# Patient Record
Sex: FEMALE | Race: WHITE | NOT HISPANIC OR LATINO | Employment: OTHER | URBAN - METROPOLITAN AREA
[De-identification: names, ages, dates, MRNs, and addresses within clinical notes are randomized per-mention and may not be internally consistent; named-entity substitution may affect disease eponyms.]

---

## 2017-01-04 ENCOUNTER — HOSPITAL ENCOUNTER (OUTPATIENT)
Dept: CARDIOLOGY | Facility: HOSPITAL | Age: 72
Setting detail: RECURRING SERIES
Discharge: HOME | End: 2017-01-04

## 2017-01-04 PROCEDURE — 36416 COLLJ CAPILLARY BLOOD SPEC: CPT | Performed by: INTERNAL MEDICINE

## 2017-01-04 PROCEDURE — 85610 PROTHROMBIN TIME: CPT | Performed by: INTERNAL MEDICINE

## 2017-01-06 ENCOUNTER — TELEPHONE (OUTPATIENT)
Dept: CARDIOLOGY | Facility: CLINIC | Age: 72
End: 2017-01-06

## 2017-01-06 NOTE — TELEPHONE ENCOUNTER
Zio patch results show constant atrial fibrillation.  She has a history of paroxysmal atrial fibrillation.  She also had an echocardiogram which showed some aortic stenosis.  Dr. Mina did review this.  He shouldn't is anticoagulated on warfarin and rate controlled on sotalol.  She has a follow-up appointment with Dr. Mina at [facility] on 1/18/2017.  I left a message for the patient to call me with the results of her Zio patch.

## 2017-01-06 NOTE — TELEPHONE ENCOUNTER
----- Message from LUIS ARMANDO Pollock sent at 12/13/2016  3:43 PM EST -----    Follow up on zio patch placed 12/7  Discuss with Dr. Mick Perez

## 2017-01-06 NOTE — TELEPHONE ENCOUNTER
The Zio Patch monitor was returned and scanned in on 12/19/16. I just spoke with Marcos and he is going to obtain the results.

## 2017-01-09 ENCOUNTER — TELEPHONE (OUTPATIENT)
Dept: CARDIOLOGY | Facility: CLINIC | Age: 72
End: 2017-01-09

## 2017-01-12 ENCOUNTER — TELEPHONE (OUTPATIENT)
Dept: CARDIOLOGY | Facility: CLINIC | Age: 72
End: 2017-01-12

## 2017-01-12 NOTE — TELEPHONE ENCOUNTER
Zio patch results:     Zio patch results show constant atrial fibrillation and average heart rate 70 beats per minute. She has a history of paroxysmal atrial fibrillation. She also had an echocardiogram which showed some aortic stenosis. Dr. Mina did review this. The patient anticoagulated on warfarin and rate controlled on sotalol.     She has a follow-up appointment with Dr. Mina at [facility] on 1/18/2017.     Patient was already given the results of the echocardiogram.  I just informed her about the Zio patch results.  She will follow-up with Dr. Mina as scheduled.

## 2017-01-18 ENCOUNTER — OFFICE VISIT (OUTPATIENT)
Dept: CARDIOLOGY | Facility: CLINIC | Age: 72
End: 2017-01-18

## 2017-01-18 VITALS
SYSTOLIC BLOOD PRESSURE: 124 MMHG | BODY MASS INDEX: 33.13 KG/M2 | DIASTOLIC BLOOD PRESSURE: 80 MMHG | HEIGHT: 62 IN | WEIGHT: 180 LBS | HEART RATE: 70 BPM

## 2017-01-18 DIAGNOSIS — Z95.0 PACEMAKER: ICD-10-CM

## 2017-01-18 DIAGNOSIS — Z95.2 AORTIC VALVE REPLACED: ICD-10-CM

## 2017-01-18 DIAGNOSIS — G47.33 OSA (OBSTRUCTIVE SLEEP APNEA): ICD-10-CM

## 2017-01-18 DIAGNOSIS — Z98.890 H/O MITRAL VALVE REPAIR: ICD-10-CM

## 2017-01-18 DIAGNOSIS — I48.20 CHRONIC ATRIAL FIBRILLATION (HCC): Primary | ICD-10-CM

## 2017-01-18 PROCEDURE — 93000 ELECTROCARDIOGRAM COMPLETE: CPT | Performed by: INTERNAL MEDICINE

## 2017-01-18 PROCEDURE — 99214 OFFICE O/P EST MOD 30 MIN: CPT | Performed by: INTERNAL MEDICINE

## 2017-01-18 RX ORDER — WARFARIN SODIUM 5 MG/1
5 TABLET ORAL
Qty: 90 TABLET | Refills: 0 | Status: SHIPPED | OUTPATIENT
Start: 2017-01-18 | End: 2017-03-10 | Stop reason: SDUPTHER

## 2017-01-18 RX ORDER — DILTIAZEM HYDROCHLORIDE 120 MG/1
120 CAPSULE, EXTENDED RELEASE ORAL DAILY
Qty: 90 CAPSULE | Refills: 3 | Status: SHIPPED | OUTPATIENT
Start: 2017-01-18 | End: 2017-09-01

## 2017-01-18 RX ORDER — WARFARIN SODIUM 5 MG/1
5 TABLET ORAL
COMMUNITY
End: 2017-01-18 | Stop reason: SDUPTHER

## 2017-01-18 NOTE — LETTER
January 18, 2017     Cain Dale MD  [address] [address]    Patient: Anna Gilbert   YOB: 1945   Date of Visit: 1/18/2017       Dear Dr. Suyapa MD:    Thank you for referring Anna Gilbert to me for evaluation. Below are the relevant portions of my assessment and plan of care.    If you have questions, please do not hesitate to call me. I look forward to following Anna along with you.         Sincerely,        Adeel Mina III, MD        CC: No Recipients  Adeel Mina III, MD  1/18/2017  1:03 PM  Signed      Subjective:     Encounter Date:01/18/2017      Patient ID: Anna Gilbert is a 71 y.o. female.    Chief Complaint:  History of Present Illness    Dear Dr. Dale,    This patient comes in for routine follow-up of their cardiac status.  Recently, she was seen in our device clinic for surveillance.  It was noted that she had some irregularity on interrogation.  Dr. Perez ordering echocardiogram and an event monitor.    Event Monitor  Event monitor enrollment date was 12/7/2016. Enrollment ended on 12/13/2016. The manufacturing company for the event monitor is [employer]. [employer] monitor is reviewed. A total of 6 days and 10 hours are available for review. Atrial fibrillation is present throughout. Average heart rate is 70 bpm. Maximal heart rate is approximately 150 bpm. Occasional wide complex beats are seen which appear to be atrial fibrillation with aberrancy. Minimal heart rate is atrial fibrillation at a rate of 39 bpm. No other ectopy is seen.    Impression: Abnormal monitor with atrial fibrillation present throughout. However, rate control is appropriate.     Interpretation Summary   · Left ventricular function is normal. Calculated EF = 53.8%. Estimated EF was in agreement with the calculated EF. Estimated EF = 54%. Normal left ventricular cavity size and wall thickness noted. All left ventricular wall segments contract normally.  Left ventricular diastolic function was unable to be assessed. Elevated left atrial pressure.  · Left atrial volume is severely increased.  · Right atrial cavity size is severely dilated.  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 The aortic valve peak and mean gradients are elevated. The prosthetic valve has the following abnormalities: stenosis. The mean aortic valve gradient was 28 mmHg which is moderately elevated for this valve and consistent with moderate prosthetic valve stenosis. 2-D imaging was limited and presence or absence of pannus and leaflet excursion could not be determined.  · Fixed posterior leaflet findings are consistent with surgical mitral valve repair. No mitral valve regurgitation is present. Mild mitral valve stenosis is present. 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 Mitral valve gradient was 7 mmHg at a heart rate of 53 beats minute.  · Tricuspid valve not well visualized. The tricuspid valve is grossly normal. Mild to moderate tricuspid valve regurgitation is present. Estimated right ventricular systolic pressure from tricuspid regurgitation is mildly elevated (35-45 mmHg). The calculated RV systolic pressure is 37 mmHg.  · Mild dilation of the sinuses of Valsalva is present. The aortic root at the sinus of Valsalva measures 4.1 cm.           Although the patient is in chronic atrial fibrillation at this time, she feels completely fine with no symptoms.  This patient denies any cardiac complaints.  This patient denies any chest pain, pressure, tightness, squeezing, or heartburn.  This patient has not experienced any feeling of palpitations, tachycardia or heart racing and no presyncope or syncope.  There has not been any problems with dizziness or lightheadedness.  There has not been any orthopnea or PND, and no problems with lower extremity edema.  This patient denies any shortness of breath at rest or with activity and has not had any  wheezing.  This patient has not had any problems with unexplained nausea or vomiting. The patient has continued to perform daily activities of living without any specific problem or change in the level of activity.  This patient has not been recently hospitalized for any reason.    The following portions of the patient's history were reviewed and updated as appropriate: allergies, current medications, past family history, past medical history, past social history, past surgical history and problem list.    Past Medical History   Diagnosis Date   • Health care maintenance    • Hyperlipidemia    • Hyperthyroidism    • SHEILA (obstructive sleep apnea) 7/20/2016   • PAF (paroxysmal atrial fibrillation)    • Sick sinus syndrome        Past Surgical History   Procedure Laterality Date   • A-v cardiac pacemaker insertion     • Aortic valve repair/replacement  2010     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   • Mitral valve repair  2010   • Cardiac catheterization  01/01/2011       Social History     Social History   • Marital status: Single     Spouse name: N/A   • Number of children: N/A   • Years of education: N/A     Occupational History   • Not on file.     Social History Main Topics   • Smoking status: Former Smoker   • Smokeless tobacco: Not on file      Comment: no caffeine use   • Alcohol use No   • Drug use: No   • Sexual activity: Defer     Other Topics Concern   • Not on file     Social History Narrative       Review of Systems   Constitution: Negative for chills, decreased appetite, fever and night sweats.   HENT: Negative for ear discharge, ear pain, hearing loss, nosebleeds and sore throat.    Eyes: Negative for blurred vision, double vision and pain.   Cardiovascular: Negative for cyanosis.   Respiratory: Negative for hemoptysis and sputum production.    Endocrine: Negative for cold intolerance and heat intolerance.   Hematologic/Lymphatic: Negative for adenopathy.   Skin: Negative for dry skin, itching, nail changes, rash  "and suspicious lesions.   Musculoskeletal: Negative for arthritis, gout, muscle cramps, muscle weakness, myalgias and neck pain.   Gastrointestinal: Negative for anorexia, bowel incontinence, constipation, diarrhea, dysphagia, hematemesis and jaundice.   Genitourinary: Negative for bladder incontinence, dysuria, flank pain, frequency, hematuria and nocturia.   Neurological: Negative for focal weakness, numbness, paresthesias and seizures.   Psychiatric/Behavioral: Negative for altered mental status, hallucinations, hypervigilance, suicidal ideas and thoughts of violence.   Allergic/Immunologic: Negative for persistent infections.         ECG 12 Lead  Date/Time: 1/18/2017 12:14 PM  Performed by: BELEN ROGERS III.  Authorized by: BELEN ROGERS III   Comparison: compared with previous ECG   Similar to previous ECG  Rhythm: atrial fibrillation  Rate: normal  Conduction: conduction normal  ST Segments: ST segments normal  T Waves: T waves normal  QRS axis: normal  Other: no other findings  Clinical impression: normal ECG               Objective:     Vitals:    01/18/17 1058   BP: 124/80   Pulse: 70   Weight: 180 lb (81.6 kg)   Height: 62\" (157.5 cm)         Physical Exam   Constitutional: She is oriented to person, place, and time. She appears well-developed and well-nourished. No distress.   HENT:   Head: Normocephalic and atraumatic.   Nose: Nose normal.   Mouth/Throat: Oropharynx is clear and moist.   Eyes: Conjunctivae and EOM are normal. Pupils are equal, round, and reactive to light. Right eye exhibits no discharge. Left eye exhibits no discharge.   Neck: Normal range of motion. Neck supple. No tracheal deviation present. No thyromegaly present.   Cardiovascular: Normal rate, S1 normal, S2 normal and normal pulses.  An irregularly irregular rhythm present. Exam reveals no S3.    Murmur heard.   Harsh midsystolic murmur is present with a grade of 1/6  at the upper right sternal border radiating to the " neck  Pulmonary/Chest: Effort normal and breath sounds normal. No stridor. No respiratory distress. She exhibits no tenderness.   Abdominal: Soft. Bowel sounds are normal. She exhibits no distension and no mass. There is no tenderness. There is no rebound and no guarding.   Musculoskeletal: Normal range of motion. She exhibits no tenderness or deformity.   Lymphadenopathy:     She has no cervical adenopathy.   Neurological: She is alert and oriented to person, place, and time. She has normal reflexes.   Skin: Skin is warm and dry. No rash noted. She is not diaphoretic. No erythema.   Psychiatric: She has a normal mood and affect. Thought content normal.       Lab Review:             Performed        Assessment:          Diagnosis Plan   1. Chronic atrial fibrillation  diltiazem XR (DILACOR XR) 120 MG 24 hr capsule    ECG 12 Lead   2. Aortic valve replaced  ECG 12 Lead   3. H/O mitral valve repair  ECG 12 Lead   4. SHEILA (obstructive sleep apnea)     5. Pacemaker  ECG 12 Lead          Plan:       Patient is in chronic atrial fibrillation at this point.  She is asymptomatic.  We will accept a rate control in adequate relation strategy.  I will change her sotalol to diltiazem-she previously had generalized fatigue on a higher dose of the sotalol, apparently due to the beta-blockade component of the sotalol.    Thank you very much for allowing us to participate in the care of this pleasant patient.  Please don't hesitate to call if I can be of assistance in any way.      Current Outpatient Prescriptions:   •  Ascorbic Acid (VITAMIN C PO), Take 1,000 mg by mouth Daily., Disp: , Rfl:   •  b complex vitamins capsule, Take 1 capsule by mouth Daily., Disp: , Rfl:   •  bumetanide (BUMEX) 2 MG tablet, Take 1 tablet by mouth 2 (Two) Times a Day As Needed (edema)., Disp: , Rfl:   •  Cholecalciferol (VITAMIN D-3 PO), Take 1 tablet by mouth Daily., Disp: , Rfl:   •  fluticasone (FLONASE) 50 MCG/ACT nasal spray, 1 spray into each  nostril Daily., Disp: , Rfl:   •  levothyroxine (SYNTHROID, LEVOTHROID) 175 MCG tablet, Take 175 mcg by mouth daily., Disp: , Rfl:   •  Multiple Vitamin (MULTIVITAMIN) capsule, Take 1 capsule by mouth Daily., Disp: , Rfl:   •  Potassium 99 MG tablet, Take 1 tablet by mouth Daily., Disp: , Rfl:   •  SELENIUM PO, Take 1 tablet by mouth Daily., Disp: , Rfl:   •  warfarin (JANTOVEN) 5 MG tablet, Take 5 mg by mouth Daily., Disp: , Rfl:   •  diltiazem XR (DILACOR XR) 120 MG 24 hr capsule, Take 1 capsule by mouth Daily., Disp: 90 capsule, Rfl: 3      Atrial Fibrillation and Atrial Flutter  Assessment  • The patient has permanent atrial fibrillation  • This is valvular in etiology  • The patient's CHADS2-VASc score is 3  • A GTY1TS2-FBVz score of 2 or more is considered a high risk for a thromboembolic event  • Warfarin prescribed    Plan  • Continue in atrial fibrillation with rate control  • Continue warfarin for antithrombotic therapy, bleeding issues discussed  • Add diltiazem for rate control      EMR Dragon/Transcription disclaimer:    Much of this encounter note is an electronic transcription/translation of spoken language to printed text. The electronic translation of spoken language may permit erroneous, or at times, nonsensical words or phrases to be inadvertently transcribed; Although I have reviewed the note for such errors, some may still exist.

## 2017-01-18 NOTE — MR AVS SNAPSHOT
Anna Gilbert   1/18/2017 10:45 AM   Office Visit    Dept Phone:  [phone]   Encounter #:  [id number]    Provider:  Adeel Mina III, MD   Department:  [facility] CARDIOLOGY                Your Full Care Plan              Today's Medication Changes          These changes are accurate as of: 1/18/17 11:19 AM.  If you have any questions, ask your nurse or doctor.               New Medication(s)Ordered:     diltiazem  MG 24 hr capsule   Commonly known as:  DILACOR XR   Take 1 capsule by mouth Daily.   Started by:  Adeel Mina III, MD         Medication(s)that have changed:     JANTOVEN 5 MG tablet   Generic drug:  warfarin   Take 5 mg by mouth Daily.   What changed:  Another medication with the same name was removed. Continue taking this medication, and follow the directions you see here.   Changed by:  Adeel Mina III, MD         Stop taking medication(s)listed here:     sotalol 80 MG tablet   Commonly known as:  BETAPACE   Stopped by:  Adeel Mina III, MD                Where to Get Your Medications      You can get these medications from any pharmacy     Bring a paper prescription for each of these medications     diltiazem  MG 24 hr capsule                  Your Updated Medication List          This list is accurate as of: 1/18/17 11:19 AM.  Always use your most recent med list.                b complex vitamins capsule       bumetanide 2 MG tablet   Commonly known as:  BUMEX       diltiazem  MG 24 hr capsule   Commonly known as:  DILACOR XR   Take 1 capsule by mouth Daily.       fluticasone 50 MCG/ACT nasal spray   Commonly known as:  FLONASE       JANTOVEN 5 MG tablet   Generic drug:  warfarin       levothyroxine 175 MCG tablet   Commonly known as:  SYNTHROID, LEVOTHROID       multivitamin capsule       Potassium 99 MG tablet       SELENIUM PO       VITAMIN C PO       VITAMIN D-3 PO               You Were Diagnosed With        Codes Comments    Chronic atrial fibrillation    -  Primary ICD-10-CM: I48.2  ICD-9-CM: 427.31       Instructions    Heart Disease Prevention  Heart disease is a leading cause of death. There are many things you can do to help prevent heart disease.  BE PHYSICALLY ACTIVE  Physical activity is good for your heart. It helps control your blood pressure, cholesterol levels, and weight. Try to be physically active every day. Ask your health care provider what activities are best for you.   BE A HEALTHY WEIGHT  Extra weight can strain your heart and affect your blood pressure and cholesterol levels. Lose weight with diet and exercise if recommended by your health care provider.  EAT HEART-HEALTHY FOODS  Follow a healthy eating plan as recommended by your health care provider or dietitian. Heart-healthy foods include:   · High-fiber foods. These include oat bran, oatmeal, and whole-grain breads and cereals.  · Fruits and vegetables.  Avoid:  · Alcohol.  · Fried foods.  · Foods high in saturated fat. These include meats, butter, whole dairy products, shortening, and coconut or palm oil.  · Salty foods. These include canned food, luncheon meat, salty snacks, and fast food.  KEEP YOUR CHOLESTEROL LEVELS UNDER CONTROL  Cholesterol is a substance that is used for many important functions. When your cholesterol levels are high, cholesterol can stick to the insides of your blood vessels, making them narrow or clog. This can lead to chest pain (angina) and a heart attack.   Keep your cholesterol levels under control as recommended by your health care provider. Have your cholesterol checked at least once a year. Target cholesterol levels (in mg/dL) for most people are:   · Total cholesterol below 200.  · LDL cholesterol below 100.  · HDL cholesterol above 40 in men and above 50 in women.  · Triglycerides below 150.  KEEP YOUR BLOOD PRESSURE UNDER CONTROL  Having high blood pressure (hypertension) puts you at risk for stroke and  other forms of heart disease. Keep your blood pressure under control as recommended by your health care provider. Ask your health care provider if you need treatment to lower your blood pressure. If you are 18-39 years of age, have your blood pressure checked every 3-5 years. If you are 40 years of age or older, have your blood pressure checked every year.  DO NOT USE TOBACCO PRODUCTS  Tobacco smoke can damage your heart and blood vessels. Do not use any tobacco products including cigarettes, chewing tobacco, or electronic cigarettes. If you need help quitting, ask your health care provider.  TAKE MEDICINES AS DIRECTED  Take medicines only as directed by your health care provider. Ask your health care provider whether you should take an aspirin every day. Taking aspirin can help reduce your risk of heart disease and stroke.   FOR MORE INFORMATION   To find out more about heart disease, visit the American Heart Association's website at www.americanheart.org     This information is not intended to replace advice given to you by your health care provider. Make sure you discuss any questions you have with your health care provider.     Document Released: 2005 Document Revised: 2016 Document Reviewed: 2015  [employer] Interactive Patient Education © Elsevier Inc.       Patient Instructions History      Upcoming Appointments     Visit Type Date Time Department    FOLLOW UP 2017 10:45 AM [facility]    FOLLOW UP 2018 10:30 AM [facility]      [employer] Signup     [employer] allows you to send messages to your doctor, view your test results, renew your prescriptions, schedule appointments, and more. To sign up, go to [employer] and click on the Sign Up Now link in the New User? box. Enter your [employer] Activation Code exactly as it appears below along with the last four digits of your Social Security Number and your Date of Birth () to complete the  "sign-up process. If you do not sign up before the expiration date, you must request a new code.    [employer] Activation Code: [email or web address]  Expires: 2/1/2017 11:19 AM    If you have questions, you can email [email or web address] or call [phone] to talk to our [employer] staff. Remember, [employer] is NOT to be used for urgent needs. For medical emergencies, dial 911.               Other Info from Your Visit           Your Appointments     Jan 18, 2018 10:30 AM EST   Follow Up with Adeel Mina III, MD   [facility] CARDIOLOGY (--)    [address] [address] [phone]           Arrive 15 minutes prior to appointment.              Allergies     Gabapentin        Vital Signs     Blood Pressure Pulse Height Weight Body Mass Index Smoking Status    124/80 70 62\" (157.5 cm) 180 lb (81.6 kg) 32.92 kg/m2 Former Smoker      Problems and Diagnoses Noted     Atrial fibrillation (irregular heartbeat)    -  Primary        "

## 2017-01-18 NOTE — PATIENT INSTRUCTIONS
Heart Disease Prevention  Heart disease is a leading cause of death. There are many things you can do to help prevent heart disease.  BE PHYSICALLY ACTIVE  Physical activity is good for your heart. It helps control your blood pressure, cholesterol levels, and weight. Try to be physically active every day. Ask your health care provider what activities are best for you.   BE A HEALTHY WEIGHT  Extra weight can strain your heart and affect your blood pressure and cholesterol levels. Lose weight with diet and exercise if recommended by your health care provider.  EAT HEART-HEALTHY FOODS  Follow a healthy eating plan as recommended by your health care provider or dietitian. Heart-healthy foods include:   · High-fiber foods. These include oat bran, oatmeal, and whole-grain breads and cereals.  · Fruits and vegetables.  Avoid:  · Alcohol.  · Fried foods.  · Foods high in saturated fat. These include meats, butter, whole dairy products, shortening, and coconut or palm oil.  · Salty foods. These include canned food, luncheon meat, salty snacks, and fast food.  KEEP YOUR CHOLESTEROL LEVELS UNDER CONTROL  Cholesterol is a substance that is used for many important functions. When your cholesterol levels are high, cholesterol can stick to the insides of your blood vessels, making them narrow or clog. This can lead to chest pain (angina) and a heart attack.   Keep your cholesterol levels under control as recommended by your health care provider. Have your cholesterol checked at least once a year. Target cholesterol levels (in mg/dL) for most people are:   · Total cholesterol below 200.  · LDL cholesterol below 100.  · HDL cholesterol above 40 in men and above 50 in women.  · Triglycerides below 150.  KEEP YOUR BLOOD PRESSURE UNDER CONTROL  Having high blood pressure (hypertension) puts you at risk for stroke and other forms of heart disease. Keep your blood pressure under control as recommended by your health care provider. Ask  your health care provider if you need treatment to lower your blood pressure. If you are 18-39 years of age, have your blood pressure checked every 3-5 years. If you are 40 years of age or older, have your blood pressure checked every year.  DO NOT USE TOBACCO PRODUCTS  Tobacco smoke can damage your heart and blood vessels. Do not use any tobacco products including cigarettes, chewing tobacco, or electronic cigarettes. If you need help quitting, ask your health care provider.  TAKE MEDICINES AS DIRECTED  Take medicines only as directed by your health care provider. Ask your health care provider whether you should take an aspirin every day. Taking aspirin can help reduce your risk of heart disease and stroke.   FOR MORE INFORMATION   To find out more about heart disease, visit the American Heart Association's website at www.americanheart.org     This information is not intended to replace advice given to you by your health care provider. Make sure you discuss any questions you have with your health care provider.     Document Released: 08/01/2005 Document Revised: 01/08/2016 Document Reviewed: 02/11/2015  Elsevier Interactive Patient Education ©2016 Elsevier Inc.

## 2017-01-31 ENCOUNTER — HOSPITAL ENCOUNTER (OUTPATIENT)
Dept: CARDIOLOGY | Facility: HOSPITAL | Age: 72
Setting detail: RECURRING SERIES
Discharge: HOME | End: 2017-01-31

## 2017-01-31 PROCEDURE — 36416 COLLJ CAPILLARY BLOOD SPEC: CPT | Performed by: INTERNAL MEDICINE

## 2017-01-31 PROCEDURE — 85610 PROTHROMBIN TIME: CPT | Performed by: INTERNAL MEDICINE

## 2017-02-09 ENCOUNTER — TELEPHONE (OUTPATIENT)
Dept: CARDIOLOGY | Facility: CLINIC | Age: 72
End: 2017-02-09

## 2017-02-09 NOTE — TELEPHONE ENCOUNTER
Pt is requesting us to send in a prescription of courtney XT 120MG in to [facility]. I do not see that on her medication list. Is it ok to fill? Please Advise      Thanks  Nona

## 2017-03-08 ENCOUNTER — HOSPITAL ENCOUNTER (OUTPATIENT)
Dept: CARDIOLOGY | Facility: HOSPITAL | Age: 72
Setting detail: RECURRING SERIES
Discharge: HOME | End: 2017-03-08

## 2017-03-08 PROCEDURE — 36416 COLLJ CAPILLARY BLOOD SPEC: CPT

## 2017-03-08 PROCEDURE — 85610 PROTHROMBIN TIME: CPT

## 2017-03-10 RX ORDER — WARFARIN SODIUM 5 MG/1
5 TABLET ORAL
Qty: 90 TABLET | Refills: 0 | Status: SHIPPED | OUTPATIENT
Start: 2017-03-10 | End: 2017-03-20 | Stop reason: SDUPTHER

## 2017-03-20 RX ORDER — WARFARIN SODIUM 5 MG/1
TABLET ORAL
Qty: 90 TABLET | Refills: 0 | Status: SHIPPED | OUTPATIENT
Start: 2017-03-20 | End: 2017-03-20 | Stop reason: SDUPTHER

## 2017-03-20 RX ORDER — WARFARIN SODIUM 5 MG/1
5 TABLET ORAL
Qty: 90 TABLET | Refills: 0 | Status: SHIPPED | OUTPATIENT
Start: 2017-03-20 | End: 2017-05-22 | Stop reason: SDUPTHER

## 2017-03-22 ENCOUNTER — APPOINTMENT (OUTPATIENT)
Dept: GENERAL RADIOLOGY | Facility: HOSPITAL | Age: 72
End: 2017-03-22

## 2017-03-22 ENCOUNTER — HOSPITAL ENCOUNTER (EMERGENCY)
Facility: HOSPITAL | Age: 72
Discharge: HOME | End: 2017-03-22
Attending: EMERGENCY MEDICINE | Admitting: EMERGENCY MEDICINE

## 2017-03-22 VITALS
TEMPERATURE: 98.4 F | OXYGEN SATURATION: 97 % | HEIGHT: 62 IN | DIASTOLIC BLOOD PRESSURE: 76 MMHG | SYSTOLIC BLOOD PRESSURE: 150 MMHG | HEART RATE: 73 BPM | RESPIRATION RATE: 18 BRPM | BODY MASS INDEX: 33.13 KG/M2 | WEIGHT: 180 LBS

## 2017-03-22 DIAGNOSIS — J20.9 ACUTE BRONCHITIS, UNSPECIFIED ORGANISM: Primary | ICD-10-CM

## 2017-03-22 DIAGNOSIS — S29.011A CHEST WALL MUSCLE STRAIN, INITIAL ENCOUNTER: ICD-10-CM

## 2017-03-22 PROCEDURE — 99282 EMERGENCY DEPT VISIT SF MDM: CPT

## 2017-03-22 PROCEDURE — 99284 EMERGENCY DEPT VISIT MOD MDM: CPT | Performed by: EMERGENCY MEDICINE

## 2017-03-22 PROCEDURE — 71020 HC CHEST PA AND LATERAL: CPT

## 2017-03-22 RX ORDER — AZITHROMYCIN 250 MG/1
TABLET, FILM COATED ORAL
Qty: 6 TABLET | Refills: 0 | Status: SHIPPED | OUTPATIENT
Start: 2017-03-22 | End: 2017-08-30

## 2017-03-22 NOTE — ED PROVIDER NOTES
Subjective     History provided by:  Patient    History of Present Illness    · Chief complaint: Cough and swelling on left side    · Location: Left lateral side over the left lower ribs laterally    · Quality/Severity: Severe cough productive of clear sputum.  Swelling and soreness on left side.    · Timing/Onset: Cough sputum present for about 8 days.  Swelling and soreness on the left lateral side as been present for 2 days.    · Modifying Factors: The cough exacerbates the swelling and pain on the left side.  The patient took amoxicillin for 7 days which had no effect on the cough.    · Associated symptoms: She reports a clear watery runny nose.  She denies any fever or shortness of breath.    · Narrative: The patient is a 71-year-old white female who has had an 8 day history of a cough productive of clear sputum and a runny nose.  She describes the cough as being severe.  The patient reports a swelling and pain on the left lateral side over her left lateral lower ribs for the last 2 days and wonders if she has a hernia.  She contacted her PCP Dr. nia miner and amoxicillin a week ago which had no effect on her cough.    ED Triage Vitals   Temp Heart Rate Resp BP SpO2   03/22/17 0730 03/22/17 0730 03/22/17 0730 03/22/17 0730 03/22/17 0730   98.4 °F (36.9 °C) 73 18 150/76 97 %      Temp src Heart Rate Source Patient Position BP Location FiO2 (%)   -- -- -- -- --              Review of Systems   Constitutional: Negative for activity change, appetite change, chills, diaphoresis, fatigue and fever.   HENT: Positive for rhinorrhea. Negative for congestion, dental problem, ear pain, hearing loss, mouth sores, postnasal drip, sinus pressure, sore throat and voice change.    Eyes: Negative for photophobia, pain, discharge, redness and visual disturbance.   Respiratory: Positive for choking. Negative for cough, chest tightness, shortness of breath, wheezing and stridor.    Cardiovascular: Negative for chest pain,  palpitations and leg swelling.   Gastrointestinal: Negative for abdominal pain, diarrhea, nausea and vomiting.   Genitourinary: Negative for difficulty urinating, dysuria, flank pain, frequency, hematuria and urgency.   Musculoskeletal: Negative for arthralgias, back pain, gait problem, joint swelling, myalgias, neck pain and neck stiffness.        Left-sided pain   Skin: Negative for color change and rash.   Neurological: Negative for dizziness, tremors, seizures, syncope, facial asymmetry, speech difficulty, weakness, light-headedness, numbness and headaches.   Hematological: Negative for adenopathy.   Psychiatric/Behavioral: Negative.  Negative for confusion and decreased concentration. The patient is not nervous/anxious.        Past Medical History:   Diagnosis Date   • Coronary artery disease    • Health care maintenance    • Hyperlipidemia    • Hyperthyroidism    • SHEILA (obstructive sleep apnea) 7/20/2016   • PAF (paroxysmal atrial fibrillation)    • Sick sinus syndrome        Allergies   Allergen Reactions   • Gabapentin        Past Surgical History:   Procedure Laterality Date   • A-V CARDIAC PACEMAKER INSERTION     • AORTIC VALVE REPAIR/REPLACEMENT  2010    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   • CARDIAC CATHETERIZATION  01/01/2011   • MITRAL VALVE REPAIR/REPLACEMENT  2010       Family History   Problem Relation Age of Onset   • Coronary artery disease Father    • No Known Problems Mother        Social History     Social History   • Marital status: Single     Spouse name: N/A   • Number of children: N/A   • Years of education: N/A     Social History Main Topics   • Smoking status: Former Smoker   • Smokeless tobacco: None      Comment: no caffeine use   • Alcohol use No   • Drug use: No   • Sexual activity: Defer     Other Topics Concern   • None     Social History Narrative   • None           Objective   Physical Exam   Constitutional: She is oriented to person, place, and time. She appears well-developed and  well-nourished. No distress.   HENT:   Head: Normocephalic and atraumatic.   Nose: Nose normal.   Mouth/Throat: Oropharynx is clear and moist. No oropharyngeal exudate.   Eyes: EOM are normal. Pupils are equal, round, and reactive to light. Right eye exhibits no discharge. Left eye exhibits no discharge. No scleral icterus.   Neck: Normal range of motion. Neck supple. No JVD present. No thyromegaly present.   Cardiovascular: Normal rate and regular rhythm.    Murmur (2/6 holosystolic) heard.  Pulmonary/Chest: Effort normal and breath sounds normal. She has no wheezes. She has no rales. She exhibits tenderness (left lateral lower ribs and overlying soft tissue).   There is no fracture crepitance or subcutaneous air   Abdominal: Soft. Bowel sounds are normal. She exhibits no distension. There is no tenderness.   Musculoskeletal: Normal range of motion. She exhibits no edema, tenderness or deformity.   Lymphadenopathy:     She has no cervical adenopathy.   Neurological: She is alert and oriented to person, place, and time. No cranial nerve deficit. Coordination normal.   No focal motor sensory deficit   Skin: Skin is warm and dry. No rash noted. She is not diaphoretic.   Psychiatric: She has a normal mood and affect. Her behavior is normal. Judgment and thought content normal.   Nursing note and vitals reviewed.      Procedures         ED Course  ED Course   Comment By Time   Jorge Report [id number] is blank James Mobley MD 03/22 0801                  MDM  Number of Diagnoses or Management Options  Acute bronchitis, unspecified organism: new and requires workup  Chest wall muscle strain, initial encounter: new and requires workup     Amount and/or Complexity of Data Reviewed  Tests in the radiology section of CPT®: ordered and reviewed  Independent visualization of images, tracings, or specimens: yes    Risk of Complications, Morbidity, and/or Mortality  Presenting problems: moderate  Diagnostic procedures:  moderate  Management options: moderate    Patient Progress  Patient progress: stable      Final diagnoses:   Acute bronchitis, unspecified organism   Chest wall muscle strain, initial encounter           Labs Reviewed - No data to display  XR Chest 2 View   ED Interpretation   No acute disease.  Normal lung fields.  No rib fracture seen.    Status post CABG with AICD see in place.      Final Result   1. No acute chest findings. No acute displaced left rib fracture is   seen.   2. Chronic thoracolumbar junction compression deformities.   3. Stable cardiomegaly with CABG changes.       This report was finalized on 3/22/2017 8:35 AM by Dr. Sharla Muhammad MD.                 Medication List      New Prescriptions          azithromycin 250 MG tablet   Commonly known as:  ZITHROMAX Z-STEPHON   Take 2 tablets the first day, then 1 tablet daily for 4 days.       HYDROcodone-homatropine 5-1.5 MG/5ML syrup   Commonly known as:  HYCODAN   1 - 2 tsp po q 6 hours PRN cough                James Mobley MD  03/22/17 5458

## 2017-03-28 ENCOUNTER — CLINICAL SUPPORT NO REQUIREMENTS (OUTPATIENT)
Dept: CARDIOLOGY | Facility: CLINIC | Age: 72
End: 2017-03-28

## 2017-03-28 DIAGNOSIS — I42.9 CARDIOMYOPATHY (HCC): Primary | ICD-10-CM

## 2017-03-28 PROCEDURE — 93282 PRGRMG EVAL IMPLANTABLE DFB: CPT | Performed by: INTERNAL MEDICINE

## 2017-03-28 PROCEDURE — 93290 INTERROG DEV EVAL ICPMS IP: CPT | Performed by: INTERNAL MEDICINE

## 2017-04-05 ENCOUNTER — HOSPITAL ENCOUNTER (OUTPATIENT)
Dept: CARDIOLOGY | Facility: HOSPITAL | Age: 72
Setting detail: RECURRING SERIES
Discharge: HOME | End: 2017-04-05

## 2017-04-05 PROCEDURE — 36416 COLLJ CAPILLARY BLOOD SPEC: CPT

## 2017-04-05 PROCEDURE — 85610 PROTHROMBIN TIME: CPT

## 2017-05-03 ENCOUNTER — HOSPITAL ENCOUNTER (OUTPATIENT)
Dept: CARDIOLOGY | Facility: HOSPITAL | Age: 72
Setting detail: RECURRING SERIES
Discharge: HOME | End: 2017-05-03

## 2017-05-03 PROCEDURE — 36416 COLLJ CAPILLARY BLOOD SPEC: CPT

## 2017-05-03 PROCEDURE — 85610 PROTHROMBIN TIME: CPT

## 2017-05-22 RX ORDER — WARFARIN SODIUM 5 MG/1
TABLET ORAL
Qty: 90 TABLET | Refills: 0 | Status: SHIPPED | OUTPATIENT
Start: 2017-05-22 | End: 2017-09-21 | Stop reason: SDUPTHER

## 2017-06-01 ENCOUNTER — HOSPITAL ENCOUNTER (OUTPATIENT)
Dept: CARDIOLOGY | Facility: HOSPITAL | Age: 72
Setting detail: RECURRING SERIES
Discharge: HOME | End: 2017-06-01

## 2017-06-01 PROCEDURE — 85610 PROTHROMBIN TIME: CPT

## 2017-06-01 PROCEDURE — 36416 COLLJ CAPILLARY BLOOD SPEC: CPT

## 2017-07-05 ENCOUNTER — HOSPITAL ENCOUNTER (OUTPATIENT)
Dept: CARDIOLOGY | Facility: HOSPITAL | Age: 72
Setting detail: RECURRING SERIES
Discharge: HOME | End: 2017-07-05

## 2017-07-05 PROCEDURE — 36416 COLLJ CAPILLARY BLOOD SPEC: CPT

## 2017-07-05 PROCEDURE — 85610 PROTHROMBIN TIME: CPT

## 2017-08-03 ENCOUNTER — CLINICAL SUPPORT NO REQUIREMENTS (OUTPATIENT)
Dept: CARDIOLOGY | Facility: CLINIC | Age: 72
End: 2017-08-03

## 2017-08-03 DIAGNOSIS — I42.9 CARDIOMYOPATHY (HCC): Primary | ICD-10-CM

## 2017-08-03 PROCEDURE — 93297 REM INTERROG DEV EVAL ICPMS: CPT | Performed by: INTERNAL MEDICINE

## 2017-08-03 PROCEDURE — 93296 REM INTERROG EVL PM/IDS: CPT | Performed by: INTERNAL MEDICINE

## 2017-08-03 PROCEDURE — 93295 DEV INTERROG REMOTE 1/2/MLT: CPT | Performed by: INTERNAL MEDICINE

## 2017-08-21 ENCOUNTER — HOSPITAL ENCOUNTER (OUTPATIENT)
Dept: CARDIOLOGY | Facility: HOSPITAL | Age: 72
Setting detail: RECURRING SERIES
Discharge: HOME | End: 2017-08-21

## 2017-08-21 ENCOUNTER — TELEPHONE (OUTPATIENT)
Dept: CARDIOLOGY | Facility: CLINIC | Age: 72
End: 2017-08-21

## 2017-08-21 PROCEDURE — 36416 COLLJ CAPILLARY BLOOD SPEC: CPT

## 2017-08-21 PROCEDURE — 85610 PROTHROMBIN TIME: CPT

## 2017-08-21 NOTE — TELEPHONE ENCOUNTER
Can you help me with this? His next appt for Sari is 9/7 and main office is 9/1. She may not want to wait that long. Thank you.

## 2017-08-21 NOTE — TELEPHONE ENCOUNTER
Pt called to report that she is still having increased swelling with her taking bumex 2mg bid. She said that she looks like she's pregnant. She would like to speak to you regarding changing her meds. Pls advise or call [phone].

## 2017-08-28 ENCOUNTER — HOSPITAL ENCOUNTER (OUTPATIENT)
Dept: CARDIOLOGY | Facility: HOSPITAL | Age: 72
Setting detail: RECURRING SERIES
Discharge: HOME | End: 2017-08-28

## 2017-08-28 PROCEDURE — 36416 COLLJ CAPILLARY BLOOD SPEC: CPT

## 2017-08-28 PROCEDURE — 85610 PROTHROMBIN TIME: CPT

## 2017-08-30 ENCOUNTER — OFFICE VISIT (OUTPATIENT)
Dept: CARDIOLOGY | Facility: CLINIC | Age: 72
End: 2017-08-30

## 2017-08-30 VITALS
HEART RATE: 82 BPM | BODY MASS INDEX: 33.86 KG/M2 | DIASTOLIC BLOOD PRESSURE: 80 MMHG | HEIGHT: 62 IN | SYSTOLIC BLOOD PRESSURE: 144 MMHG | WEIGHT: 184 LBS

## 2017-08-30 DIAGNOSIS — Z95.2 AORTIC VALVE REPLACED: Chronic | ICD-10-CM

## 2017-08-30 DIAGNOSIS — Z98.890 H/O MITRAL VALVE REPAIR: Chronic | ICD-10-CM

## 2017-08-30 DIAGNOSIS — I48.20 CHRONIC ATRIAL FIBRILLATION (HCC): Chronic | ICD-10-CM

## 2017-08-30 DIAGNOSIS — R06.02 SOB (SHORTNESS OF BREATH): ICD-10-CM

## 2017-08-30 DIAGNOSIS — Z95.0 PACEMAKER: Chronic | ICD-10-CM

## 2017-08-30 DIAGNOSIS — M79.89 SWELLING OF BOTH LOWER EXTREMITIES: Primary | ICD-10-CM

## 2017-08-30 PROCEDURE — 93000 ELECTROCARDIOGRAM COMPLETE: CPT | Performed by: INTERNAL MEDICINE

## 2017-08-30 PROCEDURE — 99214 OFFICE O/P EST MOD 30 MIN: CPT | Performed by: INTERNAL MEDICINE

## 2017-08-30 NOTE — PROGRESS NOTES
Subjective:     Encounter Date:08/30/2017      Patient ID: Anna Gilbert is a 72 y.o. female.    Chief Complaint:  History of Present Illness    Dear Dr. Isaac,    This patient comes in for routine follow-up of their cardiac status.  This summer she's been having recurring problems and consistent problems with lower extremity edema.  She states that when she first wakes up she has no swelling but as the day goes on he gets worse.  It's worse when she is outside in the heat or when it is hot or day.  She felt a little short of breath.  No orthopnea or PND.  She has not had any sensation of palpitations or heart racing.    Event Monitor  Event monitor enrollment date was 12/7/2016. Enrollment ended on 12/13/2016. The manufacturing company for the event monitor is [employer]. [employer] monitor is reviewed. A total of 6 days and 10 hours are available for review. Atrial fibrillation is present throughout. Average heart rate is 70 bpm. Maximal heart rate is approximately 150 bpm. Occasional wide complex beats are seen which appear to be atrial fibrillation with aberrancy. Minimal heart rate is atrial fibrillation at a rate of 39 bpm. No other ectopy is seen.    Impression: Abnormal monitor with atrial fibrillation present throughout. However, rate control is appropriate.     Interpretation Summary   · Left ventricular function is normal. Calculated EF = 53.8%. Estimated EF was in agreement with the calculated EF. Estimated EF = 54%. Normal left ventricular cavity size and wall thickness noted. All left ventricular wall segments contract normally. Left ventricular diastolic function was unable to be assessed. Elevated left atrial pressure.  · Left atrial volume is severely increased.  · Right atrial cavity size is severely dilated.  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 The aortic valve peak and mean gradients are elevated. The prosthetic valve has the following abnormalities:  stenosis. The mean aortic valve gradient was 28 mmHg which is moderately elevated for this valve and consistent with moderate prosthetic valve stenosis. 2-D imaging was limited and presence or absence of pannus and leaflet excursion could not be determined.  · Fixed posterior leaflet findings are consistent with surgical mitral valve repair. No mitral valve regurgitation is present. Mild mitral valve stenosis is present. 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 Mitral valve gradient was 7 mmHg at a heart rate of 53 beats minute.  · Tricuspid valve not well visualized. The tricuspid valve is grossly normal. Mild to moderate tricuspid valve regurgitation is present. Estimated right ventricular systolic pressure from tricuspid regurgitation is mildly elevated (35-45 mmHg). The calculated RV systolic pressure is 37 mmHg.  · Mild dilation of the sinuses of Valsalva is present. The aortic root at the sinus of Valsalva measures 4.1 cm.           Although the patient is in chronic atrial fibrillation at this time, she feels completely fine with no symptoms.  This patient denies any cardiac complaints.  This patient denies any chest pain, pressure, tightness, squeezing, or heartburn.  This patient has not experienced any feeling of palpitations, tachycardia or heart racing and no presyncope or syncope.  There has not been any problems with dizziness or lightheadedness.  There has not been any orthopnea or PND, and no problems with lower extremity edema.  This patient denies any shortness of breath at rest or with activity and has not had any wheezing.  This patient has not had any problems with unexplained nausea or vomiting. The patient has continued to perform daily activities of living without any specific problem or change in the level of activity.  This patient has not been recently hospitalized for any reason.    The following portions of the patient's history were reviewed and updated as appropriate: allergies,  current medications, past family history, past medical history, past social history, past surgical history and problem list.    Past Medical History:   Diagnosis Date   • Coronary artery disease    • Health care maintenance    • Hyperlipidemia    • Hyperthyroidism    • SHEILA (obstructive sleep apnea) 7/20/2016   • PAF (paroxysmal atrial fibrillation)    • Sick sinus syndrome        Past Surgical History:   Procedure Laterality Date   • A-V CARDIAC PACEMAKER INSERTION     • AORTIC VALVE REPAIR/REPLACEMENT  2010    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   • CARDIAC CATHETERIZATION  01/01/2011   • MITRAL VALVE REPAIR/REPLACEMENT  2010       Social History     Social History   • Marital status: Single     Spouse name: N/A   • Number of children: N/A   • Years of education: N/A     Occupational History   • Not on file.     Social History Main Topics   • Smoking status: Former Smoker   • Smokeless tobacco: Not on file      Comment: no caffeine use   • Alcohol use No   • Drug use: No   • Sexual activity: Defer     Other Topics Concern   • Not on file     Social History Narrative       Review of Systems   Constitution: Negative for chills, decreased appetite, fever and night sweats.   HENT: Negative for ear discharge, ear pain, hearing loss, nosebleeds and sore throat.    Eyes: Negative for blurred vision, double vision and pain.   Cardiovascular: Negative for cyanosis.   Respiratory: Negative for hemoptysis and sputum production.    Endocrine: Negative for cold intolerance and heat intolerance.   Hematologic/Lymphatic: Negative for adenopathy.   Skin: Negative for dry skin, itching, nail changes, rash and suspicious lesions.   Musculoskeletal: Negative for arthritis, gout, muscle cramps, muscle weakness, myalgias and neck pain.   Gastrointestinal: Negative for anorexia, bowel incontinence, constipation, diarrhea, dysphagia, hematemesis and jaundice.   Genitourinary: Negative for bladder incontinence, dysuria, flank pain, frequency,  "hematuria and nocturia.   Neurological: Negative for focal weakness, numbness, paresthesias and seizures.   Psychiatric/Behavioral: Negative for altered mental status, hallucinations, hypervigilance, suicidal ideas and thoughts of violence.   Allergic/Immunologic: Negative for persistent infections.         ECG 12 Lead  Date/Time: 8/30/2017 1:55 PM  Performed by: BELEN ROGERS III  Authorized by: BELEN ROGERS III   Comparison: compared with previous ECG   Similar to previous ECG  Rhythm: atrial fibrillation  Rate: normal  Conduction: conduction normal  ST Segments: ST segments normal  T Waves: T waves normal  QRS axis: normal  Other: no other findings  Clinical impression: abnormal ECG               Objective:     Vitals:    08/30/17 1252   BP: 144/80   Pulse: 82   Weight: 184 lb (83.5 kg)   Height: 62\" (157.5 cm)         Physical Exam   Constitutional: She is oriented to person, place, and time. She appears well-developed and well-nourished. No distress.   HENT:   Head: Normocephalic and atraumatic.   Nose: Nose normal.   Mouth/Throat: Oropharynx is clear and moist.   Eyes: Conjunctivae and EOM are normal. Pupils are equal, round, and reactive to light. Right eye exhibits no discharge. Left eye exhibits no discharge.   Neck: Normal range of motion. Neck supple. No tracheal deviation present. No thyromegaly present.   Cardiovascular: Normal rate, S1 normal, S2 normal and normal pulses.  An irregularly irregular rhythm present. Exam reveals no S3.    Murmur heard.   Harsh midsystolic murmur is present with a grade of 1/6  at the upper right sternal border radiating to the neck  Pulmonary/Chest: Effort normal and breath sounds normal. No stridor. No respiratory distress. She exhibits no tenderness.   Abdominal: Soft. Bowel sounds are normal. She exhibits no distension and no mass. There is no tenderness. There is no rebound and no guarding.   Musculoskeletal: Normal range of motion. She exhibits no tenderness or " deformity.   Lymphadenopathy:     She has no cervical adenopathy.   Neurological: She is alert and oriented to person, place, and time. She has normal reflexes.   Skin: Skin is warm and dry. No rash noted. She is not diaphoretic. No erythema.   Psychiatric: She has a normal mood and affect. Thought content normal.       Lab Review:             Performed        Assessment:          Diagnosis Plan   1. Swelling of both lower extremities  BNP    Comprehensive Metabolic Panel    CBC (No Diff)   2. SOB (shortness of breath)  BNP    Comprehensive Metabolic Panel    CBC (No Diff)   3. Aortic valve replaced     4. H/O mitral valve repair     5. Chronic atrial fibrillation     6. Pacemaker            Plan:       1.   Atrial Fibrillation and Atrial Flutter  Assessment  • The patient has permanent atrial fibrillation  • This is valvular in etiology  • The patient's CHADS2-VASc score is 3  • A [id number] score of 2 or more is considered a high risk for a thromboembolic event  • Warfarin prescribed    Plan  • Continue in atrial fibrillation with rate control  • Continue warfarin for antithrombotic therapy, bleeding issues discussed  • Continue diltiazem for rate control    2.  Lower extremity edema-I'm then arrange for a [facility]NP in [facility] to be obtained.  There may be a contribution from her diltiazem on her swelling.      Current Outpatient Prescriptions:   •  Ascorbic Acid (VITAMIN C PO), Take 1,000 mg by mouth Daily., Disp: , Rfl:   •  b complex vitamins capsule, Take 1 capsule by mouth Daily., Disp: , Rfl:   •  bumetanide (BUMEX) 2 MG tablet, Take 1 tablet by mouth 2 (Two) Times a Day As Needed (edema)., Disp: , Rfl:   •  Cholecalciferol (VITAMIN D-3 PO), Take 1 tablet by mouth Daily., Disp: , Rfl:   •  diltiazem XR (DILACOR XR) 120 MG 24 hr capsule, Take 1 capsule by mouth Daily., Disp: 90 capsule, Rfl: 3  •  fluticasone (FLONASE) 50 MCG/ACT nasal spray, 1 spray into each nostril Daily., Disp: , Rfl:   •  levothyroxine  (SYNTHROID, LEVOTHROID) 175 MCG tablet, Take 175 mcg by mouth daily., Disp: , Rfl:   •  Multiple Vitamin (MULTIVITAMIN) capsule, Take 1 capsule by mouth Daily., Disp: , Rfl:   •  Potassium 99 MG tablet, Take 1 tablet by mouth Daily., Disp: , Rfl:   •  SELENIUM PO, Take 1 tablet by mouth Daily., Disp: , Rfl:   •  warfarin (COUMADIN) 5 MG tablet, TAKE 1 TABLET EVERY DAY  OR AS DIRECTED, Disp: 90 tablet, Rfl: 0

## 2017-08-31 ENCOUNTER — LAB (OUTPATIENT)
Dept: LAB | Facility: HOSPITAL | Age: 72
End: 2017-08-31

## 2017-08-31 DIAGNOSIS — M79.89 SWELLING OF BOTH LOWER EXTREMITIES: ICD-10-CM

## 2017-08-31 DIAGNOSIS — R06.02 SOB (SHORTNESS OF BREATH): ICD-10-CM

## 2017-08-31 LAB
ALBUMIN SERPL-MCNC: 3.9 G/DL (ref 3.5–5.2)
ALBUMIN/GLOB SERPL: 1.1 G/DL
ALP SERPL-CCNC: 61 U/L (ref 40–129)
ALT SERPL W P-5'-P-CCNC: 17 U/L (ref 5–33)
ANION GAP SERPL CALCULATED.3IONS-SCNC: 12.2 MMOL/L
AST SERPL-CCNC: 21 U/L (ref 5–32)
BILIRUB SERPL-MCNC: 0.4 MG/DL (ref 0.2–1.2)
BUN BLD-MCNC: 15 MG/DL (ref 8–23)
BUN/CREAT SERPL: 18.8 (ref 7–25)
CALCIUM SPEC-SCNC: 9.2 MG/DL (ref 8.8–10.5)
CHLORIDE SERPL-SCNC: 100 MMOL/L (ref 98–107)
CO2 SERPL-SCNC: 27.8 MMOL/L (ref 22–29)
CREAT BLD-MCNC: 0.8 MG/DL (ref 0.57–1)
DEPRECATED RDW RBC AUTO: 42.5 FL (ref 37–54)
ERYTHROCYTE [DISTWIDTH] IN BLOOD BY AUTOMATED COUNT: 13.8 % (ref 11.5–14.5)
GFR SERPL CREATININE-BSD FRML MDRD: 70 ML/MIN/1.73
GLOBULIN UR ELPH-MCNC: 3.4 GM/DL
GLUCOSE BLD-MCNC: 109 MG/DL (ref 65–99)
HCT VFR BLD AUTO: 44.1 % (ref 37–47)
HGB BLD-MCNC: 14.6 G/DL (ref 12–16)
MCH RBC QN AUTO: 28.1 PG (ref 27–31)
MCHC RBC AUTO-ENTMCNC: 33.1 G/DL (ref 31–37)
MCV RBC AUTO: 85 FL (ref 81–99)
NT-PROBNP SERPL-MCNC: 303.9 PG/ML (ref 5–125)
PLATELET # BLD AUTO: 274 10*3/MM3 (ref 140–500)
PMV BLD AUTO: 9 FL (ref 7.4–10.4)
POTASSIUM BLD-SCNC: 4.3 MMOL/L (ref 3.5–5.2)
PROT SERPL-MCNC: 7.3 G/DL (ref 6–8.5)
RBC # BLD AUTO: 5.19 10*6/MM3 (ref 4.2–5.4)
SODIUM BLD-SCNC: 140 MMOL/L (ref 136–145)
WBC NRBC COR # BLD: 8.07 10*3/MM3 (ref 4.8–10.8)

## 2017-08-31 PROCEDURE — 80053 COMPREHEN METABOLIC PANEL: CPT

## 2017-08-31 PROCEDURE — 36415 COLL VENOUS BLD VENIPUNCTURE: CPT

## 2017-08-31 PROCEDURE — 85027 COMPLETE CBC AUTOMATED: CPT

## 2017-08-31 PROCEDURE — 83880 ASSAY OF NATRIURETIC PEPTIDE: CPT

## 2017-09-01 ENCOUNTER — TELEPHONE (OUTPATIENT)
Dept: CARDIOLOGY | Facility: CLINIC | Age: 72
End: 2017-09-01

## 2017-09-01 DIAGNOSIS — I48.20 CHRONIC ATRIAL FIBRILLATION (HCC): Primary | ICD-10-CM

## 2017-09-01 RX ORDER — METOPROLOL SUCCINATE 25 MG/1
25 TABLET, EXTENDED RELEASE ORAL DAILY
Qty: 90 TABLET | Refills: 3 | Status: SHIPPED | OUTPATIENT
Start: 2017-09-01 | End: 2017-09-05 | Stop reason: SDUPTHER

## 2017-09-01 NOTE — TELEPHONE ENCOUNTER
----- Message from Adeel Mina III, MD sent at 9/1/2017  1:49 PM EDT -----  Please call patient–labs suggest the diltiazem is causing her swelling.  Have her stop the diltiazem.  I have sent in a prescription for metoprolol for her to take one tab daily.  Have her call us in 2 weeks and let us know how she is doing.

## 2017-09-01 NOTE — TELEPHONE ENCOUNTER
I advise the patient to stop the diltiazem. Pt is aware that a prescription for metoprolol has been sent in to take one tab daily. She will call back in 2 weeks and let us know how she is doing.

## 2017-09-05 DIAGNOSIS — I48.20 CHRONIC ATRIAL FIBRILLATION (HCC): ICD-10-CM

## 2017-09-05 RX ORDER — METOPROLOL SUCCINATE 25 MG/1
25 TABLET, EXTENDED RELEASE ORAL DAILY
Qty: 90 TABLET | Refills: 3 | Status: SHIPPED | OUTPATIENT
Start: 2017-09-05 | End: 2018-01-09 | Stop reason: SDUPTHER

## 2017-09-15 ENCOUNTER — HOSPITAL ENCOUNTER (OUTPATIENT)
Dept: CARDIOLOGY | Facility: HOSPITAL | Age: 72
Setting detail: RECURRING SERIES
Discharge: HOME | End: 2017-09-15

## 2017-09-15 PROCEDURE — 85610 PROTHROMBIN TIME: CPT

## 2017-09-15 PROCEDURE — 36416 COLLJ CAPILLARY BLOOD SPEC: CPT

## 2017-09-18 NOTE — TELEPHONE ENCOUNTER
Pt stop the diltiazem and started taking metoprolol one tab daily. Pt said she went without taking metoprolol and diltiazem for a week due to getting it through the mail order (pt request) and there was no edema in her legs. Pt started taking Metoprolol on 9/11/17. Since she start taking it she has been having edema in her legs and ankle. Please Advise PT#:[phone]    Thanks Nona

## 2017-09-21 RX ORDER — WARFARIN SODIUM 5 MG/1
TABLET ORAL
Qty: 90 TABLET | Refills: 0 | Status: SHIPPED | OUTPATIENT
Start: 2017-09-21 | End: 2017-10-25 | Stop reason: SDUPTHER

## 2017-09-26 ENCOUNTER — HOSPITAL ENCOUNTER (OUTPATIENT)
Dept: CARDIOLOGY | Facility: HOSPITAL | Age: 72
Setting detail: RECURRING SERIES
Discharge: HOME | End: 2017-09-26

## 2017-09-26 PROCEDURE — 85610 PROTHROMBIN TIME: CPT

## 2017-09-26 PROCEDURE — 36416 COLLJ CAPILLARY BLOOD SPEC: CPT

## 2017-10-17 ENCOUNTER — HOSPITAL ENCOUNTER (OUTPATIENT)
Dept: CARDIOLOGY | Facility: HOSPITAL | Age: 72
Setting detail: RECURRING SERIES
Discharge: HOME | End: 2017-10-17

## 2017-10-17 PROCEDURE — 85610 PROTHROMBIN TIME: CPT

## 2017-10-17 PROCEDURE — 36416 COLLJ CAPILLARY BLOOD SPEC: CPT

## 2017-10-23 ENCOUNTER — OFFICE VISIT (OUTPATIENT)
Dept: RETAIL CLINIC | Facility: CLINIC | Age: 72
End: 2017-10-23

## 2017-10-23 DIAGNOSIS — Z23 FLU VACCINE NEED: Primary | ICD-10-CM

## 2017-10-27 ENCOUNTER — TELEPHONE (OUTPATIENT)
Dept: CARDIOLOGY | Facility: CLINIC | Age: 72
End: 2017-10-27

## 2017-10-27 RX ORDER — WARFARIN SODIUM 5 MG/1
TABLET ORAL
Qty: 90 TABLET | Refills: 0 | Status: SHIPPED | OUTPATIENT
Start: 2017-10-27 | End: 2017-12-22 | Stop reason: SDUPTHER

## 2017-10-27 NOTE — TELEPHONE ENCOUNTER
Pt called the office today to schedule an appointment to be seen for SOB. I asked the patient how long its been going on and she said for 2 weeks or longer but she thinks that it could be because of her gallbladder. I informed her that if she thinks her SOB is due to her gallbladder that she needs to see her PCP. She said that she just seen gastroenterologist last week and that her SOB was not bothering her nor was her gallbladder. I let her know that she really needed to call her PCP to let them know and see what they could do for her but that I could schedule an appt for her to be seen. She didn't seem to happy with me suggesting she been seen by her PCP, even though she felt it was related to her gallbladder.     I just wanted to inform you.

## 2017-11-07 ENCOUNTER — TELEPHONE (OUTPATIENT)
Dept: CARDIOLOGY | Facility: CLINIC | Age: 72
End: 2017-11-07

## 2017-11-07 ENCOUNTER — CLINICAL SUPPORT NO REQUIREMENTS (OUTPATIENT)
Dept: CARDIOLOGY | Facility: CLINIC | Age: 72
End: 2017-11-07

## 2017-11-07 DIAGNOSIS — I42.9 CARDIOMYOPATHY, UNSPECIFIED TYPE (HCC): Primary | ICD-10-CM

## 2017-11-07 PROCEDURE — 93282 PRGRMG EVAL IMPLANTABLE DFB: CPT | Performed by: INTERNAL MEDICINE

## 2017-11-07 NOTE — TELEPHONE ENCOUNTER
Pt is requesting a 90 day supply of Bumex 2 MG. Is it ok to refill? I don't see where we ever refill the prescribtion for the pt. Please Advise    Thanks Nona

## 2017-11-08 RX ORDER — BUMETANIDE 2 MG/1
2 TABLET ORAL 2 TIMES DAILY PRN
Qty: 180 TABLET | Refills: 1 | Status: SHIPPED | OUTPATIENT
Start: 2017-11-08 | End: 2017-11-10 | Stop reason: SDUPTHER

## 2017-11-08 RX ORDER — BUMETANIDE 2 MG/1
2 TABLET ORAL 2 TIMES DAILY PRN
Qty: 60 TABLET | Refills: 3 | Status: SHIPPED | OUTPATIENT
Start: 2017-11-08 | End: 2017-11-08 | Stop reason: SDUPTHER

## 2017-11-10 RX ORDER — BUMETANIDE 2 MG/1
2 TABLET ORAL 2 TIMES DAILY PRN
Qty: 180 TABLET | Refills: 1 | Status: SHIPPED | OUTPATIENT
Start: 2017-11-10 | End: 2018-05-17 | Stop reason: SDUPTHER

## 2017-11-17 ENCOUNTER — HOSPITAL ENCOUNTER (OUTPATIENT)
Dept: CARDIOLOGY | Facility: HOSPITAL | Age: 72
Setting detail: RECURRING SERIES
Discharge: HOME | End: 2017-11-17

## 2017-11-17 PROCEDURE — 85610 PROTHROMBIN TIME: CPT

## 2017-11-17 PROCEDURE — 36416 COLLJ CAPILLARY BLOOD SPEC: CPT

## 2017-12-19 ENCOUNTER — HOSPITAL ENCOUNTER (OUTPATIENT)
Dept: CARDIOLOGY | Facility: HOSPITAL | Age: 72
Setting detail: RECURRING SERIES
Discharge: HOME | End: 2017-12-19

## 2017-12-19 PROCEDURE — 36416 COLLJ CAPILLARY BLOOD SPEC: CPT

## 2017-12-19 PROCEDURE — 85610 PROTHROMBIN TIME: CPT

## 2017-12-22 RX ORDER — WARFARIN SODIUM 5 MG/1
TABLET ORAL
Qty: 90 TABLET | Refills: 0 | Status: SHIPPED | OUTPATIENT
Start: 2017-12-22 | End: 2019-06-27 | Stop reason: SDUPTHER

## 2018-01-09 DIAGNOSIS — I48.20 CHRONIC ATRIAL FIBRILLATION (HCC): ICD-10-CM

## 2018-01-09 RX ORDER — METOPROLOL SUCCINATE 25 MG/1
25 TABLET, EXTENDED RELEASE ORAL DAILY
Qty: 90 TABLET | Refills: 3 | Status: SHIPPED | OUTPATIENT
Start: 2018-01-09 | End: 2019-02-22 | Stop reason: SDUPTHER

## 2018-01-26 ENCOUNTER — HOSPITAL ENCOUNTER (OUTPATIENT)
Dept: CARDIOLOGY | Facility: HOSPITAL | Age: 73
Setting detail: RECURRING SERIES
Discharge: HOME | End: 2018-01-26

## 2018-01-26 PROCEDURE — 85610 PROTHROMBIN TIME: CPT

## 2018-01-26 PROCEDURE — 36416 COLLJ CAPILLARY BLOOD SPEC: CPT

## 2018-02-08 ENCOUNTER — CLINICAL SUPPORT NO REQUIREMENTS (OUTPATIENT)
Dept: CARDIOLOGY | Facility: CLINIC | Age: 73
End: 2018-02-08

## 2018-02-08 DIAGNOSIS — I42.9 CARDIOMYOPATHY, UNSPECIFIED TYPE (HCC): Primary | ICD-10-CM

## 2018-02-08 PROCEDURE — 93295 DEV INTERROG REMOTE 1/2/MLT: CPT | Performed by: INTERNAL MEDICINE

## 2018-02-08 PROCEDURE — 93296 REM INTERROG EVL PM/IDS: CPT | Performed by: INTERNAL MEDICINE

## 2018-02-08 PROCEDURE — 93297 REM INTERROG DEV EVAL ICPMS: CPT | Performed by: INTERNAL MEDICINE

## 2018-03-06 ENCOUNTER — HOSPITAL ENCOUNTER (OUTPATIENT)
Dept: CARDIOLOGY | Facility: HOSPITAL | Age: 73
Setting detail: RECURRING SERIES
Discharge: HOME | End: 2018-03-06

## 2018-03-06 PROCEDURE — 36416 COLLJ CAPILLARY BLOOD SPEC: CPT

## 2018-03-06 PROCEDURE — 85610 PROTHROMBIN TIME: CPT

## 2018-04-26 RX ORDER — WARFARIN SODIUM 5 MG/1
TABLET ORAL
Qty: 90 TABLET | Refills: 0 | OUTPATIENT
Start: 2018-04-26

## 2018-04-27 ENCOUNTER — HOSPITAL ENCOUNTER (OUTPATIENT)
Dept: CARDIOLOGY | Facility: HOSPITAL | Age: 73
Setting detail: RECURRING SERIES
Discharge: HOME | End: 2018-04-27

## 2018-04-27 PROCEDURE — 36416 COLLJ CAPILLARY BLOOD SPEC: CPT

## 2018-04-27 PROCEDURE — 85610 PROTHROMBIN TIME: CPT

## 2018-05-07 RX ORDER — WARFARIN SODIUM 5 MG/1
TABLET ORAL
Qty: 90 TABLET | Refills: 0 | Status: SHIPPED | OUTPATIENT
Start: 2018-05-07 | End: 2018-10-07 | Stop reason: SDUPTHER

## 2018-05-17 RX ORDER — BUMETANIDE 2 MG/1
2 TABLET ORAL 2 TIMES DAILY PRN
Qty: 180 TABLET | Refills: 0 | Status: SHIPPED | OUTPATIENT
Start: 2018-05-17 | End: 2018-12-10 | Stop reason: SDUPTHER

## 2018-05-25 ENCOUNTER — TRANSCRIBE ORDERS (OUTPATIENT)
Dept: ADMINISTRATIVE | Facility: HOSPITAL | Age: 73
End: 2018-05-25

## 2018-05-25 ENCOUNTER — LAB (OUTPATIENT)
Dept: LAB | Facility: HOSPITAL | Age: 73
End: 2018-05-25

## 2018-05-25 DIAGNOSIS — I10 HYPERTENSION, UNSPECIFIED TYPE: Primary | ICD-10-CM

## 2018-05-25 DIAGNOSIS — E53.8 VITAMIN B 12 DEFICIENCY: ICD-10-CM

## 2018-05-25 DIAGNOSIS — I10 HYPERTENSION, UNSPECIFIED TYPE: ICD-10-CM

## 2018-05-25 LAB
ALBUMIN SERPL-MCNC: 3.9 G/DL (ref 3.5–5.2)
ALBUMIN/GLOB SERPL: 1.4 G/DL
ALP SERPL-CCNC: 56 U/L (ref 40–129)
ALT SERPL W P-5'-P-CCNC: 11 U/L (ref 5–33)
ANION GAP SERPL CALCULATED.3IONS-SCNC: 10.2 MMOL/L
AST SERPL-CCNC: 21 U/L (ref 5–32)
BACTERIA UR QL AUTO: ABNORMAL /HPF
BILIRUB SERPL-MCNC: 0.3 MG/DL (ref 0.2–1.2)
BILIRUB UR QL STRIP: NEGATIVE
BUN BLD-MCNC: 13 MG/DL (ref 8–23)
BUN/CREAT SERPL: 19.1 (ref 7–25)
CALCIUM SPEC-SCNC: 9.6 MG/DL (ref 8.8–10.5)
CHLORIDE SERPL-SCNC: 105 MMOL/L (ref 98–107)
CHOLEST SERPL-MCNC: 152 MG/DL (ref 0–200)
CLARITY UR: CLEAR
CO2 SERPL-SCNC: 26.8 MMOL/L (ref 22–29)
COLOR UR: YELLOW
CREAT BLD-MCNC: 0.68 MG/DL (ref 0.57–1)
GFR SERPL CREATININE-BSD FRML MDRD: 85 ML/MIN/1.73
GLOBULIN UR ELPH-MCNC: 2.8 GM/DL
GLUCOSE BLD-MCNC: 117 MG/DL (ref 65–99)
GLUCOSE UR STRIP-MCNC: NEGATIVE MG/DL
HDLC SERPL-MCNC: 42 MG/DL (ref 40–60)
HGB UR QL STRIP.AUTO: ABNORMAL
HYALINE CASTS UR QL AUTO: ABNORMAL /LPF
KETONES UR QL STRIP: NEGATIVE
LDLC SERPL CALC-MCNC: 85 MG/DL (ref 0–100)
LDLC/HDLC SERPL: 2.03 {RATIO}
LEUKOCYTE ESTERASE UR QL STRIP.AUTO: ABNORMAL
NITRITE UR QL STRIP: NEGATIVE
PH UR STRIP.AUTO: 6 [PH] (ref 4.5–8)
POTASSIUM BLD-SCNC: 4 MMOL/L (ref 3.5–5.2)
PROT SERPL-MCNC: 6.7 G/DL (ref 6–8.5)
PROT UR QL STRIP: ABNORMAL
RBC # UR: ABNORMAL /HPF
REF LAB TEST METHOD: ABNORMAL
SODIUM BLD-SCNC: 142 MMOL/L (ref 136–145)
SP GR UR STRIP: 1.01 (ref 1–1.03)
SQUAMOUS #/AREA URNS HPF: ABNORMAL /HPF
TRIGL SERPL-MCNC: 124 MG/DL (ref 0–150)
TSH SERPL DL<=0.05 MIU/L-ACNC: 0.21 MIU/ML (ref 0.27–4.2)
UROBILINOGEN UR QL STRIP: ABNORMAL
VIT B12 BLD-MCNC: 766 PG/ML (ref 232–1245)
VLDLC SERPL-MCNC: 24.8 MG/DL (ref 7–27)
WBC UR QL AUTO: ABNORMAL /HPF

## 2018-05-25 PROCEDURE — 82607 VITAMIN B-12: CPT

## 2018-05-25 PROCEDURE — 80053 COMPREHEN METABOLIC PANEL: CPT

## 2018-05-25 PROCEDURE — 87086 URINE CULTURE/COLONY COUNT: CPT

## 2018-05-25 PROCEDURE — 80061 LIPID PANEL: CPT

## 2018-05-25 PROCEDURE — 81001 URINALYSIS AUTO W/SCOPE: CPT

## 2018-05-25 PROCEDURE — 84443 ASSAY THYROID STIM HORMONE: CPT

## 2018-05-25 PROCEDURE — 36415 COLL VENOUS BLD VENIPUNCTURE: CPT

## 2018-05-27 LAB — BACTERIA SPEC AEROBE CULT: ABNORMAL

## 2018-06-04 ENCOUNTER — CLINICAL SUPPORT NO REQUIREMENTS (OUTPATIENT)
Dept: CARDIOLOGY | Facility: CLINIC | Age: 73
End: 2018-06-04

## 2018-06-04 ENCOUNTER — HOSPITAL ENCOUNTER (OUTPATIENT)
Dept: CARDIOLOGY | Facility: HOSPITAL | Age: 73
Setting detail: RECURRING SERIES
Discharge: HOME | End: 2018-06-04

## 2018-06-04 DIAGNOSIS — I42.9 CARDIOMYOPATHY, UNSPECIFIED TYPE (HCC): Primary | ICD-10-CM

## 2018-06-04 PROCEDURE — 36416 COLLJ CAPILLARY BLOOD SPEC: CPT

## 2018-06-04 PROCEDURE — 93282 PRGRMG EVAL IMPLANTABLE DFB: CPT | Performed by: INTERNAL MEDICINE

## 2018-06-04 PROCEDURE — 85610 PROTHROMBIN TIME: CPT

## 2018-06-04 PROCEDURE — 93290 INTERROG DEV EVAL ICPMS IP: CPT | Performed by: INTERNAL MEDICINE

## 2018-06-11 ENCOUNTER — HOSPITAL ENCOUNTER (OUTPATIENT)
Dept: CARDIOLOGY | Facility: HOSPITAL | Age: 73
Setting detail: RECURRING SERIES
Discharge: HOME | End: 2018-06-11

## 2018-06-11 PROCEDURE — 85610 PROTHROMBIN TIME: CPT

## 2018-06-11 PROCEDURE — 36416 COLLJ CAPILLARY BLOOD SPEC: CPT

## 2018-06-18 ENCOUNTER — HOSPITAL ENCOUNTER (OUTPATIENT)
Dept: CARDIOLOGY | Facility: HOSPITAL | Age: 73
Setting detail: RECURRING SERIES
Discharge: HOME | End: 2018-06-18

## 2018-06-18 PROCEDURE — 85610 PROTHROMBIN TIME: CPT

## 2018-06-18 PROCEDURE — 36416 COLLJ CAPILLARY BLOOD SPEC: CPT

## 2018-07-03 ENCOUNTER — OFFICE VISIT (OUTPATIENT)
Dept: ORTHOPEDIC SURGERY | Facility: CLINIC | Age: 73
End: 2018-07-03

## 2018-07-03 ENCOUNTER — TELEPHONE (OUTPATIENT)
Dept: ORTHOPEDIC SURGERY | Facility: CLINIC | Age: 73
End: 2018-07-03

## 2018-07-03 VITALS
BODY MASS INDEX: 33.49 KG/M2 | SYSTOLIC BLOOD PRESSURE: 145 MMHG | DIASTOLIC BLOOD PRESSURE: 91 MMHG | HEIGHT: 62 IN | WEIGHT: 182 LBS | HEART RATE: 98 BPM

## 2018-07-03 DIAGNOSIS — M67.911 TENDINOPATHY OF RIGHT ROTATOR CUFF: ICD-10-CM

## 2018-07-03 DIAGNOSIS — M17.12 PRIMARY OSTEOARTHRITIS OF LEFT KNEE: ICD-10-CM

## 2018-07-03 DIAGNOSIS — R52 PAIN: Primary | ICD-10-CM

## 2018-07-03 PROCEDURE — 73030 X-RAY EXAM OF SHOULDER: CPT | Performed by: NURSE PRACTITIONER

## 2018-07-03 PROCEDURE — 99214 OFFICE O/P EST MOD 30 MIN: CPT | Performed by: NURSE PRACTITIONER

## 2018-07-03 PROCEDURE — 73562 X-RAY EXAM OF KNEE 3: CPT | Performed by: NURSE PRACTITIONER

## 2018-07-03 PROCEDURE — 20610 DRAIN/INJ JOINT/BURSA W/O US: CPT | Performed by: NURSE PRACTITIONER

## 2018-07-03 RX ORDER — TRIAMCINOLONE ACETONIDE 40 MG/ML
80 INJECTION, SUSPENSION INTRA-ARTICULAR; INTRAMUSCULAR
Status: COMPLETED | OUTPATIENT
Start: 2018-07-03 | End: 2018-07-03

## 2018-07-03 RX ORDER — LIDOCAINE HYDROCHLORIDE 10 MG/ML
4 INJECTION, SOLUTION EPIDURAL; INFILTRATION; INTRACAUDAL; PERINEURAL
Status: COMPLETED | OUTPATIENT
Start: 2018-07-03 | End: 2018-07-03

## 2018-07-03 RX ADMIN — LIDOCAINE HYDROCHLORIDE 4 ML: 10 INJECTION, SOLUTION EPIDURAL; INFILTRATION; INTRACAUDAL; PERINEURAL at 15:17

## 2018-07-03 RX ADMIN — TRIAMCINOLONE ACETONIDE 80 MG: 40 INJECTION, SUSPENSION INTRA-ARTICULAR; INTRAMUSCULAR at 15:17

## 2018-07-03 NOTE — PROGRESS NOTES
Subjective:     Patient ID: Anna Gilbert is a 72 y.o. female.    Chief Complaint: Right shoulder pain, left knee pain    History of Present Illness    Ms. Gilbert is a 72 y.o female who presents with concerns of pain at right shoulder and left knee. Left knee has been hurting for the last 10 days. Denies known injury. 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 Pain present over lateral and medial joint line knee. Has tried use of ice, taking tylenol and when needed has taken ibuprofen although currently on blood thinner. She has tried using OTC knee sleeve, denies knee sleeve has been helpful. She has noticed increased swelling at left lower extremity at knee and below. Has history of CHF and always suffers with fluid retention in the summer months. Denies feeling as if the knee is going to give out on her. Denies presence of numbness and tingling radiating down left lower extremity.      Right shoulder- Began hurting approximately 30 years ago. She quickly lifted a very heavy bucket (approximately 80 lbs) and felt at that time that something tore. She has continued to experience pain with reaching out to side and attempting to lift any object. Reports increased weakness and pain with lifting overhead. Denies presence of numbness and tingling radiating down right upper extremity. She is right hand dominant. Denies pain radiating up into neck. Rates pain at 3-4 out of 10. Denies previous x-ray, MRI or CT scan right shoulder. Denies previously receiving corticosteroid injections in past. Denies all other concerns present at this time.   Social History     Occupational History   • Not on file.     Social History Main Topics   • Smoking status: Former Smoker   • Smokeless tobacco: Never Used      Comment: no caffeine use   • Alcohol use No   • Drug use: No   • Sexual activity: Defer      Past Medical  History:   Diagnosis Date   • Coronary artery disease    • Health care maintenance    • Hyperlipidemia    • Hyperthyroidism    • SHEILA (obstructive sleep apnea) 7/20/2016   • PAF (paroxysmal atrial fibrillation) (CMS/HCC)    • Sick sinus syndrome (CMS/HCC)      Past Surgical History:   Procedure Laterality Date   • A-V CARDIAC PACEMAKER INSERTION     • AORTIC VALVE REPAIR/REPLACEMENT  2010    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   • CARDIAC CATHETERIZATION  01/01/2011   • MITRAL VALVE REPAIR/REPLACEMENT  2010       Family History   Problem Relation Age of Onset   • Coronary artery disease Father    • No Known Problems Mother          Review of Systems   Constitutional: Negative for chills, diaphoresis, fever and unexpected weight change.   HENT: Positive for tinnitus. Negative for hearing loss, nosebleeds and sore throat.    Eyes: Negative for pain and visual disturbance.   Respiratory: Negative for cough, shortness of breath and wheezing.    Cardiovascular: Negative for chest pain and palpitations.   Gastrointestinal: Negative for abdominal pain, diarrhea, nausea and vomiting.   Endocrine: Negative for cold intolerance, heat intolerance and polydipsia.   Genitourinary: Negative for difficulty urinating, dysuria and hematuria.   Musculoskeletal: Positive for arthralgias and myalgias. Negative for joint swelling.   Skin: Negative for rash and wound.   Allergic/Immunologic: Positive for environmental allergies.   Neurological: Negative for dizziness, syncope and numbness.   Hematological: Bruises/bleeds easily.   Psychiatric/Behavioral: Negative for dysphoric mood and sleep disturbance. The patient is not nervous/anxious.            Objective:  Physical Exam    Vital signs reviewed.   General: No acute distress.  Eyes: conjunctiva clear; pupils equally round and reactive  ENT: external ears and nose atraumatic; oropharynx clear  CV: no peripheral edema  Resp: normal respiratory effort  Skin: no rashes or wounds; normal  "turgor  Psych: mood and affect appropriate; recent and remote memory intact    Vitals:    07/03/18 1400   BP: 145/91   BP Location: Right arm   Pulse: 98   Weight: 82.6 kg (182 lb)   Height: 157.5 cm (62\")     1    07/03/18  1400   Weight: 82.6 kg (182 lb)     Body mass index is 33.29 kg/m².     Left Knee Exam     Tenderness   The patient is experiencing tenderness in the medial joint line.    Range of Motion   Extension: 0   Flexion: 120     Tests   Romina:  Medial - negative Lateral - negative  Lachman:  Anterior - 1+    Posterior - negative  Drawer:       Anterior - negative     Posterior - negative  Varus: negative  Valgus: negative  Patellar Apprehension: negative    Other   Erythema: absent  Scars: absent  Sensation: normal  Pulse: present  Swelling: mild  Effusion: no effusion present      Right Shoulder Exam     Tenderness   The patient is experiencing tenderness in the acromion.    Range of Motion   Forward Flexion: 170   External Rotation: 50     Muscle Strength   Abduction: 4/5   Internal Rotation: 4/5   External Rotation: 4/5   Supraspinatus: 4/5   Subscapularis: 4/5   Biceps: 4/5     Tests   Apprehension: negative  Cross Arm: negative  Drop Arm: negative  Hawkin's test: positive  Impingement: positive  Sulcus: absent    Other   Erythema: absent  Scars: absent  Sensation: normal  Pulse: present    Comments:  Mildly positive empty can  negative [place]'s  negative Speed's  negative bear hug exam                Imaging:  Right Shoulder X-Ray  Indication: Pain  AP Internal and External Rotation views    Findings:  No fracture  No bony lesion  Normal soft tissues  AC joint arthropathy     No prior studies were available for comparison.    Left Knee X-Ray  Indication: Pain    AP, Lateral, and Three Way views    Findings:  No fracture  Osteophytes present superior patellar pole, lateral femoral condyle, lateral tibial plateau   Normal soft tissues  Tricompartmental osteoarthritis     No prior studies were " available for comparison.    Assessment:       1. Pain    2. Tendinopathy of right rotator cuff    3. Primary osteoarthritis of left knee          Plan:  1. Discussed plan of care with patient. Wishes to proceed with corticosteroid injection left knee.   2. Will proceed with CT arthrogramright shoulder to evaluate rotator cuff. She does have pacemaker therefore unable to proceed with MRI. Will follow-up after completion of CT arthrogram to discuss results. Patient verbalized understanding of all information and agrees with plan of care. Denies all other concerns present at this time.     Large Joint Arthrocentesis  Date/Time: 7/3/2018 3:17 PM  Consent given by: patient  Site marked: site marked  Supporting Documentation  Indications: pain   Procedure Details  Location: knee - L knee  Needle size: 22 G  Approach: superior  Medications administered: 80 mg triamcinolone acetonide 40 MG/ML; 4 mL lidocaine PF 1% 1 %  Patient tolerance: patient tolerated the procedure well with no immediate complications        Orders:  Orders Placed This Encounter   Procedures   • Large Joint Arthrocentesis   • XR Knee 3 View Left   • XR Shoulder 2+ View Right   • MRI Shoulder Right Without Contrast       I ordered and reviewed the ABIMAEL today.     Dictated utilizing Dragon dictation

## 2018-07-03 NOTE — TELEPHONE ENCOUNTER
Patient called. Wanted to mention she has a pacemaker and doesn't think she can have an MRI. I called radiology and we are fairly confident she cannot have it done based on the model/etc. What would you like to do??

## 2018-07-25 ENCOUNTER — HOSPITAL ENCOUNTER (OUTPATIENT)
Dept: CARDIOLOGY | Facility: HOSPITAL | Age: 73
Setting detail: RECURRING SERIES
Discharge: HOME | End: 2018-07-25

## 2018-07-25 PROCEDURE — 85610 PROTHROMBIN TIME: CPT

## 2018-07-25 PROCEDURE — 36416 COLLJ CAPILLARY BLOOD SPEC: CPT

## 2018-08-13 ENCOUNTER — LAB (OUTPATIENT)
Dept: LAB | Facility: HOSPITAL | Age: 73
End: 2018-08-13

## 2018-08-13 ENCOUNTER — TRANSCRIBE ORDERS (OUTPATIENT)
Dept: ADMINISTRATIVE | Facility: HOSPITAL | Age: 73
End: 2018-08-13

## 2018-08-13 DIAGNOSIS — E03.9 ACQUIRED HYPOTHYROIDISM: Primary | ICD-10-CM

## 2018-08-13 DIAGNOSIS — E03.9 ACQUIRED HYPOTHYROIDISM: ICD-10-CM

## 2018-08-13 LAB
T3FREE SERPL-MCNC: 2.53 PG/ML (ref 2–4.4)
T4 FREE SERPL-MCNC: 1.54 NG/DL (ref 0.93–1.7)
TSH SERPL DL<=0.05 MIU/L-ACNC: 0.45 MIU/ML (ref 0.27–4.2)

## 2018-08-13 PROCEDURE — 36415 COLL VENOUS BLD VENIPUNCTURE: CPT

## 2018-08-13 PROCEDURE — 84439 ASSAY OF FREE THYROXINE: CPT

## 2018-08-13 PROCEDURE — 84443 ASSAY THYROID STIM HORMONE: CPT

## 2018-08-13 PROCEDURE — 84481 FREE ASSAY (FT-3): CPT

## 2018-09-07 ENCOUNTER — CLINICAL SUPPORT NO REQUIREMENTS (OUTPATIENT)
Dept: CARDIOLOGY | Facility: CLINIC | Age: 73
End: 2018-09-07

## 2018-09-07 DIAGNOSIS — I42.9 CARDIOMYOPATHY, UNSPECIFIED TYPE (HCC): Primary | ICD-10-CM

## 2018-09-07 PROCEDURE — 93295 DEV INTERROG REMOTE 1/2/MLT: CPT | Performed by: INTERNAL MEDICINE

## 2018-09-07 PROCEDURE — 93297 REM INTERROG DEV EVAL ICPMS: CPT | Performed by: INTERNAL MEDICINE

## 2018-09-07 PROCEDURE — 93296 REM INTERROG EVL PM/IDS: CPT | Performed by: INTERNAL MEDICINE

## 2018-09-13 ENCOUNTER — HOSPITAL ENCOUNTER (OUTPATIENT)
Dept: CARDIOLOGY | Facility: HOSPITAL | Age: 73
Setting detail: RECURRING SERIES
Discharge: HOME | End: 2018-09-13

## 2018-09-13 PROCEDURE — 36416 COLLJ CAPILLARY BLOOD SPEC: CPT

## 2018-09-13 PROCEDURE — 85610 PROTHROMBIN TIME: CPT

## 2018-10-05 ENCOUNTER — OFFICE VISIT (OUTPATIENT)
Dept: ORTHOPEDIC SURGERY | Facility: CLINIC | Age: 73
End: 2018-10-05

## 2018-10-05 VITALS — WEIGHT: 186 LBS | BODY MASS INDEX: 34.23 KG/M2 | HEIGHT: 62 IN

## 2018-10-05 DIAGNOSIS — M17.12 PRIMARY OSTEOARTHRITIS OF LEFT KNEE: Primary | ICD-10-CM

## 2018-10-05 PROCEDURE — 99213 OFFICE O/P EST LOW 20 MIN: CPT | Performed by: NURSE PRACTITIONER

## 2018-10-05 PROCEDURE — 20610 DRAIN/INJ JOINT/BURSA W/O US: CPT | Performed by: NURSE PRACTITIONER

## 2018-10-05 NOTE — PROGRESS NOTES
Subjective:     Patient ID: Anna Gilbert is a 73 y.o. female.    Chief Complaint:  Follow-up primary osteoarthritis left knee    History of Present Illness  Anna Gilbert presents back to clinic for follow-up left knee and right shoulder.  States that the right shoulder has not been symptomatic since she was last seen in continues to do very well even with lifting activities at work.  Left knee pain has recently returned over the last several weeks.  She was last seen by this APRN 7/3/2018 received corticosteroid injection at that time which helped for the last few months.  Increased pain over the medial joint line worse at night rates pain at a 7 to an 8 out of a 10 throbbing and aching in nature.  Worse with rotational twisting activity.  She does receive relief when she lays flat on her back which is not comfortable for her.  She does have a history of CHF and fluid retention is worse within because of the recent heat.  She has been taking the prescribed dose of diuretics along with extra strength Tylenol multiple times a day.  Denies that the knee is locking, catching or giving way.  She is unable to tolerate oral NSAIDs secondary to CHF history.  Denies all other concerns present this time.         Social History     Occupational History   • Not on file.     Social History Main Topics   • Smoking status: Former Smoker   • Smokeless tobacco: Never Used      Comment: no caffeine use   • Alcohol use No   • Drug use: No   • Sexual activity: Defer      Past Medical History:   Diagnosis Date   • Coronary artery disease    • Health care maintenance    • Hyperlipidemia    • Hyperthyroidism    • SHEILA (obstructive sleep apnea) 7/20/2016   • PAF (paroxysmal atrial fibrillation) (CMS/HCC)    • Sick sinus syndrome (CMS/HCC)      Past Surgical History:   Procedure Laterality Date   • A-V CARDIAC PACEMAKER INSERTION     • AORTIC VALVE REPAIR/REPLACEMENT  2010    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   • CARDIAC CATHETERIZATION   "01/01/2011   • MITRAL VALVE REPAIR/REPLACEMENT  2010       Family History   Problem Relation Age of Onset   • Coronary artery disease Father    • No Known Problems Mother          Review of Systems   Constitutional: Negative for chills, diaphoresis, fever and unexpected weight change.   HENT: Negative for hearing loss, nosebleeds, sore throat and tinnitus.    Eyes: Negative for pain and visual disturbance.   Respiratory: Negative for cough, shortness of breath and wheezing.    Cardiovascular: Negative for chest pain and palpitations.   Gastrointestinal: Negative for abdominal pain, diarrhea, nausea and vomiting.   Endocrine: Negative for cold intolerance, heat intolerance and polydipsia.   Genitourinary: Negative for difficulty urinating, dysuria and hematuria.   Musculoskeletal: Positive for arthralgias and myalgias. Negative for joint swelling.   Skin: Negative for rash and wound.   Allergic/Immunologic: Negative for environmental allergies.   Neurological: Negative for dizziness, syncope and numbness.   Hematological: Does not bruise/bleed easily.   Psychiatric/Behavioral: Negative for dysphoric mood and sleep disturbance. The patient is not nervous/anxious.            Objective:  Physical Exam    General: No acute distress.  Eyes: conjunctiva clear; pupils equally round and reactive  ENT: external ears and nose atraumatic; oropharynx clear  CV: no peripheral edema  Resp: normal respiratory effort  Skin: no rashes or wounds; normal turgor  Psych: mood and affect appropriate; recent and remote memory intact    Vitals:    10/05/18 0926   Weight: 84.4 kg (186 lb)   Height: 157.5 cm (62\")     1    10/05/18  0926   Weight: 84.4 kg (186 lb)     Body mass index is 34.02 kg/m².      Left Knee Exam     Tenderness   The patient is experiencing tenderness in the medial joint line.    Range of Motion   Extension: 0   Flexion: 120     Tests   Romina:  Medial - negative Lateral - negative  Lachman:  Anterior - 1+    " Posterior - negative  Drawer:       Anterior - negative       Varus: negative  Valgus: negative  Patellar Apprehension: negative    Other   Erythema: absent  Scars: absent  Sensation: normal  Pulse: present  Swelling: mild    Comments:  Positive crepitus throughout arc motion             Assessment:        1. Primary osteoarthritis of left knee           Plan:  1.  Assessment of care with patient.  Wishes to proceed with Visco supplementation injections to the left knee.  We'll start today and see her back in 1 week for the second injection and the following week for the third.  Patient verbalized understanding of all information is plan of care.  Denies other concerns that she has at this time.  Large Joint Arthrocentesis  Date/Time: 10/5/2018 9:42 AM  Consent given by: patient  Site marked: site marked  Timeout: Immediately prior to procedure a time out was called to verify the correct patient, procedure, equipment, support staff and site/side marked as required   Supporting Documentation  Indications: pain   Procedure Details  Location: knee - L knee  Preparation: Patient was prepped and draped in the usual sterile fashion  Needle size: 22 G  Approach: anterolateral  Medications administered: 30 mg Hyaluronan 30 MG/2ML  Patient tolerance: patient tolerated the procedure well with no immediate complications          Orders:  Orders Placed This Encounter   Procedures   • Large Joint Arthrocentesis     Dictated utilizing Dragon dictation

## 2018-10-08 RX ORDER — WARFARIN SODIUM 5 MG/1
TABLET ORAL
Qty: 90 TABLET | Refills: 0 | Status: SHIPPED | OUTPATIENT
Start: 2018-10-08 | End: 2018-11-30 | Stop reason: SDUPTHER

## 2018-10-11 ENCOUNTER — TELEPHONE (OUTPATIENT)
Dept: PHARMACY | Facility: HOSPITAL | Age: 73
End: 2018-10-11

## 2018-10-12 ENCOUNTER — CLINICAL SUPPORT (OUTPATIENT)
Dept: ORTHOPEDIC SURGERY | Facility: CLINIC | Age: 73
End: 2018-10-12

## 2018-10-12 ENCOUNTER — ANTICOAGULATION VISIT (OUTPATIENT)
Dept: PHARMACY | Facility: HOSPITAL | Age: 73
End: 2018-10-12

## 2018-10-12 DIAGNOSIS — I48.20 CHRONIC ATRIAL FIBRILLATION (HCC): ICD-10-CM

## 2018-10-12 DIAGNOSIS — M17.12 PRIMARY OSTEOARTHRITIS OF LEFT KNEE: Primary | ICD-10-CM

## 2018-10-12 LAB
INR PPP: 2.6 (ref 0.91–1.09)
PROTHROMBIN TIME: 31.7 SECONDS (ref 10–13.8)

## 2018-10-12 PROCEDURE — 85610 PROTHROMBIN TIME: CPT

## 2018-10-12 PROCEDURE — 36416 COLLJ CAPILLARY BLOOD SPEC: CPT

## 2018-10-12 PROCEDURE — G0463 HOSPITAL OUTPT CLINIC VISIT: HCPCS | Performed by: PHARMACIST

## 2018-10-12 PROCEDURE — 20610 DRAIN/INJ JOINT/BURSA W/O US: CPT | Performed by: NURSE PRACTITIONER

## 2018-10-12 NOTE — PROGRESS NOTES
Large Joint Arthrocentesis  Date/Time: 10/12/2018 10:00 AM  Consent given by: patient  Site marked: site marked  Timeout: Immediately prior to procedure a time out was called to verify the correct patient, procedure, equipment, support staff and site/side marked as required   Supporting Documentation  Indications: pain   Procedure Details  Location: knee - L knee  Preparation: Patient was prepped and draped in the usual sterile fashion  Needle size: 22 G  Approach: anterolateral  Medications administered: 30 mg Hyaluronan 30 MG/2ML  Patient tolerance: patient tolerated the procedure well with no immediate complications      Patient presented today for viscosupplement injection.  This is the second injection for the left knee.  We will see patient back in one week.  We reviewed risks benefits and alternatives of the procedure and patient wished to proceed today and had all questions answered.

## 2018-10-12 NOTE — PROGRESS NOTES
Anticoagulation Clinic Progress Note  Anticoagulation Summary  As of 10/12/2018    INR goal:   2.0-3.0   TTR:   --   Today's INR:   2.6   Warfarin maintenance plan:   5 mg every day   Weekly warfarin total:   35 mg   Plan last modified:   Radha Bravo RPH (10/12/2018)   Next INR check:   2018   Priority:   High   Target end date:   Indefinite    Indications    Chronic atrial fibrillation (CMS/HCC) [I48.2]             Anticoagulation Episode Summary     INR check location:       Preferred lab:       Send INR reminders to:    [facility] [place]    Comments:         Anticoagulation Care Providers     Provider Role Specialty Phone number    Adeel Mina III, MD Referring Cardiology [phone]    Radha Bravo RPH  Pharmacy           Drug interactions: No changes in medications  Diet: Consistent    Clinic Interview:  Patient Findings     Positives:   Missed doses    Negatives:   Signs/symptoms of thrombosis, Signs/symptoms of bleeding,   Laboratory test error suspected, Change in health, Change in alcohol use,   Change in activity, Upcoming invasive procedure, Emergency department   visit, Upcoming dental procedure, Extra doses, Change in medications,   Change in diet/appetite, Hospital admission, Bruising, Other complaints    Comments:   Took 1/2 tablet yesterday due to out of refills      Clinical Outcomes     Negatives:   Major bleeding event, Thromboembolic event,   Anticoagulation-related hospital admission, Anticoagulation-related ED   visit, Anticoagulation-related fatality    Comments:   Took 1/2 tablet yesterday due to out of refills        Education:  Anna Gilbert is a new start in the [facility]. We discussed the followin) Warfarin's indication, mechanism, and dosing  2) Enforced the importance of taking warfarin as instructed and at the same time every day, preferably in the evening so that we can make dose adjustments more easily following subsequent  clinic visits  3) What she should do about a missed dose; pts can take missed doses within about 12 hours of their usual scheduled dose, but she was instructed on the importance of not doubling up on doses unless told to do so by the [facility]  4) Explained possible side effects of warfarin therapy, including increased risk of bleeding, s/sx of bleeding and s/sx of any additional clots/PE/CVA.   5) Discussed monitoring of warfarin, the INR, goal INR range, and the frequency of monitoring  6) Reviewed drug/food/tobacco/EtOH interactions and provided written information covering these topics in more detail, explaining that green, leafy vegetables interact most heavily with warfarin  7) Instructed the pt not to take or discontinue any medications without informing her physician/pharmacist and reminded her to inform us of any dietary changes, as well  8) Explained that she would be coming into the clinic more frequently in these first few weeks of therapy as we try to adjust her dose and achieve a therapeutic INR x 2 consecutive readings. Once that is achieved, patient will follow up in clinic every 4 weeks, on average.    She stated no problems with transportation or scheduling clinic appts in this manner. she expressed understanding of the information provided and has no additional questions at this time.    Anna Gilbert was presented with a copy of the Patients Rights and Responsibilities. she expressed verbal consent and agreement to receive care in the [facility] under the current collaborative care agreement with [facility].       INR History:  Previous INR data from [facility] is recorded in [facility] and scanned into the patient's chart in [employer]. These results have been analyzed and reviewed.      Plan:  1. INR is Therapeutic today- see above in Anticoagulation Summary.   Will instruct Anna Gilbert to Continue their warfarin regimen- see above in  Anticoagulation Summary.  2. Follow up in 3 weeks  3. Patient declines warfarin refills.  4. Verbal and written information provided. Patient expresses understanding and has no further questions at this time.    Radha Bravo [facility]

## 2018-10-22 ENCOUNTER — CLINICAL SUPPORT (OUTPATIENT)
Dept: ORTHOPEDIC SURGERY | Facility: CLINIC | Age: 73
End: 2018-10-22

## 2018-10-22 DIAGNOSIS — M17.12 PRIMARY OSTEOARTHRITIS OF LEFT KNEE: Primary | ICD-10-CM

## 2018-10-22 PROCEDURE — 20610 DRAIN/INJ JOINT/BURSA W/O US: CPT | Performed by: ORTHOPAEDIC SURGERY

## 2018-10-22 NOTE — PROGRESS NOTES
Subjective:     Patient ID: Anna Gilbert is a 73 y.o. female.    Chief Complaint:    History of Present Illness     Presents for third viscosupplement injection left knee.  Social History     Occupational History   • Not on file.     Social History Main Topics   • Smoking status: Former Smoker   • Smokeless tobacco: Never Used      Comment: no caffeine use   • Alcohol use No   • Drug use: No   • Sexual activity: Defer      Review of Systems   Constitutional: Negative for chills, diaphoresis, fever and unexpected weight change.   HENT: Negative for hearing loss, nosebleeds, sore throat and tinnitus.    Eyes: Negative for pain and visual disturbance.   Respiratory: Negative for cough, shortness of breath and wheezing.    Cardiovascular: Negative for chest pain and palpitations.   Gastrointestinal: Negative for abdominal pain, diarrhea, nausea and vomiting.   Endocrine: Negative for cold intolerance, heat intolerance and polydipsia.   Genitourinary: Negative for difficulty urinating, dysuria and hematuria.   Musculoskeletal: Positive for arthralgias. Negative for joint swelling and myalgias.   Skin: Negative for rash and wound.   Allergic/Immunologic: Negative for environmental allergies.   Neurological: Negative for dizziness, syncope and numbness.   Hematological: Does not bruise/bleed easily.   Psychiatric/Behavioral: Negative for dysphoric mood and sleep disturbance. The patient is not nervous/anxious.          Past Medical History:   Diagnosis Date   • Coronary artery disease    • Health care maintenance    • Hyperlipidemia    • Hyperthyroidism    • SHEILA (obstructive sleep apnea) 7/20/2016   • PAF (paroxysmal atrial fibrillation) (CMS/[facility])    • Sick sinus syndrome (CMS/[facility])      Past Surgical History:   Procedure Laterality Date   • A-V CARDIAC PACEMAKER INSERTION     • AORTIC VALVE REPAIR/REPLACEMENT  2010    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   • CARDIAC CATHETERIZATION  01/01/2011   • MITRAL VALVE REPAIR/REPLACEMENT   2010     Family History   Problem Relation Age of Onset   • Coronary artery disease Father    • No Known Problems Mother          Objective:  There were no vitals filed for this visit.  There were no vitals filed for this visit.  There is no height or weight on file to calculate BMI.        Ortho Exam       Assessment:        1. Primary osteoarthritis of left knee           Plan:  Large Joint Arthrocentesis  Date/Time: 10/22/2018 2:41 PM  Consent given by: patient  Site marked: site marked  Supporting Documentation  Indications: pain   Procedure Details  Location: knee - L knee  Preparation: Patient was prepped and draped in the usual sterile fashion  Needle size: 22 G  Approach: anterolateral  Medications administered: 30 mg Hyaluronan 30 MG/2ML  Patient tolerance: patient tolerated the procedure well with no immediate complications        Patient presented today for viscosupplement injection.  This is the third injection for the left knee.  We will see patient back in six weeks.  We reviewed risks benefits and alternatives of the procedure and patient wished to proceed today and had all questions answered.               Work Status:    ABIMAEL query complete.    Orders:  Orders Placed This Encounter   Procedures   • Large Joint Arthrocentesis       Medications:  No orders of the defined types were placed in this encounter.      Followup:  No Follow-up on file.          Dictated utilizing Dragon dictation

## 2018-10-31 ENCOUNTER — CLINICAL SUPPORT NO REQUIREMENTS (OUTPATIENT)
Dept: CARDIOLOGY | Facility: CLINIC | Age: 73
End: 2018-10-31

## 2018-10-31 DIAGNOSIS — I42.9 CARDIOMYOPATHY, UNSPECIFIED TYPE (HCC): Primary | ICD-10-CM

## 2018-10-31 DIAGNOSIS — I48.20 CHRONIC ATRIAL FIBRILLATION (HCC): ICD-10-CM

## 2018-11-28 ENCOUNTER — TELEPHONE (OUTPATIENT)
Dept: PHARMACY | Facility: HOSPITAL | Age: 73
End: 2018-11-28

## 2018-11-30 ENCOUNTER — ANTICOAGULATION VISIT (OUTPATIENT)
Dept: PHARMACY | Facility: HOSPITAL | Age: 73
End: 2018-11-30

## 2018-11-30 DIAGNOSIS — I48.20 CHRONIC ATRIAL FIBRILLATION (HCC): ICD-10-CM

## 2018-11-30 LAB
INR PPP: 3.6 (ref 0.91–1.09)
PROTHROMBIN TIME: 43.2 SECONDS (ref 10–13.8)

## 2018-11-30 PROCEDURE — G0463 HOSPITAL OUTPT CLINIC VISIT: HCPCS

## 2018-11-30 PROCEDURE — 85610 PROTHROMBIN TIME: CPT

## 2018-11-30 PROCEDURE — 36416 COLLJ CAPILLARY BLOOD SPEC: CPT

## 2018-11-30 RX ORDER — WARFARIN SODIUM 5 MG/1
5 TABLET ORAL DAILY
Qty: 90 TABLET | Refills: 0 | Status: SHIPPED | OUTPATIENT
Start: 2018-11-30 | End: 2019-01-02 | Stop reason: SDUPTHER

## 2018-11-30 NOTE — PROGRESS NOTES
Anticoagulation Clinic Progress Note    Anticoagulation Summary  As of 11/30/2018    INR goal:   2.0-3.0   TTR:   25.6 % (1.3 mo)   INR used for dosing:   3.6! (11/30/2018)   Warfarin maintenance plan:   5 mg every day   Weekly warfarin total:   35 mg   Plan last modified:   Radha Bravo RPH (10/12/2018)   Next INR check:   12/28/2018   Priority:   High   Target end date:   Indefinite    Indications    Chronic atrial fibrillation (CMS/HCC) [I48.2]             Anticoagulation Episode Summary     INR check location:       Preferred lab:       [facility] [place]    Comments:         Anticoagulation Care Providers     Provider Role Specialty Phone number    Adeel Mina III, MD Referring Cardiology [phone]    Radha Bravo [facility]  Pharmacy [phone]          Drug interactions: has remained unchanged.  Diet: has remained unchanged.    Clinic Interview:      INR History:  Anticoagulation Monitoring 10/12/2018 11/30/2018   INR 2.6 3.6   INR Date 10/12/2018 11/30/2018   INR Goal 2.0-3.0 2.0-3.0   Trend - Same   Last Week Total 32.5 mg 35 mg   Next Week Total 35 mg 32.5 mg   Sun 5 mg 5 mg   Mon 5 mg 5 mg   Tue 5 mg 5 mg   Wed 5 mg 5 mg   Thu 5 mg 5 mg   Fri 5 mg 2.5 mg (11/30); Otherwise 5 mg   Sat 5 mg 5 mg   Visit Report - -       Previous INR data from [facility] is recorded in [facility] and scanned into the patient's chart in [employer]. These results have been analyzed and reviewed.      Plan:  1. INR is Supratherapeutic today- see above in Anticoagulation Summary.  Will instruct Anna Gilbert to Continue their warfarin regimen- see above in Anticoagulation Summary.  Take 2.5mg today only.  2. Follow up in 3 weeks  3. Patient requests [facility].  4. Verbal and written information provided. Patient expresses understanding and has no further questions at this time.    Alicia Garcia [facility]

## 2018-12-11 RX ORDER — BUMETANIDE 2 MG/1
2 TABLET ORAL 2 TIMES DAILY PRN
Qty: 180 TABLET | Refills: 0 | Status: SHIPPED | OUTPATIENT
Start: 2018-12-11 | End: 2020-04-21

## 2018-12-27 ENCOUNTER — ANTICOAGULATION VISIT (OUTPATIENT)
Dept: PHARMACY | Facility: HOSPITAL | Age: 73
End: 2018-12-27

## 2018-12-27 DIAGNOSIS — I48.20 CHRONIC ATRIAL FIBRILLATION (HCC): ICD-10-CM

## 2018-12-27 LAB
INR PPP: 3.8 (ref 0.91–1.09)
PROTHROMBIN TIME: 45.5 SECONDS (ref 10–13.8)

## 2018-12-27 PROCEDURE — 36416 COLLJ CAPILLARY BLOOD SPEC: CPT

## 2018-12-27 PROCEDURE — 85610 PROTHROMBIN TIME: CPT

## 2018-12-27 PROCEDURE — G0463 HOSPITAL OUTPT CLINIC VISIT: HCPCS

## 2018-12-27 NOTE — PROGRESS NOTES
Anticoagulation Clinic Progress Note    Anticoagulation Summary  As of 12/27/2018    INR goal:   2.0-3.0   TTR:   15.2 % (2.2 mo)   INR used for dosing:   3.8! (12/27/2018)   Warfarin maintenance plan:   2.5 mg on Sun; 5 mg all other days   Weekly warfarin total:   32.5 mg   Plan last modified:   Mackenzie Kwon, [facility] (12/27/2018)   Next INR check:   1/10/2019   Priority:   High   Target end date:   Indefinite    Indications    Chronic atrial fibrillation (CMS/HCC) [I48.2]             Anticoagulation Episode Summary     INR check location:       Preferred lab:       Send INR reminders to:    G[facility]Y S[facility] CLINICAL POOL    Comments:         Anticoagulation Care Providers     Provider Role Specialty Phone number    Adeel Mina III, MD Referring Cardiology [phone]    Radha Bravo [facility]  Pharmacy [phone]          Clinic Interview:  Patient Findings     Negatives:   Signs/symptoms of thrombosis, Signs/symptoms of bleeding,   Laboratory test error suspected, Change in health, Change in alcohol use,   Change in activity, Upcoming invasive procedure, Emergency department   visit, Upcoming dental procedure, Missed doses, Extra doses, Change in   medications, Change in diet/appetite, Hospital admission, Bruising, Other   complaints      Clinical Outcomes     Negatives:   Major bleeding event, Thromboembolic event,   Anticoagulation-related hospital admission, Anticoagulation-related ED   visit, Anticoagulation-related fatality        INR History:  Anticoagulation Monitoring 10/12/2018 11/30/2018 12/27/2018   INR 2.6 3.6 3.8   INR Date 10/12/2018 11/30/2018 12/27/2018   INR Goal 2.0-3.0 2.0-3.0 2.0-3.0   Trend - Same Down   Last Week Total 32.5 mg 35 mg 35 mg   Next Week Total 35 mg 32.5 mg 32.5 mg   Sun 5 mg 5 mg 2.5 mg   Mon 5 mg 5 mg 5 mg   Tue 5 mg 5 mg 5 mg   Wed 5 mg 5 mg 5 mg   Thu 5 mg 5 mg 5 mg   Fri 5 mg 2.5 mg (11/30); Otherwise 5 mg 5 mg   Sat 5 mg 5 mg 5 mg   Visit Report - - -   Some recent  data might be hidden       Plan:  1. INR is Supratherapeutic today- see above in Anticoagulation Summary.  Will instruct Anna Gilbert to decrease her weekly dose by 7% from 35mg/wk to 32.5mg/wk. Plans to eat some kale today.   2. Follow up in 2 weeks  3. Patient declines warfarin refills.  4. Verbal and written information provided. Patient expresses understanding and has no further questions at this time.    Mackenzie Kwon [facility]

## 2019-01-02 ENCOUNTER — OFFICE VISIT (OUTPATIENT)
Dept: CARDIOLOGY | Facility: CLINIC | Age: 74
End: 2019-01-02

## 2019-01-02 VITALS
SYSTOLIC BLOOD PRESSURE: 138 MMHG | DIASTOLIC BLOOD PRESSURE: 88 MMHG | BODY MASS INDEX: 33.86 KG/M2 | HEIGHT: 62 IN | HEART RATE: 104 BPM | WEIGHT: 184 LBS

## 2019-01-02 DIAGNOSIS — G47.33 OSA (OBSTRUCTIVE SLEEP APNEA): Chronic | ICD-10-CM

## 2019-01-02 DIAGNOSIS — I48.20 CHRONIC ATRIAL FIBRILLATION (HCC): Primary | Chronic | ICD-10-CM

## 2019-01-02 DIAGNOSIS — Z95.2 AORTIC VALVE REPLACED: Chronic | ICD-10-CM

## 2019-01-02 DIAGNOSIS — Z98.890 H/O MITRAL VALVE REPAIR: Chronic | ICD-10-CM

## 2019-01-02 DIAGNOSIS — Z95.0 PACEMAKER: Chronic | ICD-10-CM

## 2019-01-02 PROCEDURE — 99214 OFFICE O/P EST MOD 30 MIN: CPT | Performed by: INTERNAL MEDICINE

## 2019-01-02 PROCEDURE — 93000 ELECTROCARDIOGRAM COMPLETE: CPT | Performed by: INTERNAL MEDICINE

## 2019-01-02 NOTE — PROGRESS NOTES
Subjective:     Encounter Date: 1/2/2019      Patient ID: Anna Gilbert is a 73 y.o. female.    Chief Complaint: CAF  History of Present Illness    Dear Dr. Isaac,    This patient comes in for routine follow-up of their cardiac status.   She has a history of chronic atrial fibrillation, as well as bioprosthetic aortic valve and prior mitral valve repair.    For the last 2 days or so she's been having an upper respiratory tract infection.  She has a cough, sore throat, and generalized malaise.  She is not been having any fever that she is aware of.    She is not having any cardiac blood.  No chest pain or chest discomfort.  She's not had any feeling or heart racing.  She has not had any presyncope or syncope.  She has a little bit of shortness breath just with a cough, but prior to that she hadn't had any shortness of breath at all.    Event Monitor  Event monitor enrollment date was 12/7/2016. Enrollment ended on 12/13/2016. The manufacturing company for the event monitor is [employer]. [employer] monitor is reviewed. A total of 6 days and 10 hours are available for review. Atrial fibrillation is present throughout. Average heart rate is 70 bpm. Maximal heart rate is approximately 150 bpm. Occasional wide complex beats are seen which appear to be atrial fibrillation with aberrancy. Minimal heart rate is atrial fibrillation at a rate of 39 bpm. No other ectopy is seen.    Impression: Abnormal monitor with atrial fibrillation present throughout. However, rate control is appropriate.     Interpretation Summary   · Left ventricular function is normal. Calculated EF = 53.8%. Estimated EF was in agreement with the calculated EF. Estimated EF = 54%. Normal left ventricular cavity size and wall thickness noted. All left ventricular wall segments contract normally. Left ventricular diastolic function was unable to be assessed. Elevated left atrial pressure.  · Left atrial volume is severely increased.  · Right atrial cavity size is  severely dilated.  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 The aortic valve peak and mean gradients are elevated. The prosthetic valve has the following abnormalities: stenosis. The mean aortic valve gradient was 28 mmHg which is moderately elevated for this valve and consistent with moderate prosthetic valve stenosis. 2-D imaging was limited and presence or absence of pannus and leaflet excursion could not be determined.  · Fixed posterior leaflet findings are consistent with surgical mitral valve repair. No mitral valve regurgitation is present. Mild mitral valve stenosis is present. 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 Mitral valve gradient was 7 mmHg at a heart rate of 53 beats minute.  · Tricuspid valve not well visualized. The tricuspid valve is grossly normal. Mild to moderate tricuspid valve regurgitation is present. Estimated right ventricular systolic pressure from tricuspid regurgitation is mildly elevated (35-45 mmHg). The calculated RV systolic pressure is 37 mmHg.  · Mild dilation of the sinuses of Valsalva is present. The aortic root at the sinus of Valsalva measures 4.1 cm.             The following portions of the patient's history were reviewed and updated as appropriate: allergies, current medications, past family history, past medical history, past social history, past surgical history and problem list.    Past Medical History:   Diagnosis Date   • Coronary artery disease    • Health care maintenance    • Hyperlipidemia    • Hyperthyroidism    • SHEILA (obstructive sleep apnea) 7/20/2016   • PAF (paroxysmal atrial fibrillation) (CMS/[facility])    • Sick sinus syndrome (CMS/[facility])        Past Surgical History:   Procedure Laterality Date   • A-V CARDIAC PACEMAKER INSERTION     • AORTIC VALVE REPAIR/REPLACEMENT  2010    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   • CARDIAC CATHETERIZATION  01/01/2011   • MITRAL VALVE REPAIR/REPLACEMENT  2010       Social History      Socioeconomic History   • Marital status: Single     Spouse name: Not on file   • Number of children: Not on file   • Years of education: Not on file   • Highest education level: Not on file   Social Needs   • Financial resource strain: Not on file   • Food insecurity - worry: Not on file   • Food insecurity - inability: Not on file   • Transportation needs - medical: Not on file   • Transportation needs - non-medical: Not on file   Occupational History   • Not on file   Tobacco Use   • Smoking status: Former Smoker   • Smokeless tobacco: Never Used   • Tobacco comment: no caffeine use   Substance and Sexual Activity   • Alcohol use: No   • Drug use: No   • Sexual activity: Defer   Other Topics Concern   • Not on file   Social History Narrative   • Not on file       Review of Systems   Constitution: Negative for chills, decreased appetite, fever and night sweats.   HENT: Negative for ear discharge, ear pain, hearing loss, nosebleeds and sore throat.    Eyes: Negative for blurred vision, double vision and pain.   Cardiovascular: Negative for cyanosis.   Respiratory: Negative for hemoptysis and sputum production.    Endocrine: Negative for cold intolerance and heat intolerance.   Hematologic/Lymphatic: Negative for adenopathy.   Skin: Negative for dry skin, itching, nail changes, rash and suspicious lesions.   Musculoskeletal: Negative for arthritis, gout, muscle cramps, muscle weakness, myalgias and neck pain.   Gastrointestinal: Negative for anorexia, bowel incontinence, constipation, diarrhea, dysphagia, hematemesis and jaundice.   Genitourinary: Negative for bladder incontinence, dysuria, flank pain, frequency, hematuria and nocturia.   Neurological: Negative for focal weakness, numbness, paresthesias and seizures.   Psychiatric/Behavioral: Negative for altered mental status, hallucinations, hypervigilance, suicidal ideas and thoughts of violence.   Allergic/Immunologic: Negative for persistent infections.  "        ECG 12 Lead  Date/Time: 1/2/2019 3:48 PM  Performed by: Adeel Mina III, MD  Authorized by: Adeel Mina III, MD   Comparison: compared with previous ECG   Similar to previous ECG  Rhythm: atrial fibrillation  Rate: normal  Conduction: conduction normal  ST Segments: ST segments normal  T Waves: T waves normal  QRS axis: normal  Other: no other findings  Clinical impression: abnormal ECG               Objective:     Vitals:    01/02/19 1324   BP: 138/88   Pulse: 104   Weight: 83.5 kg (184 lb)   Height: 157.5 cm (62\")         Physical Exam   Constitutional: She is oriented to person, place, and time. She appears well-developed and well-nourished. No distress.   HENT:   Head: Normocephalic and atraumatic.   Nose: Nose normal.   Mouth/Throat: Oropharynx is clear and moist.   Eyes: Conjunctivae and EOM are normal. Pupils are equal, round, and reactive to light. Right eye exhibits no discharge. Left eye exhibits no discharge.   Neck: Normal range of motion. Neck supple. No tracheal deviation present. No thyromegaly present.   Cardiovascular: Normal rate, S1 normal, S2 normal and normal pulses. An irregularly irregular rhythm present. Exam reveals no S3.   Murmur heard.   Harsh midsystolic murmur is present with a grade of 1/6 at the upper right sternal border radiating to the neck.  Pulmonary/Chest: Effort normal and breath sounds normal. No stridor. No respiratory distress. She exhibits no tenderness.   Abdominal: Soft. Bowel sounds are normal. She exhibits no distension and no mass. There is no tenderness. There is no rebound and no guarding.   Musculoskeletal: Normal range of motion. She exhibits no tenderness or deformity.   Lymphadenopathy:     She has no cervical adenopathy.   Neurological: She is alert and oriented to person, place, and time. She has normal reflexes.   Skin: Skin is warm and dry. No rash noted. She is not diaphoretic. No erythema.   Psychiatric: She has a normal mood and affect. " Thought content normal.       Lab Review:             Performed        Assessment:          Diagnosis Plan   1. Chronic atrial fibrillation (CMS/HCC)  ECG 12 Lead   2. Pacemaker  ECG 12 Lead   3. SHEILA (obstructive sleep apnea)  ECG 12 Lead   4. H/O mitral valve repair  ECG 12 Lead   5. Aortic valve replaced  ECG 12 Lead          Plan:       1.   Atrial Fibrillation and Atrial Flutter  Assessment  • The patient has permanent atrial fibrillation  • This is valvular in etiology  • The patient's CHADS2-VASc score is 3  • A [id number] score of 2 or more is considered a high risk for a thromboembolic event  • Warfarin prescribed    Plan  • Continue in atrial fibrillation with rate control  • Continue warfarin for antithrombotic therapy, bleeding issues discussed  • Continue diltiazem for rate control    2.  Bioprosthetic aortic valve replacement with prior mitral valve repair  3.  Sick sinus syndrome with pacemaker in place  4.  Acute upper respiratory tract infection-patient is leaving from here to go to an urgent care center to be checked for influenza, since it has suddenly become so prevalent in the community      Current Outpatient Medications:   •  b complex vitamins capsule, Take 1 capsule by mouth Daily., Disp: , Rfl:   •  BIOTIN PO, Take 1 tablet by mouth Daily., Disp: , Rfl:   •  bumetanide (BUMEX) 2 MG tablet, Take 1 tablet by mouth 2 (Two) Times a Day As Needed (edema). (Patient taking differently: Take 2 mg by mouth Daily. 1-2 as needed), Disp: 180 tablet, Rfl: 0  •  CALCIUM-MAGNESIUM-ZINC PO, Take 1 tablet by mouth Daily., Disp: , Rfl:   •  Cholecalciferol (VITAMIN D-3 PO), Take 1 tablet by mouth Daily., Disp: , Rfl:   •  fluticasone (FLONASE) 50 MCG/ACT nasal spray, 1 spray into each nostril Daily., Disp: , Rfl:   •  levothyroxine (SYNTHROID, LEVOTHROID) 150 MCG tablet, Take 150 mcg by mouth Daily., Disp: , Rfl:   •  metoprolol succinate XL (TOPROL-XL) 25 MG 24 hr tablet, Take 1 tablet by mouth Daily.,  Disp: 90 tablet, Rfl: 3  •  Multiple Vitamin (MULTIVITAMIN) capsule, Take 1 capsule by mouth Daily., Disp: , Rfl:   •  Potassium 99 MG tablet, Take 1 tablet by mouth As Needed., Disp: , Rfl:   •  SELENIUM PO, Take 1 tablet by mouth Daily., Disp: , Rfl:   •  warfarin (COUMADIN) 5 MG tablet, TAKE 1 TABLET EVERY DAY  OR AS DIRECTED, Disp: 90 tablet, Rfl: 0

## 2019-01-07 RX ORDER — WARFARIN SODIUM 5 MG/1
TABLET ORAL
Qty: 90 TABLET | Refills: 1 | Status: SHIPPED | OUTPATIENT
Start: 2019-01-07 | End: 2019-08-06 | Stop reason: SDUPTHER

## 2019-01-08 ENCOUNTER — ANTICOAGULATION VISIT (OUTPATIENT)
Dept: PHARMACY | Facility: HOSPITAL | Age: 74
End: 2019-01-08

## 2019-01-08 DIAGNOSIS — I48.20 CHRONIC ATRIAL FIBRILLATION (HCC): ICD-10-CM

## 2019-01-08 LAB
INR PPP: 2.8 (ref 0.91–1.09)
PROTHROMBIN TIME: 33.6 SECONDS (ref 10–13.8)

## 2019-01-08 PROCEDURE — 36416 COLLJ CAPILLARY BLOOD SPEC: CPT

## 2019-01-08 PROCEDURE — 85610 PROTHROMBIN TIME: CPT

## 2019-01-08 NOTE — PROGRESS NOTES
Anticoagulation Clinic Progress Note    Anticoagulation Summary  As of 2019    INR goal:   2.0-3.0   TTR:   16.0 % (2.6 mo)   INR used for dosin.8 (2019)   Warfarin maintenance plan:   2.5 mg on Sun; 5 mg all other days   Weekly warfarin total:   32.5 mg   No change documented:   Roxanne Otero   Plan last modified:   Mackenzie Kwon [facility] (2018)   Next INR check:   2019   Priority:   High   Target end date:   Indefinite    Indications    Chronic atrial fibrillation (CMS/HCC) [I48.2]             Anticoagulation Episode Summary     INR check location:       Preferred lab:       Send INR reminders to:    G[facility]Y SANC[facility]Z CLINICAL POOL    Comments:         Anticoagulation Care Providers     Provider Role Specialty Phone number    Adeel Mina III, MD Referring Cardiology [phone]    Radha Bravo [facility]  Pharmacy [phone]          Clinic Interview:  Patient Findings     Negatives:   Signs/symptoms of thrombosis, Signs/symptoms of bleeding,   Laboratory test error suspected, Change in health, Change in alcohol use,   Change in activity, Upcoming invasive procedure, Emergency department   visit, Upcoming dental procedure, Missed doses, Extra doses, Change in   medications, Change in diet/appetite, Hospital admission, Bruising, Other   complaints      Clinical Outcomes     Negatives:   Major bleeding event, Thromboembolic event,   Anticoagulation-related hospital admission, Anticoagulation-related ED   visit, Anticoagulation-related fatality        INR History:  Anticoagulation Monitoring 2018   INR 3.6 3.8 2.8   INR Date 2018   INR Goal 2.0-3.0 2.0-3.0 2.0-3.0   Trend Same Down Same   Last Week Total 35 mg 35 mg 32.5 mg   Next Week Total 32.5 mg 32.5 mg 32.5 mg   Sun 5 mg 2.5 mg 2.5 mg   Mon 5 mg 5 mg 5 mg   Tue 5 mg 5 mg 5 mg   Wed 5 mg 5 mg 5 mg   Thu 5 mg 5 mg 5 mg   Fri 2.5 mg (); Otherwise 5 mg 5 mg 5 mg   Sat 5 mg  5 mg 5 mg   Visit Report - - -   Some recent data might be hidden       Plan:  1. INR is therapeutic today- see above in Anticoagulation Summary.   Will instruct Anna Gilbert to continue their warfarin regimen- see above in Anticoagulation Summary.  2. Follow up in 2 weeks.  3. Patient desires warfarin refills.  4. Verbal and written information provided. Patient expresses understanding and has no further questions at this time.    Roxanne Otero

## 2019-01-22 ENCOUNTER — ANTICOAGULATION VISIT (OUTPATIENT)
Dept: PHARMACY | Facility: HOSPITAL | Age: 74
End: 2019-01-22

## 2019-01-22 DIAGNOSIS — I48.20 CHRONIC ATRIAL FIBRILLATION (HCC): ICD-10-CM

## 2019-01-22 LAB
INR PPP: 2.3 (ref 0.91–1.09)
PROTHROMBIN TIME: 27 SECONDS (ref 10–13.8)

## 2019-01-22 PROCEDURE — 36416 COLLJ CAPILLARY BLOOD SPEC: CPT

## 2019-01-22 PROCEDURE — 85610 PROTHROMBIN TIME: CPT

## 2019-01-22 NOTE — PROGRESS NOTES
[facility] NRP Delivery Note    Infant born via vaginal delivery    YES to Term, Tone, Breathing or Crying  Infant with mother for routine care:  Warm and maintain normal temperature, position airway, clear secretions if needed, dry, ongoing evaluation.  Done by RN.    Baby went right to mom's chest.  Cleared by KARLOS Patricio to leave.                                    Anticoagulation Clinic Progress Note    Anticoagulation Summary  As of 2019    INR goal:   2.0-3.0   TTR:   28.6 % (3.1 mo)   INR used for dosin.3 (2019)   Warfarin maintenance plan:   2.5 mg on Sun; 5 mg all other days   Weekly warfarin total:   32.5 mg   No change documented:   Rylee Bernal   Plan last modified:   Mackenzie Kwon [facility] (2018)   Next INR check:   2019   Priority:   High   Target end date:   Indefinite    Indications    Chronic atrial fibrillation (CMS/HCC) [I48.2]             Anticoagulation Episode Summary     INR check location:       Preferred lab:       Send INR reminders to:    G[facility]Y SANCHEZ CLINICAL POOL    Comments:         Anticoagulation Care Providers     Provider Role Specialty Phone number    Adeel Mina III, MD Referring Cardiology [phone]    Radha Bravo [facility]  Pharmacy [phone]          Clinic Interview:  Patient Findings     Negatives:   Signs/symptoms of thrombosis, Signs/symptoms of bleeding,   Laboratory test error suspected, Change in health, Change in alcohol use,   Change in activity, Upcoming invasive procedure, Emergency department   visit, Upcoming dental procedure, Missed doses, Extra doses, Change in   medications, Change in diet/appetite, Hospital admission, Bruising, Other   complaints      Clinical Outcomes     Negatives:   Major bleeding event, Thromboembolic event,   Anticoagulation-related hospital admission, Anticoagulation-related ED   visit, Anticoagulation-related fatality        INR History:  Anticoagulation Monitoring 2018   INR 3.8 2.8 2.3   INR Date 2018   INR Goal 2.0-3.0 2.0-3.0 2.0-3.0   Trend Down Same Same   Last Week Total 35 mg 32.5 mg 32.5 mg   Next Week Total 32.5 mg 32.5 mg 32.5 mg   Sun 2.5 mg 2.5 mg 2.5 mg   Mon 5 mg 5 mg 5 mg   Tue 5 mg 5 mg 5 mg   Wed 5 mg 5 mg 5 mg   Thu 5 mg 5 mg 5 mg   Fri 5 mg 5 mg 5 mg   Sat 5 mg 5 mg 5 mg   Visit Report - -  -   Some recent data might be hidden       Plan:  1. INR is therapeutic today- see above in Anticoagulation Summary.   Will instruct Anna Gilbert to continue their warfarin regimen- see above in Anticoagulation Summary.  2. Follow up in 4 weeks.  3. Patient declines warfarin refills.  4. Verbal and written information provided. Patient expresses understanding and has no further questions at this time.    Rylee Bernal

## 2019-02-20 ENCOUNTER — ANTICOAGULATION VISIT (OUTPATIENT)
Dept: PHARMACY | Facility: HOSPITAL | Age: 74
End: 2019-02-20

## 2019-02-20 DIAGNOSIS — I48.20 CHRONIC ATRIAL FIBRILLATION (HCC): ICD-10-CM

## 2019-02-20 LAB
INR PPP: 2.4 (ref 0.91–1.09)
PROTHROMBIN TIME: 29.1 SECONDS (ref 10–13.8)

## 2019-02-20 PROCEDURE — 85610 PROTHROMBIN TIME: CPT

## 2019-02-20 PROCEDURE — 36416 COLLJ CAPILLARY BLOOD SPEC: CPT

## 2019-02-20 NOTE — PROGRESS NOTES
Anticoagulation Clinic Progress Note    Anticoagulation Summary  As of 2019    INR goal:   2.0-3.0   TTR:   45.7 % (4 mo)   INR used for dosin.4 (2019)   Warfarin maintenance plan:   2.5 mg on Sun; 5 mg all other days   Weekly warfarin total:   32.5 mg   No change documented:   Lizzy Villa   Plan last modified:   Mackenzie Kwon [facility] (2018)   Next INR check:   3/20/2019   Priority:   Maintenance   Target end date:   Indefinite    Indications    Chronic atrial fibrillation (CMS/[facility]) [I48.2]             Anticoagulation Episode Summary     INR check location:       Preferred lab:       Send INR reminders to:    GORDY SANCHEZ CLINICAL POOL    Comments:         Anticoagulation Care Providers     Provider Role Specialty Phone number    Adeel Mina III, MD Referring Cardiology [phone]    Radha Bravo [facility]  Pharmacy [phone]          Clinic Interview:  Patient Findings     Negatives:   Signs/symptoms of thrombosis, Signs/symptoms of bleeding,   Laboratory test error suspected, Change in health, Change in alcohol use,   Change in activity, Upcoming invasive procedure, Emergency department   visit, Upcoming dental procedure, Missed doses, Extra doses, Change in   medications, Change in diet/appetite, Hospital admission, Bruising, Other   complaints      Clinical Outcomes     Negatives:   Major bleeding event, Thromboembolic event,   Anticoagulation-related hospital admission, Anticoagulation-related ED   visit, Anticoagulation-related fatality        INR History:  Anticoagulation Monitoring 2019   INR 2.8 2.3 2.4   INR Date 2019   INR Goal 2.0-3.0 2.0-3.0 2.0-3.0   Trend Same Same Same   Last Week Total 32.5 mg 32.5 mg 32.5 mg   Next Week Total 32.5 mg 32.5 mg 32.5 mg   Sun 2.5 mg 2.5 mg 2.5 mg   Mon 5 mg 5 mg 5 mg   Tue 5 mg 5 mg 5 mg   Wed 5 mg 5 mg 5 mg   Thu 5 mg 5 mg 5 mg   Fri 5 mg 5 mg 5 mg   Sat 5 mg 5 mg 5 mg   Visit  Report - - -   Some recent data might be hidden       Plan:  1. INR is therapeutic today- see above in Anticoagulation Summary.   Will instruct Anna Gilbert to continue their warfarin regimen- see above in Anticoagulation Summary.  2. Follow up in 4 weeks.  3. Patient declines warfarin refills.  4. Verbal and written information provided. Patient expresses understanding and has no further questions at this time.    Lizzy Villa

## 2019-02-22 DIAGNOSIS — I48.20 CHRONIC ATRIAL FIBRILLATION (HCC): ICD-10-CM

## 2019-02-22 RX ORDER — METOPROLOL SUCCINATE 25 MG/1
25 TABLET, EXTENDED RELEASE ORAL DAILY
Qty: 90 TABLET | Refills: 3 | Status: SHIPPED | OUTPATIENT
Start: 2019-02-22 | End: 2020-03-10

## 2019-03-19 ENCOUNTER — APPOINTMENT (OUTPATIENT)
Dept: PHARMACY | Facility: HOSPITAL | Age: 74
End: 2019-03-19

## 2019-03-21 ENCOUNTER — ANTICOAGULATION VISIT (OUTPATIENT)
Dept: PHARMACY | Facility: HOSPITAL | Age: 74
End: 2019-03-21

## 2019-03-21 DIAGNOSIS — I48.20 CHRONIC ATRIAL FIBRILLATION (HCC): ICD-10-CM

## 2019-03-21 LAB
INR PPP: 2.3 (ref 0.91–1.09)
PROTHROMBIN TIME: 27.8 SECONDS (ref 10–13.8)

## 2019-03-21 PROCEDURE — 85610 PROTHROMBIN TIME: CPT

## 2019-03-21 PROCEDURE — 36416 COLLJ CAPILLARY BLOOD SPEC: CPT

## 2019-03-21 NOTE — PROGRESS NOTES
Anticoagulation Clinic Progress Note    Anticoagulation Summary  As of 3/21/2019    INR goal:   2.0-3.0   TTR:   56.1 % (5 mo)   INR used for dosin.3 (3/21/2019)   Warfarin maintenance plan:   2.5 mg every Sun; 5 mg all other days   Weekly warfarin total:   32.5 mg   No change documented:   Rylee Bernal   Plan last modified:   Mackenzie Kwon [facility] (2018)   Next INR check:   2019   Priority:   Maintenance   Target end date:   Indefinite    Indications    Chronic atrial fibrillation (CMS/[facility]) [I48.2]             Anticoagulation Episode Summary     INR check location:       Preferred lab:       Send INR reminders to:    GORDY SANCHEZ CLINICAL POOL    Comments:         Anticoagulation Care Providers     Provider Role Specialty Phone number    Adeel Mina III, MD Referring Cardiology [phone]    Radha Bravo [facility]  Pharmacy [phone]          Clinic Interview:  Patient Findings     Negatives:   Signs/symptoms of thrombosis, Signs/symptoms of bleeding,   Laboratory test error suspected, Change in health, Change in alcohol use,   Change in activity, Upcoming invasive procedure, Emergency department   visit, Upcoming dental procedure, Missed doses, Extra doses, Change in   medications, Change in diet/appetite, Hospital admission, Bruising, Other   complaints      Clinical Outcomes     Negatives:   Major bleeding event, Thromboembolic event,   Anticoagulation-related hospital admission, Anticoagulation-related ED   visit, Anticoagulation-related fatality        INR History:  Anticoagulation Monitoring 2019 2019 3/21/2019   INR 2.3 2.4 2.3   INR Date 2019 2019 3/21/2019   INR Goal 2.0-3.0 2.0-3.0 2.0-3.0   Trend Same Same Same   Last Week Total 32.5 mg 32.5 mg 32.5 mg   Next Week Total 32.5 mg 32.5 mg 32.5 mg   Sun 2.5 mg 2.5 mg 2.5 mg   Mon 5 mg 5 mg 5 mg   Tue 5 mg 5 mg 5 mg   Wed 5 mg 5 mg 5 mg   Thu 5 mg 5 mg 5 mg   Fri 5 mg 5 mg 5 mg   Sat 5 mg 5 mg 5 mg   Visit  Report - - -   Some recent data might be hidden       Plan:  1. INR is therapeutic today- see above in Anticoagulation Summary.   Will instruct Anna Gilbert to continue their warfarin regimen- see above in Anticoagulation Summary.  2. Follow up in 4 weeks.  3. Patient declines warfarin refills.  4. Verbal and written information provided. Patient expresses understanding and has no further questions at this time.    Rylee Bernal

## 2019-03-28 ENCOUNTER — TELEPHONE (OUTPATIENT)
Dept: CARDIOLOGY | Facility: CLINIC | Age: 74
End: 2019-03-28

## 2019-03-28 NOTE — TELEPHONE ENCOUNTER
03/28/19  1:09 PM  Anna Gilbert  1945  Home Phone [phone]   Mobile [phone]       Anna Gilbert is a patient of Dr Mina. She is calling in with c/o intermittent chest pressure, that does not radiate.  She is not having SOA or nausea with the pain.  She stated it has been going on for the last couple of weeks.  And stated she may have at least one episode per day and she feels like it is increasing in frequency.    She does not have her bp or heart rate, she is driving.  She also stated she is seeing another MD at [place] and she can run up for an ekg if you need her to.    Cardiac meds reviewed  Metoprolol succinate 25mg daily  bumex 2mg daily  Coumadin as directed.    Dr Mina does she need to be seen? Or EKG?    Judy Rodgers RN  Triage nurse

## 2019-03-29 ENCOUNTER — OFFICE VISIT (OUTPATIENT)
Dept: CARDIOLOGY | Facility: CLINIC | Age: 74
End: 2019-03-29

## 2019-03-29 VITALS
WEIGHT: 182.3 LBS | HEART RATE: 74 BPM | HEIGHT: 62 IN | DIASTOLIC BLOOD PRESSURE: 80 MMHG | BODY MASS INDEX: 33.55 KG/M2 | SYSTOLIC BLOOD PRESSURE: 142 MMHG

## 2019-03-29 DIAGNOSIS — I20.9 ANGINA PECTORIS (HCC): Primary | ICD-10-CM

## 2019-03-29 DIAGNOSIS — I48.20 CHRONIC ATRIAL FIBRILLATION (HCC): Chronic | ICD-10-CM

## 2019-03-29 DIAGNOSIS — E78.2 MIXED HYPERLIPIDEMIA: ICD-10-CM

## 2019-03-29 DIAGNOSIS — Z95.0 PACEMAKER: Chronic | ICD-10-CM

## 2019-03-29 DIAGNOSIS — G47.33 OSA (OBSTRUCTIVE SLEEP APNEA): Chronic | ICD-10-CM

## 2019-03-29 DIAGNOSIS — Z95.2 AORTIC VALVE REPLACED: Chronic | ICD-10-CM

## 2019-03-29 PROCEDURE — 99214 OFFICE O/P EST MOD 30 MIN: CPT | Performed by: NURSE PRACTITIONER

## 2019-03-29 PROCEDURE — 93000 ELECTROCARDIOGRAM COMPLETE: CPT | Performed by: NURSE PRACTITIONER

## 2019-03-29 NOTE — PROGRESS NOTES
Subjective:     Encounter Date:03/29/2019      Patient ID: Anna Gilbert is a 73 y.o. female.    Chief Complaint: chest pain  History of Present Illness  Ms. Gilbert is a new patient to me and I have reviewed her past medical records.  She is a patient of Dr. Mina.  She has a history of chronic atrial fibrillation, bioprosthetic aortic valve and prior mitral valve repair.  She also has a pacemaker/defibrillator.  She is on chronic warfarin therapy.     She had a Zio monitor placed from 12/7/16 through 12/13/16 12/7/2016.  Monitor showed atrial fibrillation was present throughout.  Average heart rate was 70 bpm maximal heart rate was 150 bpm there were occasional wide complex beats which appear to be atrial fibrillation with aberrancy.  Her minimal heart rate was 39 bpm.  No ectopy was seen.    Impression: Abnormal monitor with atrial fibrillation present throughout. However, rate control is appropriate.      Interpretation Summary 11/16/16  · Left ventricular function is normal. Calculated EF = 53.8%. Estimated EF was in agreement with the calculated EF. Estimated EF = 54%. Normal left ventricular cavity size and wall thickness noted. All left ventricular wall segments contract normally. Left ventricular diastolic function was unable to be assessed. Elevated left atrial pressure.  · Left atrial volume is severely increased.  · Right atrial cavity size is severely dilated.  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 The aortic valve peak and mean gradients are elevated. The prosthetic valve has the following abnormalities: stenosis. The mean aortic valve gradient was 28 mmHg which is moderately elevated for this valve and consistent with moderate prosthetic valve stenosis. 2-D imaging was limited and presence or absence of pannus and leaflet excursion could not be determined.  · Fixed posterior leaflet findings are consistent with surgical mitral valve repair. No mitral  valve regurgitation is present. Mild mitral valve stenosis is present. 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 Mitral valve gradient was 7 mmHg at a heart rate of 53 beats minute.  · Tricuspid valve not well visualized. The tricuspid valve is grossly normal. Mild to moderate tricuspid valve regurgitation is present. Estimated right ventricular systolic pressure from tricuspid regurgitation is mildly elevated (35-45 mmHg). The calculated RV systolic pressure is 37 mmHg.  · Mild dilation of the sinuses of Valsalva is present. The aortic root at the sinus of Valsalva measures 4.1 cm.         She presents today, 3/29/16, with complaints of intermittent chest pain/tightness for the last 2 weeks.    She states these episodes are a tightness versus actual pain.  These episodes will happen with and without activity.  It has woken her up during the night. She denies any palpitations, shortness of air, or edema.  She denies any fatigue.  She does have a history of vertigo.  She is very active and still works.  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  She has not had any unexplained leg pain, numbness or tingling in her upper or lower extremities.  She denies any PND, cough, orthopnea.  She has had no bleeding issues.    The following portions of the patient's history were reviewed and updated as appropriate: allergies, current medications, past family history, past medical history, past social history, past surgical history and problem list.    Review of Systems   Constitution: Negative for weakness and malaise/fatigue.   HENT: Negative for congestion, hoarse voice and sore throat.    Eyes: Negative for blurred vision, double vision, photophobia, vision loss in left eye and vision loss in right eye.   Cardiovascular: Negative for chest pain, dyspnea on exertion, irregular heartbeat, leg swelling,  near-syncope, orthopnea, palpitations, paroxysmal nocturnal dyspnea and syncope. Chest tightness  Respiratory: Negative for cough, hemoptysis, shortness of breath, sleep disturbances due to breathing, snoring, sputum production and wheezing.    Endocrine: Negative.    Hematologic/Lymphatic: Does not bruise/bleed easily.   Skin: Negative for color change, dry skin, poor wound healing and rash.   Musculoskeletal: Negative for back pain, falls, gout, joint pain, joint swelling, muscle cramps and muscle weakness.   Gastrointestinal: Negative for abdominal pain, constipation, diarrhea, dysphagia, melena, nausea and vomiting.   Neurological: Negative for excessive daytime sleepiness, dizziness, headaches, light-headedness, loss of balance, numbness, paresthesias, seizures and vertigo.   Psychiatric/Behavioral: Negative for depression and substance abuse. The patient is not nervous/anxious.        ECG 12 Lead  Date/Time: 3/29/2019 4:47 PM  Performed by: Deedee Adam APRN  Authorized by: Deedee Adam APRN   Comparison: compared with previous ECG from 1/2/2019  Similar to previous ECG  Rhythm: atrial fibrillation  Ectopy: unifocal PVCs  Rate: normal  ST Segments: ST segments normal  T Waves: T waves normal  QRS axis: left    Clinical impression: abnormal EKG               Objective:         Physical Exam   Constitutional: He is oriented to person, place, and time. Vital signs are normal. He appears well-developed and well-nourished. No distress.   HENT:   Head: Normocephalic and atraumatic.   Right Ear: Hearing normal.   Left Ear: Hearing normal.   Eyes: Conjunctivae and lids are normal.   Neck: Normal range of motion. Neck supple. No JVD present. Carotid bruit is not present. No thyromegaly present.   Cardiovascular: Normal rate, regular rhythm, S1 normal, S2 normal, normal heart sounds and intact distal pulses.  PMI is not displaced.  Exam reveals no gallop.     murmur heard.  Pulses:       Carotid pulses are 2+ on the  "right side, and 2+ on the left side.       Radial pulses are 2+ on the right side, and 2+ on the left side.        Dorsalis pedis pulses are 2+ on the right side, and 2+ on the left side.        Posterior tibial pulses are 2+ on the right side, and 2+ on the left side.   Pulmonary/Chest: Effort normal and breath sounds normal. No respiratory distress. He has no wheezes. He has no rhonchi. He has no rales. He exhibits no tenderness.   Abdominal: Soft. Normal appearance and bowel sounds are normal. He exhibits no distension, no abdominal bruit and no mass. There is no tenderness.   Musculoskeletal: Normal range of motion.   Exhibits no  deformity Trace left pedal edema  Lymphadenopathy:     He has no cervical adenopathy.   Neurological: He is alert and oriented to person, place, and time. No cranial nerve deficit. Coordination and gait normal.   Oriented to person, place and time.   Skin: Skin is warm, dry and intact. No rash noted. He is not diaphoretic. No cyanosis. Nails show no clubbing.   Psychiatric: He has a normal mood and affect. His speech is normal and behavior is normal. Judgment and thought content normal. Cognition and memory are normal.     Vitals:    03/29/19 1244   BP: 142/80   BP Location: Right arm   Patient Position: Sitting   Pulse: 74   Weight: 82.7 kg (182 lb 4.8 oz)   Height: 157.5 cm (62.01\")           Lab Review:       Assessment:          Diagnosis Plan   1. Angina pectoris (CMS/HCC)  Treadmill Stress Test   2. Chronic atrial fibrillation (CMS/HCC)     3. Mixed hyperlipidemia     4. Pacemaker     5. SHEILA (obstructive sleep apnea)     6. Aortic valve replaced            Plan:       1.  Angina pectoris - new for her.  Feelings of chest tightness vs pain.  Check walking treadmill  2.  Atrial fibrillation - rate controlled, on warfarin  Atrial Fibrillation and Atrial Flutter  Assessment  • The patient has persistent atrial fibrillation  • This is valvular in etiology  • The patient's CHADS2-VASc " score is 4  • A IRA1SH1-ACSw score of 2 or more is considered a high risk for a thromboembolic event  • Warfarin prescribed    Plan  • Continue in atrial fibrillation with rate control  • Continue warfarin for antithrombotic therapy, bleeding issues discussed  • Continue beta blocker for rhythm control  • Continue beta blocker for rate control    3.  Hyperlipidemia   4.  Pacemaker  5.  SHEILA  6 Aortic valve replacement - on chronic warfarin therapy    LUIS ARMANDO Quiñones      Current Outpatient Medications:   •  b complex vitamins capsule, Take 1 capsule by mouth Daily., Disp: , Rfl:   •  BIOTIN PO, Take 1 tablet by mouth Daily., Disp: , Rfl:   •  bumetanide (BUMEX) 2 MG tablet, Take 1 tablet by mouth 2 (Two) Times a Day As Needed (edema). (Patient taking differently: Take 2 mg by mouth Daily. 1-2 as needed), Disp: 180 tablet, Rfl: 0  •  CALCIUM-MAGNESIUM-ZINC PO, Take 1 tablet by mouth Daily., Disp: , Rfl:   •  Cholecalciferol (VITAMIN D-3 PO), Take 1 tablet by mouth Daily., Disp: , Rfl:   •  fluticasone (FLONASE) 50 MCG/ACT nasal spray, 1 spray into each nostril Daily., Disp: , Rfl:   •  JANTOVEN 5 MG tablet, TAKE ONE TABLET BY MOUTH ONE TIME DAILY , Disp: 90 tablet, Rfl: 1  •  levothyroxine (SYNTHROID, LEVOTHROID) 150 MCG tablet, Take 150 mcg by mouth Daily., Disp: , Rfl:   •  metoprolol succinate XL (TOPROL-XL) 25 MG 24 hr tablet, Take 1 tablet by mouth Daily., Disp: 90 tablet, Rfl: 3  •  Multiple Vitamin (MULTIVITAMIN) capsule, Take 1 capsule by mouth Daily., Disp: , Rfl:   •  Potassium 99 MG tablet, Take 1 tablet by mouth As Needed., Disp: , Rfl:   •  SELENIUM PO, Take 1 tablet by mouth Daily., Disp: , Rfl:   •  warfarin (COUMADIN) 5 MG tablet, TAKE 1 TABLET EVERY DAY  OR AS DIRECTED, Disp: 90 tablet, Rfl: 0

## 2019-04-03 ENCOUNTER — APPOINTMENT (OUTPATIENT)
Dept: CARDIOLOGY | Facility: HOSPITAL | Age: 74
End: 2019-04-03

## 2019-04-03 ENCOUNTER — HOSPITAL ENCOUNTER (OUTPATIENT)
Dept: CARDIOLOGY | Facility: HOSPITAL | Age: 74
Discharge: HOME | End: 2019-04-03
Admitting: NURSE PRACTITIONER

## 2019-04-03 DIAGNOSIS — I20.9 ANGINA PECTORIS (HCC): ICD-10-CM

## 2019-04-03 LAB
BH CV STRESS BP STAGE 1: NORMAL
BH CV STRESS BP STAGE 2: NORMAL
BH CV STRESS DURATION MIN STAGE 1: 3
BH CV STRESS DURATION SEC STAGE 1: 0
BH CV STRESS DURATION SEC STAGE 2: 38
BH CV STRESS GRADE STAGE 1: 10
BH CV STRESS GRADE STAGE 2: 12
BH CV STRESS HR STAGE 1: 128
BH CV STRESS HR STAGE 2: 140
BH CV STRESS METS STAGE 1: 5
BH CV STRESS METS STAGE 2: 7.5
BH CV STRESS PROTOCOL 1: NORMAL
BH CV STRESS RECOVERY BP: NORMAL MMHG
BH CV STRESS RECOVERY HR: 76 BPM
BH CV STRESS SPEED STAGE 1: 1.7
BH CV STRESS SPEED STAGE 2: 2.5
BH CV STRESS STAGE 1: 1
BH CV STRESS STAGE 2: 2
MAXIMAL PREDICTED HEART RATE: 145 BPM
PERCENT MAX PREDICTED HR: 103.45 %
STRESS BASELINE BP: NORMAL MMHG
STRESS BASELINE HR: 76 BPM
STRESS O2 SAT REST: 95 %
STRESS PERCENT HR: 122 %
STRESS POST ESTIMATED WORKLOAD: 5.4 METS
STRESS POST EXERCISE DUR MIN: 3 MIN
STRESS POST EXERCISE DUR SEC: 38 SEC
STRESS POST O2 SAT PEAK: 95 %
STRESS POST PEAK BP: NORMAL MMHG
STRESS POST PEAK HR: 150 BPM
STRESS TARGET HR: 123 BPM

## 2019-04-03 PROCEDURE — 93017 CV STRESS TEST TRACING ONLY: CPT

## 2019-04-03 PROCEDURE — 93016 CV STRESS TEST SUPVJ ONLY: CPT | Performed by: INTERNAL MEDICINE

## 2019-04-03 PROCEDURE — 93018 CV STRESS TEST I&R ONLY: CPT | Performed by: INTERNAL MEDICINE

## 2019-04-10 ENCOUNTER — TELEPHONE (OUTPATIENT)
Dept: CARDIOLOGY | Facility: CLINIC | Age: 74
End: 2019-04-10

## 2019-04-10 DIAGNOSIS — I48.20 CHRONIC ATRIAL FIBRILLATION (HCC): Primary | Chronic | ICD-10-CM

## 2019-04-24 ENCOUNTER — HOSPITAL ENCOUNTER (OUTPATIENT)
Dept: CARDIOLOGY | Facility: HOSPITAL | Age: 74
Discharge: HOME | End: 2019-04-24
Admitting: NURSE PRACTITIONER

## 2019-04-24 ENCOUNTER — ANTICOAGULATION VISIT (OUTPATIENT)
Dept: PHARMACY | Facility: HOSPITAL | Age: 74
End: 2019-04-24

## 2019-04-24 VITALS
WEIGHT: 182 LBS | DIASTOLIC BLOOD PRESSURE: 70 MMHG | BODY MASS INDEX: 33.49 KG/M2 | SYSTOLIC BLOOD PRESSURE: 110 MMHG | HEIGHT: 62 IN | HEART RATE: 72 BPM

## 2019-04-24 DIAGNOSIS — I48.20 CHRONIC ATRIAL FIBRILLATION (HCC): ICD-10-CM

## 2019-04-24 LAB
AORTIC DIMENSIONLESS INDEX: 0.2 (DI)
ASCENDING AORTA: 3.2 CM
BH CV ECHO MEAS - AO ACC TIME: 0.14 SEC
BH CV ECHO MEAS - AO MAX PG (FULL): 47.4 MMHG
BH CV ECHO MEAS - AO MAX PG: 52.5 MMHG
BH CV ECHO MEAS - AO MEAN PG (FULL): 25.5 MMHG
BH CV ECHO MEAS - AO MEAN PG: 28.3 MMHG
BH CV ECHO MEAS - AO ROOT AREA (BSA CORRECTED): 1.8
BH CV ECHO MEAS - AO ROOT AREA: 8.9 CM^2
BH CV ECHO MEAS - AO ROOT DIAM: 3.4 CM
BH CV ECHO MEAS - AO V2 MAX: 362.4 CM/SEC
BH CV ECHO MEAS - AO V2 MEAN: 249.4 CM/SEC
BH CV ECHO MEAS - AO V2 VTI: 81.7 CM
BH CV ECHO MEAS - AVA(I,A): 0.92 CM^2
BH CV ECHO MEAS - AVA(I,D): 0.92 CM^2
BH CV ECHO MEAS - AVA(V,A): 0.97 CM^2
BH CV ECHO MEAS - AVA(V,D): 0.97 CM^2
BH CV ECHO MEAS - BSA(HAYCOCK): 1.9 M^2
BH CV ECHO MEAS - BSA: 1.8 M^2
BH CV ECHO MEAS - BZI_BMI: 33.3 KILOGRAMS/M^2
BH CV ECHO MEAS - BZI_METRIC_HEIGHT: 157.5 CM
BH CV ECHO MEAS - BZI_METRIC_WEIGHT: 82.6 KG
BH CV ECHO MEAS - EDV(MOD-SP2): 78 ML
BH CV ECHO MEAS - EDV(MOD-SP4): 60 ML
BH CV ECHO MEAS - EDV(TEICH): 119.6 ML
BH CV ECHO MEAS - EF(CUBED): 61.9 %
BH CV ECHO MEAS - EF(MOD-BP): 54 %
BH CV ECHO MEAS - EF(MOD-SP2): 52.6 %
BH CV ECHO MEAS - EF(MOD-SP4): 55 %
BH CV ECHO MEAS - EF(TEICH): 53.2 %
BH CV ECHO MEAS - ESV(MOD-SP2): 37 ML
BH CV ECHO MEAS - ESV(MOD-SP4): 27 ML
BH CV ECHO MEAS - ESV(TEICH): 55.9 ML
BH CV ECHO MEAS - FS: 27.5 %
BH CV ECHO MEAS - IVS/LVPW: 1.1
BH CV ECHO MEAS - IVSD: 1.2 CM
BH CV ECHO MEAS - LAT PEAK E' VEL: 9 CM/SEC
BH CV ECHO MEAS - LV DIASTOLIC VOL/BSA (35-75): 32.7 ML/M^2
BH CV ECHO MEAS - LV MASS(C)D: 228.8 GRAMS
BH CV ECHO MEAS - LV MASS(C)DI: 124.6 GRAMS/M^2
BH CV ECHO MEAS - LV MAX PG: 5.1 MMHG
BH CV ECHO MEAS - LV MEAN PG: 2.7 MMHG
BH CV ECHO MEAS - LV SYSTOLIC VOL/BSA (12-30): 14.7 ML/M^2
BH CV ECHO MEAS - LV V1 MAX: 113.2 CM/SEC
BH CV ECHO MEAS - LV V1 MEAN: 77.9 CM/SEC
BH CV ECHO MEAS - LV V1 VTI: 24.1 CM
BH CV ECHO MEAS - LVIDD: 5 CM
BH CV ECHO MEAS - LVIDS: 3.6 CM
BH CV ECHO MEAS - LVLD AP2: 7.9 CM
BH CV ECHO MEAS - LVLD AP4: 8 CM
BH CV ECHO MEAS - LVLS AP2: 6.3 CM
BH CV ECHO MEAS - LVLS AP4: 6.5 CM
BH CV ECHO MEAS - LVOT AREA (M): 3.1 CM^2
BH CV ECHO MEAS - LVOT AREA: 3.1 CM^2
BH CV ECHO MEAS - LVOT DIAM: 2 CM
BH CV ECHO MEAS - LVPWD: 1.1 CM
BH CV ECHO MEAS - MED PEAK E' VEL: 11 CM/SEC
BH CV ECHO MEAS - MV DEC SLOPE: 690.9 CM/SEC^2
BH CV ECHO MEAS - MV DEC TIME: 0.22 SEC
BH CV ECHO MEAS - MV E MAX VEL: 163.7 CM/SEC
BH CV ECHO MEAS - MV MAX PG: 11.4 MMHG
BH CV ECHO MEAS - MV MEAN PG: 5 MMHG
BH CV ECHO MEAS - MV P1/2T MAX VEL: 172.4 CM/SEC
BH CV ECHO MEAS - MV P1/2T: 73.1 MSEC
BH CV ECHO MEAS - MV V2 MAX: 169 CM/SEC
BH CV ECHO MEAS - MV V2 MEAN: 98.1 CM/SEC
BH CV ECHO MEAS - MV V2 VTI: 35 CM
BH CV ECHO MEAS - MVA P1/2T LCG: 1.3 CM^2
BH CV ECHO MEAS - MVA(P1/2T): 3 CM^2
BH CV ECHO MEAS - MVA(VTI): 2.2 CM^2
BH CV ECHO MEAS - PA ACC TIME: 0.14 SEC
BH CV ECHO MEAS - PA MAX PG (FULL): 2.5 MMHG
BH CV ECHO MEAS - PA MAX PG: 4 MMHG
BH CV ECHO MEAS - PA PR(ACCEL): 15.6 MMHG
BH CV ECHO MEAS - PA V2 MAX: 99.9 CM/SEC
BH CV ECHO MEAS - PVA(V,A): 2.4 CM^2
BH CV ECHO MEAS - PVA(V,D): 2.4 CM^2
BH CV ECHO MEAS - QP/QS: 0.72
BH CV ECHO MEAS - RAP SYSTOLE: 8 MMHG
BH CV ECHO MEAS - RV MAX PG: 1.5 MMHG
BH CV ECHO MEAS - RV MEAN PG: 0.83 MMHG
BH CV ECHO MEAS - RV V1 MAX: 61.6 CM/SEC
BH CV ECHO MEAS - RV V1 MEAN: 42.5 CM/SEC
BH CV ECHO MEAS - RV V1 VTI: 13.9 CM
BH CV ECHO MEAS - RVOT AREA: 3.9 CM^2
BH CV ECHO MEAS - RVOT DIAM: 2.2 CM
BH CV ECHO MEAS - RVSP: 38 MMHG
BH CV ECHO MEAS - SI(AO): 397.7 ML/M^2
BH CV ECHO MEAS - SI(CUBED): 42.8 ML/M^2
BH CV ECHO MEAS - SI(LVOT): 40.9 ML/M^2
BH CV ECHO MEAS - SI(MOD-SP2): 22.3 ML/M^2
BH CV ECHO MEAS - SI(MOD-SP4): 18 ML/M^2
BH CV ECHO MEAS - SI(TEICH): 34.6 ML/M^2
BH CV ECHO MEAS - SUP REN AO DIAM: 1.8 CM
BH CV ECHO MEAS - SV(AO): 730.4 ML
BH CV ECHO MEAS - SV(CUBED): 78.5 ML
BH CV ECHO MEAS - SV(LVOT): 75.2 ML
BH CV ECHO MEAS - SV(MOD-SP2): 41 ML
BH CV ECHO MEAS - SV(MOD-SP4): 33 ML
BH CV ECHO MEAS - SV(RVOT): 53.9 ML
BH CV ECHO MEAS - SV(TEICH): 63.6 ML
BH CV ECHO MEAS - TR MAX VEL: 275.5 CM/SEC
BH CV ECHO MEASUREMENTS AVERAGE E/E' RATIO: 16.37
BH CV XLRA - RV BASE: 3.3 CM
BH CV XLRA - TDI S': 12 CM/SEC
INR PPP: 1.7 (ref 0.91–1.09)
LEFT ATRIUM VOLUME INDEX: 57 ML/M2
PROTHROMBIN TIME: 20.3 SECONDS (ref 10–13.8)
SINUS: 3.3 CM
STJ: 2.9 CM

## 2019-04-24 PROCEDURE — G0463 HOSPITAL OUTPT CLINIC VISIT: HCPCS

## 2019-04-24 PROCEDURE — 93306 TTE W/DOPPLER COMPLETE: CPT | Performed by: INTERNAL MEDICINE

## 2019-04-24 PROCEDURE — 85610 PROTHROMBIN TIME: CPT

## 2019-04-24 PROCEDURE — 36416 COLLJ CAPILLARY BLOOD SPEC: CPT

## 2019-04-24 PROCEDURE — 93306 TTE W/DOPPLER COMPLETE: CPT

## 2019-04-24 NOTE — PROGRESS NOTES
Anticoagulation Clinic Progress Note    Anticoagulation Summary  As of 2019    INR goal:   2.0-3.0   TTR:   55.0 % (6.1 mo)   INR used for dosin.7! (2019)   Warfarin maintenance plan:   2.5 mg every Sun; 5 mg all other days   Weekly warfarin total:   32.5 mg   Plan last modified:   Mackenzie Kwon, [facility] (2018)   Next INR check:   2019   Priority:   Maintenance   Target end date:   Indefinite    Indications    Chronic atrial fibrillation (CMS/HCC) [I48.2]             Anticoagulation Episode Summary     INR check location:       Preferred lab:       Send INR reminders to:   [facility]    Comments:         Anticoagulation Care Providers     Provider Role Specialty Phone number    Adeel Mina III, MD Referring Cardiology [phone]    Radha Bravo [facility]  Pharmacy [phone]          Clinic Interview:  Patient Findings     Positives:   Missed doses, Change in diet/appetite    Negatives:   Signs/symptoms of thrombosis, Signs/symptoms of bleeding,   Laboratory test error suspected, Change in health, Change in alcohol use,   Change in activity, Upcoming invasive procedure, Emergency department   visit, Upcoming dental procedure, Extra doses, Change in medications,   Hospital admission, Bruising, Other complaints    Comments:   Increased vit k over Easter. Missed dose last night (ran out   of supply).      Clinical Outcomes     Negatives:   Major bleeding event, Thromboembolic event,   Anticoagulation-related hospital admission, Anticoagulation-related ED   visit, Anticoagulation-related fatality    Comments:   Increased vit k over Easter. Missed dose last night (ran out   of supply).        INR History:  Anticoagulation Monitoring 2019 3/21/2019 2019   INR 2.4 2.3 1.7   INR Date 2019 3/21/2019 2019   INR Goal 2.0-3.0 2.0-3.0 2.0-3.0   Trend Same Same Same   Last Week Total 32.5 mg 32.5 mg 27.5 mg   Next Week Total 32.5 mg 32.5 mg 35 mg   Sun 2.5  mg 2.5 mg 2.5 mg   Mon 5 mg 5 mg 5 mg   Tue 5 mg 5 mg 5 mg   Wed 5 mg 5 mg 7.5 mg (4/24); Otherwise 5 mg   Thu 5 mg 5 mg 5 mg   Fri 5 mg 5 mg 5 mg   Sat 5 mg 5 mg 5 mg   Visit Report - - -   Some recent data might be hidden       Plan:  1. INR is Subtherapeutic today- see above in Anticoagulation Summary.  Will instruct Anna Gilbert to Change their warfarin regimen- see above in Anticoagulation Summary.  2. Follow up in 4 weeks  3. Patient declines warfarin refills.  4. Verbal and written information provided. Patient expresses understanding and has no further questions at this time.    [facility]

## 2019-04-30 ENCOUNTER — OFFICE VISIT (OUTPATIENT)
Dept: CARDIOLOGY | Facility: CLINIC | Age: 74
End: 2019-04-30

## 2019-04-30 VITALS
WEIGHT: 183.9 LBS | OXYGEN SATURATION: 98 % | BODY MASS INDEX: 32.59 KG/M2 | SYSTOLIC BLOOD PRESSURE: 128 MMHG | DIASTOLIC BLOOD PRESSURE: 80 MMHG | HEART RATE: 70 BPM | HEIGHT: 63 IN

## 2019-04-30 DIAGNOSIS — E78.2 MIXED HYPERLIPIDEMIA: ICD-10-CM

## 2019-04-30 DIAGNOSIS — G47.33 OSA (OBSTRUCTIVE SLEEP APNEA): Chronic | ICD-10-CM

## 2019-04-30 DIAGNOSIS — Z95.0 PACEMAKER: Chronic | ICD-10-CM

## 2019-04-30 DIAGNOSIS — I48.20 CHRONIC ATRIAL FIBRILLATION (HCC): Primary | Chronic | ICD-10-CM

## 2019-04-30 DIAGNOSIS — Z95.2 AORTIC VALVE REPLACED: Chronic | ICD-10-CM

## 2019-04-30 PROCEDURE — 99214 OFFICE O/P EST MOD 30 MIN: CPT | Performed by: NURSE PRACTITIONER

## 2019-04-30 NOTE — PROGRESS NOTES
Subjective:     Encounter Date:04/30/2019      Patient ID: Anna Gilbert is a 73 y.o. female.    Chief Complaint:  Cardiac risk assessment for bronch  History of Present Illness  She is a patient of Dr. Mina.  She has a history of chronic atrial fibrillation, bioprosthetic aortic valve and prior mitral valve repair.  She also has a pacemaker/defibrillator.  She is on chronic warfarin therapy.     She had a Zio monitor placed from 12/7/16 through 12/13/16 12/7/2016.  Monitor showed atrial fibrillation was present throughout.  Average heart rate was 70 bpm maximal heart rate was 150 bpm there were occasional wide complex beats which appear to be atrial fibrillation with aberrancy.  Her minimal heart rate was 39 bpm.  No ectopy was seen.     Impression: Abnormal monitor with atrial fibrillation present throughout. However, rate control is appropriate.      Interpretation Summary 11/16/16  · Left ventricular function is normal. Calculated EF = 53.8%. Estimated EF was in agreement with the calculated EF. Estimated EF = 54%. Normal left ventricular cavity size and wall thickness noted. All left ventricular wall segments contract normally. Left ventricular diastolic function was unable to be assessed. Elevated left atrial pressure.  · Left atrial volume is severely increased.  · Right atrial cavity size is severely dilated.  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 The aortic valve peak and mean gradients are elevated. The prosthetic valve has the following abnormalities: stenosis. The mean aortic valve gradient was 28 mmHg which is moderately elevated for this valve and consistent with moderate prosthetic valve stenosis. 2-D imaging was limited and presence or absence of pannus and leaflet excursion could not be determined.  · Fixed posterior leaflet findings are consistent with surgical mitral valve repair. No mitral valve regurgitation is present. Mild mitral valve  stenosis is present. 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 Mitral valve gradient was 7 mmHg at a heart rate of 53 beats minute.  · Tricuspid valve not well visualized. The tricuspid valve is grossly normal. Mild to moderate tricuspid valve regurgitation is present. Estimated right ventricular systolic pressure from tricuspid regurgitation is mildly elevated (35-45 mmHg). The calculated RV systolic pressure is 37 mmHg.  · Mild dilation of the sinuses of Valsalva is present. The aortic root at the sinus of Valsalva measures 4.1 cm.          I saw her on 3/29/16, with complaints of intermittent chest pain/tightness for the last 2 weeks.    She stated these episodes are a tightness versus actual pain.  They will happen with and without activity.  It has woken her up during the night.   She does have a history of vertigo.  She is very active and still works.  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       Stress test 4/3/19  Narrative:     · The patient reported shortness of breath during the stress test.  · AFib present throughout  · No ECG evidence of myocardial ischemia.Negative clinical evidence of   myocardial ischemia. Findings consistent with a normal ECG stress test.        She was scheduled to have a pulmonary nodule biopsy but that was cancelled.  Surgeon wanted patient to have an echo first.      Echo 4/24/19  Narrative:     · Left ventricular systolic function is normal. Calculated EF = 54%.   Estimated EF was in disagreement with the calculated EF. Estimated EF   appears to be in the range of 61 - 65%. Normal left ventricular cavity   size noted. Left ventricular wall thickness is consistent with mild   concentric hypertrophy. Left ventricular diastolic function was   indeterminate.  · Right ventricular cavity is mild-to-moderately dilated.  · Left atrial cavity size is severely  dilated.  · Right atrial cavity size is severely dilated.  · The aortic valve peak and mean gradients are within defined limits. The   prosthetic valve is grossly normal.  · No significant mitral valve regurgitation is present. No significant   mitral valve stenosis is present.  · Mild to moderate tricuspid valve regurgitation is present. Estimated   right ventricular systolic pressure from tricuspid regurgitation is mildly   elevated (35-45 mmHg). Calculated right ventricular systolic pressure from   tricuspid regurgitation is 38 mmHg.        She presents today, 4/30/19, for cardiac risk assessment for bronchoscopy.  She denies any palpitations, shortness of breath or edema.  She has had no episodes of chest pain or chest tightness.  She denies any lightheadedness or dizziness.  She denies any fatigue.  She states that she mows her yard every week.  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     She is to have a bronchoscopy and pulmonary nodule biopsy with Dr. Mana Pope.  This is not been scheduled yet.    The following portions of the patient's history were reviewed and updated as appropriate: allergies, current medications, past family history, past medical history, past social history, past surgical history and problem list.    Review of Systems   Constitution: Negative for weakness and malaise/fatigue.   HENT: Negative for congestion, hoarse voice and sore throat.    Eyes: Negative for blurred vision, double vision, photophobia, vision loss in left eye and vision loss in right eye.   Cardiovascular: Negative for chest pain, dyspnea on exertion, irregular heartbeat, leg swelling, near-syncope, orthopnea, palpitations, paroxysmal nocturnal dyspnea and syncope.   Respiratory: Negative for cough, hemoptysis, shortness of breath, sleep disturbances due to breathing, snoring, sputum production and wheezing.    Endocrine: Negative.     Hematologic/Lymphatic: Does not bruise/bleed easily.   Skin: Negative for color change, dry skin, poor wound healing and rash.   Musculoskeletal: Negative for back pain, falls, gout, joint pain, joint swelling, muscle cramps and muscle weakness.   Gastrointestinal: Negative for abdominal pain, constipation, diarrhea, dysphagia, melena, nausea and vomiting.   Neurological: Negative for excessive daytime sleepiness, dizziness, headaches, light-headedness, loss of balance, numbness, paresthesias, seizures and vertigo.   Psychiatric/Behavioral: Negative for depression and substance abuse. The patient is not nervous/anxious.      Procedures       Objective:         Physical Exam   Constitutional: He is oriented to person, place, and time. Vital signs are normal. He appears well-developed and well-nourished. No distress.   HENT:   Head: Normocephalic and atraumatic.   Right Ear: Hearing normal.   Left Ear: Hearing normal.   Eyes: Conjunctivae and lids are normal.   Neck: Normal range of motion. Neck supple. No JVD present. Carotid bruit is not present. No thyromegaly present.   Cardiovascular: Normal rate, regular rhythm, S1 normal, S2 normal, normal heart sounds and intact distal pulses.  PMI is not displaced.  Exam reveals no gallop.    No murmur heard.  Pulses:       Carotid pulses are 2+ on the right side, and 2+ on the left side.       Radial pulses are 2+ on the right side, and 2+ on the left side.        Dorsalis pedis pulses are 2+ on the right side, and 2+ on the left side.        Posterior tibial pulses are 2+ on the right side, and 2+ on the left side.   Pulmonary/Chest: Effort normal and breath sounds normal. No respiratory distress. He has no wheezes. He has no rhonchi. He has no rales. He exhibits no tenderness.   Abdominal: Soft. Normal appearance and bowel sounds are normal. He exhibits no distension, no abdominal bruit and no mass. There is no tenderness.   Musculoskeletal: Normal range of motion.  "  Exhibits no edema or deformity   Lymphadenopathy:     He has no cervical adenopathy.   Neurological: He is alert and oriented to person, place, and time. No cranial nerve deficit. Coordination and gait normal.   Oriented to person, place and time.   Skin: Skin is warm, dry and intact. No rash noted. He is not diaphoretic. No cyanosis. Nails show no clubbing.   Psychiatric: He has a normal mood and affect. His speech is normal and behavior is normal. Judgment and thought content normal. Cognition and memory are normal.     Vitals:    04/30/19 1429   BP: 128/80   BP Location: Left arm   Patient Position: Sitting   Cuff Size: Adult   Pulse: 70   SpO2: 98%   Weight: 83.4 kg (183 lb 14.4 oz)   Height: 158.8 cm (62.5\")           Lab Review:       Assessment:          Diagnosis Plan   1. Chronic atrial fibrillation (CMS/HCC)     2. Mixed hyperlipidemia     3. Pacemaker     4. Aortic valve replaced     5. SHEILA (obstructive sleep apnea)            Plan:       1.  Chronic PAF - rate controlled, on BB and warfarin  Atrial Fibrillation and Atrial Flutter  Assessment  • The patient has permanent atrial fibrillation  • This is valvular in etiology  • The patient's CHADS2-VASc score is 3  • A [id number]-[id number] score of 2 or more is considered a high risk for a thromboembolic event  • Warfarin prescribed    Plan  • Continue in atrial fibrillation with rate control  • Continue warfarin for antithrombotic therapy, bleeding issues discussed  • Continue beta blocker for rhythm control  • Continue beta blocker for rate control  2.  Mixed hyperlipidemia - order for lipids/Hepatic panel prior to next visit  3.  Pacemaker  4.  AV replacement - on warfarin  5.  SHEILA    Minor change in Echo from 2016.  Negative stress test.      RTO in 4 months with JA with labs prior    LUIS ARMANDO Quiñones      Current Outpatient Medications:   •  b complex vitamins capsule, Take 1 capsule by mouth Daily., Disp: , Rfl:   •  BIOTIN PO, Take 1 tablet by mouth Daily., " Disp: , Rfl:   •  bumetanide (BUMEX) 2 MG tablet, Take 1 tablet by mouth 2 (Two) Times a Day As Needed (edema). (Patient taking differently: Take 2 mg by mouth Daily. 1-2 as needed), Disp: 180 tablet, Rfl: 0  •  CALCIUM-MAGNESIUM-ZINC PO, Take 1 tablet by mouth Daily., Disp: , Rfl:   •  Cholecalciferol (VITAMIN D-3 PO), Take 1 tablet by mouth Daily., Disp: , Rfl:   •  fluticasone (FLONASE) 50 MCG/ACT nasal spray, 1 spray into each nostril Daily., Disp: , Rfl:   •  JANTOVEN 5 MG tablet, TAKE ONE TABLET BY MOUTH ONE TIME DAILY , Disp: 90 tablet, Rfl: 1  •  levothyroxine (SYNTHROID, LEVOTHROID) 150 MCG tablet, Take 150 mcg by mouth Daily., Disp: , Rfl:   •  metoprolol succinate XL (TOPROL-XL) 25 MG 24 hr tablet, Take 1 tablet by mouth Daily., Disp: 90 tablet, Rfl: 3  •  Multiple Vitamin (MULTIVITAMIN) capsule, Take 1 capsule by mouth Daily., Disp: , Rfl:   •  Potassium 99 MG tablet, Take 1 tablet by mouth As Needed., Disp: , Rfl:   •  warfarin (COUMADIN) 5 MG tablet, TAKE 1 TABLET EVERY DAY  OR AS DIRECTED, Disp: 90 tablet, Rfl: 0    Per revised cardiac risk index (Enoch criteria) patient's estimated risk of adverse outcome with noncardiac surgery is a very low risk.  Her estimated right of myocardial infarction, pulmonary edema, ventricular fibrillation, cardiac arrest, or complete heart block is 0.4%.    Patient may hold her warfarin 4 days prior to intervention.  Please restart when okay with surgeon.

## 2019-05-02 ENCOUNTER — TELEPHONE (OUTPATIENT)
Dept: CARDIOLOGY | Facility: CLINIC | Age: 74
End: 2019-05-02

## 2019-05-02 NOTE — TELEPHONE ENCOUNTER
05/02/19  3:11 PM  Anna Gilbert  1945  Home Phone [phone]   Mobile [phone]       Anna Gilbert will be having pulmonary nodule biopsy with Dr Pope. They are calling for cardiac clearance.     Pt is taking warfarin. Can pt hold her blood thinner 4-5 days prior to her procedure?......Please advise      Attn:  Kayleigh TOMLIN/sri number [phone]  Fax number [phone]      Thanks  Jeanette MAST

## 2019-05-03 NOTE — TELEPHONE ENCOUNTER
Addendum made to my note of 4/30/19.  Also spoke to Kayleigh at Dr. Pope's office.  Note also faxed to Kayleigh.

## 2019-06-21 ENCOUNTER — TELEPHONE (OUTPATIENT)
Dept: PHARMACY | Facility: HOSPITAL | Age: 74
End: 2019-06-21

## 2019-06-26 ENCOUNTER — TELEPHONE (OUTPATIENT)
Dept: CARDIOLOGY | Facility: CLINIC | Age: 74
End: 2019-06-26

## 2019-06-26 NOTE — TELEPHONE ENCOUNTER
----- Message from Adeel Mina III, MD sent at 6/26/2019 12:47 PM EDT -----  This patient had a procedure today at [facility] and they had a question about her pacemaker (if it was working correctly)- we need to contact her and get her eval'd soon by device clinic

## 2019-06-27 ENCOUNTER — OFFICE VISIT (OUTPATIENT)
Dept: CARDIOLOGY | Facility: CLINIC | Age: 74
End: 2019-06-27

## 2019-06-27 ENCOUNTER — CLINICAL SUPPORT NO REQUIREMENTS (OUTPATIENT)
Dept: CARDIOLOGY | Facility: CLINIC | Age: 74
End: 2019-06-27

## 2019-06-27 VITALS
WEIGHT: 186 LBS | SYSTOLIC BLOOD PRESSURE: 140 MMHG | HEIGHT: 62 IN | HEART RATE: 79 BPM | BODY MASS INDEX: 34.23 KG/M2 | DIASTOLIC BLOOD PRESSURE: 70 MMHG

## 2019-06-27 DIAGNOSIS — Z95.2 AORTIC VALVE REPLACED: Chronic | ICD-10-CM

## 2019-06-27 DIAGNOSIS — Z98.890 H/O MITRAL VALVE REPAIR: Chronic | ICD-10-CM

## 2019-06-27 DIAGNOSIS — G47.33 OSA (OBSTRUCTIVE SLEEP APNEA): Chronic | ICD-10-CM

## 2019-06-27 DIAGNOSIS — I48.20 CHRONIC ATRIAL FIBRILLATION (HCC): Primary | Chronic | ICD-10-CM

## 2019-06-27 DIAGNOSIS — I42.9 CARDIOMYOPATHY, UNSPECIFIED TYPE (HCC): Primary | ICD-10-CM

## 2019-06-27 PROCEDURE — 93282 PRGRMG EVAL IMPLANTABLE DFB: CPT | Performed by: INTERNAL MEDICINE

## 2019-06-27 PROCEDURE — 99214 OFFICE O/P EST MOD 30 MIN: CPT | Performed by: INTERNAL MEDICINE

## 2019-06-27 NOTE — TELEPHONE ENCOUNTER
I called pt, she is overdue for icd check.  I ruthie her to come into the [facility] office today for clinic. She has a previously ruthie Dr. Mina apt today.

## 2019-06-27 NOTE — PROGRESS NOTES
Subjective:     Encounter Date: 6/27/2019      Patient ID: Anna Gilbert is a 73 y.o. female.    Chief Complaint: CAF  History of Present Illness    Dear Dr. Isaac,    This patient comes in for routine follow-up of their cardiac status.   She has a history of chronic atrial fibrillation, as well as bioprosthetic aortic valve and prior mitral valve repair. She also has an AICD in place.    Yesterday she was at [facility] and had a bronchoscopy.  We were contacted because she was bradycardic and they were worried about her device.  She already had an appointment to be seen today and we arrange for her to be seen by myself as well as the device clinic.    From heart standpoint she feels like she has been doing well.  She has had no cardiac symptoms.  She denies any chest pain, pressure, tightness, squeezing, or heartburn.  She has not experienced any feeling of palpitations, tachycardia or heart racing and no presyncope or syncope.  There have not been any problems with dizziness or lightheadedness.  There have not been any orthopnea or PND, and no problems with lower extremity edema.  She has not had any problems with unexplained nausea or vomiting. She has continued to perform daily activities of living without any specific problem or change in the level of activity.      For the last 2 days or so she's been having an upper respiratory tract infection.  She has a cough, sore throat, and generalized malaise.  She is not been having any fever that she is aware of.    She is not having any cardiac blood.  No chest pain or chest discomfort.  She's not had any feeling or heart racing.  She has not had any presyncope or syncope.  She has a little bit of shortness breath just with a cough, but prior to that she hadn't had any shortness of breath at all.    Event Monitor  Event monitor enrollment date was 12/7/2016. Enrollment ended on 12/13/2016. The manufacturing company for the event monitor is [employer]. [employer] monitor is  reviewed. A total of 6 days and 10 hours are available for review. Atrial fibrillation is present throughout. Average heart rate is 70 bpm. Maximal heart rate is approximately 150 bpm. Occasional wide complex beats are seen which appear to be atrial fibrillation with aberrancy. Minimal heart rate is atrial fibrillation at a rate of 39 bpm. No other ectopy is seen.      She had a stress test and echocardiogram performed April 2019:  04/24/19 ECHO  LVSF NORMAL EF=54%  ESTIMATED EF WAS IN DISAGREEMENT W THE CALCULATED EF . EST EF APPEARS TO BE IN RANGE 61-65%  NORMAL LEFT VENTRICULAR CAVITY / LEFT VENT WALL THICKNESS IS CONSISTENT W MILD LVH  LEFT VENTRICULAR DIASTOLIC FUNCTION WAS INDETERMINATE.  RT VENT CAVITY IS MILD TO MODERATE DILATED  LEFT ATRIAL CAVITY SEVERELY DILATED  RT ATRIAL CAVITY IS SEVERELY DILATED  THE AORTIC VALVE PEAK AND MEAN GRADIENTS ARE WITHIN DEFINED LIMITS . THE PROSTHETIC VALVE IS GROSSLY NORMAL  NO SIGNIFICANT MITRAL VALVE REGURG OR STENOSIS  MILD TO MOD TR VALVE REGURG.  ESTIMATED RT VENTRICULAR SYSTOLIC PRESSURE FROM TR REGURG IS MILDLY ELEVATED 35-45MMHG. CALCULATED RT VS PRESSURE FROM TR IS 38MMHG    04/03/19 STRESS TREADMILL  The patient reported shortness of breath during the stress test.  · AFib present throughout  · No ECG evidence of myocardial ischemia.Negative clinical evidence of   myocardial ischemia. Findings consistent with a normal ECG stress test                The following portions of the patient's history were reviewed and updated as appropriate: allergies, current medications, past family history, past medical history, past social history, past surgical history and problem list.    Past Medical History:   Diagnosis Date   • Coronary artery disease    • Health care maintenance    • Hyperlipidemia    • Hyperthyroidism    • SHEILA (obstructive sleep apnea) 7/20/2016   • PAF (paroxysmal atrial fibrillation) (CMS/HCC)    • Sick sinus syndrome (CMS/HCC)        Past Surgical  History:   Procedure Laterality Date   • AORTIC VALVE REPAIR/REPLACEMENT  2010    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   • CARDIAC CATHETERIZATION  01/01/2011   • CARDIAC DEFIBRILLATOR PLACEMENT  2010   • MITRAL VALVE REPAIR/REPLACEMENT  2010       Social History     Socioeconomic History   • Marital status: Single     Spouse name: Not on file   • Number of children: Not on file   • Years of education: Not on file   • Highest education level: Not on file   Tobacco Use   • Smoking status: Former Smoker   • Smokeless tobacco: Never Used   • Tobacco comment: caffeine use   Substance and Sexual Activity   • Alcohol use: No   • Drug use: No   • Sexual activity: Defer       Review of Systems   Constitution: Negative for chills, decreased appetite, fever and night sweats.   HENT: Negative for ear discharge, ear pain, hearing loss, nosebleeds and sore throat.    Eyes: Negative for blurred vision, double vision and pain.   Cardiovascular: Negative for cyanosis.   Respiratory: Negative for hemoptysis and sputum production.    Endocrine: Negative for cold intolerance and heat intolerance.   Hematologic/Lymphatic: Negative for adenopathy.   Skin: Negative for dry skin, itching, nail changes, rash and suspicious lesions.   Musculoskeletal: Negative for arthritis, gout, muscle cramps, muscle weakness, myalgias and neck pain.   Gastrointestinal: Negative for anorexia, bowel incontinence, constipation, diarrhea, dysphagia, hematemesis and jaundice.   Genitourinary: Negative for bladder incontinence, dysuria, flank pain, frequency, hematuria and nocturia.   Neurological: Negative for focal weakness, numbness, paresthesias and seizures.   Psychiatric/Behavioral: Negative for altered mental status, hallucinations, hypervigilance, suicidal ideas and thoughts of violence.   Allergic/Immunologic: Negative for persistent infections.       Procedures       Objective:     Vitals:    06/27/19 1318   BP: 140/70   Pulse: 79   Weight: 84.4 kg (186 lb)  "  Height: 157.5 cm (62\")         Physical Exam   Constitutional: She is oriented to person, place, and time. She appears well-developed and well-nourished. No distress.   HENT:   Head: Normocephalic and atraumatic.   Nose: Nose normal.   Mouth/Throat: Oropharynx is clear and moist.   Eyes: Conjunctivae and EOM are normal. Pupils are equal, round, and reactive to light. Right eye exhibits no discharge. Left eye exhibits no discharge.   Neck: Normal range of motion. Neck supple. No tracheal deviation present. No thyromegaly present.   Cardiovascular: Normal rate, S1 normal, S2 normal and normal pulses. An irregularly irregular rhythm present. Exam reveals no S3.   Murmur heard.   Harsh midsystolic murmur is present with a grade of 1/6 at the upper right sternal border radiating to the neck.  Pulmonary/Chest: Effort normal and breath sounds normal. No stridor. No respiratory distress. She exhibits no tenderness.   Abdominal: Soft. Bowel sounds are normal. She exhibits no distension and no mass. There is no tenderness. There is no rebound and no guarding.   Musculoskeletal: Normal range of motion. She exhibits no tenderness or deformity.   Lymphadenopathy:     She has no cervical adenopathy.   Neurological: She is alert and oriented to person, place, and time. She has normal reflexes.   Skin: Skin is warm and dry. No rash noted. She is not diaphoretic. No erythema.   Psychiatric: She has a normal mood and affect. Thought content normal.       Lab Review:             Performed        Assessment:          Diagnosis Plan   1. Chronic atrial fibrillation (CMS/HCC)     2. SHEILA (obstructive sleep apnea)     3. H/O mitral valve repair     4. Aortic valve replaced            Plan:       1.   Atrial Fibrillation and Atrial Flutter  Assessment  • The patient has permanent atrial fibrillation  • This is valvular in etiology  • The patient's CHADS2-VASc score is 3  • A [id number]-TTTb score of 2 or more is considered a high risk for a " thromboembolic event  • Warfarin prescribed    Plan  • Continue in atrial fibrillation with rate control  • Continue warfarin for antithrombotic therapy, bleeding issues discussed  • Continue diltiazem for rate control    2.  Bioprosthetic aortic valve replacement with prior mitral valve repair-echocardiogram April 2019 demonstrated normal function  3.  AICD in place- patient was seen in our device clinic.  No ectopy seen.  Normal function.  Device was placed in 2010, we have never been able to find documentation regarding that.  Serial echocardiograms dating from 2014 have consistently demonstrated normal ejection fraction.  She is never had any arrhythmia document on this.  Battery life is getting low.  I discussed this with her- she would prefer simply leave the device in place and not replace it when the AICD reaches end-of-life.  Also, it is not bothering her in place, so she does not feel any desire to have the AICD removed after it is past end-of-life.  4.  Mediastinal adenopathy-status post biopsy, biopsy results pending      Current Outpatient Medications:   •  b complex vitamins capsule, Take 1 capsule by mouth Daily., Disp: , Rfl:   •  BIOTIN PO, Take 1 tablet by mouth Daily., Disp: , Rfl:   •  bumetanide (BUMEX) 2 MG tablet, Take 1 tablet by mouth 2 (Two) Times a Day As Needed (edema). (Patient taking differently: Take 2 mg by mouth Daily. 1-2 as needed), Disp: 180 tablet, Rfl: 0  •  CALCIUM-MAGNESIUM-ZINC PO, Take 1 tablet by mouth Daily., Disp: , Rfl:   •  Cholecalciferol (VITAMIN D-3 PO), Take 1 tablet by mouth Daily. Only through the winter, Disp: , Rfl:   •  fluticasone (FLONASE) 50 MCG/ACT nasal spray, 1 spray into each nostril Daily., Disp: , Rfl:   •  JANTOVEN 5 MG tablet, TAKE ONE TABLET BY MOUTH ONE TIME DAILY  (Patient taking differently: TAKE ONE TABLET BY MOUTH ONE TIME DAILY beside sunday half tablet), Disp: 90 tablet, Rfl: 1  •  levothyroxine (SYNTHROID, LEVOTHROID) 150 MCG tablet, Take  150 mcg by mouth Daily., Disp: , Rfl:   •  metoprolol succinate XL (TOPROL-XL) 25 MG 24 hr tablet, Take 1 tablet by mouth Daily., Disp: 90 tablet, Rfl: 3  •  Multiple Vitamin (MULTIVITAMIN) capsule, Take 1 capsule by mouth Daily., Disp: , Rfl:   •  Potassium 99 MG tablet, Take 1 tablet by mouth As Needed., Disp: , Rfl:

## 2019-07-11 ENCOUNTER — TELEPHONE (OUTPATIENT)
Dept: PHARMACY | Facility: HOSPITAL | Age: 74
End: 2019-07-11

## 2019-07-12 ENCOUNTER — TELEPHONE (OUTPATIENT)
Dept: CARDIOLOGY | Facility: CLINIC | Age: 74
End: 2019-07-12

## 2019-07-16 ENCOUNTER — ANTICOAGULATION VISIT (OUTPATIENT)
Dept: PHARMACY | Facility: HOSPITAL | Age: 74
End: 2019-07-16

## 2019-07-16 DIAGNOSIS — I48.20 CHRONIC ATRIAL FIBRILLATION (HCC): ICD-10-CM

## 2019-07-16 LAB
INR PPP: 2.3 (ref 0.91–1.09)
PROTHROMBIN TIME: 28.1 SECONDS (ref 10–13.8)

## 2019-07-16 PROCEDURE — 36416 COLLJ CAPILLARY BLOOD SPEC: CPT

## 2019-07-16 PROCEDURE — 85610 PROTHROMBIN TIME: CPT

## 2019-07-16 NOTE — PROGRESS NOTES
Anticoagulation Clinic Progress Note    Anticoagulation Summary  As of 2019    INR goal:   2.0-3.0   TTR:   53.4 % (8.9 mo)   INR used for dosin.3 (2019)   Warfarin maintenance plan:   2.5 mg every Sun; 5 mg all other days   Weekly warfarin total:   32.5 mg   No change documented:   Lizzy Villa   Plan last modified:   Mackenzie Kwon [facility] (2018)   Next INR check:   2019   Priority:   Maintenance   Target end date:   Indefinite    Indications    Chronic atrial fibrillation (CMS/[facility]) [I48.2]             Anticoagulation Episode Summary     INR check location:       Preferred lab:       Send INR reminders to:    [facility] POOL    Comments:         Anticoagulation Care Providers     Provider Role Specialty Phone number    Adeel Mina III, MD Referring Cardiology [phone]    Radha Bravo [facility]  Pharmacy [phone]          Clinic Interview:  Patient Findings     Negatives:   Signs/symptoms of thrombosis, Signs/symptoms of bleeding,   Laboratory test error suspected, Change in health, Change in alcohol use,   Change in activity, Upcoming invasive procedure, Emergency department   visit, Upcoming dental procedure, Missed doses, Extra doses, Change in   medications, Change in diet/appetite, Hospital admission, Bruising, Other   complaints      Clinical Outcomes     Negatives:   Major bleeding event, Thromboembolic event,   Anticoagulation-related hospital admission, Anticoagulation-related ED   visit, Anticoagulation-related fatality        INR History:  Anticoagulation Monitoring 3/21/2019 2019 2019   INR 2.3 1.7 2.3   INR Date 3/21/2019 2019 2019   INR Goal 2.0-3.0 2.0-3.0 2.0-3.0   Trend Same Same Same   Last Week Total 32.5 mg 27.5 mg 32.5 mg   Next Week Total 32.5 mg 35 mg 32.5 mg   Sun 2.5 mg 2.5 mg 2.5 mg   Mon 5 mg 5 mg 5 mg   Tue 5 mg 5 mg 5 mg   Wed 5 mg 7.5 mg (); Otherwise 5 mg 5 mg   Thu 5 mg 5 mg 5 mg   Fri 5 mg 5 mg 5 mg    Sat 5 mg 5 mg 5 mg   Visit Report - - -   Some recent data might be hidden       Plan:  1. INR is therapeutic today- see above in Anticoagulation Summary.   Will instruct Anna THA ConradGilbert to continue their warfarin regimen- see above in Anticoagulation Summary.  2. Follow up in 4 weeks.  3. Patient declines warfarin refills.  4. Verbal and written information provided. Patient expresses understanding and has no further questions at this time.    Lizzy Villa

## 2019-07-16 NOTE — TELEPHONE ENCOUNTER
Spoke with pt regarding needing Hepatic function test and Lipid profile, pt reports will be going to [facility] to have labs drawn.

## 2019-07-17 ENCOUNTER — CLINICAL SUPPORT NO REQUIREMENTS (OUTPATIENT)
Dept: CARDIOLOGY | Facility: CLINIC | Age: 74
End: 2019-07-17

## 2019-07-17 ENCOUNTER — TELEPHONE (OUTPATIENT)
Dept: CARDIOLOGY | Facility: CLINIC | Age: 74
End: 2019-07-17

## 2019-07-17 DIAGNOSIS — I48.20 CHRONIC ATRIAL FIBRILLATION (HCC): Primary | ICD-10-CM

## 2019-07-17 NOTE — TELEPHONE ENCOUNTER
Alert remote transmission received and reviewed. Alert is for battery reaching AIME, EOS < 3months. Per Dr. Mina office note from 6/27/19, patient does not want to replace device nor have it removed once it does reach EOS. Presenting EGM VS 62 bpm. =<0.1%. I called patient with results. She states that after speaking with her children, she is now unsure about her decision to not replace the device. I did make her aware that EOS is estimated to be < 3 months but that I would let Dr. Mina know so that they can discuss this more.

## 2019-07-18 ENCOUNTER — LAB (OUTPATIENT)
Dept: LAB | Facility: HOSPITAL | Age: 74
End: 2019-07-18

## 2019-07-18 ENCOUNTER — TRANSCRIBE ORDERS (OUTPATIENT)
Dept: ADMINISTRATIVE | Facility: HOSPITAL | Age: 74
End: 2019-07-18

## 2019-07-18 ENCOUNTER — TELEPHONE (OUTPATIENT)
Dept: CARDIOLOGY | Facility: CLINIC | Age: 74
End: 2019-07-18

## 2019-07-18 DIAGNOSIS — N76.0 PYOCOLPOS: ICD-10-CM

## 2019-07-18 DIAGNOSIS — E03.9 MYXEDEMA HEART DISEASE: ICD-10-CM

## 2019-07-18 DIAGNOSIS — I51.9 MYXEDEMA HEART DISEASE: ICD-10-CM

## 2019-07-18 DIAGNOSIS — E78.2 MIXED HYPERLIPIDEMIA: ICD-10-CM

## 2019-07-18 DIAGNOSIS — E55.9 AVITAMINOSIS D: Primary | ICD-10-CM

## 2019-07-18 DIAGNOSIS — I10 ESSENTIAL HYPERTENSION, MALIGNANT: Primary | ICD-10-CM

## 2019-07-18 DIAGNOSIS — E78.5 HYPERLIPOPROTEINEMIA: ICD-10-CM

## 2019-07-18 DIAGNOSIS — I10 ESSENTIAL HYPERTENSION, MALIGNANT: ICD-10-CM

## 2019-07-18 LAB
ALBUMIN SERPL-MCNC: 4.3 G/DL (ref 3.5–5.2)
ALBUMIN SERPL-MCNC: 4.3 G/DL (ref 3.5–5.2)
ALBUMIN/GLOB SERPL: 1.5 G/DL
ALP SERPL-CCNC: 62 U/L (ref 39–117)
ALP SERPL-CCNC: 62 U/L (ref 39–117)
ALT SERPL W P-5'-P-CCNC: 13 U/L (ref 1–33)
ALT SERPL W P-5'-P-CCNC: 13 U/L (ref 1–33)
ANION GAP SERPL CALCULATED.3IONS-SCNC: 10.3 MMOL/L (ref 5–15)
AST SERPL-CCNC: 21 U/L (ref 1–32)
AST SERPL-CCNC: 21 U/L (ref 1–32)
BACTERIA UR QL AUTO: NORMAL /HPF
BASOPHILS # BLD AUTO: 0.06 10*3/MM3 (ref 0–0.2)
BASOPHILS NFR BLD AUTO: 1 % (ref 0–1.5)
BILIRUB CONJ SERPL-MCNC: <0.2 MG/DL (ref 0.2–0.3)
BILIRUB INDIRECT SERPL-MCNC: ABNORMAL MG/DL
BILIRUB SERPL-MCNC: 0.6 MG/DL (ref 0.2–1.2)
BILIRUB SERPL-MCNC: 0.6 MG/DL (ref 0.2–1.2)
BILIRUB UR QL STRIP: NEGATIVE
BUN BLD-MCNC: 11 MG/DL (ref 8–23)
BUN/CREAT SERPL: 14.1 (ref 7–25)
CALCIUM SPEC-SCNC: 9.6 MG/DL (ref 8.6–10.5)
CHLORIDE SERPL-SCNC: 102 MMOL/L (ref 98–107)
CHOLEST SERPL-MCNC: 175 MG/DL (ref 0–200)
CLARITY UR: CLEAR
CO2 SERPL-SCNC: 27.7 MMOL/L (ref 22–29)
COLOR UR: YELLOW
CREAT BLD-MCNC: 0.78 MG/DL (ref 0.57–1)
DEPRECATED RDW RBC AUTO: 45.5 FL (ref 37–54)
EOSINOPHIL # BLD AUTO: 0.3 10*3/MM3 (ref 0–0.4)
EOSINOPHIL NFR BLD AUTO: 4.8 % (ref 0.3–6.2)
ERYTHROCYTE [DISTWIDTH] IN BLOOD BY AUTOMATED COUNT: 14 % (ref 12.3–15.4)
GFR SERPL CREATININE-BSD FRML MDRD: 72 ML/MIN/1.73
GLOBULIN UR ELPH-MCNC: 2.9 GM/DL
GLUCOSE BLD-MCNC: 101 MG/DL (ref 65–99)
GLUCOSE UR STRIP-MCNC: NEGATIVE MG/DL
HCT VFR BLD AUTO: 47.9 % (ref 34–46.6)
HDLC SERPL-MCNC: 41 MG/DL (ref 40–60)
HGB BLD-MCNC: 15.4 G/DL (ref 12–15.9)
HGB UR QL STRIP.AUTO: NEGATIVE
HYALINE CASTS UR QL AUTO: NORMAL /LPF
IMM GRANULOCYTES # BLD AUTO: 0.02 10*3/MM3 (ref 0–0.05)
IMM GRANULOCYTES NFR BLD AUTO: 0.3 % (ref 0–0.5)
KETONES UR QL STRIP: NEGATIVE
LDLC SERPL CALC-MCNC: 107 MG/DL (ref 0–100)
LDLC/HDLC SERPL: 2.6 {RATIO}
LEUKOCYTE ESTERASE UR QL STRIP.AUTO: ABNORMAL
LYMPHOCYTES # BLD AUTO: 1.81 10*3/MM3 (ref 0.7–3.1)
LYMPHOCYTES NFR BLD AUTO: 28.7 % (ref 19.6–45.3)
MCH RBC QN AUTO: 28.8 PG (ref 26.6–33)
MCHC RBC AUTO-ENTMCNC: 32.2 G/DL (ref 31.5–35.7)
MCV RBC AUTO: 89.5 FL (ref 79–97)
MONOCYTES # BLD AUTO: 0.73 10*3/MM3 (ref 0.1–0.9)
MONOCYTES NFR BLD AUTO: 11.6 % (ref 5–12)
NEUTROPHILS # BLD AUTO: 3.38 10*3/MM3 (ref 1.7–7)
NEUTROPHILS NFR BLD AUTO: 53.6 % (ref 42.7–76)
NITRITE UR QL STRIP: NEGATIVE
NRBC BLD AUTO-RTO: 0 /100 WBC (ref 0–0.2)
PH UR STRIP.AUTO: 6 [PH] (ref 5–8)
PLATELET # BLD AUTO: 314 10*3/MM3 (ref 140–450)
PMV BLD AUTO: 9.2 FL (ref 6–12)
POTASSIUM BLD-SCNC: 4.7 MMOL/L (ref 3.5–5.2)
PROT SERPL-MCNC: 7.2 G/DL (ref 6–8.5)
PROT SERPL-MCNC: 7.2 G/DL (ref 6–8.5)
PROT UR QL STRIP: NEGATIVE
RBC # BLD AUTO: 5.35 10*6/MM3 (ref 3.77–5.28)
RBC # UR: NORMAL /HPF
REF LAB TEST METHOD: NORMAL
SODIUM BLD-SCNC: 140 MMOL/L (ref 136–145)
SP GR UR STRIP: 1.02 (ref 1–1.03)
SQUAMOUS #/AREA URNS HPF: NORMAL /HPF
TRIGL SERPL-MCNC: 137 MG/DL (ref 0–150)
TSH SERPL DL<=0.05 MIU/L-ACNC: 3.76 MIU/ML (ref 0.27–4.2)
UROBILINOGEN UR QL STRIP: ABNORMAL
VLDLC SERPL-MCNC: 27.4 MG/DL (ref 5–40)
WBC NRBC COR # BLD: 6.3 10*3/MM3 (ref 3.4–10.8)
WBC UR QL AUTO: NORMAL /HPF

## 2019-07-18 PROCEDURE — 84443 ASSAY THYROID STIM HORMONE: CPT

## 2019-07-18 PROCEDURE — 80061 LIPID PANEL: CPT

## 2019-07-18 PROCEDURE — 87086 URINE CULTURE/COLONY COUNT: CPT | Performed by: FAMILY MEDICINE

## 2019-07-18 PROCEDURE — 85025 COMPLETE CBC W/AUTO DIFF WBC: CPT

## 2019-07-18 PROCEDURE — 36415 COLL VENOUS BLD VENIPUNCTURE: CPT

## 2019-07-18 PROCEDURE — 81001 URINALYSIS AUTO W/SCOPE: CPT | Performed by: FAMILY MEDICINE

## 2019-07-18 PROCEDURE — 80076 HEPATIC FUNCTION PANEL: CPT

## 2019-07-18 PROCEDURE — 80053 COMPREHEN METABOLIC PANEL: CPT

## 2019-07-18 PROCEDURE — 82248 BILIRUBIN DIRECT: CPT

## 2019-07-18 NOTE — TELEPHONE ENCOUNTER
----- Message from LUIS ARMANDO Pate sent at 7/18/2019  1:59 PM EDT -----  Please call patient, labs look good.     Pt called and informed of labs, verbalized understanding.

## 2019-07-19 LAB — BACTERIA SPEC AEROBE CULT: NO GROWTH

## 2019-07-19 NOTE — TELEPHONE ENCOUNTER
Patient called about device beeping. She indicated that it was not going to be replaced. I scheduled her to come to the [facility] Thursday 7/25 to have the beep turned off, otherwise it will alarm daily. She asked if we were going to turn the whole thing off at that time. Do you want therapies off as well? Did you want to see her first in the clinic and discuss?

## 2019-07-22 NOTE — TELEPHONE ENCOUNTER
Patient returned call. She states she discussed this with you at last office visit and does not see why she needs to be seen. Please advise.

## 2019-07-25 ENCOUNTER — TELEPHONE (OUTPATIENT)
Dept: ORTHOPEDIC SURGERY | Facility: CLINIC | Age: 74
End: 2019-07-25

## 2019-07-25 ENCOUNTER — CLINICAL SUPPORT NO REQUIREMENTS (OUTPATIENT)
Dept: CARDIOLOGY | Facility: CLINIC | Age: 74
End: 2019-07-25

## 2019-07-25 ENCOUNTER — OFFICE VISIT (OUTPATIENT)
Dept: ORTHOPEDIC SURGERY | Facility: CLINIC | Age: 74
End: 2019-07-25

## 2019-07-25 DIAGNOSIS — R52 PAIN: Primary | ICD-10-CM

## 2019-07-25 DIAGNOSIS — M75.50 SUBACROMIAL BURSITIS: ICD-10-CM

## 2019-07-25 DIAGNOSIS — M17.11 PRIMARY OSTEOARTHRITIS OF RIGHT KNEE: ICD-10-CM

## 2019-07-25 DIAGNOSIS — I42.9 CARDIOMYOPATHY, UNSPECIFIED TYPE (HCC): Primary | ICD-10-CM

## 2019-07-25 PROCEDURE — 20610 DRAIN/INJ JOINT/BURSA W/O US: CPT | Performed by: ORTHOPAEDIC SURGERY

## 2019-07-25 PROCEDURE — 99214 OFFICE O/P EST MOD 30 MIN: CPT | Performed by: ORTHOPAEDIC SURGERY

## 2019-07-25 PROCEDURE — 73562 X-RAY EXAM OF KNEE 3: CPT | Performed by: ORTHOPAEDIC SURGERY

## 2019-07-25 RX ORDER — LIDOCAINE HYDROCHLORIDE 10 MG/ML
4 INJECTION, SOLUTION EPIDURAL; INFILTRATION; INTRACAUDAL; PERINEURAL
Status: COMPLETED | OUTPATIENT
Start: 2019-07-25 | End: 2019-07-25

## 2019-07-25 RX ORDER — TRIAMCINOLONE ACETONIDE 40 MG/ML
40 INJECTION, SUSPENSION INTRA-ARTICULAR; INTRAMUSCULAR
Status: COMPLETED | OUTPATIENT
Start: 2019-07-25 | End: 2019-07-25

## 2019-07-25 RX ADMIN — LIDOCAINE HYDROCHLORIDE 4 ML: 10 INJECTION, SOLUTION EPIDURAL; INFILTRATION; INTRACAUDAL; PERINEURAL at 16:18

## 2019-07-25 RX ADMIN — TRIAMCINOLONE ACETONIDE 40 MG: 40 INJECTION, SUSPENSION INTRA-ARTICULAR; INTRAMUSCULAR at 16:18

## 2019-07-25 RX ADMIN — LIDOCAINE HYDROCHLORIDE 4 ML: 10 INJECTION, SOLUTION EPIDURAL; INFILTRATION; INTRACAUDAL; PERINEURAL at 16:19

## 2019-07-25 RX ADMIN — TRIAMCINOLONE ACETONIDE 40 MG: 40 INJECTION, SUSPENSION INTRA-ARTICULAR; INTRAMUSCULAR at 16:19

## 2019-07-25 NOTE — PROGRESS NOTES
Subjective: Right knee pain, right shoulder pain     Patient ID: Anna Gilbert is a 73 y.o. female.    Chief Complaint:    History of Present Illness 73-year-old female known to the practice returns with new complaints of pain in her right knee and right shoulder that she has had for several months.  Previously treated for right knee pain with gel injection is doing well.  The knee is causing her pain particular when walking long distances getting in and out of her car navigating steps.  The shoulder is giving her pain with any type of overhead activity.  No history of trauma to either joint.  Unable to take anti-inflammatories that she is on Coumadin atrial fib and a cardiac valve       Social History     Occupational History   • Not on file   Tobacco Use   • Smoking status: Former Smoker   • Smokeless tobacco: Never Used   • Tobacco comment: caffeine use   Substance and Sexual Activity   • Alcohol use: No   • Drug use: No   • Sexual activity: Defer      Review of Systems   Constitutional: Negative for chills, diaphoresis, fever and unexpected weight change.   HENT: Negative for hearing loss, nosebleeds, sore throat and tinnitus.    Eyes: Negative for pain and visual disturbance.   Respiratory: Negative for cough, shortness of breath and wheezing.    Cardiovascular: Negative for chest pain and palpitations.   Gastrointestinal: Negative for abdominal pain, diarrhea, nausea and vomiting.   Endocrine: Negative for cold intolerance, heat intolerance and polydipsia.   Genitourinary: Negative for difficulty urinating, dysuria and hematuria.   Musculoskeletal: Positive for arthralgias. Negative for joint swelling and myalgias.   Skin: Negative for rash and wound.   Allergic/Immunologic: Negative for environmental allergies.   Neurological: Negative for dizziness, syncope and numbness.   Hematological: Does not bruise/bleed easily.   Psychiatric/Behavioral: Negative for dysphoric mood and sleep disturbance. The patient is  not nervous/anxious.    All other systems reviewed and are negative.        Past Medical History:   Diagnosis Date   • Coronary artery disease    • Health care maintenance    • Hyperlipidemia    • Hyperthyroidism    • SHEILA (obstructive sleep apnea) 7/20/2016   • PAF (paroxysmal atrial fibrillation) (CMS/HCC)    • Sick sinus syndrome (CMS/HCC)      Past Surgical History:   Procedure Laterality Date   • AORTIC VALVE REPAIR/REPLACEMENT  2010    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   • CARDIAC CATHETERIZATION  01/01/2011   • CARDIAC DEFIBRILLATOR PLACEMENT  2010   • MITRAL VALVE REPAIR/REPLACEMENT  2010     Family History   Problem Relation Age of Onset   • Coronary artery disease Father    • No Known Problems Mother          Objective:  There were no vitals filed for this visit.  There were no vitals filed for this visit.  There is no height or weight on file to calculate BMI.        Ortho Exam   AP lateral sunrise view of the right knee to evaluate her chief complaint does show changes in the patellofemoral and medial compartment far as decrease in joint space and spurring.  No priors available for comparison.  The right knee shows no swelling effusion erythema there is 0 to 125 degrees of motion with patellofemoral crepitance.  No pain with subluxation.  Mild medial joint line tenderness but negative Romina's.  Quad function 5/5 in the calf is nontender.  No instability 0 90 degrees nor is or any varus valgus instability 0 30 degrees.  The calf is nontender.  Good distal pulses no motor or sensory deficit good capillary refill.  With regard to the right shoulder there is no motor deficits right radial ulnar median nerve function and no sensory loss.  She has full range of motion of the elbow 5 flexion extension pronation supination.  She can extend and abduct her shoulder to about 160 degrees.  Abduction extension and 90 degrees is intact although mildly painful.  Mildly positive Squires sign.  Negative Speed test.  Negative  Nuckolls's test.  Negative the points test.  Deltoid function is 4 out of 5 secondary to pain biceps function 5 out of 5.  External rotation is 40 degrees and internal rotation is 50 degrees.  Again unable to take anti-inflammatories due to the fact she is on Coumadin.  Both the shoulder and knee pain causes moderate discomfort with all activities of daily living.    Assessment:        1. Pain    2. Primary osteoarthritis of right knee    3. Subacromial bursitis           Plan: Over 15 minutes was spent with the patient face-to-face reviewing her physical findings on her shoulder in her knee and her knee x-rays.  Reviewed her history and treatment rendered today.  Describes arthritic complaints in both joints were can recommend Pennsaid gel that can be applied topically twice a day and for the pain in both the knee and the shoulder and proceed with cortisone injections a lidocaine Kenalog 41.  Both joints were injected without complications.  Postinjection instructions given to the patient.  Return to see me in 6 weeks.  Patient was in agreement those treatment recommendations.  Large Joint Arthrocentesis: R knee  Date/Time: 7/25/2019 4:18 PM  Consent given by: patient  Site marked: site marked  Timeout: Immediately prior to procedure a time out was called to verify the correct patient, procedure, equipment, support staff and site/side marked as required   Supporting Documentation  Indications: pain   Procedure Details  Location: knee - R knee  Preparation: Patient was prepped and draped in the usual sterile fashion  Needle size: 22 G  Approach: anterolateral  Medications administered: 4 mL lidocaine PF 1% 1 %; 40 mg triamcinolone acetonide 40 MG/ML  Patient tolerance: patient tolerated the procedure well with no immediate complications                    Work Status:    ABIMAEL query complete.    Orders:  Orders Placed This Encounter   Procedures   • Large Joint Arthrocentesis: R knee   • Large Joint Arthrocentesis:  R glenohumeral   • XR Knee 3 View Right       Medications:  New Medications Ordered This Visit   Medications   • Diclofenac Sodium (PENNSAID) 2 % solution     Sig: Apply to affected areas twice a day     Dispense:  100 g     Refill:  5       Followup:  Return in about 6 weeks (around 9/5/2019).          Dictated utilizing Dragon dictation

## 2019-07-25 NOTE — PROGRESS NOTES
Procedure   Large Joint Arthrocentesis: R glenohumeral  Date/Time: 7/25/2019 4:19 PM  Consent given by: patient  Site marked: site marked  Timeout: Immediately prior to procedure a time out was called to verify the correct patient, procedure, equipment, support staff and site/side marked as required   Supporting Documentation  Indications: pain   Procedure Details  Location: shoulder - R glenohumeral  Preparation: Patient was prepped and draped in the usual sterile fashion  Needle size: 22 G  Approach: posterior  Medications administered: 4 mL lidocaine PF 1% 1 %; 40 mg triamcinolone acetonide 40 MG/ML  Patient tolerance: patient tolerated the procedure well with no immediate complications

## 2019-07-25 NOTE — TELEPHONE ENCOUNTER
Pennsaid PA denied. Please send over [facility] try a PA for that.     Thank you.

## 2019-08-06 ENCOUNTER — TELEPHONE (OUTPATIENT)
Dept: CARDIOLOGY | Facility: CLINIC | Age: 74
End: 2019-08-06

## 2019-08-06 RX ORDER — WARFARIN SODIUM 5 MG/1
TABLET ORAL
Qty: 90 TABLET | Refills: 0 | Status: CANCELLED | OUTPATIENT
Start: 2019-08-06

## 2019-08-06 RX ORDER — WARFARIN SODIUM 5 MG/1
5 TABLET ORAL DAILY
Qty: 90 TABLET | Refills: 0 | Status: SHIPPED | OUTPATIENT
Start: 2019-08-06 | End: 2019-10-01 | Stop reason: SDUPTHER

## 2019-08-06 NOTE — TELEPHONE ENCOUNTER
Pt wants her Jantoven refilled at [facility] fax number is [phone]    Thank you,     Deedee MOROCHO

## 2019-08-12 ENCOUNTER — ANTICOAGULATION VISIT (OUTPATIENT)
Dept: PHARMACY | Facility: HOSPITAL | Age: 74
End: 2019-08-12

## 2019-08-12 DIAGNOSIS — I48.20 CHRONIC ATRIAL FIBRILLATION (HCC): ICD-10-CM

## 2019-08-12 LAB
INR PPP: 1.3 (ref 0.91–1.09)
PROTHROMBIN TIME: 16.1 SECONDS (ref 10–13.8)

## 2019-08-12 PROCEDURE — 36416 COLLJ CAPILLARY BLOOD SPEC: CPT

## 2019-08-12 PROCEDURE — G0463 HOSPITAL OUTPT CLINIC VISIT: HCPCS

## 2019-08-12 PROCEDURE — 85610 PROTHROMBIN TIME: CPT

## 2019-08-12 NOTE — PROGRESS NOTES
Anticoagulation Clinic Progress Note    Anticoagulation Summary  As of 2019    INR goal:   2.0-3.0   TTR:   51.3 % (9.8 mo)   INR used for dosin.3! (2019)   Warfarin maintenance plan:   2.5 mg every Sun; 5 mg all other days   Weekly warfarin total:   32.5 mg   Plan last modified:   Mackenzie Kwon [facility] (2018)   Next INR check:   2019   Priority:   Maintenance   Target end date:   Indefinite    Indications    Chronic atrial fibrillation (CMS/HCC) [I48.2]             Anticoagulation Episode Summary     INR check location:       Preferred lab:       Send INR reminders to:    [facility] POOL    Comments:         Anticoagulation Care Providers     Provider Role Specialty Phone number    Adeel Mina III, MD Referring Cardiology [phone]    Radha Bravo [facility]  Pharmacy [phone]          Clinic Interview:      INR History:  Anticoagulation Monitoring 2019   INR 1.7 2.3 1.3   INR Date 2019   INR Goal 2.0-3.0 2.0-3.0 2.0-3.0   Trend Same Same Same   Last Week Total 27.5 mg 32.5 mg 32.5 mg   Next Week Total 35 mg 32.5 mg 37.5 mg   Sun 2.5 mg 2.5 mg 2.5 mg   Mon 5 mg 5 mg 7.5 mg (); Otherwise 5 mg   Tue 5 mg 5 mg 7.5 mg (); Otherwise 5 mg   Wed 7.5 mg (); Otherwise 5 mg 5 mg 5 mg   Thu 5 mg 5 mg 5 mg   Fri 5 mg 5 mg 5 mg   Sat 5 mg 5 mg 5 mg   Visit Report - - -   Some recent data might be hidden       Plan:  1. INR is Subtherapeutic today- see above in Anticoagulation Summary.  Will instruct Anna Gilbert to boost to warfarin 7.5mg x 2 days then continue their warfarin regimen- see above in Anticoagulation Summary.  2. Follow up in 2 weeks  3. Patient declines warfarin refills.  4. Verbal and written information provided. Patient expresses understanding and has no further questions at this time.    Alicia Garcia [facility]

## 2019-08-29 ENCOUNTER — OFFICE VISIT (OUTPATIENT)
Dept: ORTHOPEDIC SURGERY | Facility: CLINIC | Age: 74
End: 2019-08-29

## 2019-08-29 VITALS — WEIGHT: 186 LBS | HEIGHT: 62 IN | BODY MASS INDEX: 34.23 KG/M2

## 2019-08-29 DIAGNOSIS — R52 PAIN: Primary | ICD-10-CM

## 2019-08-29 DIAGNOSIS — M17.11 PRIMARY OSTEOARTHRITIS OF RIGHT KNEE: ICD-10-CM

## 2019-08-29 PROCEDURE — 20610 DRAIN/INJ JOINT/BURSA W/O US: CPT | Performed by: ORTHOPAEDIC SURGERY

## 2019-08-29 NOTE — PROGRESS NOTES
Subjective: Right knee pain     Patient ID: Anna Gilbert is a 74 y.o. female.    Chief Complaint:    History of Present Illness 74-year-old female returns with persistent pain discomfort in the knee despite the cortisone injection as previously discussed we will proceed with Orthovisc injections       Social History     Occupational History   • Not on file   Tobacco Use   • Smoking status: Former Smoker   • Smokeless tobacco: Never Used   • Tobacco comment: caffeine use   Substance and Sexual Activity   • Alcohol use: No   • Drug use: No   • Sexual activity: Defer      Review of Systems   Constitutional: Negative for chills, diaphoresis, fever and unexpected weight change.   HENT: Negative for hearing loss, nosebleeds, sore throat and tinnitus.    Eyes: Negative for pain and visual disturbance.   Respiratory: Negative for cough, shortness of breath and wheezing.    Cardiovascular: Negative for chest pain and palpitations.   Gastrointestinal: Negative for abdominal pain, diarrhea, nausea and vomiting.   Endocrine: Negative for cold intolerance, heat intolerance and polydipsia.   Genitourinary: Negative for difficulty urinating, dysuria and hematuria.   Musculoskeletal: Positive for arthralgias and myalgias. Negative for joint swelling.   Skin: Negative for rash and wound.   Allergic/Immunologic: Negative for environmental allergies.   Neurological: Negative for dizziness, syncope and numbness.   Hematological: Does not bruise/bleed easily.   Psychiatric/Behavioral: Negative for dysphoric mood and sleep disturbance. The patient is not nervous/anxious.          Past Medical History:   Diagnosis Date   • Coronary artery disease    • Health care maintenance    • Hyperlipidemia    • Hyperthyroidism    • SHEILA (obstructive sleep apnea) 7/20/2016   • PAF (paroxysmal atrial fibrillation) (CMS/HCC)    • Sick sinus syndrome (CMS/HCC)      Past Surgical History:   Procedure Laterality Date   • AORTIC VALVE REPAIR/REPLACEMENT   2010    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   • CARDIAC CATHETERIZATION  01/01/2011   • CARDIAC DEFIBRILLATOR PLACEMENT  2010   • MITRAL VALVE REPAIR/REPLACEMENT  2010     Family History   Problem Relation Age of Onset   • Coronary artery disease Father    • No Known Problems Mother          Objective:  There were no vitals filed for this visit.      08/29/19  1530   Weight: 84.4 kg (186 lb)     Body mass index is 34.02 kg/m².    2 Stephon injection given through the superolateral portal of sterile prep without complications tolerating well.  Postinjection instructions given to the patient.    Ortho Exam       Assessment:        1. Pain    2. Primary osteoarthritis of right knee           Plan: Return next week for next injection  Large Joint Arthrocentesis: R knee  Date/Time: 8/29/2019 3:42 PM  Consent given by: patient  Site marked: site marked  Timeout: Immediately prior to procedure a time out was called to verify the correct patient, procedure, equipment, support staff and site/side marked as required   Supporting Documentation  Indications: pain   Procedure Details  Location: knee - R knee  Preparation: Patient was prepped and draped in the usual sterile fashion  Needle size: 22 G  Approach: superior (LATERAL)  Medications administered: 30 mg Hyaluronan 30 MG/2ML  Patient tolerance: patient tolerated the procedure well with no immediate complications                  Work Status:    ABIMAEL query complete.    Orders:  Orders Placed This Encounter   Procedures   • Large Joint Arthrocentesis: R knee       Medications:  No orders of the defined types were placed in this encounter.      Followup:  Return in about 1 week (around 9/5/2019).          Dictated utilizing Dragon dictation

## 2019-09-12 ENCOUNTER — CLINICAL SUPPORT (OUTPATIENT)
Dept: ORTHOPEDIC SURGERY | Facility: CLINIC | Age: 74
End: 2019-09-12

## 2019-09-12 VITALS — BODY MASS INDEX: 34.23 KG/M2 | WEIGHT: 186 LBS | HEIGHT: 62 IN

## 2019-09-12 DIAGNOSIS — M17.11 PRIMARY OSTEOARTHRITIS OF RIGHT KNEE: Primary | ICD-10-CM

## 2019-09-12 DIAGNOSIS — R52 PAIN: ICD-10-CM

## 2019-09-12 PROCEDURE — 20610 DRAIN/INJ JOINT/BURSA W/O US: CPT | Performed by: ORTHOPAEDIC SURGERY

## 2019-09-12 NOTE — PROGRESS NOTES
Subjective: Osteoarthritis right knee     Patient ID: Anna Gilbert is a 74 y.o. female.    Chief Complaint:    History of Present Illness patient seen for second Orthovisc injection.  Has noted a reduction in her pain.       Social History     Occupational History   • Not on file   Tobacco Use   • Smoking status: Former Smoker   • Smokeless tobacco: Never Used   • Tobacco comment: caffeine use   Substance and Sexual Activity   • Alcohol use: No   • Drug use: No   • Sexual activity: Defer      Review of Systems   Constitutional: Negative for chills, diaphoresis, fever and unexpected weight change.   HENT: Negative for hearing loss, nosebleeds, sore throat and tinnitus.    Eyes: Negative for pain and visual disturbance.   Respiratory: Negative for cough, shortness of breath and wheezing.    Cardiovascular: Negative for chest pain and palpitations.   Gastrointestinal: Negative for abdominal pain, diarrhea, nausea and vomiting.   Endocrine: Negative for cold intolerance, heat intolerance and polydipsia.   Genitourinary: Negative for difficulty urinating, dysuria and hematuria.   Musculoskeletal: Positive for arthralgias and myalgias. Negative for joint swelling.   Skin: Negative for rash and wound.   Allergic/Immunologic: Negative for environmental allergies.   Neurological: Negative for dizziness, syncope and numbness.   Hematological: Does not bruise/bleed easily.   Psychiatric/Behavioral: Negative for dysphoric mood and sleep disturbance. The patient is not nervous/anxious.          Past Medical History:   Diagnosis Date   • Coronary artery disease    • Health care maintenance    • Hyperlipidemia    • Hyperthyroidism    • SHEILA (obstructive sleep apnea) 7/20/2016   • PAF (paroxysmal atrial fibrillation) (CMS/[facility])    • Sick sinus syndrome (CMS/[facility])      Past Surgical History:   Procedure Laterality Date   • AORTIC VALVE REPAIR/REPLACEMENT  2010    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   • CARDIAC CATHETERIZATION  01/01/2011   •  CARDIAC DEFIBRILLATOR PLACEMENT  2010   • MITRAL VALVE REPAIR/REPLACEMENT  2010     Family History   Problem Relation Age of Onset   • Coronary artery disease Father    • No Known Problems Mother          Objective:  There were no vitals filed for this visit.      09/12/19  1532   Weight: 84.4 kg (186 lb)     Body mass index is 34.02 kg/m².        Ortho Exam   2 mL injection given through the superolateral portal of sterile prep without complications tolerating well.  Postinjection instructions given    Assessment:        1. Primary osteoarthritis of right knee    2. Pain           Plan: Return next week for next injection  Large Joint Arthrocentesis: R knee  Date/Time: 9/12/2019 3:33 PM  Consent given by: patient  Site marked: site marked  Timeout: Immediately prior to procedure a time out was called to verify the correct patient, procedure, equipment, support staff and site/side marked as required   Supporting Documentation  Indications: pain   Procedure Details  Location: knee - R knee  Preparation: Patient was prepped and draped in the usual sterile fashion  Needle size: 22 G  Approach: superior (LATERAL)  Medications administered: 30 mg Hyaluronan 30 MG/2ML  Patient tolerance: patient tolerated the procedure well with no immediate complications                Work Status:    ABIMAEL query complete.    Orders:  Orders Placed This Encounter   Procedures   • Large Joint Arthrocentesis: R knee       Medications:  No orders of the defined types were placed in this encounter.      Followup:  Return in about 1 week (around 9/19/2019).          Dictated utilizing Dragon dictation

## 2019-09-19 ENCOUNTER — CLINICAL SUPPORT (OUTPATIENT)
Dept: ORTHOPEDIC SURGERY | Facility: CLINIC | Age: 74
End: 2019-09-19

## 2019-09-19 DIAGNOSIS — M17.11 PRIMARY OSTEOARTHRITIS OF RIGHT KNEE: ICD-10-CM

## 2019-09-19 DIAGNOSIS — R52 PAIN: Primary | ICD-10-CM

## 2019-09-19 PROCEDURE — 20610 DRAIN/INJ JOINT/BURSA W/O US: CPT | Performed by: ORTHOPAEDIC SURGERY

## 2019-09-19 NOTE — PROGRESS NOTES
Subjective: Osteoarthritis right knee     Patient ID: Anna Gilbert is a 74 y.o. female.    Chief Complaint:    History of Present Illness patient seen for third and final Orthovisc injection.  Has noted a reduction in pain       Social History     Occupational History   • Not on file   Tobacco Use   • Smoking status: Former Smoker   • Smokeless tobacco: Never Used   • Tobacco comment: caffeine use   Substance and Sexual Activity   • Alcohol use: No   • Drug use: No   • Sexual activity: Defer      Review of Systems   Constitutional: Negative for chills, diaphoresis, fever and unexpected weight change.   HENT: Negative for hearing loss, nosebleeds, sore throat and tinnitus.    Eyes: Negative for pain and visual disturbance.   Respiratory: Negative for cough, shortness of breath and wheezing.    Cardiovascular: Negative for chest pain and palpitations.   Gastrointestinal: Negative for abdominal pain, diarrhea, nausea and vomiting.   Endocrine: Negative for cold intolerance, heat intolerance and polydipsia.   Genitourinary: Negative for difficulty urinating, dysuria and hematuria.   Musculoskeletal: Positive for arthralgias. Negative for joint swelling and myalgias.   Skin: Negative for rash and wound.   Allergic/Immunologic: Negative for environmental allergies.   Neurological: Negative for dizziness, syncope and numbness.   Hematological: Does not bruise/bleed easily.   Psychiatric/Behavioral: Negative for dysphoric mood and sleep disturbance. The patient is not nervous/anxious.    All other systems reviewed and are negative.        Past Medical History:   Diagnosis Date   • Coronary artery disease    • Health care maintenance    • Hyperlipidemia    • Hyperthyroidism    • SHEILA (obstructive sleep apnea) 7/20/2016   • PAF (paroxysmal atrial fibrillation) (CMS/HCC)    • Sick sinus syndrome (CMS/HCC)      Past Surgical History:   Procedure Laterality Date   • AORTIC VALVE REPAIR/REPLACEMENT  2010    Porcine aortic valve  21 mm   • CARDIAC CATHETERIZATION  01/01/2011   • CARDIAC DEFIBRILLATOR PLACEMENT  2010   • MITRAL VALVE REPAIR/REPLACEMENT  2010     Family History   Problem Relation Age of Onset   • Coronary artery disease Father    • No Known Problems Mother          Objective:  There were no vitals filed for this visit.  There were no vitals filed for this visit.  There is no height or weight on file to calculate BMI.        Ortho Exam   Final 2 mL injection given to the superolateral portal tolerating well.    Assessment:        1. Pain    2. Primary osteoarthritis of right knee           Plan: Return in 6 weeks if still symptomatic      Large Joint Arthrocentesis: R knee  Date/Time: 9/19/2019 2:21 PM  Consent given by: patient  Site marked: site marked  Timeout: Immediately prior to procedure a time out was called to verify the correct patient, procedure, equipment, support staff and site/side marked as required   Supporting Documentation  Indications: pain   Procedure Details  Location: knee - R knee (right)  Preparation: Patient was prepped and draped in the usual sterile fashion  Needle size: 22 G  Approach: anterolateral  Medications administered: 30 mg Hyaluronan 30 MG/2ML  Patient tolerance: patient tolerated the procedure well with no immediate complications            Work Status:    ABIMAEL query complete.    Orders:  Orders Placed This Encounter   Procedures   • Large Joint Arthrocentesis: R knee       Medications:  No orders of the defined types were placed in this encounter.      Followup:  Return in about 6 weeks (around 10/31/2019).          Dictated utilizing Dragon dictation

## 2019-09-24 ENCOUNTER — TELEPHONE (OUTPATIENT)
Dept: PHARMACY | Facility: HOSPITAL | Age: 74
End: 2019-09-24

## 2019-09-24 ENCOUNTER — TELEPHONE (OUTPATIENT)
Dept: ORTHOPEDIC SURGERY | Facility: CLINIC | Age: 74
End: 2019-09-24

## 2019-09-24 NOTE — TELEPHONE ENCOUNTER
Patient calling stating that her Right knee is swollen after having her 3rd Orthovisc, she is resting it, using ice. But admits not as much as she should, she did also go purchase a OTC knee sleeve as your recommended. She is on an anti-coag so she cannot take and anti inflammatory at this time.     Do you have any other suggestions?

## 2019-09-25 NOTE — TELEPHONE ENCOUNTER
She should be applying the gel to the knee 4 times a day but if she wants I can also send her to physical therapy.  She wants to do therapy need to know where she wants to go

## 2019-10-01 ENCOUNTER — ANTICOAGULATION VISIT (OUTPATIENT)
Dept: PHARMACY | Facility: HOSPITAL | Age: 74
End: 2019-10-01

## 2019-10-01 DIAGNOSIS — I48.20 CHRONIC ATRIAL FIBRILLATION (HCC): ICD-10-CM

## 2019-10-01 LAB
INR PPP: 1.2 (ref 0.91–1.09)
PROTHROMBIN TIME: 14.9 SECONDS (ref 10–13.8)

## 2019-10-01 PROCEDURE — 36416 COLLJ CAPILLARY BLOOD SPEC: CPT

## 2019-10-01 PROCEDURE — G0463 HOSPITAL OUTPT CLINIC VISIT: HCPCS

## 2019-10-01 PROCEDURE — 85610 PROTHROMBIN TIME: CPT

## 2019-10-01 RX ORDER — WARFARIN SODIUM 5 MG/1
5 TABLET ORAL DAILY
Qty: 90 TABLET | Refills: 0 | Status: SHIPPED | OUTPATIENT
Start: 2019-10-01 | End: 2020-02-18

## 2019-10-01 NOTE — PROGRESS NOTES
Anticoagulation Clinic Progress Note    Anticoagulation Summary  As of 10/1/2019    INR goal:   2.0-3.0   TTR:   43.9 % (11.5 mo)   INR used for dosin.2! (10/1/2019)   Warfarin maintenance plan:   5 mg every day   Weekly warfarin total:   35 mg   Plan last modified:   Alicia Garcia RP (10/1/2019)   Next INR check:   10/15/2019   Priority:   Maintenance   Target end date:   Indefinite    Indications    Chronic atrial fibrillation [I48.20]             Anticoagulation Episode Summary     INR check location:       Preferred lab:       Send INR reminders to:   [facility] CLINICAL POOL    Comments:         Anticoagulation Care Providers     Provider Role Specialty Phone number    Adeel Mina III, MD Referring Cardiology [phone]    [facility]  Pharmacy [phone]          Clinic Interview:      INR History:  Anticoagulation Monitoring 2019 2019 10/1/2019   INR 2.3 1.3 1.2   INR Date 2019 2019 10/1/2019   INR Goal 2.0-3.0 2.0-3.0 2.0-3.0   Trend Same Same Up   Last Week Total 32.5 mg 32.5 mg 32.5 mg   Next Week Total 32.5 mg 37.5 mg 42.5 mg   Sun 2.5 mg 2.5 mg 5 mg   Mon 5 mg 7.5 mg (); Otherwise 5 mg 5 mg   Tue 5 mg 7.5 mg (); Otherwise 5 mg 10 mg (10/1); Otherwise 5 mg   Wed 5 mg 5 mg 7.5 mg (10/2); Otherwise 5 mg   Thu 5 mg 5 mg 5 mg   Fri 5 mg 5 mg 5 mg   Sat 5 mg 5 mg 5 mg   Visit Report - - -   Some recent data might be hidden       Plan:  1. INR is Subtherapeutic today- see above in Anticoagulation Summary.  Will instruct Anna Gilbert to Increase their warfarin regimen- see above in Anticoagulation Summary.  2. Follow up in 2 weeks  3. Patient desires warfarin refills.  4. Verbal and written information provided. Patient expresses understanding and has no further questions at this time.    Alicia Garcia [facility]

## 2019-10-04 ENCOUNTER — APPOINTMENT (OUTPATIENT)
Dept: PHARMACY | Facility: HOSPITAL | Age: 74
End: 2019-10-04

## 2019-10-07 ENCOUNTER — OFFICE VISIT (OUTPATIENT)
Dept: ORTHOPEDIC SURGERY | Facility: CLINIC | Age: 74
End: 2019-10-07

## 2019-10-07 VITALS — WEIGHT: 186 LBS | BODY MASS INDEX: 34.23 KG/M2 | HEIGHT: 62 IN

## 2019-10-07 DIAGNOSIS — R52 PAIN: Primary | ICD-10-CM

## 2019-10-07 DIAGNOSIS — M17.11 PRIMARY OSTEOARTHRITIS OF RIGHT KNEE: ICD-10-CM

## 2019-10-07 PROCEDURE — 99213 OFFICE O/P EST LOW 20 MIN: CPT | Performed by: ORTHOPAEDIC SURGERY

## 2019-10-07 PROCEDURE — 73562 X-RAY EXAM OF KNEE 3: CPT | Performed by: ORTHOPAEDIC SURGERY

## 2019-10-07 NOTE — PROGRESS NOTES
Subjective: Right knee pain     Patient ID: Anna Gilbert is a 74 y.o. female.    Chief Complaint:    History of Present Illness patient seen today in urgent basis for acute onset of right knee pain that developed about a week to 10 days ago.  States she does kneel on her bed and felt sharp pain in that knee and caused significant swelling in the knee.  She subsequent developed medial ecchymosis.  She is on a blood thinner.  Some of the pain has resolved and is able to ambulate but has some persistent knee discomfort.  Had previously undergone gel injections and did well after this latest episode.       Social History     Occupational History   • Not on file   Tobacco Use   • Smoking status: Former Smoker   • Smokeless tobacco: Never Used   • Tobacco comment: caffeine use   Substance and Sexual Activity   • Alcohol use: No   • Drug use: No   • Sexual activity: Defer      Review of Systems   Constitutional: Negative for chills, diaphoresis, fever and unexpected weight change.   HENT: Negative for hearing loss, nosebleeds, sore throat and tinnitus.    Eyes: Negative for pain and visual disturbance.   Respiratory: Negative for cough, shortness of breath and wheezing.    Cardiovascular: Negative for chest pain and palpitations.   Gastrointestinal: Negative for abdominal pain, diarrhea, nausea and vomiting.   Endocrine: Negative for cold intolerance, heat intolerance and polydipsia.   Genitourinary: Negative for difficulty urinating, dysuria and hematuria.   Musculoskeletal: Positive for arthralgias, joint swelling and myalgias.   Skin: Negative for rash and wound.   Allergic/Immunologic: Negative for environmental allergies.   Neurological: Negative for dizziness, syncope and numbness.   Hematological: Bruises/bleeds easily.   Psychiatric/Behavioral: Negative for dysphoric mood and sleep disturbance. The patient is not nervous/anxious.          Past Medical History:   Diagnosis Date   • Coronary artery disease    •  Health care maintenance    • Hyperlipidemia    • Hyperthyroidism    • SHEILA (obstructive sleep apnea) 7/20/2016   • PAF (paroxysmal atrial fibrillation) (CMS/HCC)    • Sick sinus syndrome (CMS/HCC)      Past Surgical History:   Procedure Laterality Date   • AORTIC VALVE REPAIR/REPLACEMENT  2010    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   • CARDIAC CATHETERIZATION  01/01/2011   • CARDIAC DEFIBRILLATOR PLACEMENT  2010   • MITRAL VALVE REPAIR/REPLACEMENT  2010     Family History   Problem Relation Age of Onset   • Coronary artery disease Father    • No Known Problems Mother          Objective:  There were no vitals filed for this visit.      10/07/19  1437   Weight: 84.4 kg (186 lb)     Body mass index is 34.02 kg/m².        Ortho Exam   A repeat AP lateral sunrise view shows no chronic changes.  There may be a stress fracture involving the intercondylar notch the lateral condyle although is very difficult to tell.  There does appear to be a change in the previous x-rays but again I cannot be sure this is an artifact.  On exam there is a boggy synovitis to the knee.  There is medial ecchymosis to the thigh area measures probably 6 x 8 cm and is tender to touch.  She has 0 to 125 degrees of motion with crepitus and mild pain but no instability.  Her calf is nontender good distal pulses no motor or sensory deficit good capillary refill.  Skin is cool to touch.    Assessment:        1. Pain    2. Primary osteoarthritis of right knee           Plan: Is most likely a muscle strain that caused the bleeding but I do want to get an MRI of the knee to fully evaluate for any intra-articular pathology particular stress fracture.  Return to see me after completion.            Work Status:    ABIMAEL query complete.    Orders:  Orders Placed This Encounter   Procedures   • XR Knee 3 View Right       Medications:  No orders of the defined types were placed in this encounter.      Followup:  Return in about 2 weeks (around 10/21/2019).           Dictated utilizing Dragon dictation

## 2019-10-08 ENCOUNTER — TELEPHONE (OUTPATIENT)
Dept: ORTHOPEDIC SURGERY | Facility: CLINIC | Age: 74
End: 2019-10-08

## 2019-10-08 NOTE — TELEPHONE ENCOUNTER
Patient calling stating that she went to refill her Voltaren gel 1% cream and that [facility] states [facility] lost the RX or can't seem to find it, she is requesting we re send an order over to them.    DX:Primary osteoarthritis of right knee, Right shoulder. Last seen yesterday 10.07.2019.    Please advise.

## 2019-10-10 ENCOUNTER — HOSPITAL ENCOUNTER (OUTPATIENT)
Dept: GENERAL RADIOLOGY | Facility: HOSPITAL | Age: 74
Discharge: HOME | End: 2019-10-10

## 2019-10-10 ENCOUNTER — HOSPITAL ENCOUNTER (OUTPATIENT)
Dept: MRI IMAGING | Facility: HOSPITAL | Age: 74
End: 2019-10-10

## 2019-10-10 ENCOUNTER — HOSPITAL ENCOUNTER (OUTPATIENT)
Dept: CT IMAGING | Facility: HOSPITAL | Age: 74
Discharge: HOME | End: 2019-10-10
Admitting: ORTHOPAEDIC SURGERY

## 2019-10-10 DIAGNOSIS — M17.11 PRIMARY OSTEOARTHRITIS OF RIGHT KNEE: Primary | ICD-10-CM

## 2019-10-10 DIAGNOSIS — M17.11 PRIMARY OSTEOARTHRITIS OF RIGHT KNEE: ICD-10-CM

## 2019-10-10 DIAGNOSIS — R52 PAIN: ICD-10-CM

## 2019-10-10 PROCEDURE — 73700 CT LOWER EXTREMITY W/O DYE: CPT

## 2019-10-14 ENCOUNTER — OFFICE VISIT (OUTPATIENT)
Dept: ORTHOPEDIC SURGERY | Facility: CLINIC | Age: 74
End: 2019-10-14

## 2019-10-14 VITALS — BODY MASS INDEX: 34.23 KG/M2 | WEIGHT: 186 LBS | HEIGHT: 62 IN

## 2019-10-14 DIAGNOSIS — R52 PAIN: ICD-10-CM

## 2019-10-14 DIAGNOSIS — M17.11 PRIMARY OSTEOARTHRITIS OF RIGHT KNEE: Primary | ICD-10-CM

## 2019-10-14 PROCEDURE — 99213 OFFICE O/P EST LOW 20 MIN: CPT | Performed by: ORTHOPAEDIC SURGERY

## 2019-10-14 RX ORDER — METHYLPREDNISOLONE 4 MG/1
TABLET ORAL
Qty: 21 TABLET | Refills: 0 | Status: SHIPPED | OUTPATIENT
Start: 2019-10-14 | End: 2020-04-21

## 2019-10-14 NOTE — PROGRESS NOTES
Subjective: Right knee pain     Patient ID: Anna Gilbert is a 74 y.o. female.    Chief Complaint:    History of Present Illness patient returns with results of the MRI which show a loose body but the tibial spine shows no fracture.  She still having moderate pain and discomfort.       Social History     Occupational History   • Not on file   Tobacco Use   • Smoking status: Former Smoker   • Smokeless tobacco: Never Used   • Tobacco comment: caffeine use   Substance and Sexual Activity   • Alcohol use: No   • Drug use: No   • Sexual activity: Defer      Review of Systems   Constitutional: Negative for chills, diaphoresis, fever and unexpected weight change.   HENT: Negative for hearing loss, nosebleeds, sore throat and tinnitus.    Eyes: Negative for pain and visual disturbance.   Respiratory: Negative for cough, shortness of breath and wheezing.    Cardiovascular: Negative for chest pain and palpitations.   Gastrointestinal: Negative for abdominal pain, diarrhea, nausea and vomiting.   Endocrine: Negative for cold intolerance, heat intolerance and polydipsia.   Genitourinary: Negative for difficulty urinating, dysuria and hematuria.   Musculoskeletal: Positive for arthralgias, joint swelling and myalgias.   Skin: Negative for rash and wound.   Allergic/Immunologic: Negative for environmental allergies.   Neurological: Negative for dizziness, syncope and numbness.   Hematological: Does not bruise/bleed easily.   Psychiatric/Behavioral: Negative for dysphoric mood and sleep disturbance. The patient is not nervous/anxious.          Past Medical History:   Diagnosis Date   • Coronary artery disease    • Health care maintenance    • Hyperlipidemia    • Hyperthyroidism    • SHEILA (obstructive sleep apnea) 7/20/2016   • PAF (paroxysmal atrial fibrillation) (CMS/HCC)    • Sick sinus syndrome (CMS/HCC)      Past Surgical History:   Procedure Laterality Date   • AORTIC VALVE REPAIR/REPLACEMENT  2010    Porcine aortic valve 21  mm   • CARDIAC CATHETERIZATION  01/01/2011   • CARDIAC DEFIBRILLATOR PLACEMENT  2010   • MITRAL VALVE REPAIR/REPLACEMENT  2010     Family History   Problem Relation Age of Onset   • Coronary artery disease Father    • No Known Problems Mother          Objective:  There were no vitals filed for this visit.      10/14/19  1030   Weight: 84.4 kg (186 lb)     Body mass index is 34.02 kg/m².        Ortho Exam   She is alert and oriented x3.  The knee does have a mild effusion to the knee and there is moderate crepitus with range of motion but there is no instability throughout the arc of motion.  The skin is cool to touch.  There is crepitus but no patella subluxation.  Quad function 4 out of 5 secondary to pain.  Calf is nontender.  Good distal pulses no motor or sensory deficit.  She has been taken Aleve although she does take a form of Coumadin for the pain.  She is been off the Coumadin while taking the Aleve she is done so for 3 days.  Still having moderate discomfort.    Assessment:        1. Primary osteoarthritis of right knee    2. Pain           Plan: Spent over 10 minutes with the patient reviewing her history results of the MRI which I personally reviewed the images and the report and her physical exam.  At this point she does not appear to be having symptoms related to the loose body noted in the intercondylar notch and she does not have any symptoms of the knee locking.  She has pain more at rest and when she is up and walking.  My recommendation is to try steroid pack.  She is to get that filled today and get started on and also advised to get back on her anticoagulation.  Return next Monday she shown no improvement discussed arthroscopic evaluation and debridement.  Answered all questions            Work Status:    ABIMAEL query complete.    Orders:  No orders of the defined types were placed in this encounter.      Medications:  New Medications Ordered This Visit   Medications   • methylPREDNISolone  (MEDROL, STEPHON,) 4 MG tablet     Sig: Use as directed by package instructions     Dispense:  21 tablet     Refill:  0       Followup:  No Follow-up on file.          Dictated utilizing Dragon dictation

## 2019-10-21 ENCOUNTER — OFFICE VISIT (OUTPATIENT)
Dept: ORTHOPEDIC SURGERY | Facility: CLINIC | Age: 74
End: 2019-10-21

## 2019-10-21 VITALS — BODY MASS INDEX: 33.86 KG/M2 | WEIGHT: 184 LBS | HEIGHT: 62 IN

## 2019-10-21 DIAGNOSIS — R52 PAIN: Primary | ICD-10-CM

## 2019-10-21 DIAGNOSIS — M17.11 PRIMARY OSTEOARTHRITIS OF RIGHT KNEE: ICD-10-CM

## 2019-10-21 DIAGNOSIS — M23.41 LOOSE BODY IN KNEE, RIGHT KNEE: ICD-10-CM

## 2019-10-21 PROCEDURE — 20610 DRAIN/INJ JOINT/BURSA W/O US: CPT | Performed by: ORTHOPAEDIC SURGERY

## 2019-10-21 PROCEDURE — 99213 OFFICE O/P EST LOW 20 MIN: CPT | Performed by: ORTHOPAEDIC SURGERY

## 2019-10-21 RX ORDER — TRIAMCINOLONE ACETONIDE 40 MG/ML
40 INJECTION, SUSPENSION INTRA-ARTICULAR; INTRAMUSCULAR
Status: COMPLETED | OUTPATIENT
Start: 2019-10-21 | End: 2019-10-21

## 2019-10-21 RX ORDER — LIDOCAINE HYDROCHLORIDE 10 MG/ML
4 INJECTION, SOLUTION EPIDURAL; INFILTRATION; INTRACAUDAL; PERINEURAL
Status: COMPLETED | OUTPATIENT
Start: 2019-10-21 | End: 2019-10-21

## 2019-10-21 RX ADMIN — LIDOCAINE HYDROCHLORIDE 4 ML: 10 INJECTION, SOLUTION EPIDURAL; INFILTRATION; INTRACAUDAL; PERINEURAL at 15:07

## 2019-10-21 RX ADMIN — TRIAMCINOLONE ACETONIDE 40 MG: 40 INJECTION, SUSPENSION INTRA-ARTICULAR; INTRAMUSCULAR at 15:07

## 2019-10-21 NOTE — PROGRESS NOTES
Subjective: Right knee pain     Patient ID: Anna Gilbert is a 74 y.o. female.    Chief Complaint:    History of Present Illness 74-year-old female returns with results of the CT scan which confirmed a 7 mm loose body just above the tibial spines.  She persistent having moderate pain and discomfort.  Again all this pain developed after she completed the gel injections which she got excellent relief and then she knelt on her bed and felt sudden pain medial discomfort in the knee which has persisted.  I personally reviewed the CT images and the report with the patient.       Social History     Occupational History   • Not on file   Tobacco Use   • Smoking status: Former Smoker   • Smokeless tobacco: Never Used   • Tobacco comment: caffeine use   Substance and Sexual Activity   • Alcohol use: No   • Drug use: No   • Sexual activity: Defer      Review of Systems   Constitutional: Negative for chills, diaphoresis, fever and unexpected weight change.   HENT: Negative for hearing loss, nosebleeds, sore throat and tinnitus.    Eyes: Negative for pain and visual disturbance.   Respiratory: Negative for cough, shortness of breath and wheezing.    Cardiovascular: Negative for chest pain and palpitations.   Gastrointestinal: Negative for abdominal pain, diarrhea, nausea and vomiting.   Endocrine: Negative for cold intolerance, heat intolerance and polydipsia.   Genitourinary: Negative for difficulty urinating, dysuria and hematuria.   Musculoskeletal: Positive for arthralgias and myalgias. Negative for joint swelling.   Skin: Negative for rash and wound.   Allergic/Immunologic: Negative for environmental allergies.   Neurological: Negative for dizziness, syncope and numbness.   Hematological: Does not bruise/bleed easily.   Psychiatric/Behavioral: Negative for dysphoric mood and sleep disturbance. The patient is not nervous/anxious.          Past Medical History:   Diagnosis Date   • Coronary artery disease    • Health care  maintenance    • Hyperlipidemia    • Hyperthyroidism    • SHEILA (obstructive sleep apnea) 7/20/2016   • PAF (paroxysmal atrial fibrillation) (CMS/HCC)    • Sick sinus syndrome (CMS/HCC)      Past Surgical History:   Procedure Laterality Date   • AORTIC VALVE REPAIR/REPLACEMENT  2010    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   • CARDIAC CATHETERIZATION  01/01/2011   • CARDIAC DEFIBRILLATOR PLACEMENT  2010   • MITRAL VALVE REPAIR/REPLACEMENT  2010     Family History   Problem Relation Age of Onset   • Coronary artery disease Father    • No Known Problems Mother          Objective:  There were no vitals filed for this visit.      10/21/19  1450   Weight: 83.5 kg (184 lb)     Body mass index is 33.65 kg/m².        Ortho Exam   She is alert and oriented x3.  Again there is moderate medial joint line tenderness with a moderate effusion to the knee.  She has full extension flexion about 150 degrees with no instability but there is pain and crepitus with range of motion.  No varus valgus instability or AP instability.  Quad function is 4 out of 5 secondary to pain calf nontender.  Good distal pulses no motor or sensory deficit the skin is cool to touch.  She walks with a slight antalgic gait secondary to the pain.    Assessment:        1. Pain    2. Primary osteoarthritis of right knee    3. Loose body in knee, right knee           Plan: Spent 10 minutes with the patient reviewing the results of her CT scan.  Having no symptoms of the knee locking but she is having persistent recurrent medial joint line pain.  After reviewing the x-rays and the exam with the patient I will try injecting the knee to see what kind of relief we get.  After sterile prep and she underwent first an aspiration of over 15 cc of serous fluid and injected 4 cc of lidocaine 1 cc Kenalog.  Postinjection instructions given to the patient.  Return in 2 weeks for follow-up to  the efficacy of the injection.  If the pain goes away but returned she may be a  candidate for arthroscopic debridement of discussed this with the patient would like to try this first and she was in agreement.      Large Joint Arthrocentesis: R knee  Date/Time: 10/21/2019 3:07 PM  Consent given by: patient  Site marked: site marked  Timeout: Immediately prior to procedure a time out was called to verify the correct patient, procedure, equipment, support staff and site/side marked as required   Supporting Documentation  Indications: pain   Procedure Details  Location: knee - R knee  Needle size: 22 G  Approach: superior  Medications administered: 4 mL lidocaine PF 1% 1 %; 40 mg triamcinolone acetonide 40 MG/ML  Aspirate amount: 15 mL  Aspirate: serous  Patient tolerance: patient tolerated the procedure well with no immediate complications             Work Status:    ABIMAEL query complete.    Orders:  Orders Placed This Encounter   Procedures   • Large Joint Arthrocentesis: R knee       Medications:  No orders of the defined types were placed in this encounter.      Followup:  Return in about 2 weeks (around 11/4/2019).          Dictated utilizing Dragon dictation

## 2019-10-31 ENCOUNTER — OFFICE VISIT (OUTPATIENT)
Dept: ORTHOPEDIC SURGERY | Facility: CLINIC | Age: 74
End: 2019-10-31

## 2019-10-31 ENCOUNTER — CLINICAL SUPPORT NO REQUIREMENTS (OUTPATIENT)
Dept: CARDIOLOGY | Facility: CLINIC | Age: 74
End: 2019-10-31

## 2019-10-31 DIAGNOSIS — R52 PAIN: Primary | ICD-10-CM

## 2019-10-31 DIAGNOSIS — M17.11 PRIMARY OSTEOARTHRITIS OF RIGHT KNEE: ICD-10-CM

## 2019-10-31 DIAGNOSIS — I42.9 CARDIOMYOPATHY, UNSPECIFIED TYPE (HCC): Primary | ICD-10-CM

## 2019-10-31 PROCEDURE — 99212 OFFICE O/P EST SF 10 MIN: CPT | Performed by: ORTHOPAEDIC SURGERY

## 2019-10-31 PROCEDURE — 93295 DEV INTERROG REMOTE 1/2/MLT: CPT | Performed by: INTERNAL MEDICINE

## 2019-10-31 PROCEDURE — 93296 REM INTERROG EVL PM/IDS: CPT | Performed by: INTERNAL MEDICINE

## 2019-10-31 NOTE — PROGRESS NOTES
Subjective: Osteoarthritis right knee     Patient ID: Anna Gilbert is a 74 y.o. female.    Chief Complaint:    History of Present Illness patient seen in follow-up having undergone a cortisone injection in the knee and is noted complete relief of her pain and discomfort at this time.       Social History     Occupational History   • Not on file   Tobacco Use   • Smoking status: Former Smoker   • Smokeless tobacco: Never Used   • Tobacco comment: caffeine use   Substance and Sexual Activity   • Alcohol use: No   • Drug use: No   • Sexual activity: Defer      Review of Systems   Constitutional: Negative for chills, diaphoresis, fever and unexpected weight change.   HENT: Negative for hearing loss, nosebleeds, sore throat and tinnitus.    Eyes: Negative for pain and visual disturbance.   Respiratory: Negative for cough, shortness of breath and wheezing.    Cardiovascular: Negative for chest pain and palpitations.   Gastrointestinal: Negative for abdominal pain, diarrhea, nausea and vomiting.   Endocrine: Negative for cold intolerance, heat intolerance and polydipsia.   Genitourinary: Negative for difficulty urinating, dysuria and hematuria.   Musculoskeletal: Positive for arthralgias and myalgias. Negative for joint swelling.   Skin: Negative for rash and wound.   Allergic/Immunologic: Negative for environmental allergies.   Neurological: Negative for dizziness, syncope and numbness.   Hematological: Does not bruise/bleed easily.   Psychiatric/Behavioral: Negative for dysphoric mood and sleep disturbance. The patient is not nervous/anxious.          Past Medical History:   Diagnosis Date   • Coronary artery disease    • Health care maintenance    • Hyperlipidemia    • Hyperthyroidism    • SHEILA (obstructive sleep apnea) 7/20/2016   • PAF (paroxysmal atrial fibrillation) (CMS/HCC)    • Sick sinus syndrome (CMS/HCC)      Past Surgical History:   Procedure Laterality Date   • AORTIC VALVE REPAIR/REPLACEMENT  2010     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   • CARDIAC CATHETERIZATION  01/01/2011   • CARDIAC DEFIBRILLATOR PLACEMENT  2010   • MITRAL VALVE REPAIR/REPLACEMENT  2010     Family History   Problem Relation Age of Onset   • Coronary artery disease Father    • No Known Problems Mother          Objective:  There were no vitals filed for this visit.  There were no vitals filed for this visit.  There is no height or weight on file to calculate BMI.        Ortho Exam   Alert and oriented x3.  She has0 to 125 degrees of motion with minimal pain and discomfort.  There is crepitus but there is no instability.  Quad function 5/5 calf nontender good distal pulses no motor or sensory deficit good capillary refill skin is cool to touch.  Patient is on Coumadin he is unable to take anti-inflammatory agents.  She is able to perform all activity daily living.    Assessment:        1. Pain    2. Primary osteoarthritis of right knee           Plan: Patient will be seen on as-needed basis.  We discussed the gel injections can be repeated every 6 months she needs to wait until March but a cortisone can be given in 3 months if necessary.            Work Status:    ABIMAEL query complete.    Orders:  No orders of the defined types were placed in this encounter.      Medications:  No orders of the defined types were placed in this encounter.      Followup:  Return if symptoms worsen or fail to improve.          Dictated utilizing Dragon dictation

## 2019-11-07 ENCOUNTER — TELEPHONE (OUTPATIENT)
Dept: PHARMACY | Facility: HOSPITAL | Age: 74
End: 2019-11-07

## 2019-11-21 ENCOUNTER — ANTICOAGULATION VISIT (OUTPATIENT)
Dept: PHARMACY | Facility: HOSPITAL | Age: 74
End: 2019-11-21

## 2019-11-21 ENCOUNTER — LAB (OUTPATIENT)
Dept: LAB | Facility: HOSPITAL | Age: 74
End: 2019-11-21

## 2019-11-21 ENCOUNTER — TELEPHONE (OUTPATIENT)
Dept: CARDIOLOGY | Facility: CLINIC | Age: 74
End: 2019-11-21

## 2019-11-21 DIAGNOSIS — R06.02 SHORTNESS OF BREATH: ICD-10-CM

## 2019-11-21 DIAGNOSIS — I48.20 CHRONIC ATRIAL FIBRILLATION (HCC): Primary | Chronic | ICD-10-CM

## 2019-11-21 DIAGNOSIS — Z98.890 H/O MITRAL VALVE REPAIR: Chronic | ICD-10-CM

## 2019-11-21 DIAGNOSIS — I48.20 CHRONIC ATRIAL FIBRILLATION (HCC): Chronic | ICD-10-CM

## 2019-11-21 DIAGNOSIS — I48.20 CHRONIC ATRIAL FIBRILLATION (HCC): ICD-10-CM

## 2019-11-21 LAB
ANION GAP SERPL CALCULATED.3IONS-SCNC: 13.5 MMOL/L (ref 5–15)
BUN BLD-MCNC: 17 MG/DL (ref 8–23)
BUN/CREAT SERPL: 22.7 (ref 7–25)
CALCIUM SPEC-SCNC: 9.5 MG/DL (ref 8.6–10.5)
CHLORIDE SERPL-SCNC: 98 MMOL/L (ref 98–107)
CO2 SERPL-SCNC: 28.5 MMOL/L (ref 22–29)
CREAT BLD-MCNC: 0.75 MG/DL (ref 0.57–1)
GFR SERPL CREATININE-BSD FRML MDRD: 75 ML/MIN/1.73
GLUCOSE BLD-MCNC: 102 MG/DL (ref 65–99)
INR PPP: 1.4 (ref 0.91–1.09)
NT-PROBNP SERPL-MCNC: 361.8 PG/ML (ref 5–1800)
POTASSIUM BLD-SCNC: 3.7 MMOL/L (ref 3.5–5.2)
PROTHROMBIN TIME: 17.1 SECONDS (ref 10–13.8)
SODIUM BLD-SCNC: 140 MMOL/L (ref 136–145)

## 2019-11-21 PROCEDURE — G0463 HOSPITAL OUTPT CLINIC VISIT: HCPCS

## 2019-11-21 PROCEDURE — 83880 ASSAY OF NATRIURETIC PEPTIDE: CPT

## 2019-11-21 PROCEDURE — 36416 COLLJ CAPILLARY BLOOD SPEC: CPT

## 2019-11-21 PROCEDURE — 36415 COLL VENOUS BLD VENIPUNCTURE: CPT

## 2019-11-21 PROCEDURE — 85610 PROTHROMBIN TIME: CPT

## 2019-11-21 PROCEDURE — 80048 BASIC METABOLIC PNL TOTAL CA: CPT

## 2019-11-21 NOTE — TELEPHONE ENCOUNTER
"Spoke to patient.  Abdomen feels distended and bloated in the mornings.  Better after taking diuretics.  Denies LE edema, feels the fluid retention is all in her \"upper body\".  Denies SOA.    BMP and Pro BNP ordered, patient will get today.    Appt for Monday.  "

## 2019-11-21 NOTE — TELEPHONE ENCOUNTER
11/21/19  10:33 AM  Anna Gilbert  1945  Home Phone [phone]   Mobile [phone]     Kaitlin Sanchez called this morning. She said she woke up Saturday morning and had gained six pounds over a three day period (she doesn't weigh every day). Normally she takes Bumex 2mg daily, but Saturday she started taking Bumex 2mg BID. She had the double dose Saturday, Sunday, Monday and Tuesday. She only took one dose yesterday.     She said that even though she took the extra Bumex, she has only lost three pounds. She wondered if she needs to continue taking the double dose or if she needs to do something different? She did not have a HR or B/P to give me.    Cardiac-related medications:  Warfarin  Toprol XL 25 mg daily  Bumex 2 mg BID prn  Synthroid    Thank you!    Sophie Mayfield RN  Triage [facility] [place]

## 2019-11-21 NOTE — PROGRESS NOTES
Anticoagulation Clinic Progress Note    Anticoagulation Summary  As of 2019    INR goal:   2.0-3.0   TTR:   38.2 % (1.1 y)   INR used for dosin.4! (2019)   Warfarin maintenance plan:   5 mg every day   Weekly warfarin total:   35 mg   Plan last modified:   Alicia Garcia RPH (10/1/2019)   Next INR check:   2019   Priority:   Maintenance   Target end date:   Indefinite    Indications    Chronic atrial fibrillation [I48.20]             Anticoagulation Episode Summary     INR check location:       Preferred lab:       Send INR reminders to:   [facility] CLINICAL POOL    Comments:         Anticoagulation Care Providers     Provider Role Specialty Phone number    Adeel Mina III, MD Referring Cardiology [phone]    Radha Bravo [facility]  Pharmacy [phone]          Clinic Interview:      INR History:  Anticoagulation Monitoring 2019 10/1/2019 2019   INR 1.3 1.2 1.4   INR Date 2019 10/1/2019 2019   INR Goal 2.0-3.0 2.0-3.0 2.0-3.0   Trend Same Up Same   Last Week Total 32.5 mg 32.5 mg 35 mg   Next Week Total 37.5 mg 42.5 mg 42.5 mg   Sun 2.5 mg 5 mg 5 mg   Mon 7.5 mg (); Otherwise 5 mg 5 mg 5 mg   Tue 7.5 mg (); Otherwise 5 mg 10 mg (10/1); Otherwise 5 mg 5 mg   Wed 5 mg 7.5 mg (10/2); Otherwise 5 mg 5 mg   Thu 5 mg 5 mg 10 mg (); Otherwise 5 mg   Fri 5 mg 5 mg 7.5 mg (); Otherwise 5 mg   Sat 5 mg 5 mg 5 mg   Visit Report - - -   Some recent data might be hidden       Plan:  1. INR is Subtherapeutic today- see above in Anticoagulation Summary.  Will instruct Anna Gilbert to Increase their warfarin regimen- see above in Anticoagulation Summary.  2. Follow up in 1.5 weeks  3. Patient declines warfarin refills.  4. Verbal and written information provided. Patient expresses understanding and has no further questions at this time.    Alicia Garcia KIRK

## 2020-01-09 ENCOUNTER — TELEPHONE (OUTPATIENT)
Dept: PHARMACY | Facility: HOSPITAL | Age: 75
End: 2020-01-09

## 2020-01-09 ENCOUNTER — ANTICOAGULATION VISIT (OUTPATIENT)
Dept: PHARMACY | Facility: HOSPITAL | Age: 75
End: 2020-01-09

## 2020-01-09 DIAGNOSIS — I48.20 CHRONIC ATRIAL FIBRILLATION (HCC): ICD-10-CM

## 2020-01-09 LAB
INR PPP: 1.8 (ref 0.91–1.09)
PROTHROMBIN TIME: 21.5 SECONDS (ref 10–13.8)

## 2020-01-09 PROCEDURE — 36416 COLLJ CAPILLARY BLOOD SPEC: CPT

## 2020-01-09 PROCEDURE — G0463 HOSPITAL OUTPT CLINIC VISIT: HCPCS

## 2020-01-09 PROCEDURE — 85610 PROTHROMBIN TIME: CPT

## 2020-01-09 NOTE — PROGRESS NOTES
Anticoagulation Clinic Progress Note    Anticoagulation Summary  As of 2020    INR goal:   2.0-3.0   TTR:   34.0 % (1.2 y)   INR used for dosin.8! (2020)   Warfarin maintenance plan:   5 mg every day   Weekly warfarin total:   35 mg   No change documented:   Simon Ivy RPH   Plan last modified:   Alicia Garcia RPH (10/1/2019)   Next INR check:   2020   Priority:   Maintenance   Target end date:   Indefinite    Indications    Chronic atrial fibrillation [I48.20]             Anticoagulation Episode Summary     INR check location:       Preferred lab:       Send INR reminders to:    [facility] [place]    Comments:         Anticoagulation Care Providers     Provider Role Specialty Phone number    Adeel Mina III, MD Referring Cardiology [phone]          Clinic Interview:  Patient Findings     Positives:   Other complaints    Negatives:   Signs/symptoms of thrombosis, Signs/symptoms of bleeding,   Laboratory test error suspected, Change in health, Change in alcohol use,   Change in activity, Upcoming invasive procedure, Emergency department   visit, Upcoming dental procedure, Missed doses, Extra doses, Change in   medications, Change in diet/appetite, Hospital admission, Bruising    Comments:   Iceberg salad last night. Reports taking 2.5 mg on Sun rather   than 5 mg daily.      Clinical Outcomes     Negatives:   Major bleeding event, Thromboembolic event,   Anticoagulation-related hospital admission, Anticoagulation-related ED   visit, Anticoagulation-related fatality    Comments:   Iceberg salad last night. Reports taking 2.5 mg on Sun rather   than 5 mg daily.        INR History:  Anticoagulation Monitoring 10/1/2019 2019 2020   INR 1.2 1.4 1.8   INR Date 10/1/2019 2019 2020   INR Goal 2.0-3.0 2.0-3.0 2.0-3.0   Trend Up Same Same   Last Week Total 32.5 mg 35 mg 35 mg   Next Week Total 42.5 mg 42.5 mg 35 mg   Sun 5 mg 5 mg 5 mg   Mon 5 mg 5 mg 5 mg   Tue 10 mg  (10/1); Otherwise 5 mg 5 mg 5 mg   Wed 7.5 mg (10/2); Otherwise 5 mg 5 mg 5 mg   Thu 5 mg 10 mg (11/21); Otherwise 5 mg 5 mg   Fri 5 mg 7.5 mg (11/22); Otherwise 5 mg 5 mg   Sat 5 mg 5 mg 5 mg   Visit Report - - -   Some recent data might be hidden       Plan:  1. INR is Subtherapeutic today- see above in Anticoagulation Summary.  Will instruct Anna Gilbert to Increase their warfarin regimen to 5 mg daily - see above in Anticoagulation Summary.  2. Follow up in 2 weeks  3. Patient declines warfarin refills.  4. Verbal and written information provided. Patient expresses understanding and has no further questions at this time.    Simon Ivy [facility] [place]

## 2020-01-20 ENCOUNTER — OFFICE VISIT (OUTPATIENT)
Dept: ORTHOPEDIC SURGERY | Facility: CLINIC | Age: 75
End: 2020-01-20

## 2020-01-20 DIAGNOSIS — M75.51 SUBACROMIAL BURSITIS OF RIGHT SHOULDER JOINT: ICD-10-CM

## 2020-01-20 DIAGNOSIS — R52 PAIN: Primary | ICD-10-CM

## 2020-01-20 PROCEDURE — 20610 DRAIN/INJ JOINT/BURSA W/O US: CPT | Performed by: ORTHOPAEDIC SURGERY

## 2020-01-20 PROCEDURE — 73030 X-RAY EXAM OF SHOULDER: CPT | Performed by: ORTHOPAEDIC SURGERY

## 2020-01-20 RX ORDER — TRIAMCINOLONE ACETONIDE 40 MG/ML
40 INJECTION, SUSPENSION INTRA-ARTICULAR; INTRAMUSCULAR
Status: COMPLETED | OUTPATIENT
Start: 2020-01-20 | End: 2020-01-20

## 2020-01-20 RX ORDER — LIDOCAINE HYDROCHLORIDE 10 MG/ML
4 INJECTION, SOLUTION EPIDURAL; INFILTRATION; INTRACAUDAL; PERINEURAL
Status: COMPLETED | OUTPATIENT
Start: 2020-01-20 | End: 2020-01-20

## 2020-01-20 RX ADMIN — LIDOCAINE HYDROCHLORIDE 4 ML: 10 INJECTION, SOLUTION EPIDURAL; INFILTRATION; INTRACAUDAL; PERINEURAL at 15:01

## 2020-01-20 RX ADMIN — TRIAMCINOLONE ACETONIDE 40 MG: 40 INJECTION, SUSPENSION INTRA-ARTICULAR; INTRAMUSCULAR at 15:01

## 2020-01-20 NOTE — PROGRESS NOTES
Subjective: Right shoulder pain     Patient ID: Anna Gilbert is a 74 y.o. female.    Chief Complaint:    History of Present Illness patient returns of the right shoulder symptomatic once again.  Is been over for 5 months since the last cortisone injection did well to just recently she presents today for possible reinjection       Social History     Occupational History   • Not on file   Tobacco Use   • Smoking status: Former Smoker   • Smokeless tobacco: Never Used   • Tobacco comment: caffeine use   Substance and Sexual Activity   • Alcohol use: No   • Drug use: No   • Sexual activity: Defer      Review of Systems   Constitutional: Negative for chills, diaphoresis, fever and unexpected weight change.   HENT: Negative for hearing loss, nosebleeds, sore throat and tinnitus.    Eyes: Negative for pain and visual disturbance.   Respiratory: Negative for cough, shortness of breath and wheezing.    Cardiovascular: Negative for chest pain and palpitations.   Gastrointestinal: Negative for abdominal pain, diarrhea, nausea and vomiting.   Endocrine: Negative for cold intolerance, heat intolerance and polydipsia.   Genitourinary: Negative for difficulty urinating, dysuria and hematuria.   Musculoskeletal: Positive for arthralgias. Negative for joint swelling and myalgias.   Skin: Negative for rash and wound.   Allergic/Immunologic: Negative for environmental allergies.   Neurological: Negative for dizziness, syncope and numbness.   Hematological: Does not bruise/bleed easily.   Psychiatric/Behavioral: Negative for dysphoric mood and sleep disturbance. The patient is not nervous/anxious.    All other systems reviewed and are negative.        Past Medical History:   Diagnosis Date   • Coronary artery disease    • Health care maintenance    • Hyperlipidemia    • Hyperthyroidism    • SHEILA (obstructive sleep apnea) 7/20/2016   • PAF (paroxysmal atrial fibrillation) (CMS/HCC)    • Sick sinus syndrome (CMS/HCC)      Past Surgical  History:   Procedure Laterality Date   • AORTIC VALVE REPAIR/REPLACEMENT  2010    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   • CARDIAC CATHETERIZATION  01/01/2011   • CARDIAC DEFIBRILLATOR PLACEMENT  2010   • MITRAL VALVE REPAIR/REPLACEMENT  2010     Family History   Problem Relation Age of Onset   • Coronary artery disease Father    • No Known Problems Mother          Objective:  There were no vitals filed for this visit.  There were no vitals filed for this visit.  There is no height or weight on file to calculate BMI.        Ortho Exam   AP lateral and outlet view does show AC arthritis.  No prior x-rays available for comparison.  She is alert and oriented x3.  She has about 170 degrees of abduction extension but at 90 degrees both are intact but painful.  Mildly positive Squires sign.  No motor or sensory deficit.  Good distal pulses.  External rotation is 45 degrees internal rotation is 40 degrees.    Assessment:        1. Pain    2. Subacromial bursitis of right shoulder joint           Plan: Patient had excellent positive cortisone injection previously starting reinject the shoulder today with 4 cc of lidocaine 1 cc Kenalog through the posterior portal tolerating well.  Return to see me as needed.  Answered all questions  Large Joint Arthrocentesis: R subacromial bursa  Date/Time: 1/20/2020 3:01 PM  Consent given by: patient  Site marked: site marked  Timeout: Immediately prior to procedure a time out was called to verify the correct patient, procedure, equipment, support staff and site/side marked as required   Supporting Documentation  Indications: pain   Procedure Details  Location: shoulder - R subacromial bursa  Preparation: Patient was prepped and draped in the usual sterile fashion  Needle size: 22 G  Approach: posterior  Medications administered: 4 mL lidocaine PF 1% 1 %; 40 mg triamcinolone acetonide 40 MG/ML  Patient tolerance: patient tolerated the procedure well with no immediate complications                   Work Status:    ABIMAEL query complete.    Orders:  Orders Placed This Encounter   Procedures   • Large Joint Arthrocentesis: R subacromial bursa   • XR Shoulder 2+ View Right       Medications:  No orders of the defined types were placed in this encounter.      Followup:  Return if symptoms worsen or fail to improve.          Dictated utilizing Dragon dictation

## 2020-01-21 ENCOUNTER — APPOINTMENT (OUTPATIENT)
Dept: CT IMAGING | Facility: HOSPITAL | Age: 75
End: 2020-01-21

## 2020-01-21 ENCOUNTER — HOSPITAL ENCOUNTER (EMERGENCY)
Facility: HOSPITAL | Age: 75
Discharge: HOME | End: 2020-01-21
Attending: EMERGENCY MEDICINE | Admitting: EMERGENCY MEDICINE

## 2020-01-21 VITALS
HEART RATE: 76 BPM | RESPIRATION RATE: 16 BRPM | TEMPERATURE: 98.2 F | OXYGEN SATURATION: 96 % | DIASTOLIC BLOOD PRESSURE: 83 MMHG | WEIGHT: 186 LBS | SYSTOLIC BLOOD PRESSURE: 173 MMHG | HEIGHT: 62 IN | BODY MASS INDEX: 34.23 KG/M2

## 2020-01-21 DIAGNOSIS — S00.03XA HEMATOMA OF SCALP, INITIAL ENCOUNTER: ICD-10-CM

## 2020-01-21 DIAGNOSIS — Z79.01 CHRONIC ANTICOAGULATION: ICD-10-CM

## 2020-01-21 DIAGNOSIS — S09.93XA INJURY OF FOREHEAD, INITIAL ENCOUNTER: Primary | ICD-10-CM

## 2020-01-21 PROCEDURE — 99284 EMERGENCY DEPT VISIT MOD MDM: CPT | Performed by: PHYSICIAN ASSISTANT

## 2020-01-21 PROCEDURE — 70450 CT HEAD/BRAIN W/O DYE: CPT

## 2020-01-21 PROCEDURE — 99282 EMERGENCY DEPT VISIT SF MDM: CPT

## 2020-01-28 ENCOUNTER — ANTICOAGULATION VISIT (OUTPATIENT)
Dept: PHARMACY | Facility: HOSPITAL | Age: 75
End: 2020-01-28

## 2020-01-28 DIAGNOSIS — I48.20 CHRONIC ATRIAL FIBRILLATION (HCC): ICD-10-CM

## 2020-01-28 LAB
INR PPP: 2.2 (ref 0.91–1.09)
PROTHROMBIN TIME: 26.7 SECONDS (ref 10–13.8)

## 2020-01-28 PROCEDURE — 36416 COLLJ CAPILLARY BLOOD SPEC: CPT

## 2020-01-28 PROCEDURE — 85610 PROTHROMBIN TIME: CPT

## 2020-01-29 NOTE — PROGRESS NOTES
Anticoagulation Clinic Progress Note    Anticoagulation Summary  As of 2020    INR goal:   2.0-3.0   TTR:   34.6 % (1.3 y)   INR used for dosin.2 (2020)   Warfarin maintenance plan:   5 mg every day   Weekly warfarin total:   35 mg   No change documented:   Lizzy Villa   Plan last modified:   Alicia Garcia [facility] (10/1/2019)   Next INR check:   2020   Priority:   Maintenance   Target end date:   Indefinite    Indications    Chronic atrial fibrillation [I48.20]             Anticoagulation Episode Summary     INR check location:       Preferred lab:       [facility]    Comments:         Anticoagulation Care Providers     Provider Role Specialty Phone number    Adeel Mina III, MD Referring Cardiology [phone]          Clinic Interview:      INR History:  Anticoagulation Monitoring 2019   INR 1.4 1.8 2.2   INR Date 2019   INR Goal 2.0-3.0 2.0-3.0 2.0-3.0   Trend Same Same Same   Last Week Total 35 mg 35 mg 35 mg   Next Week Total 42.5 mg 35 mg 35 mg   Sun 5 mg 5 mg 5 mg   Mon 5 mg 5 mg 5 mg   Tue 5 mg 5 mg 5 mg   Wed 5 mg 5 mg 5 mg   Thu 10 mg (); Otherwise 5 mg 5 mg 5 mg   Fri 7.5 mg (); Otherwise 5 mg 5 mg 5 mg   Sat 5 mg 5 mg 5 mg   Visit Report - - -   Some recent data might be hidden       Plan:  1. INR is therapeutic today- see above in Anticoagulation Summary.   Will instruct Anna Gilbert to continue their warfarin regimen- see above in Anticoagulation Summary.  2. Follow up in 2 weeks.  3. Patient declines warfarin refills.  4. Verbal and written information provided. Patient expresses understanding and has no further questions at this time.    Lizzy Villa

## 2020-02-04 ENCOUNTER — CLINICAL SUPPORT NO REQUIREMENTS (OUTPATIENT)
Dept: CARDIOLOGY | Facility: CLINIC | Age: 75
End: 2020-02-04

## 2020-02-04 DIAGNOSIS — I48.20 CHRONIC ATRIAL FIBRILLATION (HCC): Primary | ICD-10-CM

## 2020-02-04 PROCEDURE — 93297 REM INTERROG DEV EVAL ICPMS: CPT | Performed by: INTERNAL MEDICINE

## 2020-02-04 PROCEDURE — 93296 REM INTERROG EVL PM/IDS: CPT | Performed by: INTERNAL MEDICINE

## 2020-02-04 PROCEDURE — 93295 DEV INTERROG REMOTE 1/2/MLT: CPT | Performed by: INTERNAL MEDICINE

## 2020-02-18 RX ORDER — WARFARIN SODIUM 5 MG/1
TABLET ORAL
Qty: 90 TABLET | Refills: 0 | Status: SHIPPED | OUTPATIENT
Start: 2020-02-18 | End: 2020-06-02 | Stop reason: SDUPTHER

## 2020-02-25 ENCOUNTER — ANTICOAGULATION VISIT (OUTPATIENT)
Dept: PHARMACY | Facility: HOSPITAL | Age: 75
End: 2020-02-25

## 2020-02-25 DIAGNOSIS — I48.20 CHRONIC ATRIAL FIBRILLATION (HCC): ICD-10-CM

## 2020-02-25 LAB
INR PPP: 1.9 (ref 0.91–1.09)
PROTHROMBIN TIME: 22.2 SECONDS (ref 10–13.8)

## 2020-02-25 PROCEDURE — 85610 PROTHROMBIN TIME: CPT

## 2020-02-25 PROCEDURE — 36416 COLLJ CAPILLARY BLOOD SPEC: CPT

## 2020-02-25 NOTE — PROGRESS NOTES
Anticoagulation Clinic Progress Note    Anticoagulation Summary  As of 2020    INR goal:   2.0-3.0   TTR:   36.5 % (1.3 y)   INR used for dosin.9! (2020)   Warfarin maintenance plan:   5 mg every day   Weekly warfarin total:   35 mg   No change documented:   Rylee Bernal   Plan last modified:   Alicia Garcia RPH (10/1/2019)   Next INR check:   3/26/2020   Priority:   Maintenance   Target end date:   Indefinite    Indications    Chronic atrial fibrillation [I48.20]             Anticoagulation Episode Summary     INR check location:       Preferred lab:       Send INR reminders to:    [facility] PO[facility]    Comments:         Anticoagulation Care Providers     Provider Role Specialty Phone number    Adeel Mina III, MD Referring Cardiology [phone]          Clinic Interview:  Patient Findings     Negatives:   Signs/symptoms of thrombosis, Signs/symptoms of bleeding,   Laboratory test error suspected, Change in health, Change in alcohol use,   Change in activity, Upcoming invasive procedure, Emergency department   visit, Upcoming dental procedure, Missed doses, Extra doses, Change in   medications, Change in diet/appetite, Hospital admission, Bruising, Other   complaints      Clinical Outcomes     Negatives:   Major bleeding event, Thromboembolic event,   Anticoagulation-related hospital admission, Anticoagulation-related ED   visit, Anticoagulation-related fatality        INR History:  Anticoagulation Monitoring 2020   INR 1.8 2.2 1.9   INR Date 2020   INR Goal 2.0-3.0 2.0-3.0 2.0-3.0   Trend Same Same Same   Last Week Total 35 mg 35 mg 35 mg   Next Week Total 35 mg 35 mg 35 mg   Sun 5 mg 5 mg 5 mg   Mon 5 mg 5 mg 5 mg   Tue 5 mg 5 mg 5 mg   Wed 5 mg 5 mg 5 mg   Thu 5 mg 5 mg 5 mg   Fri 5 mg 5 mg 5 mg   Sat 5 mg 5 mg 5 mg   Visit Report - - -   Some recent data might be hidden       Plan:  1. INR is out of range- see above in  Anticoagulation Summary.   Will instruct Anna Gilbert to Continue  their warfarin regimen- see above in Anticoagulation Summary.  2. Follow up in 4 weeks but will check on home-machine on Thursday.  3. Patient declines warfarin refills.  4. Verbal and written information provided. Patient expresses understanding and has no further questions at this time.    Rylee Bernal

## 2020-02-25 NOTE — PROGRESS NOTES
I have reviewed the notes, assessments, and/or procedures performed by Rylee Bernal, I concur with her/his documentation of Anna Gilbert. Continue same dose. INR will be checked with initial home test training on 2/27/20.

## 2020-02-27 LAB — INR PPP: 2.2

## 2020-02-28 ENCOUNTER — ANTICOAGULATION VISIT (OUTPATIENT)
Dept: PHARMACY | Facility: HOSPITAL | Age: 75
End: 2020-02-28

## 2020-02-28 DIAGNOSIS — I48.20 CHRONIC ATRIAL FIBRILLATION (HCC): ICD-10-CM

## 2020-02-28 NOTE — PROGRESS NOTES
Anticoagulation Clinic Progress Note    Anticoagulation Summary  As of 2020    INR goal:   2.0-3.0   TTR:   36.6 % (1.4 y)   INR used for dosin.20 (2020)   Warfarin maintenance plan:   5 mg every day   Weekly warfarin total:   35 mg   No change documented:   Simon Ivy RPH   Plan last modified:   Alicia Garcia RPH (10/1/2019)   Next INR check:   3/12/2020   Priority:   Maintenance   Target end date:   Indefinite    Indications    Chronic atrial fibrillation [I48.20]             Anticoagulation Episode Summary     INR check location:       Preferred lab:       [facility]ers [facility]    [facility] [place]    Comments:   **New to [facility]  - call every time for now; ask if okay to only call when out of range**      Anticoagulation Care Providers     Provider Role Specialty Phone number    Adeel Mina III, MD Referring Cardiology [phone]          Clinic Interview:  No pertinent clinical findings have been reported.    INR History:  Anticoagulation Monitoring 2020   INR 2.2 1.9 2.20   INR Date 2020   INR Goal 2.0-3.0 2.0-3.0 2.0-3.0   Trend Same Same Same   Last Week Total 35 mg 35 mg 35 mg   Next Week Total 35 mg 35 mg 35 mg   Sun 5 mg 5 mg 5 mg   Mon 5 mg 5 mg 5 mg   Tue 5 mg 5 mg 5 mg   Wed 5 mg 5 mg 5 mg   Thu 5 mg 5 mg 5 mg   Fri 5 mg 5 mg 5 mg   Sat 5 mg 5 mg 5 mg   Visit Report - - -   Some recent data might be hidden       Plan:  1. INR is therapeutic today- see above in Anticoagulation Summary.    Anna ALMAZAN Gilbert to continue their warfarin regimen- see above in Anticoagulation Summary.  2. Follow up in 2 weeks  3. Unable to reach. Left HIPAA-friendly VM with instructions to continue the same dose and recheck in 2 wks. They have been instructed to call if any changes in medications, doses, concerns, etc.     Simon Ivy RPH

## 2020-03-10 DIAGNOSIS — I48.20 CHRONIC ATRIAL FIBRILLATION (HCC): ICD-10-CM

## 2020-03-10 RX ORDER — METOPROLOL SUCCINATE 25 MG/1
TABLET, EXTENDED RELEASE ORAL
Qty: 90 TABLET | Refills: 2 | Status: SHIPPED | OUTPATIENT
Start: 2020-03-10 | End: 2020-04-21

## 2020-03-12 ENCOUNTER — ANTICOAGULATION VISIT (OUTPATIENT)
Dept: PHARMACY | Facility: HOSPITAL | Age: 75
End: 2020-03-12

## 2020-03-12 DIAGNOSIS — I48.20 CHRONIC ATRIAL FIBRILLATION (HCC): ICD-10-CM

## 2020-03-12 LAB — INR PPP: 2.8

## 2020-03-12 NOTE — PROGRESS NOTES
Anticoagulation Clinic Progress Note    Anticoagulation Summary  As of 3/12/2020    INR goal:   2.0-3.0   TTR:   38.3 % (1.4 y)   INR used for dosin.80 (3/12/2020)   Warfarin maintenance plan:   5 mg every day   Weekly warfarin total:   35 mg   No change documented:   Lizzy Villa   Plan last modified:   Alicia Garcia RPH (10/1/2019)   Next INR check:   3/26/2020   Priority:   Maintenance   Target end date:   Indefinite    Indications    Chronic atrial fibrillation [I48.20]             Anticoagulation Episode Summary     INR check location:       Preferred lab:       Send INR reminders to:    [facility] [place]    Comments:   **New to [facility]  - call every time for now; ask if okay to only call when out of range**      Anticoagulation Care Providers     Provider Role Specialty Phone number    Adeel Mina III, MD Referring Cardiology [phone]          Clinic Interview:  No pertinent clinical findings have been reported.    INR History:  Anticoagulation Monitoring 2020 2020 3/12/2020   INR 1.9 2.20 2.80   INR Date 2020 2020 3/12/2020   INR Goal 2.0-3.0 2.0-3.0 2.0-3.0   Trend Same Same Same   Last Week Total 35 mg 35 mg 35 mg   Next Week Total 35 mg 35 mg 35 mg   Sun 5 mg 5 mg 5 mg   Mon 5 mg 5 mg 5 mg   Tue 5 mg 5 mg 5 mg   Wed 5 mg 5 mg 5 mg   Thu 5 mg 5 mg 5 mg   Fri 5 mg 5 mg 5 mg   Sat 5 mg 5 mg 5 mg   Visit Report - - -   Some recent data might be hidden       Plan:  1. INR is therapeutic today- see above in Anticoagulation Summary.    Anna ALMAZAN Gilbert to continue their warfarin regimen- see above in Anticoagulation Summary.  2. Follow up in 2 weeks  3. Pt would like to be called after each INR result. They have been instructed to call if any changes in medications, doses, concerns, etc. Patient expresses understanding and has no further questions at this time.    Lizzy Villa

## 2020-04-14 ENCOUNTER — ANTICOAGULATION VISIT (OUTPATIENT)
Dept: PHARMACY | Facility: HOSPITAL | Age: 75
End: 2020-04-14

## 2020-04-14 DIAGNOSIS — I48.20 CHRONIC ATRIAL FIBRILLATION (HCC): ICD-10-CM

## 2020-04-14 LAB
INR PPP: 2.5 (ref 0.91–1.09)
PROTHROMBIN TIME: 29.5 SECONDS (ref 10–13.8)

## 2020-04-14 PROCEDURE — 85610 PROTHROMBIN TIME: CPT

## 2020-04-14 PROCEDURE — 36416 COLLJ CAPILLARY BLOOD SPEC: CPT

## 2020-04-14 NOTE — PROGRESS NOTES
Anticoagulation Clinic Progress Note    Anticoagulation Summary  As of 2020    INR goal:   2.0-3.0   TTR:   42.1 % (1.5 y)   INR used for dosin.5 (2020)   Warfarin maintenance plan:   5 mg every day   Weekly warfarin total:   35 mg   No change documented:   Roxanne Otero   Plan last modified:   Alicia Garcia RPH (10/1/2019)   Next INR check:   2020   Priority:   Maintenance   Target end date:   Indefinite    Indications    Chronic atrial fibrillation [I48.20]             Anticoagulation Episode Summary     INR check location:       Preferred lab:       Send INR reminders to:    [facility] [place]    Comments:   **New to [facility]  - call every time for now; ask if okay to only call when out of range**      Anticoagulation Care Providers     Provider Role Specialty Phone number    Adeel Mina III, MD Referring Cardiology [phone]          Clinic Interview:  Patient Findings     Negatives:   Signs/symptoms of thrombosis, Signs/symptoms of bleeding,   Laboratory test error suspected, Change in health, Change in alcohol use,   Change in activity, Upcoming invasive procedure, Emergency department   visit, Upcoming dental procedure, Missed doses, Extra doses, Change in   medications, Change in diet/appetite, Hospital admission, Bruising, Other   complaints      Clinical Outcomes     Negatives:   Major bleeding event, Thromboembolic event,   Anticoagulation-related hospital admission, Anticoagulation-related ED   visit, Anticoagulation-related fatality        INR History:  Anticoagulation Monitoring 2020 3/12/2020 2020   INR 2.20 2.80 2.5   INR Date 2020 3/12/2020 2020   INR Goal 2.0-3.0 2.0-3.0 2.0-3.0   Trend Same Same Same   Last Week Total 35 mg 35 mg 35 mg   Next Week Total 35 mg 35 mg 35 mg   Sun 5 mg 5 mg 5 mg   Mon 5 mg 5 mg 5 mg   Tue 5 mg 5 mg 5 mg   Wed 5 mg 5 mg 5 mg   Thu 5 mg 5 mg 5 mg   Fri 5 mg 5 mg 5 mg   Sat 5 mg 5 mg 5 mg   Visit  Report - - -   Some recent data might be hidden       Plan:  1. INR is therapeutic today- see above in Anticoagulation Summary.   Will instruct Anna Gilbert to continue their warfarin regimen- see above in Anticoagulation Summary.  2. Follow up in 4 weeks.  3. Patient declines warfarin refills.  4. Verbal and written information provided. Patient expresses understanding and has no further questions at this time.    Roxanne Otero

## 2020-04-21 ENCOUNTER — OFFICE VISIT (OUTPATIENT)
Dept: CARDIOLOGY | Facility: CLINIC | Age: 75
End: 2020-04-21

## 2020-04-21 VITALS
WEIGHT: 183 LBS | HEART RATE: 64 BPM | SYSTOLIC BLOOD PRESSURE: 124 MMHG | DIASTOLIC BLOOD PRESSURE: 66 MMHG | HEIGHT: 62 IN | BODY MASS INDEX: 33.68 KG/M2

## 2020-04-21 DIAGNOSIS — G47.33 OSA (OBSTRUCTIVE SLEEP APNEA): Chronic | ICD-10-CM

## 2020-04-21 DIAGNOSIS — I48.20 CHRONIC ATRIAL FIBRILLATION (HCC): ICD-10-CM

## 2020-04-21 DIAGNOSIS — I50.33 ACUTE ON CHRONIC DIASTOLIC CHF (CONGESTIVE HEART FAILURE) (HCC): ICD-10-CM

## 2020-04-21 DIAGNOSIS — I20.9 ANGINA PECTORIS (HCC): ICD-10-CM

## 2020-04-21 DIAGNOSIS — Z98.890 H/O MITRAL VALVE REPAIR: Chronic | ICD-10-CM

## 2020-04-21 DIAGNOSIS — Z95.2 AORTIC VALVE REPLACED: Chronic | ICD-10-CM

## 2020-04-21 DIAGNOSIS — Z95.0 PACEMAKER: Primary | Chronic | ICD-10-CM

## 2020-04-21 DIAGNOSIS — E78.2 MIXED HYPERLIPIDEMIA: ICD-10-CM

## 2020-04-21 DIAGNOSIS — R06.02 SOB (SHORTNESS OF BREATH): ICD-10-CM

## 2020-04-21 PROCEDURE — 99443 PR PHYS/QHP TELEPHONE EVALUATION 21-30 MIN: CPT | Performed by: INTERNAL MEDICINE

## 2020-04-21 RX ORDER — METOPROLOL SUCCINATE 50 MG/1
50 TABLET, EXTENDED RELEASE ORAL DAILY
Qty: 90 TABLET | Refills: 3 | Status: SHIPPED | OUTPATIENT
Start: 2020-04-21 | End: 2021-04-16

## 2020-04-21 RX ORDER — BUMETANIDE 2 MG/1
2 TABLET ORAL 2 TIMES DAILY
Qty: 180 TABLET | Refills: 3 | Status: SHIPPED | OUTPATIENT
Start: 2020-04-21 | End: 2021-07-20

## 2020-04-21 RX ORDER — POTASSIUM CHLORIDE 20 MEQ/1
20 TABLET, EXTENDED RELEASE ORAL DAILY
Qty: 90 TABLET | Refills: 3 | Status: SHIPPED | OUTPATIENT
Start: 2020-04-21 | End: 2021-05-24

## 2020-04-21 RX ORDER — BUMETANIDE 2 MG/1
2 TABLET ORAL DAILY
Qty: 180 TABLET | Refills: 3 | Status: SHIPPED | OUTPATIENT
Start: 2020-04-21 | End: 2020-04-21

## 2020-04-21 NOTE — PROGRESS NOTES
Subjective:     Encounter Date:  04/21/20        Patient ID: Anna Gilbert is a 74 y.o. female.    Chief Complaint: CAF  History of Present Illness    Dear Dr. Isaac,    This patient has consented to a telehealth visit via audio. The visit was scheduled as a audio visit to comply with patient safety concerns in accordance with CDC recommendations.  All vitals recorded within this visit are reported by the patient.  I spent  22 minutes in total including but not limited to the 15 minutes spent in direct conversation with this patient.     She has a history of chronic atrial fibrillation, as well as bioprosthetic aortic valve and prior mitral valve repair. She also has an AICD in place.     She reports that recently she has been having some increasing swelling, a little bit more shortness of breath.  She also feels like her heart rate is been beating harder and faster.  She increased her metoprolol on her own to take a full pill in the morning and one half in the afternoon.  She also has started to take a second Bumex in the afternoon.  These things seem to be helping.  No chills or fevers.  A little bit of a cough.  No exposure eating with COVID.  She has been under a lot of stress at home because of the COVID pandemic.  No dizziness or lightheadedness, no presyncope or syncope, no chest pain or chest discomfort.    She is not having any cardiac blood.  No chest pain or chest discomfort.  She's not had any feeling or heart racing.  She has not had any presyncope or syncope.  She has a little bit of shortness breath just with a cough, but prior to that she hadn't had any shortness of breath at all.    Event Monitor  Event monitor enrollment date was 12/7/2016. Enrollment ended on 12/13/2016. The manufacturing company for the event monitor is [employer]. [employer] monitor is reviewed. A total of 6 days and 10 hours are available for review. Atrial fibrillation is present throughout. Average heart rate is 70 bpm. Maximal  heart rate is approximately 150 bpm. Occasional wide complex beats are seen which appear to be atrial fibrillation with aberrancy. Minimal heart rate is atrial fibrillation at a rate of 39 bpm. No other ectopy is seen.      She had a stress test and echocardiogram performed April 2019:  04/24/19 ECHO  LVSF NORMAL EF=54%  ESTIMATED EF WAS IN DISAGREEMENT W THE CALCULATED EF . EST EF APPEARS TO BE IN RANGE 61-65%  NORMAL LEFT VENTRICULAR CAVITY / LEFT VENT WALL THICKNESS IS CONSISTENT W MILD LVH  LEFT VENTRICULAR DIASTOLIC FUNCTION WAS INDETERMINATE.  RT VENT CAVITY IS MILD TO MODERATE DILATED  LEFT ATRIAL CAVITY SEVERELY DILATED  RT ATRIAL CAVITY IS SEVERELY DILATED  THE AORTIC VALVE PEAK AND MEAN GRADIENTS ARE WITHIN DEFINED LIMITS . THE PROSTHETIC VALVE IS GROSSLY NORMAL  NO SIGNIFICANT MITRAL VALVE REGURG OR STENOSIS  MILD TO MOD TR VALVE REGURG.  ESTIMATED RT VENTRICULAR SYSTOLIC PRESSURE FROM TR REGURG IS MILDLY ELEVATED 35-45MMHG. CALCULATED RT VS PRESSURE FROM TR IS 38MMHG    04/03/19 STRESS TREADMILL  The patient reported shortness of breath during the stress test.  · AFib present throughout  · No ECG evidence of myocardial ischemia.Negative clinical evidence of   myocardial ischemia. Findings consistent with a normal ECG stress test                The following portions of the patient's history were reviewed and updated as appropriate: allergies, current medications, past family history, past medical history, past social history, past surgical history and problem list.    Past Medical History:   Diagnosis Date   • Coronary artery disease    • Health care maintenance    • Hyperlipidemia    • Hyperthyroidism    • SHEILA (obstructive sleep apnea) 7/20/2016   • PAF (paroxysmal atrial fibrillation) (CMS/HCC)    • Sick sinus syndrome (CMS/HCC)        Past Surgical History:   Procedure Laterality Date   • AORTIC VALVE REPAIR/REPLACEMENT  2010    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   • CARDIAC CATHETERIZATION  01/01/2011   •  "CARDIAC DEFIBRILLATOR PLACEMENT  2010   • MITRAL VALVE REPAIR/REPLACEMENT  2010       Social History     Socioeconomic History   • Marital status: Single     Spouse name: Not on file   • Number of children: Not on file   • Years of education: Not on file   • Highest education level: Not on file   Tobacco Use   • Smoking status: Former Smoker   • Smokeless tobacco: Never Used   • Tobacco comment: caffeine use   Substance and Sexual Activity   • Alcohol use: No   • Drug use: No   • Sexual activity: Defer       Review of Systems   Constitution: Negative for chills, decreased appetite, fever and night sweats.   HENT: Negative for ear discharge, ear pain, hearing loss, nosebleeds and sore throat.    Eyes: Negative for blurred vision, double vision and pain.   Cardiovascular: Negative for cyanosis.   Respiratory: Negative for hemoptysis and sputum production.    Endocrine: Negative for cold intolerance and heat intolerance.   Hematologic/Lymphatic: Negative for adenopathy.   Skin: Negative for dry skin, itching, nail changes, rash and suspicious lesions.   Musculoskeletal: Negative for arthritis, gout, muscle cramps, muscle weakness, myalgias and neck pain.   Gastrointestinal: Negative for anorexia, bowel incontinence, constipation, diarrhea, dysphagia, hematemesis and jaundice.   Genitourinary: Negative for bladder incontinence, dysuria, flank pain, frequency, hematuria and nocturia.   Neurological: Negative for focal weakness, numbness, paresthesias and seizures.   Psychiatric/Behavioral: Negative for altered mental status, hallucinations, hypervigilance, suicidal ideas and thoughts of violence.   Allergic/Immunologic: Negative for persistent infections.       Procedures       Objective:     Vitals:    04/21/20 0924   BP: 124/66   Pulse: 64   Weight: 83 kg (183 lb)   Height: 157.5 cm (62\")     Alert and oriented x3, thought processes normal, judgment normal, speaking in full sentences with no wheezing and no respiratory " distress. Hearing is appropriate without difficulty.      Lab Review:             Performed        Assessment:          Diagnosis Plan   1. Pacemaker  Adult Transthoracic Echo Complete W/ Cont if Necessary Per Protocol    Holter Monitor - 24 Hour    BNP    Comprehensive Metabolic Panel    metoprolol succinate XL (TOPROL-XL) 50 MG 24 hr tablet    bumetanide (BUMEX) 2 MG tablet    potassium chloride (K-DUR,KLOR-CON) 20 MEQ CR tablet   2. Angina pectoris (CMS/HCC)  Adult Transthoracic Echo Complete W/ Cont if Necessary Per Protocol    Holter Monitor - 24 Hour    BNP    Comprehensive Metabolic Panel    metoprolol succinate XL (TOPROL-XL) 50 MG 24 hr tablet    bumetanide (BUMEX) 2 MG tablet    potassium chloride (K-DUR,KLOR-CON) 20 MEQ CR tablet   3. Chronic atrial fibrillation  Adult Transthoracic Echo Complete W/ Cont if Necessary Per Protocol    Holter Monitor - 24 Hour    BNP    Comprehensive Metabolic Panel    metoprolol succinate XL (TOPROL-XL) 50 MG 24 hr tablet    bumetanide (BUMEX) 2 MG tablet    potassium chloride (K-DUR,KLOR-CON) 20 MEQ CR tablet   4. Mixed hyperlipidemia  Adult Transthoracic Echo Complete W/ Cont if Necessary Per Protocol    Holter Monitor - 24 Hour    BNP    Comprehensive Metabolic Panel    metoprolol succinate XL (TOPROL-XL) 50 MG 24 hr tablet    bumetanide (BUMEX) 2 MG tablet    potassium chloride (K-DUR,KLOR-CON) 20 MEQ CR tablet   5. SHEILA (obstructive sleep apnea)  Adult Transthoracic Echo Complete W/ Cont if Necessary Per Protocol    Holter Monitor - 24 Hour    BNP    Comprehensive Metabolic Panel    metoprolol succinate XL (TOPROL-XL) 50 MG 24 hr tablet    bumetanide (BUMEX) 2 MG tablet    potassium chloride (K-DUR,KLOR-CON) 20 MEQ CR tablet   6. Aortic valve replaced  Adult Transthoracic Echo Complete W/ Cont if Necessary Per Protocol    Holter Monitor - 24 Hour    BNP    Comprehensive Metabolic Panel    metoprolol succinate XL (TOPROL-XL) 50 MG 24 hr tablet    bumetanide (BUMEX) 2 MG  tablet    potassium chloride (K-DUR,KLOR-CON) 20 MEQ CR tablet   7. H/O mitral valve repair  Adult Transthoracic Echo Complete W/ Cont if Necessary Per Protocol    Holter Monitor - 24 Hour    BNP    Comprehensive Metabolic Panel    metoprolol succinate XL (TOPROL-XL) 50 MG 24 hr tablet    bumetanide (BUMEX) 2 MG tablet    potassium chloride (K-DUR,KLOR-CON) 20 MEQ CR tablet   8. SOB (shortness of breath)  Adult Transthoracic Echo Complete W/ Cont if Necessary Per Protocol    Holter Monitor - 24 Hour    BNP    Comprehensive Metabolic Panel    metoprolol succinate XL (TOPROL-XL) 50 MG 24 hr tablet    bumetanide (BUMEX) 2 MG tablet    potassium chloride (K-DUR,KLOR-CON) 20 MEQ CR tablet   9. Acute on chronic diastolic CHF (congestive heart failure) (CMS/HCC)  Adult Transthoracic Echo Complete W/ Cont if Necessary Per Protocol    Holter Monitor - 24 Hour    BNP    Comprehensive Metabolic Panel    metoprolol succinate XL (TOPROL-XL) 50 MG 24 hr tablet    bumetanide (BUMEX) 2 MG tablet    potassium chloride (K-DUR,KLOR-CON) 20 MEQ CR tablet          Plan:       1.   Atrial Fibrillation and Atrial Flutter  Assessment  • The patient has permanent atrial fibrillation  • This is valvular in etiology  • The patient's CHADS2-VASc score is 4  • A [id number] score of 2 or more is considered a high risk for a thromboembolic event  • Warfarin prescribed    Plan  • Continue in atrial fibrillation with rate control  • Continue warfarin for antithrombotic therapy, bleeding issues discussed  • Continue beta blocker for rate control  • I will increase the dose of her metoprolol from 25 mg daily to 50 mg daily.  We will also arrange for a Holter monitor to be obtained at the time of the echocardiogram    2.  Bioprosthetic aortic valve replacement with prior mitral valve repair-echocardiogram April 2019 demonstrated normal function  3.  AICD in place- patient is seen in our device clinic.  No ectopy seen.  Normal function.  Device was  placed in 2010, we have never been able to find documentation regarding that.  Serial echocardiograms dating from 2014 have consistently demonstrated normal ejection fraction.  She is never had any arrhythmia document on this.  Battery life is getting low.  I discussed this with her- she would prefer simply leave the device in place and not replace it when the AICD reaches end-of-life.  Also, it is not bothering her in place, so she does not feel any desire to have the AICD removed after it is past end-of-life.  4.  Mediastinal adenopathy-status post biopsy,   5.  Acute on chronic diastolic heart failure, for now we will increase her Bumex and add potassium.  I will repeat an echocardiogram, but we will arrange for that in about 5 or 6 weeks given the COVID pandemic.  We will also check blood work at the same time      Current Outpatient Medications:   •  APPLE CIDER VINEGAR PO, Take  by mouth., Disp: , Rfl:   •  b complex vitamins capsule, Take 1 capsule by mouth Daily., Disp: , Rfl:   •  BIOTIN PO, Take 1 tablet by mouth Daily., Disp: , Rfl:   •  bumetanide (BUMEX) 2 MG tablet, Take 1 tablet by mouth 2 (Two) Times a Day As Needed (edema). (Patient taking differently: Take 2 mg by mouth Daily. 1-2 as needed), Disp: 180 tablet, Rfl: 0  •  CALCIUM-MAGNESIUM-ZINC PO, Take 1 tablet by mouth Daily., Disp: , Rfl:   •  Cholecalciferol (VITAMIN D-3 PO), Take 1 tablet by mouth As Needed. Only through the winter, Disp: , Rfl:   •  diclofenac (VOLTAREN) 1 % gel gel, Apply 4 g topically to the appropriate area as directed 4 (Four) Times a Day., Disp: 100 g, Rfl: 5  •  fluticasone (FLONASE) 50 MCG/ACT nasal spray, 1 spray into the nostril(s) as directed by provider As Needed., Disp: , Rfl:   •  levothyroxine (SYNTHROID, LEVOTHROID) 150 MCG tablet, Take 150 mcg by mouth Daily., Disp: , Rfl:   •  metoprolol succinate XL (TOPROL-XL) 25 MG 24 hr tablet, TAKE ONE TABLET BY MOUTH DAILY (Patient taking differently: 1 tablet in the am  and half tablet at night), Disp: 90 tablet, Rfl: 2  •  Multiple Vitamin (MULTIVITAMIN) capsule, Take 1 capsule by mouth Daily., Disp: , Rfl:   •  Potassium 99 MG tablet, Take 1 tablet by mouth As Needed., Disp: , Rfl:   •  TURMERIC PO, Take 1 tablet by mouth Daily., Disp: , Rfl:   •  warfarin (COUMADIN) 5 MG tablet, Take 5mg (1 Tablet) daily OR as directed by the [facility], Disp: 90 tablet, Rfl: 0

## 2020-05-14 ENCOUNTER — OFFICE VISIT (OUTPATIENT)
Dept: ORTHOPEDIC SURGERY | Facility: CLINIC | Age: 75
End: 2020-05-14

## 2020-05-14 VITALS — BODY MASS INDEX: 33.68 KG/M2 | HEIGHT: 62 IN | WEIGHT: 183 LBS

## 2020-05-14 DIAGNOSIS — R52 PAIN: Primary | ICD-10-CM

## 2020-05-14 DIAGNOSIS — M75.51 SUBACROMIAL BURSITIS OF RIGHT SHOULDER JOINT: ICD-10-CM

## 2020-05-14 DIAGNOSIS — M17.11 PRIMARY OSTEOARTHRITIS OF RIGHT KNEE: ICD-10-CM

## 2020-05-14 PROCEDURE — 20610 DRAIN/INJ JOINT/BURSA W/O US: CPT | Performed by: ORTHOPAEDIC SURGERY

## 2020-05-14 RX ORDER — LIDOCAINE HYDROCHLORIDE 10 MG/ML
4 INJECTION, SOLUTION EPIDURAL; INFILTRATION; INTRACAUDAL; PERINEURAL
Status: COMPLETED | OUTPATIENT
Start: 2020-05-14 | End: 2020-05-14

## 2020-05-14 RX ORDER — TRIAMCINOLONE ACETONIDE 40 MG/ML
40 INJECTION, SUSPENSION INTRA-ARTICULAR; INTRAMUSCULAR
Status: COMPLETED | OUTPATIENT
Start: 2020-05-14 | End: 2020-05-14

## 2020-05-14 RX ADMIN — LIDOCAINE HYDROCHLORIDE 4 ML: 10 INJECTION, SOLUTION EPIDURAL; INFILTRATION; INTRACAUDAL; PERINEURAL at 15:10

## 2020-05-14 RX ADMIN — TRIAMCINOLONE ACETONIDE 40 MG: 40 INJECTION, SUSPENSION INTRA-ARTICULAR; INTRAMUSCULAR at 15:10

## 2020-05-14 NOTE — PROGRESS NOTES
Subjective: Right shoulder pain, right knee pain     Patient ID: Anna Gilbert is a 74 y.o. female.    Chief Complaint:    History of Present Illness 74-year-old female known to me with osteoarthritis of the right knee and bursitis of the right shoulder previously treated with cortisone injections over 6 months ago returns with pain once again for consideration of repeat cortisone injections.  Patient did well to just recently       Social History     Occupational History   • Not on file   Tobacco Use   • Smoking status: Former Smoker   • Smokeless tobacco: Never Used   • Tobacco comment: caffeine use   Substance and Sexual Activity   • Alcohol use: No   • Drug use: No   • Sexual activity: Defer      Review of Systems   Constitutional: Negative for chills, diaphoresis, fever and unexpected weight change.   HENT: Negative for hearing loss, nosebleeds, sore throat and tinnitus.    Eyes: Negative for pain and visual disturbance.   Respiratory: Negative for cough, shortness of breath and wheezing.    Cardiovascular: Negative for chest pain and palpitations.   Gastrointestinal: Negative for abdominal pain, diarrhea, nausea and vomiting.   Endocrine: Negative for cold intolerance, heat intolerance and polydipsia.   Genitourinary: Negative for difficulty urinating, dysuria and hematuria.   Musculoskeletal: Positive for arthralgias and myalgias. Negative for joint swelling.   Skin: Negative for rash and wound.   Allergic/Immunologic: Negative for environmental allergies.   Neurological: Negative for dizziness, syncope and numbness.   Hematological: Does not bruise/bleed easily.   Psychiatric/Behavioral: Negative for dysphoric mood and sleep disturbance. The patient is not nervous/anxious.          Past Medical History:   Diagnosis Date   • Coronary artery disease    • Health care maintenance    • Hyperlipidemia    • Hyperthyroidism    • SHEILA (obstructive sleep apnea) 7/20/2016   • PAF (paroxysmal atrial fibrillation)  (CMS/[facility])    • Sick sinus syndrome (CMS/HCC)      Past Surgical History:   Procedure Laterality Date   • AORTIC VALVE REPAIR/REPLACEMENT  2010    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   • CARDIAC CATHETERIZATION  01/01/2011   • CARDIAC DEFIBRILLATOR PLACEMENT  2010   • MITRAL VALVE REPAIR/REPLACEMENT  2010     Family History   Problem Relation Age of Onset   • Coronary artery disease Father    • No Known Problems Mother          Objective:  There were no vitals filed for this visit.      05/14/20  1505   Weight: 83 kg (183 lb)     Body mass index is 33.47 kg/m².        Ortho Exam   She is alert and oriented x3.  No change in her exam from previous visits therefore both joints were injected with 4 cc of lidocaine 1 cc Kenalog, the right shoulder posteriorly and the right knee through the superolateral portal tolerated well.  Postinjection instructions given to the patient.    Assessment:        1. Pain    2. Subacromial bursitis of right shoulder joint    3. Primary osteoarthritis of right knee           Plan: Return to see me as needed.  All questions answered  Large Joint Arthrocentesis: R knee  Date/Time: 5/14/2020 3:10 PM  Consent given by: patient  Site marked: site marked  Timeout: Immediately prior to procedure a time out was called to verify the correct patient, procedure, equipment, support staff and site/side marked as required   Supporting Documentation  Indications: pain   Procedure Details  Location: knee - R knee  Preparation: Patient was prepped and draped in the usual sterile fashion  Needle size: 22 G  Approach: superior (LATERAL)  Medications administered: 40 mg triamcinolone acetonide 40 MG/ML; 4 mL lidocaine PF 1% 1 %  Patient tolerance: patient tolerated the procedure well with no immediate complications    Large Joint Arthrocentesis: R subacromial bursa  Date/Time: 5/14/2020 3:10 PM  Consent given by: patient  Site marked: site marked  Timeout: Immediately prior to procedure a time out was called to verify  the correct patient, procedure, equipment, support staff and site/side marked as required   Supporting Documentation  Indications: pain   Procedure Details  Location: shoulder - R subacromial bursa  Preparation: Patient was prepped and draped in the usual sterile fashion  Needle size: 22 G  Approach: lateral  Medications administered: 4 mL lidocaine PF 1% 1 %; 40 mg triamcinolone acetonide 40 MG/ML  Patient tolerance: patient tolerated the procedure well with no immediate complications                  Work Status:    ABIMAEL query complete.    Orders:  Orders Placed This Encounter   Procedures   • Large Joint Arthrocentesis: R knee   • Large Joint Arthrocentesis: R subacromial bursa       Medications:  No orders of the defined types were placed in this encounter.      Followup:  Return if symptoms worsen or fail to improve.          Dictated utilizing Dragon dictation

## 2020-06-02 ENCOUNTER — TELEPHONE (OUTPATIENT)
Dept: CARDIOLOGY | Facility: CLINIC | Age: 75
End: 2020-06-02

## 2020-06-02 RX ORDER — WARFARIN SODIUM 5 MG/1
TABLET ORAL
Qty: 90 TABLET | Refills: 0 | Status: SHIPPED | OUTPATIENT
Start: 2020-06-02 | End: 2020-09-21

## 2020-06-02 NOTE — TELEPHONE ENCOUNTER
Called and scheduled patient for INR in clinic on 6/3. Sent warfarin refill to [facility] in [place].

## 2020-06-08 ENCOUNTER — APPOINTMENT (OUTPATIENT)
Dept: GENERAL RADIOLOGY | Facility: HOSPITAL | Age: 75
End: 2020-06-08

## 2020-06-08 ENCOUNTER — HOSPITAL ENCOUNTER (EMERGENCY)
Facility: HOSPITAL | Age: 75
Discharge: HOME | End: 2020-06-08
Attending: EMERGENCY MEDICINE | Admitting: EMERGENCY MEDICINE

## 2020-06-08 ENCOUNTER — TELEPHONE (OUTPATIENT)
Dept: CARDIOLOGY | Facility: CLINIC | Age: 75
End: 2020-06-08

## 2020-06-08 VITALS
BODY MASS INDEX: 33.13 KG/M2 | RESPIRATION RATE: 20 BRPM | TEMPERATURE: 97.6 F | HEIGHT: 62 IN | SYSTOLIC BLOOD PRESSURE: 154 MMHG | OXYGEN SATURATION: 98 % | DIASTOLIC BLOOD PRESSURE: 92 MMHG | HEART RATE: 72 BPM | WEIGHT: 180 LBS

## 2020-06-08 DIAGNOSIS — S61.256A OPEN WOUND OF RIGHT LITTLE FINGER DUE TO DOG BITE: Primary | ICD-10-CM
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
## 2020-06-08 PROCEDURE — 99283 EMERGENCY DEPT VISIT LOW MDM: CPT

## 2020-06-08 PROCEDURE — 25010000002 TETANUS-DIPHTHERIA TOXOIDS (ADULT) PER 0.5 ML: Performed by: EMERGENCY MEDICINE

## 2020-06-08 PROCEDURE — 90714 TD VACC NO PRESV 7 YRS+ IM: CPT | Performed by: EMERGENCY MEDICINE

## 2020-06-08 PROCEDURE — 99284 EMERGENCY DEPT VISIT MOD MDM: CPT | Performed by: EMERGENCY MEDICINE

## 2020-06-08 PROCEDURE — 73140 X-RAY EXAM OF FINGER(S): CPT

## 2020-06-08 PROCEDURE — 96372 THER/PROPH/DIAG INJ SC/IM: CPT

## 2020-06-08 RX ORDER — HYDROCODONE BITARTRATE AND ACETAMINOPHEN 5; 325 MG/1; MG/1
1 TABLET ORAL EVERY 4 HOURS PRN
Qty: 20 TABLET | Refills: 0 | Status: SHIPPED | OUTPATIENT
Start: 2020-06-08 | End: 2020-09-23

## 2020-06-08 RX ORDER — AMOXICILLIN AND CLAVULANATE POTASSIUM 875; 125 MG/1; MG/1
1 TABLET, FILM COATED ORAL 2 TIMES DAILY
Qty: 20 TABLET | Refills: 0 | Status: SHIPPED | OUTPATIENT
Start: 2020-06-08 | End: 2020-08-26

## 2020-06-08 RX ADMIN — CLOSTRIDIUM TETANI TOXOID ANTIGEN (FORMALDEHYDE INACTIVATED) AND CORYNEBACTERIUM DIPHTHERIAE TOXOID ANTIGEN (FORMALDEHYDE INACTIVATED) 0.5 ML: 5; 2 INJECTION, SUSPENSION INTRAMUSCULAR at 08:32

## 2020-06-08 NOTE — TELEPHONE ENCOUNTER
I spoke to the pt regarding over due labs (BNP). She will go next week to have labs drawn.    Thanks Nona

## 2020-06-08 NOTE — ED NOTES
Applied wet/dry dressing to patients right hand lac  Patient tolerated well     Layla Myrick  06/08/20 0861

## 2020-06-08 NOTE — ED NOTES
Pt standing at doorway.  When asked if she needs something she says she's been here a long time and is late to work.  Asked if we could bandage her up so she can leave.  Primary nurse and MD aware.  Pt has been here 1 hour and 4 mins.  Waiting for xray results.  Explained this to pt and she says that is a long time.  Explained that the average ER visit is 3 hours.  She says she is not staying that long and wants to be bandaged up so she can leave.  Dr Porter aware.  ER tech to bandage pt so she can leave.     Mallika Conway, RN  06/08/20 9612

## 2020-06-08 NOTE — DISCHARGE INSTRUCTIONS
Follow-up with Dr. Coleman at the [facility] arm and hand clinic at the [place] office at 11:45 AM tomorrow without fail.  Do not eat or drink anything after midnight.  Call  at [phone] tomorrow at 9 AM if you have not heard from the office by then.  Return to the emergency department if there is increased pain, numbness, tingling, weakness, change in color or temperature, fever, chills, worse in any way at all.

## 2020-06-08 NOTE — ED PROVIDER NOTES
Subjective   History of Present Illness  History of Present Illness    Chief complaint: Dog bite    Location: Right small finger    Quality/Severity: Tip of the finger    Timing/Onset/Duration: Prior to arrival    Modifying Factors: Nothing makes it better or worse    Associated Symptoms: No numbness, tingling, or weakness.    Narrative: This 74-year-old white female was trying to give a dog a pill and the dog bit off the end of her right small finger.  Her tetanus is not up-to-date.  Patient's last oral intake was at 630 this morning.  She had a cup of coffee.    PCP: Marlin      Review of Systems   Musculoskeletal:        Dog bite to right small finger   Neurological: Negative for weakness and numbness.        Medication List      ASK your doctor about these medications    APPLE CIDER VINEGAR PO     b complex vitamins capsule     BIOTIN PO     bumetanide 2 MG tablet  Commonly known as:  BUMEX  Take 1 tablet by mouth 2 (Two) Times a Day.     CALCIUM-MAGNESIUM-ZINC PO     diclofenac 1 % gel gel  Commonly known as:  VOLTAREN  Apply 4 g topically to the appropriate area as directed 4 (Four) Times a   Day.     fluticasone 50 MCG/ACT nasal spray  Commonly known as:  FLONASE     levothyroxine 150 MCG tablet  Commonly known as:  SYNTHROID, LEVOTHROID     metoprolol succinate XL 50 MG 24 hr tablet  Commonly known as:  TOPROL-XL  Take 1 tablet by mouth Daily.     multivitamin capsule     potassium chloride 20 MEQ CR tablet  Commonly known as:  K-DUR,KLOR-CON  Take 1 tablet by mouth Daily.     TURMERIC PO     VITAMIN D-3 PO     warfarin 5 MG tablet  Commonly known as:  COUMADIN  Take 5mg (1 Tablet) daily OR as directed by the Medication Management   Clinic          Past Medical History:   Diagnosis Date   • Coronary artery disease    • Health care maintenance    • Hyperthyroidism    • SHEILA (obstructive sleep apnea) 7/20/2016   • PAF (paroxysmal atrial fibrillation) (CMS/Formerly [facility])    • Sick sinus syndrome (CMS/Formerly [facility])         Allergies   Allergen Reactions   • Gabapentin Unknown - High Severity       Past Surgical History:   Procedure Laterality Date   • AORTIC VALVE REPAIR/REPLACEMENT  2010    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   • CARDIAC CATHETERIZATION  01/01/2011   • CARDIAC DEFIBRILLATOR PLACEMENT  2010   • MITRAL VALVE REPAIR/REPLACEMENT  2010       Family History   Problem Relation Age of Onset   • Coronary artery disease Father    • No Known Problems Mother        Social History     Socioeconomic History   • Marital status: Single     Spouse name: Not on file   • Number of children: Not on file   • Years of education: Not on file   • Highest education level: Not on file   Tobacco Use   • Smoking status: Former Smoker   • Smokeless tobacco: Never Used   • Tobacco comment: caffeine use   Substance and Sexual Activity   • Alcohol use: No   • Drug use: No   • Sexual activity: Defer           Objective   Physical Exam   Constitutional: She is oriented to person, place, and time. She appears well-developed and well-nourished. No distress.   ED Triage Vitals (06/08/20 0722)  Temp: 97.6 °F (36.4 °C)  Heart Rate: 72  Resp: 20  BP: 157/94  SpO2: 97 %  Temp src: Temporal  Heart Rate Source: Monitor  Patient Position: Sitting  BP Location: Left arm  FiO2 (%): n/a    The patient's vitals were reviewed by me.  Unless otherwise noted they are within normal limits.     Neurological: She is alert and oriented to person, place, and time.   Skin: Skin is warm and dry. Capillary refill takes less than 2 seconds. No pallor.   Nursing note and vitals reviewed.      Procedures           ED Course      08:53, 06/08/20:  The case was discussed with Lyndsey, the nurse at the [facility], she is consulted with Dr. Coleman.  Dr. Coleman would like that wound cleaned and dressed.  He will see the patient in the [place] office at 11:45 AM tomorrow.  The office will call to confirm the appointment.  If the patient has not heard from the office by 9 AM  she should call the office at [phone].  The patient will be given a prescription for Norco for pain.  He should take Colace as needed as directed for constipation if she is taking the Norco.  I will place the patient on Augmentin.  She should return to the emergency department if there is increased pain, numbness, tingling, weakness, change in color or temperature, fever, chills, worse in any way at all.  All the patient's questions were answered she will be discharged in good condition.                                                                                   MDM  XR Finger 2+ View Left    (Results Pending)     Labs Reviewed - No data to display  No results found.    Final diagnoses:   Open wound of right little finger due to dog bite         ED Medications:  Medications   tetanus-diphtheria toxoids (DECAVAC 5-2) injection 0.5 mL (has no administration in time range)       New Medications:     Medication List      ASK your doctor about these medications    APPLE CIDER VINEGAR PO     b complex vitamins capsule     BIOTIN PO     bumetanide 2 MG tablet  Commonly known as:  BUMEX  Take 1 tablet by mouth 2 (Two) Times a Day.     CALCIUM-MAGNESIUM-ZINC PO     diclofenac 1 % gel gel  Commonly known as:  VOLTAREN  Apply 4 g topically to the appropriate area as directed 4 (Four) Times a   Day.     fluticasone 50 MCG/ACT nasal spray  Commonly known as:  FLONASE     levothyroxine 150 MCG tablet  Commonly known as:  SYNTHROID, LEVOTHROID     metoprolol succinate XL 50 MG 24 hr tablet  Commonly known as:  TOPROL-XL  Take 1 tablet by mouth Daily.     multivitamin capsule     potassium chloride 20 MEQ CR tablet  Commonly known as:  K-DUR,KLOR-CON  Take 1 tablet by mouth Daily.     TURMERIC PO     VITAMIN D-3 PO     warfarin 5 MG tablet  Commonly known as:  COUMADIN  Take 5mg (1 Tablet) daily OR as directed by the Medication Management   Clinic          Stopped Medications:     Medication List      ASK your doctor about  these medications    APPLE CIDER VINEGAR PO     b complex vitamins capsule     BIOTIN PO     bumetanide 2 MG tablet  Commonly known as:  BUMEX  Take 1 tablet by mouth 2 (Two) Times a Day.     CALCIUM-MAGNESIUM-ZINC PO     diclofenac 1 % gel gel  Commonly known as:  VOLTAREN  Apply 4 g topically to the appropriate area as directed 4 (Four) Times a   Day.     fluticasone 50 MCG/ACT nasal spray  Commonly known as:  FLONASE     levothyroxine 150 MCG tablet  Commonly known as:  SYNTHROID, LEVOTHROID     metoprolol succinate XL 50 MG 24 hr tablet  Commonly known as:  TOPROL-XL  Take 1 tablet by mouth Daily.     multivitamin capsule     potassium chloride 20 MEQ CR tablet  Commonly known as:  K-DUR,KLOR-CON  Take 1 tablet by mouth Daily.     TURMERIC PO     VITAMIN D-3 PO     warfarin 5 MG tablet  Commonly known as:  COUMADIN  Take 5mg (1 Tablet) daily OR as directed by the Medication Management   Clinic            Final diagnoses:   Open wound of right little finger due to dog bite            Robbin Porter MD  06/08/20 5152

## 2020-06-15 RX ORDER — WARFARIN SODIUM 5 MG/1
TABLET ORAL
Qty: 30 TABLET | Refills: 0 | OUTPATIENT
Start: 2020-06-15

## 2020-06-15 NOTE — TELEPHONE ENCOUNTER
Warfarin was refilled on 6/2/20 for 90-day supply. She has no-showed the past 2 appts and has not been seen since for INR monitoring since 4/14/20. Requires INR check prior to further warfarin refills.

## 2020-06-16 ENCOUNTER — LAB (OUTPATIENT)
Dept: LAB | Facility: HOSPITAL | Age: 75
End: 2020-06-16

## 2020-06-16 ENCOUNTER — HOSPITAL ENCOUNTER (OUTPATIENT)
Dept: CARDIOLOGY | Facility: HOSPITAL | Age: 75
Discharge: HOME | End: 2020-06-16
Admitting: INTERNAL MEDICINE

## 2020-06-16 ENCOUNTER — HOSPITAL ENCOUNTER (OUTPATIENT)
Dept: CARDIOLOGY | Facility: HOSPITAL | Age: 75
Discharge: HOME | End: 2020-06-16

## 2020-06-16 DIAGNOSIS — I20.9 ANGINA PECTORIS (HCC): ICD-10-CM

## 2020-06-16 DIAGNOSIS — Z95.0 PACEMAKER: ICD-10-CM

## 2020-06-16 DIAGNOSIS — Z98.890 H/O MITRAL VALVE REPAIR: ICD-10-CM

## 2020-06-16 DIAGNOSIS — Z95.2 AORTIC VALVE REPLACED: ICD-10-CM

## 2020-06-16 DIAGNOSIS — R06.02 SOB (SHORTNESS OF BREATH): ICD-10-CM

## 2020-06-16 DIAGNOSIS — E78.2 MIXED HYPERLIPIDEMIA: ICD-10-CM

## 2020-06-16 DIAGNOSIS — Z95.0 PACEMAKER: Chronic | ICD-10-CM

## 2020-06-16 DIAGNOSIS — I48.20 CHRONIC ATRIAL FIBRILLATION (HCC): ICD-10-CM

## 2020-06-16 DIAGNOSIS — G47.33 OSA (OBSTRUCTIVE SLEEP APNEA): ICD-10-CM

## 2020-06-16 DIAGNOSIS — I50.33 ACUTE ON CHRONIC DIASTOLIC CHF (CONGESTIVE HEART FAILURE) (HCC): ICD-10-CM

## 2020-06-16 DIAGNOSIS — Z95.2 AORTIC VALVE REPLACED: Chronic | ICD-10-CM

## 2020-06-16 DIAGNOSIS — G47.33 OSA (OBSTRUCTIVE SLEEP APNEA): Chronic | ICD-10-CM

## 2020-06-16 DIAGNOSIS — Z98.890 H/O MITRAL VALVE REPAIR: Chronic | ICD-10-CM

## 2020-06-16 LAB
ALBUMIN SERPL-MCNC: 4.6 G/DL (ref 3.5–5.2)
ALBUMIN/GLOB SERPL: 1.7 G/DL
ALP SERPL-CCNC: 53 U/L (ref 39–117)
ALT SERPL W P-5'-P-CCNC: 48 U/L (ref 1–33)
ANION GAP SERPL CALCULATED.3IONS-SCNC: 10.3 MMOL/L (ref 5–15)
AORTIC DIMENSIONLESS INDEX: 0.4 (DI)
AST SERPL-CCNC: 48 U/L (ref 1–32)
BH CV ECHO MEAS - AO MAX PG: 59 MMHG
BH CV ECHO MEAS - AO MEAN PG (FULL): 19 MMHG
BH CV ECHO MEAS - AO MEAN PG: 30 MMHG
BH CV ECHO MEAS - AO ROOT AREA (BSA CORRECTED): 1.9
BH CV ECHO MEAS - AO ROOT AREA: 9.6 CM^2
BH CV ECHO MEAS - AO ROOT DIAM: 3.5 CM
BH CV ECHO MEAS - AO V2 MAX: 380 CM/SEC
BH CV ECHO MEAS - AO V2 MEAN: 213 CM/SEC
BH CV ECHO MEAS - AO V2 VTI: 70.4 CM
BH CV ECHO MEAS - ASC AORTA: 4 CM
BH CV ECHO MEAS - AVA(I,A): 1.1 CM^2
BH CV ECHO MEAS - AVA(I,D): 1.1 CM^2
BH CV ECHO MEAS - BSA(HAYCOCK): 1.9 M^2
BH CV ECHO MEAS - BSA: 1.8 M^2
BH CV ECHO MEAS - BZI_BMI: 34 KILOGRAMS/M^2
BH CV ECHO MEAS - BZI_METRIC_HEIGHT: 154.9 CM
BH CV ECHO MEAS - BZI_METRIC_WEIGHT: 81.6 KG
BH CV ECHO MEAS - EDV(CUBED): 89.9 ML
BH CV ECHO MEAS - EDV(MOD-SP2): 101 ML
BH CV ECHO MEAS - EDV(MOD-SP4): 103 ML
BH CV ECHO MEAS - EDV(TEICH): 91.5 ML
BH CV ECHO MEAS - EF(CUBED): 60.4 %
BH CV ECHO MEAS - EF(MOD-BP): 54.6 %
BH CV ECHO MEAS - EF(MOD-SP2): 53.1 %
BH CV ECHO MEAS - EF(MOD-SP4): 57.8 %
BH CV ECHO MEAS - EF(TEICH): 52.1 %
BH CV ECHO MEAS - ESV(CUBED): 35.6 ML
BH CV ECHO MEAS - ESV(MOD-SP2): 47.4 ML
BH CV ECHO MEAS - ESV(MOD-SP4): 43.5 ML
BH CV ECHO MEAS - ESV(TEICH): 43.8 ML
BH CV ECHO MEAS - FS: 26.6 %
BH CV ECHO MEAS - IVS/LVPW: 1.1
BH CV ECHO MEAS - IVSD: 1.2 CM
BH CV ECHO MEAS - LAT PEAK E' VEL: 7.7 CM/SEC
BH CV ECHO MEAS - LV DIASTOLIC VOL/BSA (35-75): 57 ML/M^2
BH CV ECHO MEAS - LV MASS(C)D: 181.7 GRAMS
BH CV ECHO MEAS - LV MASS(C)DI: 100.6 GRAMS/M^2
BH CV ECHO MEAS - LV MAX PG: 6 MMHG
BH CV ECHO MEAS - LV MEAN PG: 3 MMHG
BH CV ECHO MEAS - LV SYSTOLIC VOL/BSA (12-30): 24.1 ML/M^2
BH CV ECHO MEAS - LV V1 MAX: 121 CM/SEC
BH CV ECHO MEAS - LV V1 MEAN: 85.1 CM/SEC
BH CV ECHO MEAS - LV V1 VTI: 26.3 CM
BH CV ECHO MEAS - LVIDD: 4.5 CM
BH CV ECHO MEAS - LVIDS: 3.3 CM
BH CV ECHO MEAS - LVLD AP2: 7.4 CM
BH CV ECHO MEAS - LVLD AP4: 7.4 CM
BH CV ECHO MEAS - LVLS AP2: 6.6 CM
BH CV ECHO MEAS - LVLS AP4: 6.4 CM
BH CV ECHO MEAS - LVOT AREA (M): 2.8 CM^2
BH CV ECHO MEAS - LVOT AREA: 2.8 CM^2
BH CV ECHO MEAS - LVOT DIAM: 1.9 CM
BH CV ECHO MEAS - LVPWD: 1.1 CM
BH CV ECHO MEAS - MED PEAK E' VEL: 6 CM/SEC
BH CV ECHO MEAS - MV A MAX VEL: 47.9 CM/SEC
BH CV ECHO MEAS - MV DEC SLOPE: 557.3 CM/SEC^2
BH CV ECHO MEAS - MV DEC TIME: 257 SEC
BH CV ECHO MEAS - MV E MAX VEL: 147.3 CM/SEC
BH CV ECHO MEAS - MV E/A: 3.1
BH CV ECHO MEAS - MV MEAN PG: 2.5 MMHG
BH CV ECHO MEAS - MV P1/2T MAX VEL: 161.3 CM/SEC
BH CV ECHO MEAS - MV P1/2T: 84.8 MSEC
BH CV ECHO MEAS - MV V2 MEAN: 62.9 CM/SEC
BH CV ECHO MEAS - MV V2 VTI: 44.6 CM
BH CV ECHO MEAS - MVA P1/2T LCG: 1.4 CM^2
BH CV ECHO MEAS - MVA(P1/2T): 2.6 CM^2
BH CV ECHO MEAS - MVA(VTI): 1.7 CM^2
BH CV ECHO MEAS - RAP SYSTOLE: 8 MMHG
BH CV ECHO MEAS - RV BASE (<4.1) - OBSOLETE: 4.5 CM
BH CV ECHO MEAS - RV LENGTH (<8.5) - OBSOLETE: 7.6 CM
BH CV ECHO MEAS - RVSP: 37 MMHG
BH CV ECHO MEAS - SI(AO): 375.2 ML/M^2
BH CV ECHO MEAS - SI(CUBED): 30.1 ML/M^2
BH CV ECHO MEAS - SI(LVOT): 41.3 ML/M^2
BH CV ECHO MEAS - SI(MOD-SP2): 29.7 ML/M^2
BH CV ECHO MEAS - SI(MOD-SP4): 32.9 ML/M^2
BH CV ECHO MEAS - SI(TEICH): 26.4 ML/M^2
BH CV ECHO MEAS - SV(AO): 677.7 ML
BH CV ECHO MEAS - SV(CUBED): 54.3 ML
BH CV ECHO MEAS - SV(LVOT): 74.6 ML
BH CV ECHO MEAS - SV(MOD-SP2): 53.6 ML
BH CV ECHO MEAS - SV(MOD-SP4): 59.5 ML
BH CV ECHO MEAS - SV(TEICH): 47.7 ML
BH CV ECHO MEAS - TAPSE (>1.6): 1.6 CM
BH CV ECHO MEAS - TR MAX VEL: 267 CM/SEC
BH CV ECHO MEASUREMENTS AVERAGE E/E' RATIO: 21.5
BH CV XLRA - RV BASE: 4.5 CM
BH CV XLRA - RV LENGTH: 7.6 CM
BH CV XLRA - RV MID: 3.7 CM
BH CV XLRA - TDI S': 11 CM/SEC
BILIRUB SERPL-MCNC: 0.5 MG/DL (ref 0.2–1.2)
BUN BLD-MCNC: 12 MG/DL (ref 8–23)
BUN/CREAT SERPL: 14 (ref 7–25)
CALCIUM SPEC-SCNC: 9.8 MG/DL (ref 8.6–10.5)
CHLORIDE SERPL-SCNC: 100 MMOL/L (ref 98–107)
CO2 SERPL-SCNC: 27.7 MMOL/L (ref 22–29)
CREAT BLD-MCNC: 0.86 MG/DL (ref 0.57–1)
GFR SERPL CREATININE-BSD FRML MDRD: 64 ML/MIN/1.73
GLOBULIN UR ELPH-MCNC: 2.7 GM/DL
GLUCOSE BLD-MCNC: 101 MG/DL (ref 65–99)
LEFT ATRIUM VOLUME INDEX: 54 ML/M2
MAXIMAL PREDICTED HEART RATE: 144 BPM
NT-PROBNP SERPL-MCNC: 532.5 PG/ML (ref 5–1800)
POTASSIUM BLD-SCNC: 3.8 MMOL/L (ref 3.5–5.2)
PROT SERPL-MCNC: 7.3 G/DL (ref 6–8.5)
SODIUM BLD-SCNC: 138 MMOL/L (ref 136–145)
STRESS TARGET HR: 122 BPM

## 2020-06-16 PROCEDURE — 93306 TTE W/DOPPLER COMPLETE: CPT | Performed by: INTERNAL MEDICINE

## 2020-06-16 PROCEDURE — 93225 XTRNL ECG REC<48 HRS REC: CPT

## 2020-06-16 PROCEDURE — 93306 TTE W/DOPPLER COMPLETE: CPT

## 2020-06-16 PROCEDURE — 80053 COMPREHEN METABOLIC PANEL: CPT

## 2020-06-16 PROCEDURE — 93226 XTRNL ECG REC<48 HR SCAN A/R: CPT

## 2020-06-16 PROCEDURE — 36415 COLL VENOUS BLD VENIPUNCTURE: CPT

## 2020-06-16 PROCEDURE — 83880 ASSAY OF NATRIURETIC PEPTIDE: CPT

## 2020-06-18 ENCOUNTER — TELEPHONE (OUTPATIENT)
Dept: PHARMACY | Facility: HOSPITAL | Age: 75
End: 2020-06-18

## 2020-06-19 PROCEDURE — 93227 XTRNL ECG REC<48 HR R&I: CPT | Performed by: INTERNAL MEDICINE

## 2020-06-24 ENCOUNTER — TELEPHONE (OUTPATIENT)
Dept: CARDIOLOGY | Facility: CLINIC | Age: 75
End: 2020-06-24

## 2020-06-25 ENCOUNTER — TELEPHONE (OUTPATIENT)
Dept: PHARMACY | Facility: HOSPITAL | Age: 75
End: 2020-06-25

## 2020-07-08 ENCOUNTER — ANTICOAGULATION VISIT (OUTPATIENT)
Dept: PHARMACY | Facility: HOSPITAL | Age: 75
End: 2020-07-08

## 2020-07-08 DIAGNOSIS — I48.20 CHRONIC ATRIAL FIBRILLATION (HCC): ICD-10-CM

## 2020-07-08 LAB
INR PPP: 1.9 (ref 0.91–1.09)
PROTHROMBIN TIME: 22.9 SECONDS (ref 10–13.8)

## 2020-07-08 PROCEDURE — G0463 HOSPITAL OUTPT CLINIC VISIT: HCPCS

## 2020-07-08 PROCEDURE — 36416 COLLJ CAPILLARY BLOOD SPEC: CPT

## 2020-07-08 PROCEDURE — 85610 PROTHROMBIN TIME: CPT

## 2020-07-08 NOTE — PROGRESS NOTES
Anticoagulation Clinic Progress Note    Anticoagulation Summary  As of 2020    INR goal:   2.0-3.0   TTR:   47.7 % (1.7 y)   INR used for dosin.9! (2020)   Warfarin maintenance plan:   5 mg every day   Weekly warfarin total:   35 mg   Plan last modified:   Alicia Garcia RPH (10/1/2019)   Next INR check:   2020   Priority:   Maintenance   Target end date:   Indefinite    Indications    Chronic atrial fibrillation (CMS/HCC) [I48.20]             Anticoagulation Episode Summary     INR check location:       Preferred lab:       [facility]ers [facility]    Comments:   **New to [employer]  - call every time for now; ask if okay to only call when out of range**      Anticoagulation Care Providers     Provider Role Specialty Phone number    Adeel Mina III, MD Referring Cardiology [phone]          Clinic Interview:  Patient Findings     Negatives:   Signs/symptoms of thrombosis, Signs/symptoms of bleeding,   Laboratory test error suspected, Change in health, Change in alcohol use,   Change in activity, Upcoming invasive procedure, Emergency department   visit, Upcoming dental procedure, Missed doses, Extra doses, Change in   medications, Change in diet/appetite, Hospital admission, Bruising, Other   complaints      Clinical Outcomes     Negatives:   Major bleeding event, Thromboembolic event,   Anticoagulation-related hospital admission, Anticoagulation-related ED   visit, Anticoagulation-related fatality        INR History:  Anticoagulation Monitoring 3/12/2020 2020 2020   INR 2.80 2.5 1.9   INR Date 3/12/2020 2020 2020   INR Goal 2.0-3.0 2.0-3.0 2.0-3.0   Trend Same Same Same   Last Week Total 35 mg 35 mg 35 mg   Next Week Total 35 mg 35 mg 37.5 mg   Sun 5 mg 5 mg 5 mg   Mon 5 mg 5 mg 5 mg   Tue 5 mg 5 mg 5 mg   Wed 5 mg 5 mg 7.5 mg (); Otherwise 5 mg   Thu 5 mg 5 mg 5 mg   Fri 5 mg 5 mg 5 mg   Sat 5 mg 5 mg 5 mg   Visit Report - - -   Some  recent data might be hidden       Plan:  1. INR is Subtherapeutic today- see above in Anticoagulation Summary.  Will instruct Anna Gilbert to boost to warfarin 7.5mg today, then continue their warfarin regimen- see above in Anticoagulation Summary.  2. Follow up in 1 month  3. Patient declines warfarin refills.  4. Verbal and written information provided. Patient expresses understanding and has no further questions at this time.    Alicia Garcia RP

## 2020-08-01 ENCOUNTER — TRANSCRIBE ORDERS (OUTPATIENT)
Dept: ADMINISTRATIVE | Facility: HOSPITAL | Age: 75
End: 2020-08-01

## 2020-08-01 DIAGNOSIS — I51.9 MYXEDEMA HEART DISEASE: ICD-10-CM

## 2020-08-01 DIAGNOSIS — I10 ESSENTIAL HYPERTENSION: Primary | ICD-10-CM

## 2020-08-01 DIAGNOSIS — E03.9 MYXEDEMA HEART DISEASE: ICD-10-CM

## 2020-08-06 ENCOUNTER — ANTICOAGULATION VISIT (OUTPATIENT)
Dept: PHARMACY | Facility: HOSPITAL | Age: 75
End: 2020-08-06

## 2020-08-06 DIAGNOSIS — I48.20 CHRONIC ATRIAL FIBRILLATION (HCC): ICD-10-CM

## 2020-08-06 LAB
INR PPP: 2.7 (ref 0.91–1.09)
PROTHROMBIN TIME: 32.8 SECONDS (ref 10–13.8)

## 2020-08-06 PROCEDURE — 36416 COLLJ CAPILLARY BLOOD SPEC: CPT

## 2020-08-06 PROCEDURE — 85610 PROTHROMBIN TIME: CPT

## 2020-08-06 NOTE — PROGRESS NOTES
I have supervised and reviewed the notes, assessments, and/or procedures performed by our PharmD Candidate. I concur with the documentation of this patient encounter.    Simon Ivy formerly [facility]

## 2020-08-06 NOTE — PROGRESS NOTES
Anticoagulation Clinic Progress Note    Anticoagulation Summary  As of 2020    INR goal:   2.0-3.0   TTR:   49.5 % (1.8 y)   INR used for dosin.7 (2020)   Warfarin maintenance plan:   5 mg every day   Weekly warfarin total:   35 mg   No change documented:   Armando Jimenes, Pharmacy Intern   Plan last modified:   Alicia Garcia RP (10/1/2019)   Next INR check:   9/3/2020   Priority:   Maintenance   Target end date:   Indefinite    Indications    Chronic atrial fibrillation (CMS/HCC) [I48.20]             Anticoagulation Episode Summary     INR check location:       Preferred lab:       Send INR reminders to:    [facility]    Comments:   **New to [employer]  - call every time for now; ask if okay to only call when out of range**      Anticoagulation Care Providers     Provider Role Specialty Phone number    Adeel Mina III, MD Referring Cardiology [phone]          Clinic Interview:  Patient Findings     Negatives:   Signs/symptoms of thrombosis, Signs/symptoms of bleeding,   Laboratory test error suspected, Change in health, Change in alcohol use,   Change in activity, Upcoming invasive procedure, Emergency department   visit, Upcoming dental procedure, Missed doses, Extra doses, Change in   medications, Change in diet/appetite, Hospital admission, Bruising, Other   complaints      Clinical Outcomes     Negatives:   Major bleeding event, Thromboembolic event,   Anticoagulation-related hospital admission, Anticoagulation-related ED   visit, Anticoagulation-related fatality        INR History:  Anticoagulation Monitoring 2020   INR 2.5 1.9 2.7   INR Date 2020   INR Goal 2.0-3.0 2.0-3.0 2.0-3.0   Trend Same Same Same   Last Week Total 35 mg 35 mg 35 mg   Next Week Total 35 mg 37.5 mg 35 mg   Sun 5 mg 5 mg 5 mg   Mon 5 mg 5 mg 5 mg   Tue 5 mg 5 mg 5 mg   Wed 5 mg 7.5 mg (); Otherwise 5 mg 5 mg   Thu 5 mg 5 mg 5 mg   Fri 5 mg 5 mg 5  mg   Sat 5 mg 5 mg 5 mg   Visit Report - - -   Some recent data might be hidden       Plan:  1. INR is Therapeutic today- see above in Anticoagulation Summary.  Will instruct Anna Gilbert to Continue their warfarin regimen- see above in Anticoagulation Summary.  2. Follow up in 4 weeks  3. Patient declines warfarin refills.  4. Verbal and written information provided. Patient expresses understanding and has no further questions at this time.    Armando Jimenes, Pharmacy Intern

## 2020-08-19 ENCOUNTER — LAB (OUTPATIENT)
Dept: LAB | Facility: HOSPITAL | Age: 75
End: 2020-08-19

## 2020-08-19 DIAGNOSIS — E03.9 MYXEDEMA HEART DISEASE: ICD-10-CM

## 2020-08-19 DIAGNOSIS — I10 ESSENTIAL HYPERTENSION: ICD-10-CM

## 2020-08-19 DIAGNOSIS — I51.9 MYXEDEMA HEART DISEASE: ICD-10-CM

## 2020-08-19 LAB
ALBUMIN SERPL-MCNC: 4.6 G/DL (ref 3.5–5.2)
ALBUMIN/GLOB SERPL: 1.5 G/DL
ALP SERPL-CCNC: 58 U/L (ref 39–117)
ALT SERPL W P-5'-P-CCNC: 12 U/L (ref 1–33)
ANION GAP SERPL CALCULATED.3IONS-SCNC: 11.2 MMOL/L (ref 5–15)
AST SERPL-CCNC: 21 U/L (ref 1–32)
BACTERIA UR QL AUTO: NORMAL /HPF
BASOPHILS # BLD AUTO: 0.08 10*3/MM3 (ref 0–0.2)
BASOPHILS NFR BLD AUTO: 1 % (ref 0–1.5)
BILIRUB SERPL-MCNC: 0.4 MG/DL (ref 0–1.2)
BILIRUB UR QL STRIP: NEGATIVE
BUN SERPL-MCNC: 13 MG/DL (ref 8–23)
BUN/CREAT SERPL: 15.9 (ref 7–25)
CALCIUM SPEC-SCNC: 9.7 MG/DL (ref 8.6–10.5)
CHLORIDE SERPL-SCNC: 101 MMOL/L (ref 98–107)
CHOLEST SERPL-MCNC: 192 MG/DL (ref 0–200)
CLARITY UR: CLEAR
CO2 SERPL-SCNC: 26.8 MMOL/L (ref 22–29)
COLOR UR: YELLOW
CREAT SERPL-MCNC: 0.82 MG/DL (ref 0.57–1)
DEPRECATED RDW RBC AUTO: 40.6 FL (ref 37–54)
EOSINOPHIL # BLD AUTO: 0.45 10*3/MM3 (ref 0–0.4)
EOSINOPHIL NFR BLD AUTO: 5.5 % (ref 0.3–6.2)
ERYTHROCYTE [DISTWIDTH] IN BLOOD BY AUTOMATED COUNT: 12.9 % (ref 12.3–15.4)
GFR SERPL CREATININE-BSD FRML MDRD: 68 ML/MIN/1.73
GLOBULIN UR ELPH-MCNC: 3 GM/DL
GLUCOSE SERPL-MCNC: 101 MG/DL (ref 65–99)
GLUCOSE UR STRIP-MCNC: NEGATIVE MG/DL
HCT VFR BLD AUTO: 44.8 % (ref 34–46.6)
HDLC SERPL-MCNC: 41 MG/DL (ref 40–60)
HGB BLD-MCNC: 15.6 G/DL (ref 12–15.9)
HGB UR QL STRIP.AUTO: NEGATIVE
HYALINE CASTS UR QL AUTO: NORMAL /LPF
IMM GRANULOCYTES # BLD AUTO: 0.05 10*3/MM3 (ref 0–0.05)
IMM GRANULOCYTES NFR BLD AUTO: 0.6 % (ref 0–0.5)
KETONES UR QL STRIP: NEGATIVE
LDLC SERPL CALC-MCNC: 118 MG/DL (ref 0–100)
LDLC/HDLC SERPL: 2.87 {RATIO}
LEUKOCYTE ESTERASE UR QL STRIP.AUTO: ABNORMAL
LYMPHOCYTES # BLD AUTO: 1.98 10*3/MM3 (ref 0.7–3.1)
LYMPHOCYTES NFR BLD AUTO: 24 % (ref 19.6–45.3)
MCH RBC QN AUTO: 30.1 PG (ref 26.6–33)
MCHC RBC AUTO-ENTMCNC: 34.8 G/DL (ref 31.5–35.7)
MCV RBC AUTO: 86.3 FL (ref 79–97)
MONOCYTES # BLD AUTO: 1.05 10*3/MM3 (ref 0.1–0.9)
MONOCYTES NFR BLD AUTO: 12.7 % (ref 5–12)
NEUTROPHILS NFR BLD AUTO: 4.64 10*3/MM3 (ref 1.7–7)
NEUTROPHILS NFR BLD AUTO: 56.2 % (ref 42.7–76)
NITRITE UR QL STRIP: NEGATIVE
NRBC BLD AUTO-RTO: 0 /100 WBC (ref 0–0.2)
PH UR STRIP.AUTO: 6.5 [PH] (ref 5–8)
PLATELET # BLD AUTO: 283 10*3/MM3 (ref 140–450)
PMV BLD AUTO: 9.5 FL (ref 6–12)
POTASSIUM SERPL-SCNC: 4.2 MMOL/L (ref 3.5–5.2)
PROT SERPL-MCNC: 7.6 G/DL (ref 6–8.5)
PROT UR QL STRIP: NEGATIVE
RBC # BLD AUTO: 5.19 10*6/MM3 (ref 3.77–5.28)
RBC # UR: NORMAL /HPF
REF LAB TEST METHOD: NORMAL
SODIUM SERPL-SCNC: 139 MMOL/L (ref 136–145)
SP GR UR STRIP: 1.01 (ref 1–1.03)
SQUAMOUS #/AREA URNS HPF: NORMAL /HPF
TRIGL SERPL-MCNC: 166 MG/DL (ref 0–150)
TSH SERPL DL<=0.05 MIU/L-ACNC: 1.41 UIU/ML (ref 0.27–4.2)
UROBILINOGEN UR QL STRIP: ABNORMAL
VLDLC SERPL-MCNC: 33.2 MG/DL (ref 5–40)
WBC # BLD AUTO: 8.25 10*3/MM3 (ref 3.4–10.8)
WBC UR QL AUTO: NORMAL /HPF

## 2020-08-19 PROCEDURE — 81001 URINALYSIS AUTO W/SCOPE: CPT | Performed by: FAMILY MEDICINE

## 2020-08-19 PROCEDURE — 85025 COMPLETE CBC W/AUTO DIFF WBC: CPT

## 2020-08-19 PROCEDURE — 80061 LIPID PANEL: CPT

## 2020-08-19 PROCEDURE — 80053 COMPREHEN METABOLIC PANEL: CPT

## 2020-08-19 PROCEDURE — 36415 COLL VENOUS BLD VENIPUNCTURE: CPT

## 2020-08-19 PROCEDURE — 84443 ASSAY THYROID STIM HORMONE: CPT

## 2020-08-26 ENCOUNTER — OFFICE VISIT (OUTPATIENT)
Dept: ORTHOPEDIC SURGERY | Facility: CLINIC | Age: 75
End: 2020-08-26

## 2020-08-26 VITALS — HEIGHT: 62 IN | BODY MASS INDEX: 33.13 KG/M2 | WEIGHT: 180 LBS

## 2020-08-26 DIAGNOSIS — R52 PAIN: Primary | ICD-10-CM

## 2020-08-26 DIAGNOSIS — M17.12 PRIMARY OSTEOARTHRITIS OF LEFT KNEE: ICD-10-CM

## 2020-08-26 DIAGNOSIS — M75.51 SUBACROMIAL BURSITIS OF RIGHT SHOULDER JOINT: ICD-10-CM

## 2020-08-26 DIAGNOSIS — M17.11 PRIMARY OSTEOARTHRITIS OF RIGHT KNEE: ICD-10-CM

## 2020-08-26 PROCEDURE — 20610 DRAIN/INJ JOINT/BURSA W/O US: CPT | Performed by: ORTHOPAEDIC SURGERY

## 2020-08-26 PROCEDURE — 99214 OFFICE O/P EST MOD 30 MIN: CPT | Performed by: ORTHOPAEDIC SURGERY

## 2020-08-26 RX ORDER — TRIAMCINOLONE ACETONIDE 40 MG/ML
40 INJECTION, SUSPENSION INTRA-ARTICULAR; INTRAMUSCULAR
Status: COMPLETED | OUTPATIENT
Start: 2020-08-26 | End: 2020-08-26

## 2020-08-26 RX ORDER — LIDOCAINE HYDROCHLORIDE 10 MG/ML
4 INJECTION, SOLUTION EPIDURAL; INFILTRATION; INTRACAUDAL; PERINEURAL
Status: COMPLETED | OUTPATIENT
Start: 2020-08-26 | End: 2020-08-26

## 2020-08-26 RX ADMIN — LIDOCAINE HYDROCHLORIDE 4 ML: 10 INJECTION, SOLUTION EPIDURAL; INFILTRATION; INTRACAUDAL; PERINEURAL at 13:42

## 2020-08-26 RX ADMIN — TRIAMCINOLONE ACETONIDE 40 MG: 40 INJECTION, SUSPENSION INTRA-ARTICULAR; INTRAMUSCULAR at 13:42

## 2020-08-26 NOTE — PROGRESS NOTES
Subjective: Bilateral knee pain, right shoulder pain     Patient ID: Anna Gilbert is a 75 y.o. female.    Chief Complaint:    History of Present Illness 75-year-old female known to me previously treated for bursitis in the right shoulder cortisone injection in May of this year previously treated for right knee pain with a cortisone injection presents with both knees symptomatic in the right shoulder symptomatic.  Pain is developed recently after she just completed a move to a new house she is doing a lot of lifting of relatively heavy boxes.  Unable to take anti-inflammatories as she is on Coumadin.       Social History     Occupational History   • Not on file   Tobacco Use   • Smoking status: Former Smoker   • Smokeless tobacco: Never Used   • Tobacco comment: caffeine use   Substance and Sexual Activity   • Alcohol use: No   • Drug use: No   • Sexual activity: Defer      Review of Systems   Constitutional: Negative for chills, diaphoresis, fever and unexpected weight change.   HENT: Negative for hearing loss, nosebleeds, sore throat and tinnitus.    Eyes: Negative for pain and visual disturbance.   Respiratory: Negative for cough, shortness of breath and wheezing.    Cardiovascular: Negative for chest pain and palpitations.   Gastrointestinal: Negative for abdominal pain, diarrhea, nausea and vomiting.   Endocrine: Negative for cold intolerance, heat intolerance and polydipsia.   Genitourinary: Negative for difficulty urinating, dysuria and hematuria.   Musculoskeletal: Positive for arthralgias and myalgias. Negative for joint swelling.   Skin: Negative for rash and wound.   Allergic/Immunologic: Negative for environmental allergies.   Neurological: Negative for dizziness, syncope and numbness.   Hematological: Does not bruise/bleed easily.   Psychiatric/Behavioral: Negative for dysphoric mood and sleep disturbance. The patient is not nervous/anxious.          Past Medical History:   Diagnosis Date   • Coronary  artery disease    • Health care maintenance    • Hyperthyroidism    • SHEILA (obstructive sleep apnea) 7/20/2016   • PAF (paroxysmal atrial fibrillation) (CMS/HCC)    • Sick sinus syndrome (CMS/HCC)      Past Surgical History:   Procedure Laterality Date   • AORTIC VALVE REPAIR/REPLACEMENT  2010    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   • CARDIAC CATHETERIZATION  01/01/2011   • CARDIAC DEFIBRILLATOR PLACEMENT  2010   • MITRAL VALVE REPAIR/REPLACEMENT  2010     Family History   Problem Relation Age of Onset   • Coronary artery disease Father    • No Known Problems Mother          Objective:  There were no vitals filed for this visit.      08/26/20  1332   Weight: 81.6 kg (180 lb)     Body mass index is 33.13 kg/m².        Ortho Exam   He is alert and oriented x3.  Neither knee shows any swelling effusion erythema but there is moderate crepitus with range of motion to both knees on exam.  There is no instability at 0 90 degrees nor any varus or valgus instability.  Quad and hamstring function is 5/5 the calves are nontender.  Good distal pulses no motor or sensory deficit good capillary refill.  With regard to the right shoulder again no motor or sensory deficit in the right upper extremity mild pain at 90 degrees of abduction extension against resistance but a markedly positive Squires sign.  External rotation is approximately 40 degrees internal rotation 35 to 40 degrees.  He capillary refill.    Assessment:        1. Pain    2. Subacromial bursitis of right shoulder joint    3. Primary osteoarthritis of right knee    4. Primary osteoarthritis of left knee           Plan: Spent over 15 minutes with the patient reviewing her symptoms and her exam of both knees and the right shoulder.  She has had good response to cortisone in the past after reviewing treatment options and the fact that she is unable to take oral anti-inflammatories will inject all 3 joints today with Marcaine and Kenalog and a ratio 4-1.  Both knees were injected  the superolateral portal and the shoulder is injected the posterior portal.  Postinjection instructions given to the patient.  Return to see me here as needed.  Answered all questions.  Large Joint Arthrocentesis  Date/Time: 8/26/2020 1:42 PM  Consent given by: patient  Site marked: site marked  Timeout: Immediately prior to procedure a time out was called to verify the correct patient, procedure, equipment, support staff and site/side marked as required   Supporting Documentation  Indications: pain   Procedure Details  Location: knee - Knee joint: BILATERAL KNEE.  Preparation: Patient was prepped and draped in the usual sterile fashion  Needle size: 22 G  Approach: superior (LATERAL)  Medications administered: 40 mg triamcinolone acetonide 40 MG/ML; 4 mL lidocaine PF 1% 1 %  Patient tolerance: patient tolerated the procedure well with no immediate complications    Large Joint Arthrocentesis: R subacromial bursa  Date/Time: 8/26/2020 1:42 PM  Consent given by: patient  Site marked: site marked  Timeout: Immediately prior to procedure a time out was called to verify the correct patient, procedure, equipment, support staff and site/side marked as required   Supporting Documentation  Indications: pain   Procedure Details  Location: shoulder - R subacromial bursa  Preparation: Patient was prepped and draped in the usual sterile fashion  Needle size: 22 G  Approach: posterior  Medications administered: 4 mL lidocaine PF 1% 1 %; 40 mg triamcinolone acetonide 40 MG/ML  Patient tolerance: patient tolerated the procedure well with no immediate complications                  Work Status:    ABIMAEL query complete.    Orders:  Orders Placed This Encounter   Procedures   • Large Joint Arthrocentesis   • Large Joint Arthrocentesis: R subacromial bursa       Medications:  No orders of the defined types were placed in this encounter.      Followup:  Return if symptoms worsen or fail to improve.          Dictated utilizing [employer]  dictation

## 2020-09-21 RX ORDER — WARFARIN SODIUM 5 MG/1
TABLET ORAL
Qty: 90 TABLET | Refills: 0 | Status: SHIPPED | OUTPATIENT
Start: 2020-09-21 | End: 2021-01-05

## 2020-09-23 ENCOUNTER — OFFICE VISIT (OUTPATIENT)
Dept: CARDIOLOGY | Facility: CLINIC | Age: 75
End: 2020-09-23

## 2020-09-23 VITALS
HEIGHT: 62 IN | DIASTOLIC BLOOD PRESSURE: 76 MMHG | SYSTOLIC BLOOD PRESSURE: 128 MMHG | HEART RATE: 113 BPM | BODY MASS INDEX: 32.61 KG/M2 | WEIGHT: 177.2 LBS

## 2020-09-23 DIAGNOSIS — Z98.890 H/O MITRAL VALVE REPAIR: Primary | Chronic | ICD-10-CM

## 2020-09-23 DIAGNOSIS — I48.20 CHRONIC ATRIAL FIBRILLATION (HCC): Chronic | ICD-10-CM

## 2020-09-23 DIAGNOSIS — Z95.2 AORTIC VALVE REPLACED: Chronic | ICD-10-CM

## 2020-09-23 PROBLEM — I47.29 NSVT (NONSUSTAINED VENTRICULAR TACHYCARDIA): Chronic | Status: ACTIVE | Noted: 2020-09-23

## 2020-09-23 PROCEDURE — 99215 OFFICE O/P EST HI 40 MIN: CPT | Performed by: INTERNAL MEDICINE

## 2020-09-23 NOTE — PROGRESS NOTES
Subjective:     Encounter Date:  09/23/20        Patient ID: Anna Gilbert is a 75 y.o. female.    Chief Complaint: CAF  History of Present Illness    Dear Dr. Isaac,    I had the pleasure of seeing this patient in the office today.    She has a history of chronic atrial fibrillation, nonsustained ventricular tachycardia, as well as bioprosthetic aortic valve and prior mitral valve repair. She also has an AICD in place.     In June she had an echocardiogram and a monitor.  We had scheduled an appointment then, but she was moving and so she rescheduled to that now.  Her AICD has been at end-of-life and she is been wondering whether she should get the AICD replaced.  She really has had several discussions about this with the device clinic.  She thinks the device now may no longer be functioning.    Event Monitor  Event monitor enrollment date was 12/7/2016. Enrollment ended on 12/13/2016. The manufacturing company for the event monitor is [employer]. Kids[employer] monitor is reviewed. A total of 6 days and 10 hours are available for review. Atrial fibrillation is present throughout. Average heart rate is 70 bpm. Maximal heart rate is approximately 150 bpm. Occasional wide complex beats are seen which appear to be atrial fibrillation with aberrancy. Minimal heart rate is atrial fibrillation at a rate of 39 bpm. No other ectopy is seen.      She had a stress test and echocardiogram performed April 2019:  04/24/19 ECHO  LVSF NORMAL EF=54%  ESTIMATED EF WAS IN DISAGREEMENT W THE CALCULATED EF . EST EF APPEARS TO BE IN RANGE 61-65%  NORMAL LEFT VENTRICULAR CAVITY / LEFT VENT WALL THICKNESS IS CONSISTENT W MILD LVH  LEFT VENTRICULAR DIASTOLIC FUNCTION WAS INDETERMINATE.  RT VENT CAVITY IS MILD TO MODERATE DILATED  LEFT ATRIAL CAVITY SEVERELY DILATED  RT ATRIAL CAVITY IS SEVERELY DILATED  THE AORTIC VALVE PEAK AND MEAN GRADIENTS ARE WITHIN DEFINED LIMITS . THE PROSTHETIC VALVE IS GROSSLY NORMAL  NO SIGNIFICANT MITRAL VALVE REGURG OR  STENOSIS  MILD TO MOD TR VALVE REGURG.  ESTIMATED RT VENTRICULAR SYSTOLIC PRESSURE FROM TR REGURG IS MILDLY ELEVATED 35-45MMHG. CALCULATED RT VS PRESSURE FROM TR IS 38MMHG    04/03/19 STRESS TREADMILL  The patient reported shortness of breath during the stress test.  · AFib present throughout  · No ECG evidence of myocardial ischemia.Negative clinical evidence of   myocardial ischemia. Findings consistent with a normal ECG stress test                The following portions of the patient's history were reviewed and updated as appropriate: allergies, current medications, past family history, past medical history, past social history, past surgical history and problem list.    Past Medical History:   Diagnosis Date   • Coronary artery disease    • Health care maintenance    • Hyperthyroidism    • NSVT (nonsustained ventricular tachycardia) (CMS/[facility]) 9/23/2020   • SHEILA (obstructive sleep apnea) 7/20/2016   • PAF (paroxysmal atrial fibrillation) (CMS/[facility])    • Sick sinus syndrome (CMS/[facility])        Past Surgical History:   Procedure Laterality Date   • AORTIC VALVE REPAIR/REPLACEMENT  2010    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   • CARDIAC CATHETERIZATION  01/01/2011   • CARDIAC DEFIBRILLATOR PLACEMENT  2010   • MITRAL VALVE REPAIR/REPLACEMENT  2010       Social History     Socioeconomic History   • Marital status: Single     Spouse name: Not on file   • Number of children: Not on file   • Years of education: Not on file   • Highest education level: Not on file   Tobacco Use   • Smoking status: Former Smoker   • Smokeless tobacco: Never Used   • Tobacco comment: caffeine use   Substance and Sexual Activity   • Alcohol use: No   • Drug use: No   • Sexual activity: Defer       Review of Systems   Constitution: Negative for chills, decreased appetite, fever and night sweats.   HENT: Negative for ear discharge, ear pain, hearing loss, nosebleeds and sore throat.    Eyes: Negative for blurred vision, double vision and pain.  "  Cardiovascular: Negative for cyanosis.   Respiratory: Negative for hemoptysis and sputum production.    Endocrine: Negative for cold intolerance and heat intolerance.   Hematologic/Lymphatic: Negative for adenopathy.   Skin: Negative for dry skin, itching, nail changes, rash and suspicious lesions.   Musculoskeletal: Negative for arthritis, gout, muscle cramps, muscle weakness, myalgias and neck pain.   Gastrointestinal: Negative for anorexia, bowel incontinence, constipation, diarrhea, dysphagia, hematemesis and jaundice.   Genitourinary: Negative for bladder incontinence, dysuria, flank pain, frequency, hematuria and nocturia.   Neurological: Negative for focal weakness, numbness, paresthesias and seizures.   Psychiatric/Behavioral: Negative for altered mental status, hallucinations, hypervigilance, suicidal ideas and thoughts of violence.   Allergic/Immunologic: Negative for persistent infections.       Procedures       Objective:     Vitals:    09/23/20 1438   BP: 128/76   Pulse: 113   Weight: 80.4 kg (177 lb 3.2 oz)   Height: 157.5 cm (62\")     General Appearance:    Alert, cooperative, in no acute distress   Head:    Normocephalic, without obvious abnormality, atraumatic   Eyes:            Lids and lashes normal, conjunctivae and sclerae normal, no   icterus, no pallor, corneas clear, PERRLA   Ears:    Ears appear intact with no abnormalities noted   Throat:   No oral lesions, no thrush, oral mucosa moist   Neck:   No adenopathy, supple, trachea midline, no thyromegaly, no   carotid bruit, no JVD   Back:     No kyphosis present, no scoliosis present, no skin lesions, erythema or scars, no tenderness to percussion or palpation, range of motion normal   Lungs:     Clear to auscultation,respirations regular, even and unlabored    Heart:    Regular rhythm and normal rate, normal S1 and S2, no murmur, no gallop, no rub, no click   Chest Wall:    No abnormalities observed   Abdomen:     Normal bowel sounds, no " masses, no organomegaly, soft        non-tender, non-distended, no guarding, no rebound  tenderness   Extremities:   Moves all extremities well, no edema, no cyanosis, no redness   Pulses:   Pulses palpable and equal bilaterally. Normal radial, carotid, femoral, dorsalis pedis and posterior tibial pulses bilaterally. Normal abdominal aorta   Skin:  Psychiatric:   No bleeding, bruising or rash    Alert and oriented x 3, normal mood and affect           Lab Review:             Performed        Assessment:          Diagnosis Plan   1. H/O mitral valve repair     2. Aortic valve replaced     3. Chronic atrial fibrillation (CMS/HCC)            Plan:       1.   Atrial Fibrillation and Atrial Flutter  Assessment  • The patient has permanent atrial fibrillation  • This is valvular in etiology  • The patient's CHADS2-VASc score is 4  • A VGE0AP4-ALRp score of 2 or more is considered a high risk for a thromboembolic event  • Warfarin prescribed    Plan  • Continue in atrial fibrillation with rate control  • Continue warfarin for antithrombotic therapy, bleeding issues discussed  • Continue beta blocker for rate control  • Rate controlled, continue current medical therapy    2.  Bioprosthetic aortic valve replacement with prior mitral valve repair-echocardiogram April 2019 demonstrated normal function  3.  AICD in place- patient is seen in our device clinic. Device was placed in 2010, we have never been able to find documentation regarding that.  Serial echocardiograms dating from 2014 have consistently demonstrated normal ejection fraction.  She has a history of ventricular tachycardia/nonsustained ventricular tachycardia.  She has had several discussions with the device clinic whether she should replace her AICD since she does not feel anything and has not had any symptoms.  No therapies for her VT have been required.  I will send a message to Dr. Perez to get his insight on this.  4.  Mediastinal adenopathy-status post  biopsy,   5.  Chronic diastolic congestive heart failure, no evidence of volume overload, continue current medical therapy  6.  Chronic anticoagulation    Current Outpatient Medications:   •  APPLE CIDER VINEGAR PO, Take  by mouth., Disp: , Rfl:   •  b complex vitamins capsule, Take 1 capsule by mouth Daily., Disp: , Rfl:   •  BIOTIN PO, Take 1 tablet by mouth Daily., Disp: , Rfl:   •  bumetanide (BUMEX) 2 MG tablet, Take 1 tablet by mouth 2 (Two) Times a Day. (Patient taking differently: Take 2 mg by mouth As Needed.), Disp: 180 tablet, Rfl: 3  •  CALCIUM-MAGNESIUM-ZINC PO, Take 1 tablet by mouth Daily., Disp: , Rfl:   •  levothyroxine (SYNTHROID, LEVOTHROID) 150 MCG tablet, Take 150 mcg by mouth Daily., Disp: , Rfl:   •  metoprolol succinate XL (TOPROL-XL) 50 MG 24 hr tablet, Take 1 tablet by mouth Daily., Disp: 90 tablet, Rfl: 3  •  Multiple Vitamin (MULTIVITAMIN) capsule, Take 1 capsule by mouth Daily., Disp: , Rfl:   •  potassium chloride (K-DUR,KLOR-CON) 20 MEQ CR tablet, Take 1 tablet by mouth Daily., Disp: 90 tablet, Rfl: 3  •  TURMERIC PO, Take 1 tablet by mouth Daily., Disp: , Rfl:   •  warfarin (COUMADIN) 5 MG tablet, TAKE ONE TABLET BY MOUTH DAILY Or as directed by [facility], Disp: 90 tablet, Rfl: 0  •  Cholecalciferol (VITAMIN D-3 PO), Take 1 tablet by mouth As Needed. Only through the winter, Disp: , Rfl:   •  diclofenac (VOLTAREN) 1 % gel gel, Apply 4 g topically to the appropriate area as directed 4 (Four) Times a Day., Disp: 100 g, Rfl: 5

## 2020-09-28 ENCOUNTER — TELEPHONE (OUTPATIENT)
Dept: CARDIOLOGY | Facility: CLINIC | Age: 75
End: 2020-09-28

## 2020-09-28 NOTE — TELEPHONE ENCOUNTER
Pt had a echo on 6/16/20. You had order the pt to have another echo done. Did you want her to have her echo repeated in 1 year or now?     PT #: [phone]

## 2020-09-29 ENCOUNTER — TELEPHONE (OUTPATIENT)
Dept: CARDIOLOGY | Facility: CLINIC | Age: 75
End: 2020-09-29

## 2020-10-07 ENCOUNTER — TELEPHONE (OUTPATIENT)
Dept: PHARMACY | Facility: HOSPITAL | Age: 75
End: 2020-10-07

## 2020-10-07 NOTE — TELEPHONE ENCOUNTER
Cam Barton- I want to confirm that you saw the episodes of VT that were present on her holter 6/2020 before I call her, thanks

## 2020-10-07 NOTE — TELEPHONE ENCOUNTER
I re-reviewed it.  I would like to see her in the office and talk with her.     This is 1 of those situations that is complicated..

## 2020-10-08 NOTE — TELEPHONE ENCOUNTER
Nona- could you please call this patient? Dr Perez wants to see her in the office to discuss her AICD (the patient and I had discussed this possibility when she was in the office). Then we need to get her an appt to see Dr Perez, thanks

## 2020-10-09 ENCOUNTER — ANTICOAGULATION VISIT (OUTPATIENT)
Dept: PHARMACY | Facility: HOSPITAL | Age: 75
End: 2020-10-09

## 2020-10-09 DIAGNOSIS — I48.20 CHRONIC ATRIAL FIBRILLATION (HCC): ICD-10-CM

## 2020-10-09 LAB
INR PPP: 2.5 (ref 0.91–1.09)
PROTHROMBIN TIME: 29.5 SECONDS (ref 10–13.8)

## 2020-10-09 PROCEDURE — 85610 PROTHROMBIN TIME: CPT

## 2020-10-09 PROCEDURE — 36416 COLLJ CAPILLARY BLOOD SPEC: CPT

## 2020-10-09 NOTE — TELEPHONE ENCOUNTER
LM on pt VM:  spoke with  to discuss her AICD and to make an appointment with /    Rip: Can you call her to schedule her an appointment to see. Dr. Perez?

## 2020-10-09 NOTE — PROGRESS NOTES
Anticoagulation Clinic Progress Note    Anticoagulation Summary  As of 10/9/2020    INR goal:  2.0-3.0   TTR:  54.0 % (2 y)   INR used for dosin.5 (10/9/2020)   Warfarin maintenance plan:  5 mg every day   Weekly warfarin total:  35 mg   No change documented:  Roxanne Otero   Plan last modified:  Alicia Garcia RPH (10/1/2019)   Next INR check:  2020   Priority:  Maintenance   Target end date:  Indefinite    Indications    Chronic atrial fibrillation (CMS/HCC) [I48.20]             Anticoagulation Episode Summary     INR check location:      Preferred lab:      Send INR reminders to:   [facility]    Comments:  **New to [employer]  - call every time for now; ask if okay to only call when out of range**      Anticoagulation Care Providers     Provider Role Specialty Phone number    Adeel Mina III, MD Referring Cardiology [phone]          Clinic Interview:  Patient Findings     Negatives:  Signs/symptoms of thrombosis, Signs/symptoms of bleeding,   Laboratory test error suspected, Change in health, Change in alcohol use,   Change in activity, Upcoming invasive procedure, Emergency department   visit, Upcoming dental procedure, Missed doses, Extra doses, Change in   medications, Change in diet/appetite, Hospital admission, Bruising, Other   complaints      Clinical Outcomes     Negatives:  Major bleeding event, Thromboembolic event,   Anticoagulation-related hospital admission, Anticoagulation-related ED   visit, Anticoagulation-related fatality        INR History:  Anticoagulation Monitoring 2020 2020 10/9/2020   INR 1.9 2.7 2.5   INR Date 2020 2020 10/9/2020   INR Goal 2.0-3.0 2.0-3.0 2.0-3.0   Trend Same Same Same   Last Week Total 35 mg 35 mg 35 mg   Next Week Total 37.5 mg 35 mg 35 mg   Sun 5 mg 5 mg 5 mg   Mon 5 mg 5 mg 5 mg   Tue 5 mg 5 mg 5 mg   Wed 7.5 mg (); Otherwise 5 mg 5 mg 5 mg   Thu 5 mg 5 mg 5 mg   Fri 5 mg 5 mg 5 mg   Sat 5 mg 5 mg 5 mg    Visit Report - - -   Some recent data might be hidden       Plan:  1. INR is therapeutic today- see above in Anticoagulation Summary.   Will instruct Anna Gilbert to continue their warfarin regimen- see above in Anticoagulation Summary.  2. Follow up in 4 weeks.  3. Patient declines warfarin refills.  4. Verbal and written information provided. Patient expresses understanding and has no further questions at this time.    Roxanne Otero

## 2020-10-14 ENCOUNTER — OFFICE VISIT (OUTPATIENT)
Dept: ORTHOPEDIC SURGERY | Facility: CLINIC | Age: 75
End: 2020-10-14

## 2020-10-14 VITALS — HEIGHT: 62 IN | WEIGHT: 177 LBS | BODY MASS INDEX: 32.57 KG/M2

## 2020-10-14 DIAGNOSIS — M17.11 PRIMARY OSTEOARTHRITIS OF RIGHT KNEE: ICD-10-CM

## 2020-10-14 DIAGNOSIS — M25.562 ACUTE PAIN OF LEFT KNEE: Primary | ICD-10-CM

## 2020-10-14 DIAGNOSIS — M25.561 ACUTE PAIN OF RIGHT KNEE: ICD-10-CM

## 2020-10-14 PROCEDURE — 73562 X-RAY EXAM OF KNEE 3: CPT | Performed by: NURSE PRACTITIONER

## 2020-10-14 PROCEDURE — 99214 OFFICE O/P EST MOD 30 MIN: CPT | Performed by: NURSE PRACTITIONER

## 2020-10-14 RX ORDER — INFLUENZA A VIRUS A/MICHIGAN/45/2015 X-275 (H1N1) ANTIGEN (FORMALDEHYDE INACTIVATED), INFLUENZA A VIRUS A/SINGAPORE/INFIMH-16-0019/2016 IVR-186 (H3N2) ANTIGEN (FORMALDEHYDE INACTIVATED), INFLUENZA B VIRUS B/PHUKET/3073/2013 ANTIGEN (FORMALDEHYDE INACTIVATED), AND INFLUENZA B VIRUS B/MARYLAND/15/2016 BX-69A ANTIGEN (FORMALDEHYDE INACTIVATED) 60; 60; 60; 60 UG/.7ML; UG/.7ML; UG/.7ML; UG/.7ML
INJECTION, SUSPENSION INTRAMUSCULAR
COMMUNITY
Start: 2020-09-14 | End: 2021-03-01

## 2020-10-14 RX ORDER — PREDNISONE 1 MG/1
TABLET ORAL
Qty: 21 TABLET | Refills: 0 | Status: SHIPPED | OUTPATIENT
Start: 2020-10-14 | End: 2020-12-17

## 2020-10-14 NOTE — PROGRESS NOTES
Subjective:     Patient ID: Anna Gilbert is a 75 y.o. female.    Chief Complaint:  Follow-up DJD right knee  History of Present Illness  Anna Gilbert returns to clinic for follow-up right knee.  Received corticosteroid injection 8/26/2021 with Dr. Grier.  Did receive significant symptom relief is recently knelt down cleaning at home began experiencing swelling at the medial compartment of the knee.  Denies that the knee is locking, catching or giving way increase pain noted transitional activities such as in seated standing attempting to walk.  When she starts ambulating pain better tolerated transitional action that exacerbate symptoms.  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  Denies other concerns present time.     Social History     Occupational History   • Not on file   Tobacco Use   • Smoking status: Former Smoker   • Smokeless tobacco: Never Used   • Tobacco comment: caffeine use   Substance and Sexual Activity   • Alcohol use: No   • Drug use: No   • Sexual activity: Defer      Past Medical History:   Diagnosis Date   • Coronary artery disease    • Health care maintenance    • Hyperthyroidism    • NSVT (nonsustained ventricular tachycardia) (CMS/[facility]) 9/23/2020   • SHEILA (obstructive sleep apnea) 7/20/2016   • PAF (paroxysmal atrial fibrillation) (CMS/[facility])    • Sick sinus syndrome (CMS/[facility])      Past Surgical History:   Procedure Laterality Date   • AORTIC VALVE REPAIR/REPLACEMENT  2010    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   • CARDIAC CATHETERIZATION  01/01/2011   • CARDIAC DEFIBRILLATOR PLACEMENT  2010   • MITRAL VALVE REPAIR/REPLACEMENT  2010       Family History   Problem Relation Age of Onset   • Coronary artery disease Father    • No Known Problems Mother          Review of Systems   Constitutional: Negative for chills, diaphoresis and unexpected weight change.   HENT: Negative for hearing loss, nosebleeds, sore  "throat and tinnitus.    Eyes: Negative for pain and visual disturbance.   Respiratory: Negative for cough, shortness of breath and wheezing.    Cardiovascular: Negative for chest pain and palpitations.   Gastrointestinal: Negative for abdominal pain, diarrhea, nausea and vomiting.   Endocrine: Negative for cold intolerance, heat intolerance and polydipsia.   Genitourinary: Negative for difficulty urinating, dyspareunia and hematuria.   Musculoskeletal: Positive for arthralgias and myalgias.   Skin: Negative for rash and wound.   Allergic/Immunologic: Negative for environmental allergies.   Neurological: Negative for dizziness, syncope and numbness.   Hematological: Does not bruise/bleed easily.   Psychiatric/Behavioral: Negative for dysphoric mood and sleep disturbance. The patient is not nervous/anxious.          Objective:  Physical Exam    General: No acute distress.  Eyes: conjunctiva clear; pupils equally round and reactive  ENT: external ears and nose atraumatic; oropharynx clear  CV: no peripheral edema  Resp: normal respiratory effort  Skin: no rashes or wounds; normal turgor  Psych: mood and affect appropriate; recent and remote memory intact    Vitals:    10/14/20 1330   Weight: 80.3 kg (177 lb)   Height: 157.5 cm (62\")         10/14/20  1330   Weight: 80.3 kg (177 lb)     Body mass index is 32.37 kg/m².        Right Knee Exam     Tenderness   The patient is experiencing tenderness in the medial joint line.    Range of Motion   Extension: 0   Flexion: 130     Tests   Romina:  Medial - negative Lateral - negative  Varus: negative Valgus: negative  Lachman:  Anterior - 1+    Posterior - negative  Drawer:  Anterior - negative    Posterior - negative  Patellar apprehension: negative    Other   Erythema: absent  Sensation: normal  Pulse: present  Swelling: moderate    Comments:  Positive crepitus throughout arc of motion  Negative active patellar compression test               Imaging:  Right Knee " X-Ray  Indication: Pain    AP, Lateral, and [place] views    Findings:  No fracture  Normal soft tissues  Normal joint spaces  Moderate to advanced medial compartment narrowing with near bone-on-bone articulation with osteophytes present at the lateral femoral condyle, medial tibial plateau, moderate patellofemoral narrowing osteophytes noted at the inferior patellar pole  Prior studies were available for comparison.    Assessment:        1. Acute pain of left knee    2. Acute pain of right knee           Plan:        1. Discussed plan of care with patient.  Will start prednisone taper 6 days.  Unable to tolerate oral NSAID secondary to concurrent use of warfarin.  Plan to see her back in clinic if not improving.  She verbalized understanding of all information agrees plan of care.  Denies other concerns present time.        Orders:  Orders Placed This Encounter   Procedures   • XR Knee 3 View Right       Medications:  New Medications Ordered This Visit   Medications   • predniSONE (DELTASONE) 5 MG tablet     Si tablets day 1, 5 tablets day 2, 4 tablets day 3, 3 tablets day 4, 2 tablets day 5, 1 tablet day 6     Dispense:  21 tablet     Refill:  0       Followup:  No follow-ups on file.    Diagnoses and all orders for this visit:    1. Acute pain of left knee (Primary)  -     Cancel: XR Knee 3 View Left    2. Acute pain of right knee  -     XR Knee 3 View Right    Other orders  -     predniSONE (DELTASONE) 5 MG tablet; 6 tablets day 1, 5 tablets day 2, 4 tablets day 3, 3 tablets day 4, 2 tablets day 5, 1 tablet day 6  Dispense: 21 tablet; Refill: 0          Dictated utilizing Dragon dictation

## 2020-11-02 ENCOUNTER — OFFICE VISIT (OUTPATIENT)
Dept: ORTHOPEDIC SURGERY | Facility: CLINIC | Age: 75
End: 2020-11-02

## 2020-11-02 VITALS — HEIGHT: 62 IN | WEIGHT: 177 LBS | BODY MASS INDEX: 32.57 KG/M2

## 2020-11-02 DIAGNOSIS — M17.11 PRIMARY OSTEOARTHRITIS OF RIGHT KNEE: ICD-10-CM

## 2020-11-02 DIAGNOSIS — M25.562 ACUTE PAIN OF LEFT KNEE: Primary | ICD-10-CM

## 2020-11-02 DIAGNOSIS — M17.12 PRIMARY OSTEOARTHRITIS OF LEFT KNEE: ICD-10-CM

## 2020-11-02 PROCEDURE — 73562 X-RAY EXAM OF KNEE 3: CPT | Performed by: ORTHOPAEDIC SURGERY

## 2020-11-02 PROCEDURE — 20610 DRAIN/INJ JOINT/BURSA W/O US: CPT | Performed by: ORTHOPAEDIC SURGERY

## 2020-11-02 RX ORDER — ALBUTEROL SULFATE 90 UG/1
2 AEROSOL, METERED RESPIRATORY (INHALATION) AS NEEDED
COMMUNITY
Start: 2020-10-30

## 2020-11-02 NOTE — PROGRESS NOTES
Subjective: Bilateral knee pain     Patient ID: Anna Gilbert is a 75 y.o. female.    Chief Complaint:    History of Present Illness 75-year-old female returns with bilateral knee pain left worse than right.  Cortisone injection given on last visit helped for short period of time.  Any pain with actives of daily living.       Social History     Occupational History   • Not on file   Tobacco Use   • Smoking status: Former Smoker   • Smokeless tobacco: Never Used   • Tobacco comment: caffeine use   Substance and Sexual Activity   • Alcohol use: No   • Drug use: No   • Sexual activity: Defer      Review of Systems   Constitutional: Negative for chills, diaphoresis and unexpected weight change.   HENT: Negative for hearing loss, nosebleeds, sore throat and tinnitus.    Eyes: Negative for pain and visual disturbance.   Respiratory: Negative for cough, shortness of breath and wheezing.    Cardiovascular: Negative for chest pain and palpitations.   Gastrointestinal: Negative for abdominal pain, diarrhea, nausea and vomiting.   Endocrine: Negative for cold intolerance, heat intolerance and polydipsia.   Genitourinary: Negative for difficulty urinating, dyspareunia and hematuria.   Musculoskeletal: Positive for arthralgias, joint swelling and myalgias.   Skin: Negative for rash and wound.   Allergic/Immunologic: Negative for environmental allergies.   Neurological: Negative for dizziness, syncope and numbness.   Hematological: Does not bruise/bleed easily.   Psychiatric/Behavioral: Negative for dysphoric mood and sleep disturbance. The patient is not nervous/anxious.            Objective:      Ortho Exam   AP lateral sunrise view of the left knee is most symptomatic does show evidence of tricompartmental arthritis particular the medial patellofemoral compartment.  Slight progression from previous x-rays.  There is crepitus with range of motion to both knees but no instability.  No motor or sensory deficit in the cast  nontender.  No instability.  Good capillary refill.  Skin is cool to touch    Assessment:        1. Acute pain of left knee    2. Primary osteoarthritis of right knee    3. Primary osteoarthritis of left knee           Plan: Patient is failed anti-inflammatory medication cortisone injections and again proceed with gel 1 injections in the both knee which was given through the superolateral portal tolerating that well.  Postinjection instructions given to the patient.  Return to see me as needed.  As discussed these injections can be repeated every 6 months.  Large Joint Arthrocentesis  Date/Time: 11/2/2020 3:21 PM  Consent given by: patient  Site marked: site marked  Timeout: Immediately prior to procedure a time out was called to verify the correct patient, procedure, equipment, support staff and site/side marked as required   Supporting Documentation  Indications: pain   Procedure Details  Location: knee - Knee joint: Bilateral.  Preparation: Patient was prepped and draped in the usual sterile fashion  Needle size: 20 G  Approach: superior  Medications administered: 30 mg Cross-Linked Hyaluronate 30 MG/3ML  Patient tolerance: patient tolerated the procedure well with no immediate complications                          Dictated utilizing Dragon dictation

## 2020-11-30 ENCOUNTER — ANTICOAGULATION VISIT (OUTPATIENT)
Dept: PHARMACY | Facility: HOSPITAL | Age: 75
End: 2020-11-30

## 2020-11-30 ENCOUNTER — OFFICE VISIT (OUTPATIENT)
Dept: CARDIOLOGY | Facility: CLINIC | Age: 75
End: 2020-11-30

## 2020-11-30 VITALS
BODY MASS INDEX: 33.49 KG/M2 | WEIGHT: 182 LBS | DIASTOLIC BLOOD PRESSURE: 86 MMHG | SYSTOLIC BLOOD PRESSURE: 140 MMHG | HEIGHT: 62 IN | HEART RATE: 90 BPM

## 2020-11-30 DIAGNOSIS — Z98.890 H/O MITRAL VALVE REPAIR: Chronic | ICD-10-CM

## 2020-11-30 DIAGNOSIS — I47.29 NSVT (NONSUSTAINED VENTRICULAR TACHYCARDIA) (HCC): Primary | Chronic | ICD-10-CM

## 2020-11-30 DIAGNOSIS — I48.20 CHRONIC ATRIAL FIBRILLATION (HCC): Chronic | ICD-10-CM

## 2020-11-30 DIAGNOSIS — I48.20 CHRONIC ATRIAL FIBRILLATION (HCC): ICD-10-CM

## 2020-11-30 LAB
INR PPP: 2.7 (ref 0.91–1.09)
PROTHROMBIN TIME: 31.8 SECONDS (ref 10–13.8)

## 2020-11-30 PROCEDURE — 36416 COLLJ CAPILLARY BLOOD SPEC: CPT

## 2020-11-30 PROCEDURE — G0463 HOSPITAL OUTPT CLINIC VISIT: HCPCS

## 2020-11-30 PROCEDURE — 99203 OFFICE O/P NEW LOW 30 MIN: CPT | Performed by: INTERNAL MEDICINE

## 2020-11-30 PROCEDURE — 85610 PROTHROMBIN TIME: CPT

## 2020-11-30 PROCEDURE — 93000 ELECTROCARDIOGRAM COMPLETE: CPT | Performed by: INTERNAL MEDICINE

## 2020-11-30 NOTE — PROGRESS NOTES
Date of Office Visit: 2020  Encounter Provider: Mick Perez MD  Place of Service: [facility] CARDIOLOGY  Patient Name: Anna Gilbert  : 1945    Subjective:     Encounter Date:2020      Patient ID: Anna Gilbert is a 75 y.o. female who has a cc of  Referred by JOHANA for discussion of ICD     She had AVR in  and then weeks after they told her to have a pacemaker but they put in an ICD and she didn't know she had an ICD until months later;     I can't see the notes from .    She has never had a shock.    She feels good; strong.     She has perm af and feels occ palpitations.     Works as a .    Echo -- normal heart function. Normal valve.         There have been no hospital admission since the last visit.     There have been no bleeding events.       Past Medical History:   Diagnosis Date   • Coronary artery disease    • Health care maintenance    • Hyperthyroidism    • NSVT (nonsustained ventricular tachycardia) (CMS/[facility]) 2020   • SHEILA (obstructive sleep apnea) 2016   • PAF (paroxysmal atrial fibrillation) (CMS/[facility])    • Sick sinus syndrome (CMS/[facility])        Social History     Socioeconomic History   • Marital status: Single     Spouse name: Not on file   • Number of children: Not on file   • Years of education: Not on file   • Highest education level: Not on file   Tobacco Use   • Smoking status: Former Smoker   • Smokeless tobacco: Never Used   • Tobacco comment: caffeine use   Substance and Sexual Activity   • Alcohol use: No   • Drug use: No   • Sexual activity: Defer       Family History   Problem Relation Age of Onset   • Coronary artery disease Father    • No Known Problems Mother        Review of Systems   Constitution: Negative for fever and night sweats.   HENT: Negative for ear pain and stridor.    Eyes: Negative for discharge and visual halos.   Cardiovascular: Negative for cyanosis.   Respiratory: Negative for hemoptysis  "and sputum production.    Hematologic/Lymphatic: Negative for adenopathy.   Skin: Negative for nail changes and unusual hair distribution.   Musculoskeletal: Negative for gout and joint swelling.   Gastrointestinal: Negative for bowel incontinence and flatus.   Genitourinary: Negative for dysuria and flank pain.   Neurological: Negative for seizures and tremors.   Psychiatric/Behavioral: Negative for altered mental status. The patient is not nervous/anxious.             Objective:     Vitals:    11/30/20 1359   BP: 140/86   BP Location: Right arm   Patient Position: Sitting   Cuff Size: Large Adult   Pulse: 90   Weight: 82.6 kg (182 lb)   Height: 157.5 cm (62\")         Eyes:      General:         Right eye: No discharge.         Left eye: No discharge.   HENT:      Head: Normocephalic and atraumatic.   Neck:      Thyroid: No thyromegaly.      Vascular: No JVD.   Pulmonary:      Effort: Pulmonary effort is normal.      Breath sounds: Normal breath sounds. No rales.   Cardiovascular:      Normal rate. Irregularly irregular rhythm.      No gallop.   Edema:     Peripheral edema absent.   Abdominal:      General: Bowel sounds are normal.      Palpations: Abdomen is soft.      Tenderness: There is no abdominal tenderness.   Musculoskeletal: Normal range of motion.         General: No deformity.   Skin:     General: Skin is warm and dry.      Findings: No erythema.   Neurological:      Mental Status: Alert and oriented to person, place, and time.      Motor: Normal muscle tone.   Psychiatric:         Behavior: Behavior normal.         Thought Content: Thought content normal.           ECG 12 Lead    Date/Time: 11/30/2020 2:44 PM  Performed by: Mick Perez MD  Authorized by: Mick Perez MD   Comparison: compared with previous ECG   Similar to previous ECG  Rhythm: atrial fibrillation    Clinical impression: abnormal EKG            Lab Review:       Assessment:          Diagnosis Plan   1. NSVT (nonsustained " ventricular tachycardia) (CMS/HCC)     2. Chronic atrial fibrillation (CMS/HCC)     3. H/O mitral valve repair            Plan:     There is no indication for an ICD    She did not have cardiac arrest;  She has never had an ICD shock  She has normal EF    I reviewed the zio -- there is ventricular ectopy and nsvt but it is not malignant and she is on metoprolol. .       Thus there is no reason to do an ICD and I agree strongly with dr le not to operate on her.    We had a nice discussion.    Thanks.

## 2020-11-30 NOTE — PROGRESS NOTES
Anticoagulation Clinic Progress Note    Anticoagulation Summary  As of 2020    INR goal:  2.0-3.0   TTR:  57.1 % (2.1 y)   INR used for dosin.7 (2020)   Warfarin maintenance plan:  2.5 mg every Sat; 5 mg all other days   Weekly warfarin total:  32.5 mg   Plan last modified:  Uzma Woodall RPH (2020)   Next INR check:  2020   Priority:  Maintenance   Target end date:  Indefinite    Indications    Chronic atrial fibrillation (CMS/HCC) [I48.20]             Anticoagulation Episode Summary     INR check location:      Preferred lab:      Send INR reminders to:  [facility] POOL    Comments:  [place]      Anticoagulation Care Providers     Provider Role Specialty Phone number    Adeel Mina III, MD Referring Cardiology [phone]          Clinic Interview:  Patient Findings     Positives:  Signs/symptoms of bleeding, Missed doses, Bruising    Negatives:  Signs/symptoms of thrombosis, Laboratory test error   suspected, Change in health, Change in alcohol use, Change in activity,   Upcoming invasive procedure, Emergency department visit, Upcoming dental   procedure, Extra doses, Change in medications, Change in diet/appetite,   Hospital admission, Other complaints    Comments:  Patient reports taking 2.5 on  for the past two due to increased bleeding with cuts/bumping into things. States she goes through 1 box of Band-Aids a week. Patient is very frustrated as she is still very active and is having issues with this. Stated that she is   calling the cardiology office tomorrow to try and see if she can be taken off warfarin.       Clinical Outcomes     Negatives:  Major bleeding event, Thromboembolic event,   Anticoagulation-related hospital admission, Anticoagulation-related ED   visit, Anticoagulation-related fatality    Comments:  Patient reports taking 2.5 on  for the past two due   to increased bleeding with cuts/bumping into things. States she goes    through 1 box of Band-Aids a week. Patient is very frustrated as she is   still very active and is having issues with this. Stated that she is   calling the cardiology office tomorrow to try and see if she can be taken   off warfarin.         INR History:  Anticoagulation Monitoring 8/6/2020 10/9/2020 11/30/2020   INR 2.7 2.5 2.7   INR Date 8/6/2020 10/9/2020 11/30/2020   INR Goal 2.0-3.0 2.0-3.0 2.0-3.0   Trend Same Same Down   Last Week Total 35 mg 35 mg 32.5 mg   Next Week Total 35 mg 35 mg 32.5 mg   Sun 5 mg 5 mg 5 mg   Mon 5 mg 5 mg 5 mg   Tue 5 mg 5 mg 5 mg   Wed 5 mg 5 mg 5 mg   Thu 5 mg 5 mg 5 mg   Fri 5 mg 5 mg 5 mg   Sat 5 mg 5 mg 2.5 mg   Visit Report - - -   Some recent data might be hidden       Plan:  1. INR is Therapeutic today- see above in Anticoagulation Summary.  Will instruct Anna Gilbert to Change their warfarin regimen based on reports of what she has been taking- see above in Anticoagulation Summary.  2. Follow up in 2 weeks  3. Patient declines warfarin refills.  4. Verbal and written information provided. Patient expresses understanding and has no further questions at this time.    Uzma Woodall [facility]

## 2020-12-03 ENCOUNTER — TELEPHONE (OUTPATIENT)
Dept: CARDIOLOGY | Facility: CLINIC | Age: 75
End: 2020-12-03

## 2020-12-03 NOTE — TELEPHONE ENCOUNTER
Patient left a voicemail that she has questions about her blood thinners. Please call her at [phone].

## 2020-12-04 NOTE — TELEPHONE ENCOUNTER
Pt called back. She is on Warfarin. She is wanting to know if there is an alternative?    She states that her skin is so sore and tearing.     PT #: [phone]

## 2020-12-09 ENCOUNTER — TELEPHONE (OUTPATIENT)
Dept: ORTHOPEDIC SURGERY | Facility: CLINIC | Age: 75
End: 2020-12-09

## 2020-12-09 NOTE — TELEPHONE ENCOUNTER
Patient calling stating that the gel only lasted a few weeks she received the injections on 11.02.2020. She is asking if you have any further recommendations?

## 2020-12-09 NOTE — TELEPHONE ENCOUNTER
Provider: CHRISSY OLMOS  Caller: CALVIN SPRING  Relationship to Patient: SELF    Phone Number: [phone]  Reason for Call: PATIENT CALLED TO REQ TYPE OF MED USED IN INJECTION ON KNEES    When was the patient last seen: 11/02/2020    PLEASE CONTACT THE PATIENT .[phone]

## 2020-12-17 ENCOUNTER — OFFICE VISIT (OUTPATIENT)
Dept: CARDIOLOGY | Facility: CLINIC | Age: 75
End: 2020-12-17

## 2020-12-17 VITALS — BODY MASS INDEX: 33.31 KG/M2 | WEIGHT: 181 LBS | HEIGHT: 62 IN

## 2020-12-17 DIAGNOSIS — Z98.890 H/O MITRAL VALVE REPAIR: Chronic | ICD-10-CM

## 2020-12-17 DIAGNOSIS — G47.33 OSA (OBSTRUCTIVE SLEEP APNEA): Chronic | ICD-10-CM

## 2020-12-17 DIAGNOSIS — Z95.0 PACEMAKER: Chronic | ICD-10-CM

## 2020-12-17 DIAGNOSIS — Z95.2 AORTIC VALVE REPLACED: Chronic | ICD-10-CM

## 2020-12-17 DIAGNOSIS — I48.20 CHRONIC ATRIAL FIBRILLATION (HCC): Primary | Chronic | ICD-10-CM

## 2020-12-17 DIAGNOSIS — M17.12 PRIMARY OSTEOARTHRITIS OF LEFT KNEE: ICD-10-CM

## 2020-12-17 DIAGNOSIS — I47.29 NSVT (NONSUSTAINED VENTRICULAR TACHYCARDIA) (HCC): Chronic | ICD-10-CM

## 2020-12-17 PROCEDURE — 99441 PR PHYS/QHP TELEPHONE EVALUATION 5-10 MIN: CPT | Performed by: INTERNAL MEDICINE

## 2020-12-17 NOTE — PROGRESS NOTES
Subjective:     Encounter Date:  12/17/20        Patient ID: Anna Gilbert is a 75 y.o. female.    Chief Complaint: CAF  History of Present Illness    Dear Dr. Isaac,    I had the pleasure of having a visit with this patient  today.    This patient has consented to a telehealth visit via audio. The visit was scheduled as a audio visit to comply with patient safety concerns in accordance with CDC recommendations.  All vitals recorded within this visit are reported by the patient.  I spent  15 minutes in total including but not limited to the 8 minutes spent in direct conversation with this patient.     She has a history of chronic atrial fibrillation, nonsustained ventricular tachycardia, as well as bioprosthetic aortic valve and prior mitral valve repair. She also has an AICD in place.    Her main question today when she was wondering if there was something she could do regarding the warfarin.  She has been bruising and notes that her skin tears very easily.  Her skin is just becoming very very thin.  She does not have there is any particular medicine that can help her skin not chair, and wanted to have a discussion regarding the need for the warfarin.  However she understands that it is necessary to prevent a stroke given her atrial fibrillation.      She also just had an appointment with Dr. Perez and the decision was not to place AICD.     In June she had an echocardiogram and a monitor.  We had scheduled an appointment then, but she was moving and so she rescheduled to that now.  Her AICD has been at end-of-life and she is been wondering whether she should get the AICD replaced.  She really has had several discussions about this with the device clinic.  She thinks the device now may no longer be functioning.    Event Monitor  Event monitor enrollment date was 12/7/2016. Enrollment ended on 12/13/2016. The manufacturing company for the event monitor is [employer]. Zio monitor is reviewed. A total of 6 days and  10 hours are available for review. Atrial fibrillation is present throughout. Average heart rate is 70 bpm. Maximal heart rate is approximately 150 bpm. Occasional wide complex beats are seen which appear to be atrial fibrillation with aberrancy. Minimal heart rate is atrial fibrillation at a rate of 39 bpm. No other ectopy is seen.      She had a stress test and echocardiogram performed April 2019:  04/24/19 ECHO  LVSF NORMAL EF=54%  ESTIMATED EF WAS IN DISAGREEMENT W THE CALCULATED EF . EST EF APPEARS TO BE IN RANGE 61-65%  NORMAL LEFT VENTRICULAR CAVITY / LEFT VENT WALL THICKNESS IS CONSISTENT W MILD LVH  LEFT VENTRICULAR DIASTOLIC FUNCTION WAS INDETERMINATE.  RT VENT CAVITY IS MILD TO MODERATE DILATED  LEFT ATRIAL CAVITY SEVERELY DILATED  RT ATRIAL CAVITY IS SEVERELY DILATED  THE AORTIC VALVE PEAK AND MEAN GRADIENTS ARE WITHIN DEFINED LIMITS . THE PROSTHETIC VALVE IS GROSSLY NORMAL  NO SIGNIFICANT MITRAL VALVE REGURG OR STENOSIS  MILD TO MOD TR VALVE REGURG.  ESTIMATED RT VENTRICULAR SYSTOLIC PRESSURE FROM TR REGURG IS MILDLY ELEVATED 35-45MMHG. CALCULATED RT VS PRESSURE FROM TR IS 38MMHG    04/03/19 STRESS TREADMILL  The patient reported shortness of breath during the stress test.  · AFib present throughout  · No ECG evidence of myocardial ischemia.Negative clinical evidence of   myocardial ischemia. Findings consistent with a normal ECG stress test      The following portions of the patient's history were reviewed and updated as appropriate: allergies, current medications, past family history, past medical history, past social history, past surgical history and problem list.    Past Medical History:   Diagnosis Date   • Coronary artery disease    • Health care maintenance    • Hyperthyroidism    • NSVT (nonsustained ventricular tachycardia) (CMS/[facility]) 9/23/2020   • SHEILA (obstructive sleep apnea) 7/20/2016   • PAF (paroxysmal atrial fibrillation) (CMS/[facility])    • Sick sinus syndrome (CMS/[facility])        Past Surgical  "History:   Procedure Laterality Date   • AORTIC VALVE REPAIR/REPLACEMENT  2010    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   • CARDIAC CATHETERIZATION  01/01/2011   • CARDIAC DEFIBRILLATOR PLACEMENT  2010   • MITRAL VALVE REPAIR/REPLACEMENT  2010       Social History     Socioeconomic History   • Marital status: Single     Spouse name: Not on file   • Number of children: Not on file   • Years of education: Not on file   • Highest education level: Not on file   Tobacco Use   • Smoking status: Former Smoker   • Smokeless tobacco: Never Used   • Tobacco comment: caffeine use   Substance and Sexual Activity   • Alcohol use: No   • Drug use: No   • Sexual activity: Defer       Review of Systems   Constitution: Negative for chills, decreased appetite, fever and night sweats.   HENT: Negative for ear discharge, ear pain, hearing loss, nosebleeds and sore throat.    Eyes: Negative for blurred vision, double vision and pain.   Cardiovascular: Negative for cyanosis.   Respiratory: Negative for hemoptysis and sputum production.    Endocrine: Negative for cold intolerance and heat intolerance.   Hematologic/Lymphatic: Negative for adenopathy.   Skin: Negative for dry skin, itching, nail changes, rash and suspicious lesions.   Musculoskeletal: Negative for arthritis, gout, muscle cramps, muscle weakness, myalgias and neck pain.   Gastrointestinal: Negative for anorexia, bowel incontinence, constipation, diarrhea, dysphagia, hematemesis and jaundice.   Genitourinary: Negative for bladder incontinence, dysuria, flank pain, frequency, hematuria and nocturia.   Neurological: Negative for focal weakness, numbness, paresthesias and seizures.   Psychiatric/Behavioral: Negative for altered mental status, hallucinations, hypervigilance, suicidal ideas and thoughts of violence.   Allergic/Immunologic: Negative for persistent infections.       Procedures       Objective:     Vitals:    12/17/20 0850   Weight: 82.1 kg (181 lb)   Height: 157.5 cm (62\") "     Alert and oriented x3, thought processes normal, judgment normal, speaking in full sentences with no wheezing and no respiratory distress. Hearing is appropriate without difficulty.    Lab Review:             Performed        Assessment:          Diagnosis Plan   1. Chronic atrial fibrillation (CMS/HCC)     2. NSVT (nonsustained ventricular tachycardia) (CMS/HCC)     3. Pacemaker     4. SHEILA (obstructive sleep apnea)     5. Primary osteoarthritis of left knee     6. Aortic valve replaced     7. H/O mitral valve repair            Plan:       1.   Atrial Fibrillation and Atrial Flutter  Assessment  • The patient has permanent atrial fibrillation  • This is valvular in etiology  • The patient's CHADS2-VASc score is 4  • A FEV5TT5-RNYs score of 2 or more is considered a high risk for a thromboembolic event  • Warfarin prescribed    Plan  • Continue in atrial fibrillation with rate control  • Continue warfarin for antithrombotic therapy, bleeding issues discussed  • Continue beta blocker for rate control  • Rate controlled, continue current medical therapy    2.  Bioprosthetic aortic valve replacement with prior mitral valve repair-echocardiogram April 2019 demonstrated normal function, continue to follow  3.  AICD in place- patient is seen in our device clinic. Device was placed in 2010, we have never been able to find documentation regarding that.  Serial echocardiograms dating from 2014 have consistently demonstrated normal ejection fraction.  She has a past history of ventricular tachycardia/nonsustained ventricular tachycardia.  She met with Dr. Covington December 2020 and the decision has been made not to place AICD.  4.  Mediastinal adenopathy-status post biopsy,   5.  Chronic diastolic congestive heart failure, no evidence of volume overload, doing well, continue current medical regimen  6.  Chronic anticoagulation-we discussed again the indications for this and she will continue on her current  regimen    Current Outpatient Medications:   •  albuterol sulfate  (90 Base) MCG/ACT inhaler, Inhale 2 puffs As Needed., Disp: , Rfl:   •  APPLE CIDER VINEGAR PO, Take 1 tablet by mouth Daily., Disp: , Rfl:   •  b complex vitamins capsule, Take 1 capsule by mouth Daily., Disp: , Rfl:   •  BIOTIN PO, Take 1 tablet by mouth Daily., Disp: , Rfl:   •  bumetanide (BUMEX) 2 MG tablet, Take 1 tablet by mouth 2 (Two) Times a Day. (Patient taking differently: Take 2 mg by mouth Daily.), Disp: 180 tablet, Rfl: 3  •  CALCIUM-MAGNESIUM-ZINC PO, Take 1 tablet by mouth Daily., Disp: , Rfl:   •  Cholecalciferol (VITAMIN D-3 PO), Take 1 tablet by mouth As Needed. Only through the winter, Disp: , Rfl:   •  conjugated estrogens (PREMARIN) 0.625 MG/GM vaginal cream, Insert 0.25 applicators into the vagina 2 (Two) Times a Week., Disp: , Rfl:   •  diclofenac (VOLTAREN) 1 % gel gel, Apply 4 g topically to the appropriate area as directed 4 (Four) Times a Day., Disp: 100 g, Rfl: 5  •  Fluzone High-Dose Quadrivalent 0.7 ML suspension prefilled syringe, PHARMACIST ADMINISTERED IMMUNIZATION ADMINISTERED AT TIME OF DISPENSING, Disp: , Rfl:   •  levothyroxine (SYNTHROID, LEVOTHROID) 150 MCG tablet, Take 150 mcg by mouth Daily., Disp: , Rfl:   •  metoprolol succinate XL (TOPROL-XL) 50 MG 24 hr tablet, Take 1 tablet by mouth Daily., Disp: 90 tablet, Rfl: 3  •  Multiple Vitamin (MULTIVITAMIN) capsule, Take 1 capsule by mouth Daily., Disp: , Rfl:   •  potassium chloride (K-DUR,KLOR-CON) 20 MEQ CR tablet, Take 1 tablet by mouth Daily., Disp: 90 tablet, Rfl: 3  •  TURMERIC PO, Take 1 tablet by mouth Daily., Disp: , Rfl:   •  warfarin (COUMADIN) 5 MG tablet, TAKE ONE TABLET BY MOUTH DAILY Or as directed by [facility], Disp: 90 tablet, Rfl: 0

## 2021-01-05 RX ORDER — WARFARIN SODIUM 5 MG/1
TABLET ORAL
Qty: 90 TABLET | Refills: 3 | Status: SHIPPED | OUTPATIENT
Start: 2021-01-05 | End: 2022-01-13 | Stop reason: SDUPTHER

## 2021-01-05 RX ORDER — WARFARIN SODIUM 5 MG/1
TABLET ORAL
Qty: 90 TABLET | Refills: 3 | Status: SHIPPED | OUTPATIENT
Start: 2021-01-05 | End: 2021-01-05 | Stop reason: SDUPTHER

## 2021-01-05 NOTE — TELEPHONE ENCOUNTER
PT has been having some edema in her left and right leg (knee, calf and foot). Pt denies weight gain or SOA.    Pt is taking BUMEX 2 MG tablet BID. Do you want her to increase her bumex?    PT #: [phone]

## 2021-01-05 NOTE — TELEPHONE ENCOUNTER
Pt called and left a VM, she said last Tuesday 12/29/2020, she called and left a voicemail with Nona, concerning her legs swelling and also that she was needing a refill on medication.    She said she is still having swelling and requesting Nona to call.    Pt can be reached at [phone]   Cooperative/Alert/Awake

## 2021-01-06 ENCOUNTER — ANTICOAGULATION VISIT (OUTPATIENT)
Dept: PHARMACY | Facility: HOSPITAL | Age: 76
End: 2021-01-06

## 2021-01-06 ENCOUNTER — TELEPHONE (OUTPATIENT)
Dept: CARDIOLOGY | Facility: CLINIC | Age: 76
End: 2021-01-06

## 2021-01-06 DIAGNOSIS — I48.20 CHRONIC ATRIAL FIBRILLATION (HCC): ICD-10-CM

## 2021-01-06 LAB
INR PPP: 2.4 (ref 0.91–1.09)
PROTHROMBIN TIME: 29.2 SECONDS (ref 10–13.8)

## 2021-01-06 PROCEDURE — 85610 PROTHROMBIN TIME: CPT

## 2021-01-06 PROCEDURE — 36416 COLLJ CAPILLARY BLOOD SPEC: CPT

## 2021-01-06 NOTE — PROGRESS NOTES
Anticoagulation Clinic Progress Note    Anticoagulation Summary  As of 2021    INR goal:  2.0-3.0   TTR:  59.1 % (2.2 y)   INR used for dosin.4 (2021)   Warfarin maintenance plan:  2.5 mg every Sat; 5 mg all other days   Weekly warfarin total:  32.5 mg   No change documented:  Rylee Bernal   Plan last modified:  Uzma Woodall RPH (2020)   Next INR check:  3/3/2021   Priority:  Maintenance   Target end date:  Indefinite    Indications    Chronic atrial fibrillation (CMS/[facility]) [I48.20]             Anticoagulation Episode Summary     INR check location:      Preferred lab:      Send INR reminders to:   [facility] POOL    Comments:  [place]      Anticoagulation Care Providers     Provider Role Specialty Phone number    Adeel Mina III, MD Referring Cardiology [phone]          Clinic Interview:  Patient Findings     Negatives:  Signs/symptoms of thrombosis, Signs/symptoms of bleeding,   Laboratory test error suspected, Change in health, Change in alcohol use,   Change in activity, Upcoming invasive procedure, Emergency department   visit, Upcoming dental procedure, Missed doses, Extra doses, Change in   medications, Change in diet/appetite, Hospital admission, Bruising, Other   complaints      Clinical Outcomes     Negatives:  Major bleeding event, Thromboembolic event,   Anticoagulation-related hospital admission, Anticoagulation-related ED   visit, Anticoagulation-related fatality        INR History:  Anticoagulation Monitoring 10/9/2020 2020 2021   INR 2.5 2.7 2.4   INR Date 10/9/2020 2020 2021   INR Goal 2.0-3.0 2.0-3.0 2.0-3.0   Trend Same Down Same   Last Week Total 35 mg 32.5 mg 32.5 mg   Next Week Total 35 mg 32.5 mg 32.5 mg   Sun 5 mg 5 mg 5 mg   Mon 5 mg 5 mg 5 mg   Tue 5 mg 5 mg 5 mg   Wed 5 mg 5 mg 5 mg   Thu 5 mg 5 mg 5 mg   Fri 5 mg 5 mg 5 mg   Sat 5 mg 2.5 mg 2.5 mg   Visit Report - - -   Some recent data might be hidden       Plan:  1.  INR is therapeutic today- see above in Anticoagulation Summary.   Will instruct Anna Gilbert to continue their warfarin regimen- see above in Anticoagulation Summary.  2. Follow up in 8 weeks.  3. Patient declines warfarin refills.  4. Verbal and written information provided. Patient expresses understanding and has no further questions at this time.    Rylee Bernal

## 2021-01-08 NOTE — TELEPHONE ENCOUNTER
I spoke to the pt. Since she increase her Bumex 2 MG 2 tab in the am and 1 tab PM. Her edema in her left and right leg (knee, calf and foot) has went away.    Do you want her to remain on Bumex 2 MG 2 IN the AM and 1 tab PM or go back to bumex 2 MG BID?    PT #: [phone]

## 2021-01-11 ENCOUNTER — APPOINTMENT (OUTPATIENT)
Dept: PHARMACY | Facility: HOSPITAL | Age: 76
End: 2021-01-11

## 2021-02-26 ENCOUNTER — TELEPHONE (OUTPATIENT)
Dept: CARDIOLOGY | Facility: CLINIC | Age: 76
End: 2021-02-26

## 2021-02-26 ENCOUNTER — ANTICOAGULATION VISIT (OUTPATIENT)
Dept: PHARMACY | Facility: HOSPITAL | Age: 76
End: 2021-02-26

## 2021-02-26 DIAGNOSIS — I48.20 CHRONIC ATRIAL FIBRILLATION (HCC): ICD-10-CM

## 2021-02-26 LAB
INR PPP: 1.7 (ref 0.91–1.09)
PROTHROMBIN TIME: 21 SECONDS (ref 10–13.8)

## 2021-02-26 PROCEDURE — 36416 COLLJ CAPILLARY BLOOD SPEC: CPT

## 2021-02-26 PROCEDURE — 85610 PROTHROMBIN TIME: CPT

## 2021-02-26 NOTE — TELEPHONE ENCOUNTER
Spoke to patient.  She states that it helps but then by mid morning her legs are swollen again.  I told her she can continue the increased dose until I see her on Monday.  She voiced understanding.

## 2021-02-26 NOTE — PROGRESS NOTES
Anticoagulation Clinic Progress Note    Anticoagulation Summary  As of 2021    INR goal:  2.0-3.0   TTR:  59.0 % (2.4 y)   INR used for dosin.7 (2021)   Warfarin maintenance plan:  2.5 mg every Sat; 5 mg all other days   Weekly warfarin total:  32.5 mg   Plan last modified:  Uzma Woodall RPH (2020)   Next INR check:  3/26/2021   Priority:  Maintenance   Target end date:  Indefinite    Indications    Chronic atrial fibrillation (CMS/HCC) [I48.20]             Anticoagulation Episode Summary     INR check location:      Preferred lab:      Send INR reminders to:   [facility] CLINICAL POOL    Comments:  [place]      Anticoagulation Care Providers     Provider Role Specialty Phone number    Adeel Mina III, MD Referring Cardiology [phone]          Clinic Interview:  Patient Findings     Positives:  Missed doses    Comments:  Found tab from Wednesday, so missed dose noted          INR History:  Anticoagulation Monitoring 2020   INR 2.7 2.4 1.7   INR Date 2020   INR Goal 2.0-3.0 2.0-3.0 2.0-3.0   Trend Down Same Same   Last Week Total 32.5 mg 32.5 mg 32.5 mg   Next Week Total 32.5 mg 32.5 mg 35 mg   Sun 5 mg 5 mg 5 mg   Mon 5 mg 5 mg 5 mg   Tue 5 mg 5 mg 5 mg   Wed 5 mg 5 mg 5 mg   Thu 5 mg 5 mg 5 mg   Fri 5 mg 5 mg 7.5 mg (); Otherwise 5 mg   Sat 2.5 mg 2.5 mg 2.5 mg   Visit Report - - -   Some recent data might be hidden       Plan:  1. INR is Subtherapeutic today- see above in Anticoagulation Summary.  Will instruct Anna Gilbert to boost warfarin to 7.5mg today then continue their warfarin regimen- see above in Anticoagulation Summary.  2. Follow up in 1 month  3. Patient declines warfarin refills.  4. Verbal and written information provided. Patient expresses understanding and has no further questions at this time.    Alicia Garcia [facility]

## 2021-02-26 NOTE — TELEPHONE ENCOUNTER
Pt called to inform you that she has been having some edema in her legs. She increased her Bumex to 1.5 in the morning and 1 tablet at night. She has an appointment with you on Monday.     Should she stay on the increased dose of Bumex until she see you on Monday?    PT#: [phone]

## 2021-03-01 ENCOUNTER — LAB (OUTPATIENT)
Dept: LAB | Facility: HOSPITAL | Age: 76
End: 2021-03-01

## 2021-03-01 ENCOUNTER — OFFICE VISIT (OUTPATIENT)
Dept: CARDIOLOGY | Facility: CLINIC | Age: 76
End: 2021-03-01

## 2021-03-01 VITALS
WEIGHT: 180 LBS | OXYGEN SATURATION: 97 % | HEIGHT: 62 IN | BODY MASS INDEX: 33.13 KG/M2 | SYSTOLIC BLOOD PRESSURE: 130 MMHG | RESPIRATION RATE: 16 BRPM | HEART RATE: 72 BPM | DIASTOLIC BLOOD PRESSURE: 70 MMHG

## 2021-03-01 DIAGNOSIS — Z95.2 AORTIC VALVE REPLACED: Primary | Chronic | ICD-10-CM

## 2021-03-01 DIAGNOSIS — Z95.2 AORTIC VALVE REPLACED: Chronic | ICD-10-CM

## 2021-03-01 DIAGNOSIS — R60.0 BILATERAL LEG EDEMA: ICD-10-CM

## 2021-03-01 DIAGNOSIS — R06.09 DYSPNEA ON EXERTION: ICD-10-CM

## 2021-03-01 DIAGNOSIS — E78.2 MIXED HYPERLIPIDEMIA: ICD-10-CM

## 2021-03-01 DIAGNOSIS — I47.29 NSVT (NONSUSTAINED VENTRICULAR TACHYCARDIA) (HCC): Chronic | ICD-10-CM

## 2021-03-01 DIAGNOSIS — I48.19 PERSISTENT ATRIAL FIBRILLATION (HCC): ICD-10-CM

## 2021-03-01 DIAGNOSIS — G47.33 OSA (OBSTRUCTIVE SLEEP APNEA): Chronic | ICD-10-CM

## 2021-03-01 DIAGNOSIS — Z98.890 H/O MITRAL VALVE REPAIR: Chronic | ICD-10-CM

## 2021-03-01 DIAGNOSIS — Z95.0 PACEMAKER: Chronic | ICD-10-CM

## 2021-03-01 LAB
ANION GAP SERPL CALCULATED.3IONS-SCNC: 13.2 MMOL/L (ref 5–15)
BUN SERPL-MCNC: 19 MG/DL (ref 8–23)
BUN/CREAT SERPL: 21.3 (ref 7–25)
CALCIUM SPEC-SCNC: 9.7 MG/DL (ref 8.6–10.5)
CHLORIDE SERPL-SCNC: 101 MMOL/L (ref 98–107)
CO2 SERPL-SCNC: 28.8 MMOL/L (ref 22–29)
CREAT SERPL-MCNC: 0.89 MG/DL (ref 0.57–1)
GFR SERPL CREATININE-BSD FRML MDRD: 62 ML/MIN/1.73
GLUCOSE SERPL-MCNC: 103 MG/DL (ref 65–99)
NT-PROBNP SERPL-MCNC: 681.2 PG/ML (ref 0–1800)
POTASSIUM SERPL-SCNC: 3.8 MMOL/L (ref 3.5–5.2)
SODIUM SERPL-SCNC: 143 MMOL/L (ref 136–145)

## 2021-03-01 PROCEDURE — 93000 ELECTROCARDIOGRAM COMPLETE: CPT | Performed by: NURSE PRACTITIONER

## 2021-03-01 PROCEDURE — 36415 COLL VENOUS BLD VENIPUNCTURE: CPT

## 2021-03-01 PROCEDURE — 83880 ASSAY OF NATRIURETIC PEPTIDE: CPT

## 2021-03-01 PROCEDURE — 80048 BASIC METABOLIC PNL TOTAL CA: CPT

## 2021-03-01 PROCEDURE — 99214 OFFICE O/P EST MOD 30 MIN: CPT | Performed by: NURSE PRACTITIONER

## 2021-03-01 NOTE — PROGRESS NOTES
Date of Office Visit: 2021  Encounter Provider: LUIS ARMANDO Quiñones  Place of Service: [facility] CARDIOLOGY  Patient Name: Anna Gilbert  :1945  Primary Cardiologist: JORGE Chahal    Dear Dr. Isaac    HPI: Anna Gilbert is a pleasant 75 y.o. female who presents 2021 for cardiac follow up.  I have reviewed past labs, testing and notes in preparation for today's visit.    She has a history of chronic atrial fibrillation, nonsustained ventricular tachycardia, as well as bioprosthetic aortic valve and prior mitral valve repair. She also has an AICD in place.     She had a telehealth appt with Dr. Mina in 2020 and was concerned about her easy bruising and questioned transitioning to something other than warfarin or stopping the warfarin.  He had a conversation that it was necessary to prevent a stroke given her atrial fibrillation and she has remained on the warfarin.  She states she is now wearing gloves on her hands and using some lotion that has helped.      Her AICD has been at end-of-life and she is been wondering whether she should get the AICD replaced.  She really has had several discussions about this with the device clinic.  She thinks the device now may no longer be functioning.She also had an appointment with Dr. Perez and the decision was not to replace  Her AICD.     Event Monitor  Event monitor enrollment date was 2016. Enrollment ended on 2016. The manufacturing company for the event monitor is [id number]. Zio monitor is reviewed. A total of 6 days and 10 hours are available for review. Atrial fibrillation is present throughout. Average heart rate is 70 bpm. Maximal heart rate is approximately 150 bpm. Occasional wide complex beats are seen which appear to be atrial fibrillation with aberrancy. Minimal heart rate is atrial fibrillation at a rate of 39 bpm. No other ectopy is seen.      She had a stress test and  echocardiogram performed April 2019:  04/24/19 ECHO  LVSF NORMAL EF=54%  ESTIMATED EF WAS IN DISAGREEMENT W THE CALCULATED EF . EST EF APPEARS TO BE IN RANGE 61-65%  NORMAL LEFT VENTRICULAR CAVITY / LEFT VENT WALL THICKNESS IS CONSISTENT W MILD LVH  LEFT VENTRICULAR DIASTOLIC FUNCTION WAS INDETERMINATE.  RT VENT CAVITY IS MILD TO MODERATE DILATED  LEFT ATRIAL CAVITY SEVERELY DILATED  RT ATRIAL CAVITY IS SEVERELY DILATED  THE AORTIC VALVE PEAK AND MEAN GRADIENTS ARE WITHIN DEFINED LIMITS . THE PROSTHETIC VALVE IS GROSSLY NORMAL  NO SIGNIFICANT MITRAL VALVE REGURG OR STENOSIS  MILD TO MOD TR VALVE REGURG.  ESTIMATED RT VENTRICULAR SYSTOLIC PRESSURE FROM TR REGURG IS MILDLY ELEVATED 35-45MMHG. CALCULATED RT VS PRESSURE FROM TR IS 38MMHG    04/03/19 STRESS TREADMILL  The patient reported shortness of breath during the stress test.  · AFib present throughout  · No ECG evidence of myocardial ischemia.Negative clinical evidence of   myocardial ischemia. Findings consistent with a normal ECG stress test     She states she has been having increasing lower extremity edema for about 2 months.  She has self increased her bumetanide to 3 mg in the morning and 2 mg in the evening.  She has been doing this for about a week.  She states it really does not help a great deal with the increased dose.  She still has pitting lower extremity edema, left greater than right.  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  She denies any shortness of breath, dizziness, chest pain or chest pressure.  She is somewhat fatigued.  She has moved and is planning on getting her aboveground pool put up and gardening this summer.  She knows she needs to lose about 20 pounds.  She states she knows she will feel better.  An echo had been scheduled last fall but that was postponed due to her move.  Past Medical History:   Diagnosis Date   • Coronary artery disease    • Health care maintenance    • Hyperthyroidism    • NSVT (nonsustained  ventricular tachycardia) (CMS/[facility]) 9/23/2020   • SHEILA (obstructive sleep apnea) 7/20/2016   • PAF (paroxysmal atrial fibrillation) (CMS/[facility])    • Sick sinus syndrome (CMS/[facility])        Past Surgical History:   Procedure Laterality Date   • AORTIC VALVE REPAIR/REPLACEMENT  2010    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   • CARDIAC CATHETERIZATION  01/01/2011   • CARDIAC DEFIBRILLATOR PLACEMENT  2010   • MITRAL VALVE REPAIR/REPLACEMENT  2010       Social History     Socioeconomic History   • Marital status: Single     Spouse name: Not on file   • Number of children: Not on file   • Years of education: Not on file   • Highest education level: Not on file   Tobacco Use   • Smoking status: Former Smoker   • Smokeless tobacco: Never Used   • Tobacco comment: caffeine use   Substance and Sexual Activity   • Alcohol use: No   • Drug use: No   • Sexual activity: Defer       Family History   Problem Relation Age of Onset   • Coronary artery disease Father    • No Known Problems Mother        The following portion of the patient's history were reviewed and updated as appropriate: past medical history, past surgical history, past social history, past family history, allergies, current medications, and problem list.    Review of Systems   Constitution: Positive for malaise/fatigue. Negative for diaphoresis and fever.   HENT: Negative for congestion, hearing loss, hoarse voice, nosebleeds and sore throat.    Eyes: Negative for photophobia, vision loss in left eye, vision loss in right eye and visual disturbance.   Cardiovascular: Positive for leg swelling. Negative for chest pain, dyspnea on exertion, irregular heartbeat, near-syncope, orthopnea, palpitations, paroxysmal nocturnal dyspnea and syncope.   Respiratory: Negative for cough, hemoptysis, shortness of breath, sleep disturbances due to breathing, snoring, sputum production and wheezing.    Endocrine: Negative for cold intolerance, heat intolerance, polydipsia, polyphagia and polyuria.    Hematologic/Lymphatic: Negative for bleeding problem. Does not bruise/bleed easily.   Skin: Negative for color change, dry skin, poor wound healing, rash and suspicious lesions.   Musculoskeletal: Negative for arthritis, back pain, falls, gout, joint pain, joint swelling, muscle cramps, muscle weakness and myalgias.   Gastrointestinal: Negative for bloating, abdominal pain, constipation, diarrhea, dysphagia, melena, nausea and vomiting.   Neurological: Negative for excessive daytime sleepiness, dizziness, headaches, light-headedness, loss of balance, numbness, paresthesias, seizures, vertigo and weakness.   Psychiatric/Behavioral: Negative for depression, memory loss and substance abuse. The patient is not nervous/anxious.        Allergies   Allergen Reactions   • Gabapentin Unknown - High Severity, Other (See Comments) and Rash     Feet peeling         Current Outpatient Medications:   •  albuterol sulfate  (90 Base) MCG/ACT inhaler, Inhale 2 puffs As Needed., Disp: , Rfl:   •  APPLE CIDER VINEGAR PO, Take 1 tablet by mouth Daily., Disp: , Rfl:   •  b complex vitamins capsule, Take 1 capsule by mouth Daily., Disp: , Rfl:   •  BIOTIN PO, Take 1 tablet by mouth Daily., Disp: , Rfl:   •  bumetanide (BUMEX) 2 MG tablet, Take 1 tablet by mouth 2 (Two) Times a Day. (Patient taking differently: Take 2 mg by mouth 2 (Two) Times a Day. Takes 1.5 tabs, 3 mg in the morning Takes 1 tab, 2 mg in the evening), Disp: 180 tablet, Rfl: 3  •  CALCIUM-MAGNESIUM-ZINC PO, Take 1 tablet by mouth Daily., Disp: , Rfl:   •  Cholecalciferol (VITAMIN D-3 PO), Take 1 tablet by mouth As Needed. Only through the winter, Disp: , Rfl:   •  levothyroxine (SYNTHROID, LEVOTHROID) 150 MCG tablet, Take 150 mcg by mouth Daily., Disp: , Rfl:   •  metoprolol succinate XL (TOPROL-XL) 50 MG 24 hr tablet, Take 1 tablet by mouth Daily., Disp: 90 tablet, Rfl: 3  •  Multiple Vitamin (MULTIVITAMIN) capsule, Take 1 capsule by mouth Daily., Disp: , Rfl:  "  •  potassium chloride (K-DUR,KLOR-CON) 20 MEQ CR tablet, Take 1 tablet by mouth Daily., Disp: 90 tablet, Rfl: 3  •  TURMERIC PO, Take 1 tablet by mouth Daily., Disp: , Rfl:   •  warfarin (COUMADIN) 5 MG tablet, Take 2.5mg (1/2 Tablet) on Saturday, Take 5mg (1 Tablet) all other days OR as directed by the [facility], Disp: 90 tablet, Rfl: 3        Objective:     Vitals:    03/01/21 1358   BP: 130/70   Pulse: 72   Resp: 16   SpO2: 97%   Weight: 81.6 kg (180 lb)   Height: 157.5 cm (62\")     Body mass index is 32.92 kg/m².      Constitutional:       General: Not in acute distress.     Appearance: Normal and healthy appearance. Well-developed, overweight and not in distress.   Eyes:      General:         Right eye: No discharge.         Left eye: No discharge.      Conjunctiva/sclera: Conjunctivae normal.   HENT:      Head: Normocephalic and atraumatic.      Right Ear: External ear normal.      Left Ear: External ear normal.      Nose: Nose normal.   Neck:      Musculoskeletal: Normal range of motion and neck supple.      Thyroid: No thyromegaly.      Vascular: No JVD.      Trachea: No tracheal deviation.      Lymphadenopathy: No cervical adenopathy.   Pulmonary:      Effort: Pulmonary effort is normal. No respiratory distress.      Breath sounds: Normal breath sounds. No wheezing. No rales.   Chest:      Chest wall: Not tender to palpatation.   Cardiovascular:      Normal rate. Occasional ectopic beats. Irregularly irregular rhythm.      No gallop.   Pulses:     Intact distal pulses.   Edema:     Peripheral edema present.     Pretibial: 2+ pitting edema of the left pretibial area and 1+ pitting edema of the right pretibial area.     Ankle: 2+ pitting edema of the left ankle and 1+ pitting edema of the right ankle.     Feet: 2+ pitting edema of the left foot and 1+ pitting edema of the right foot.  Abdominal:      General: There is no distension.      Palpations: Abdomen is soft.      Tenderness: There is no " abdominal tenderness.   Musculoskeletal: Normal range of motion.         General: No tenderness or deformity.   Skin:     General: Skin is warm and dry.      Findings: No erythema or rash.   Neurological:      Mental Status: Alert and oriented to person, place, and time.      Coordination: Coordination normal.   Psychiatric:         Behavior: Behavior normal.         Thought Content: Thought content normal.         Judgment: Judgment normal.               ECG 12 Lead    Date/Time: 3/1/2021 3:22 PM  Performed by: Deedee Adam APRN  Authorized by: Deedee Adam APRN   Comparison: compared with previous ECG from 11/30/2020  Similar to previous ECG  Rhythm: atrial fibrillation  Ectopy: couplets  Rate: normal  Conduction: incomplete right bundle branch block and left anterior fascicular block  ST Segments: ST segments normal  T flattening: aVR and aVL  QRS axis: normal and left    Clinical impression: abnormal EKG              Assessment:       Diagnosis Plan   1. Aortic valve replaced     2. H/O mitral valve repair     3. NSVT (nonsustained ventricular tachycardia) (CMS/HCC)     4. Persistent atrial fibrillation (CMS/HCC)     5. Pacemaker     6. Mixed hyperlipidemia     7. SHEILA (obstructive sleep apnea)            Plan:           1.    Atrial Fibrillation and Atrial Flutter  Assessment  • She has permanent afib  • This is valvular in etiology  • The patient's CHADS2-VASc score is 4  • A [id number] score of 2 or more is considered a high risk for a thromboembolic event  • Warfarin prescribed     Plan  • Continue in atrial fibrillation with rate control  • Continue warfarin for antithrombotic therapy, bleeding issues discussed  • Continue beta blocker for rate control  • Rate controlled, continue current medical therapy     2.  Bioprosthetic aortic valve replacement with prior mitral valve repair-echocardiogram April 2019 demonstrated normal function.  LE edema, check echo and ProBNP  3.  AICD in place- patient is  seen in our device clinic. Device was placed in 2010, we have never been able to find documentation regarding that.  Serial echocardiograms dating from 2014 have consistently demonstrated normal ejection fraction.  She has a past history of ventricular tachycardia/nonsustained ventricular tachycardia.  She met with Dr. Covington December 2020 and the decision has been made not to place AICD. She is comfortable with this decision.  4.  Mediastinal adenopathy-status post biopsy,   5.  Chronic diastolic congestive heart failure - LE edema for 1-2 months, not better with self prescribed increase in her Bumex.  Check echo and Pro-BNP  6.  Chronic anticoagulation-she continues on warfarin.    Check echo and ProBNP.     As always, it has been a pleasure to participate in your patient's care. Thank you.       Sincerely,       LUIS ARMANDO Quiñones      Current Outpatient Medications:   •  albuterol sulfate  (90 Base) MCG/ACT inhaler, Inhale 2 puffs As Needed., Disp: , Rfl:   •  APPLE CIDER VINEGAR PO, Take 1 tablet by mouth Daily., Disp: , Rfl:   •  b complex vitamins capsule, Take 1 capsule by mouth Daily., Disp: , Rfl:   •  BIOTIN PO, Take 1 tablet by mouth Daily., Disp: , Rfl:   •  bumetanide (BUMEX) 2 MG tablet, Take 1 tablet by mouth 2 (Two) Times a Day. (Patient taking differently: Take 2 mg by mouth 2 (Two) Times a Day. Takes 1.5 tabs, 3 mg in the morning Takes 1 tab, 2 mg in the evening), Disp: 180 tablet, Rfl: 3  •  CALCIUM-MAGNESIUM-ZINC PO, Take 1 tablet by mouth Daily., Disp: , Rfl:   •  Cholecalciferol (VITAMIN D-3 PO), Take 1 tablet by mouth As Needed. Only through the winter, Disp: , Rfl:   •  levothyroxine (SYNTHROID, LEVOTHROID) 150 MCG tablet, Take 150 mcg by mouth Daily., Disp: , Rfl:   •  metoprolol succinate XL (TOPROL-XL) 50 MG 24 hr tablet, Take 1 tablet by mouth Daily., Disp: 90 tablet, Rfl: 3  •  Multiple Vitamin (MULTIVITAMIN) capsule, Take 1 capsule by mouth Daily., Disp: , Rfl:   •  potassium  chloride (K-DUR,KLOR-CON) 20 MEQ CR tablet, Take 1 tablet by mouth Daily., Disp: 90 tablet, Rfl: 3  •  TURMERIC PO, Take 1 tablet by mouth Daily., Disp: , Rfl:   •  warfarin (COUMADIN) 5 MG tablet, Take 2.5mg (1/2 Tablet) on Saturday, Take 5mg (1 Tablet) all other days OR as directed by the [facility], Disp: 90 tablet, Rfl: 3    Dictated utilizing Dragon dictation

## 2021-03-03 ENCOUNTER — APPOINTMENT (OUTPATIENT)
Dept: PHARMACY | Facility: HOSPITAL | Age: 76
End: 2021-03-03

## 2021-03-19 ENCOUNTER — HOSPITAL ENCOUNTER (OUTPATIENT)
Dept: CARDIOLOGY | Facility: HOSPITAL | Age: 76
Discharge: HOME | End: 2021-03-19
Admitting: NURSE PRACTITIONER

## 2021-03-19 VITALS
DIASTOLIC BLOOD PRESSURE: 70 MMHG | WEIGHT: 180 LBS | HEIGHT: 62 IN | SYSTOLIC BLOOD PRESSURE: 130 MMHG | BODY MASS INDEX: 33.13 KG/M2

## 2021-03-19 DIAGNOSIS — Z98.890 H/O MITRAL VALVE REPAIR: ICD-10-CM

## 2021-03-19 DIAGNOSIS — I48.19 PERSISTENT ATRIAL FIBRILLATION (HCC): ICD-10-CM

## 2021-03-19 DIAGNOSIS — Z95.2 AORTIC VALVE REPLACED: ICD-10-CM

## 2021-03-19 LAB
AORTIC DIMENSIONLESS INDEX: 0.5 (DI)
BH CV ECHO MEAS - ACS: 1.4 CM
BH CV ECHO MEAS - AO MAX PG: 22.5 MMHG
BH CV ECHO MEAS - AO MEAN PG (FULL): 7 MMHG
BH CV ECHO MEAS - AO MEAN PG: 11 MMHG
BH CV ECHO MEAS - AO ROOT AREA (BSA CORRECTED): 1.4
BH CV ECHO MEAS - AO ROOT AREA: 5.3 CM^2
BH CV ECHO MEAS - AO ROOT DIAM: 2.6 CM
BH CV ECHO MEAS - AO V2 MAX: 237 CM/SEC
BH CV ECHO MEAS - AO V2 MEAN: 151.7 CM/SEC
BH CV ECHO MEAS - AO V2 VTI: 52.7 CM
BH CV ECHO MEAS - AVA(I,A): 2 CM^2
BH CV ECHO MEAS - AVA(I,D): 2 CM^2
BH CV ECHO MEAS - BSA(HAYCOCK): 1.9 M^2
BH CV ECHO MEAS - BSA: 1.8 M^2
BH CV ECHO MEAS - BZI_BMI: 32.9 KILOGRAMS/M^2
BH CV ECHO MEAS - BZI_METRIC_HEIGHT: 157.5 CM
BH CV ECHO MEAS - BZI_METRIC_WEIGHT: 81.6 KG
BH CV ECHO MEAS - EDV(CUBED): 85.2 ML
BH CV ECHO MEAS - EDV(MOD-SP2): 93 ML
BH CV ECHO MEAS - EDV(MOD-SP4): 84.1 ML
BH CV ECHO MEAS - EDV(TEICH): 87.7 ML
BH CV ECHO MEAS - EF(CUBED): 73.7 %
BH CV ECHO MEAS - EF(MOD-BP): 54.6 %
BH CV ECHO MEAS - EF(MOD-SP2): 51.7 %
BH CV ECHO MEAS - EF(MOD-SP4): 56.4 %
BH CV ECHO MEAS - EF(TEICH): 65.7 %
BH CV ECHO MEAS - ESV(CUBED): 22.4 ML
BH CV ECHO MEAS - ESV(MOD-SP2): 44.9 ML
BH CV ECHO MEAS - ESV(MOD-SP4): 36.7 ML
BH CV ECHO MEAS - ESV(TEICH): 30.1 ML
BH CV ECHO MEAS - FS: 35.9 %
BH CV ECHO MEAS - IVS/LVPW: 1.1
BH CV ECHO MEAS - IVSD: 1.4 CM
BH CV ECHO MEAS - LAT PEAK E' VEL: 9.6 CM/SEC
BH CV ECHO MEAS - LV DIASTOLIC VOL/BSA (35-75): 46 ML/M^2
BH CV ECHO MEAS - LV MASS(C)D: 228.8 GRAMS
BH CV ECHO MEAS - LV MASS(C)DI: 125.2 GRAMS/M^2
BH CV ECHO MEAS - LV MAX PG: 9.3 MMHG
BH CV ECHO MEAS - LV MEAN PG: 4 MMHG
BH CV ECHO MEAS - LV SYSTOLIC VOL/BSA (12-30): 20.1 ML/M^2
BH CV ECHO MEAS - LV V1 MAX: 152 CM/SEC
BH CV ECHO MEAS - LV V1 MEAN: 87.1 CM/SEC
BH CV ECHO MEAS - LV V1 VTI: 33.7 CM
BH CV ECHO MEAS - LVIDD: 4.4 CM
BH CV ECHO MEAS - LVIDS: 2.8 CM
BH CV ECHO MEAS - LVLD AP2: 6.3 CM
BH CV ECHO MEAS - LVLD AP4: 6 CM
BH CV ECHO MEAS - LVLS AP2: 5.4 CM
BH CV ECHO MEAS - LVLS AP4: 5.4 CM
BH CV ECHO MEAS - LVOT AREA (M): 3.1 CM^2
BH CV ECHO MEAS - LVOT AREA: 3.1 CM^2
BH CV ECHO MEAS - LVOT DIAM: 2 CM
BH CV ECHO MEAS - LVPWD: 1.3 CM
BH CV ECHO MEAS - MED PEAK E' VEL: 5.7 CM/SEC
BH CV ECHO MEAS - MV DEC SLOPE: 690 CM/SEC^2
BH CV ECHO MEAS - MV DEC TIME: 180 SEC
BH CV ECHO MEAS - MV E MAX VEL: 130 CM/SEC
BH CV ECHO MEAS - MV MEAN PG: 3 MMHG
BH CV ECHO MEAS - MV P1/2T MAX VEL: 163 CM/SEC
BH CV ECHO MEAS - MV P1/2T: 69.2 MSEC
BH CV ECHO MEAS - MV V2 MEAN: 81.1 CM/SEC
BH CV ECHO MEAS - MV V2 VTI: 23.6 CM
BH CV ECHO MEAS - MVA P1/2T LCG: 1.3 CM^2
BH CV ECHO MEAS - MVA(P1/2T): 3.2 CM^2
BH CV ECHO MEAS - MVA(VTI): 4.5 CM^2
BH CV ECHO MEAS - PA MAX PG: 2.7 MMHG
BH CV ECHO MEAS - PA V2 MAX: 82.5 CM/SEC
BH CV ECHO MEAS - QP/QS: 0.86
BH CV ECHO MEAS - RAP SYSTOLE: 8 MMHG
BH CV ECHO MEAS - RV MEAN PG: 1 MMHG
BH CV ECHO MEAS - RV V1 MEAN: 38.9 CM/SEC
BH CV ECHO MEAS - RV V1 VTI: 12 CM
BH CV ECHO MEAS - RVOT AREA: 7.5 CM^2
BH CV ECHO MEAS - RVOT DIAM: 3.1 CM
BH CV ECHO MEAS - SI(AO): 153.1 ML/M^2
BH CV ECHO MEAS - SI(CUBED): 34.3 ML/M^2
BH CV ECHO MEAS - SI(LVOT): 57.9 ML/M^2
BH CV ECHO MEAS - SI(MOD-SP2): 26.3 ML/M^2
BH CV ECHO MEAS - SI(MOD-SP4): 25.9 ML/M^2
BH CV ECHO MEAS - SI(TEICH): 31.5 ML/M^2
BH CV ECHO MEAS - SV(AO): 279.8 ML
BH CV ECHO MEAS - SV(CUBED): 62.8 ML
BH CV ECHO MEAS - SV(LVOT): 105.9 ML
BH CV ECHO MEAS - SV(MOD-SP2): 48.1 ML
BH CV ECHO MEAS - SV(MOD-SP4): 47.4 ML
BH CV ECHO MEAS - SV(RVOT): 90.6 ML
BH CV ECHO MEAS - SV(TEICH): 57.6 ML
BH CV ECHO MEAS - TAPSE (>1.6): 1.6 CM
BH CV ECHO MEAS - TR MAX VEL: 252 CM/SEC
BH CV ECHO MEASUREMENTS AVERAGE E/E' RATIO: 16.99
LEFT ATRIUM VOLUME INDEX: 84 ML/M2
MAXIMAL PREDICTED HEART RATE: 143 BPM
SINUS: 3.4 CM
STRESS TARGET HR: 122 BPM

## 2021-03-19 PROCEDURE — 93306 TTE W/DOPPLER COMPLETE: CPT | Performed by: INTERNAL MEDICINE

## 2021-03-19 PROCEDURE — 93306 TTE W/DOPPLER COMPLETE: CPT

## 2021-03-23 NOTE — PROGRESS NOTES
"Spoke to patient with results.  She states the LE edema resolves over night and in the morning, her legs are normal.  After she has been up for a while \"the fluid just comes back\".  I suggested compression stockings to help with the venous insufficiency.  "

## 2021-04-09 ENCOUNTER — TELEPHONE (OUTPATIENT)
Dept: PHARMACY | Facility: HOSPITAL | Age: 76
End: 2021-04-09

## 2021-04-14 ENCOUNTER — TELEPHONE (OUTPATIENT)
Dept: PHARMACY | Facility: HOSPITAL | Age: 76
End: 2021-04-14

## 2021-04-16 ENCOUNTER — ANTICOAGULATION VISIT (OUTPATIENT)
Dept: PHARMACY | Facility: HOSPITAL | Age: 76
End: 2021-04-16

## 2021-04-16 DIAGNOSIS — I50.33 ACUTE ON CHRONIC DIASTOLIC CHF (CONGESTIVE HEART FAILURE) (HCC): ICD-10-CM

## 2021-04-16 DIAGNOSIS — I48.20 CHRONIC ATRIAL FIBRILLATION (HCC): ICD-10-CM

## 2021-04-16 DIAGNOSIS — R06.02 SOB (SHORTNESS OF BREATH): ICD-10-CM

## 2021-04-16 DIAGNOSIS — G47.33 OSA (OBSTRUCTIVE SLEEP APNEA): Chronic | ICD-10-CM

## 2021-04-16 DIAGNOSIS — I20.9 ANGINA PECTORIS (HCC): ICD-10-CM

## 2021-04-16 DIAGNOSIS — Z98.890 H/O MITRAL VALVE REPAIR: Chronic | ICD-10-CM

## 2021-04-16 DIAGNOSIS — E78.2 MIXED HYPERLIPIDEMIA: ICD-10-CM

## 2021-04-16 DIAGNOSIS — I48.20 CHRONIC ATRIAL FIBRILLATION (HCC): Primary | ICD-10-CM

## 2021-04-16 DIAGNOSIS — Z95.2 AORTIC VALVE REPLACED: Chronic | ICD-10-CM

## 2021-04-16 DIAGNOSIS — Z95.0 PACEMAKER: Chronic | ICD-10-CM

## 2021-04-16 LAB
INR PPP: 3 (ref 0.91–1.09)
PROTHROMBIN TIME: 36.6 SECONDS (ref 10–13.8)

## 2021-04-16 PROCEDURE — 36416 COLLJ CAPILLARY BLOOD SPEC: CPT

## 2021-04-16 PROCEDURE — 85610 PROTHROMBIN TIME: CPT

## 2021-04-16 RX ORDER — METOPROLOL SUCCINATE 50 MG/1
TABLET, EXTENDED RELEASE ORAL
Qty: 90 TABLET | Refills: 2 | Status: SHIPPED | OUTPATIENT
Start: 2021-04-16 | End: 2022-01-14

## 2021-04-16 NOTE — PROGRESS NOTES
Anticoagulation Clinic Progress Note    Anticoagulation Summary  As of 4/16/2021    INR goal:  2.0-3.0   TTR:  59.9 % (2.5 y)   INR used for dosing:  3.0 (4/16/2021)   Warfarin maintenance plan:  2.5 mg every Sat; 5 mg all other days   Weekly warfarin total:  32.5 mg   No change documented:  Sheryl Pace, Pharmacy Intern   Plan last modified:  Uzma Woodall RPH (11/30/2020)   Next INR check:  5/28/2021   Priority:  Maintenance   Target end date:  Indefinite    Indications    Chronic atrial fibrillation (CMS/HCC) [I48.20]             Anticoagulation Episode Summary     INR check location:      Preferred lab:      Send INR reminders to:   [facility]    Comments:  [place]      Anticoagulation Care Providers     Provider Role Specialty Phone number    Adeel Mina III, MD Referring Cardiology [phone]          Clinic Interview:  Patient Findings     Negatives:  Signs/symptoms of thrombosis, Signs/symptoms of bleeding,   Laboratory test error suspected, Change in health, Change in alcohol use,   Change in activity, Upcoming invasive procedure, Emergency department   visit, Upcoming dental procedure, Missed doses, Extra doses, Change in   medications, Change in diet/appetite, Hospital admission, Bruising, Other   complaints      Clinical Outcomes     Negatives:  Major bleeding event, Thromboembolic event,   Anticoagulation-related hospital admission, Anticoagulation-related ED   visit, Anticoagulation-related fatality        INR History:  Anticoagulation Monitoring 1/6/2021 2/26/2021 4/16/2021   INR 2.4 1.7 3.0   INR Date 1/6/2021 2/26/2021 4/16/2021   INR Goal 2.0-3.0 2.0-3.0 2.0-3.0   Trend Same Same Same   Last Week Total 32.5 mg 32.5 mg 32.5 mg   Next Week Total 32.5 mg 35 mg 32.5 mg   Sun 5 mg 5 mg 5 mg   Mon 5 mg 5 mg 5 mg   Tue 5 mg 5 mg 5 mg   Wed 5 mg 5 mg 5 mg   Thu 5 mg 5 mg 5 mg   Fri 5 mg 7.5 mg (2/26); Otherwise 5 mg 5 mg   Sat 2.5 mg 2.5 mg 2.5 mg   Visit Report - - -    Some recent data might be hidden       Plan:  1. INR is Therapeutic today- see above in Anticoagulation Summary.  Will instruct Anna Gilbert to Continue their warfarin regimen- see above in Anticoagulation Summary.  2. Follow up in 6 weeks  3. Patient declines warfarin refills.  4. Verbal and written information provided. Patient expresses understanding and has no further questions at this time.    Sheryl Pace, Pharmacy Intern

## 2021-04-16 NOTE — PROGRESS NOTES
I have supervised and reviewed the notes, assessments, and/or procedures performed by our Pharmacy Intern. I concur with the documentation of this patient encounter.    Emily Wiggins, BrittanyD

## 2021-04-21 ENCOUNTER — TELEPHONE (OUTPATIENT)
Dept: CARDIOLOGY | Facility: CLINIC | Age: 76
End: 2021-04-21

## 2021-04-21 NOTE — TELEPHONE ENCOUNTER
Pt is wanting to know if it is ok to take Celebrex from a heart stand point?    PT #: [phone]

## 2021-05-22 DIAGNOSIS — I50.33 ACUTE ON CHRONIC DIASTOLIC CHF (CONGESTIVE HEART FAILURE) (HCC): ICD-10-CM

## 2021-05-22 DIAGNOSIS — G47.33 OSA (OBSTRUCTIVE SLEEP APNEA): Chronic | ICD-10-CM

## 2021-05-22 DIAGNOSIS — E78.2 MIXED HYPERLIPIDEMIA: ICD-10-CM

## 2021-05-22 DIAGNOSIS — Z98.890 H/O MITRAL VALVE REPAIR: Chronic | ICD-10-CM

## 2021-05-22 DIAGNOSIS — I20.9 ANGINA PECTORIS (HCC): ICD-10-CM

## 2021-05-22 DIAGNOSIS — I48.20 CHRONIC ATRIAL FIBRILLATION (HCC): ICD-10-CM

## 2021-05-22 DIAGNOSIS — R06.02 SOB (SHORTNESS OF BREATH): ICD-10-CM

## 2021-05-22 DIAGNOSIS — Z95.0 PACEMAKER: Chronic | ICD-10-CM

## 2021-05-22 DIAGNOSIS — Z95.2 AORTIC VALVE REPLACED: Chronic | ICD-10-CM

## 2021-05-24 RX ORDER — POTASSIUM CHLORIDE 1500 MG/1
TABLET, EXTENDED RELEASE ORAL
Qty: 90 TABLET | Refills: 0 | Status: SHIPPED | OUTPATIENT
Start: 2021-05-24 | End: 2021-08-20

## 2021-05-28 ENCOUNTER — ANTICOAGULATION VISIT (OUTPATIENT)
Dept: PHARMACY | Facility: HOSPITAL | Age: 76
End: 2021-05-28

## 2021-05-28 DIAGNOSIS — I48.20 CHRONIC ATRIAL FIBRILLATION (HCC): Primary | ICD-10-CM

## 2021-05-28 LAB
INR PPP: 2.1 (ref 0.91–1.09)
PROTHROMBIN TIME: 25.2 SECONDS (ref 10–13.8)

## 2021-05-28 PROCEDURE — 85610 PROTHROMBIN TIME: CPT

## 2021-05-28 PROCEDURE — 36416 COLLJ CAPILLARY BLOOD SPEC: CPT

## 2021-05-28 NOTE — PROGRESS NOTES
Anticoagulation Clinic Progress Note    Anticoagulation Summary  As of 2021    INR goal:  2.0-3.0   TTR:  61.7 % (2.6 y)   INR used for dosin.1 (2021)   Warfarin maintenance plan:  2.5 mg every Sat; 5 mg all other days   Weekly warfarin total:  32.5 mg   No change documented:  Roxanne Otero   Plan last modified:  Uzma Woodall RPH (2020)   Next INR check:  2021   Priority:  Maintenance   Target end date:  Indefinite    Indications    Chronic atrial fibrillation (CMS/HCC) [I48.20]             Anticoagulation Episode Summary     INR check location:      Preferred lab:      Send INR reminders to:   [facility]    Comments:  [place]      Anticoagulation Care Providers     Provider Role Specialty Phone number    Adeel Mina III, MD Referring Cardiology [phone]          Clinic Interview:  Patient Findings     Positives:  Change in medications    Negatives:  Signs/symptoms of thrombosis, Signs/symptoms of bleeding,   Laboratory test error suspected, Change in health, Change in alcohol use,   Change in activity, Upcoming invasive procedure, Emergency department   visit, Upcoming dental procedure, Missed doses, Extra doses, Change in   diet/appetite, Hospital admission, Bruising, Other complaints      Clinical Outcomes     Negatives:  Major bleeding event, Thromboembolic event,   Anticoagulation-related hospital admission, Anticoagulation-related ED   visit, Anticoagulation-related fatality        INR History:  Anticoagulation Monitoring 2021   INR 1.7 3.0 2.1   INR Date 2021   INR Goal 2.0-3.0 2.0-3.0 2.0-3.0   Trend Same Same Same   Last Week Total 32.5 mg 32.5 mg 32.5 mg   Next Week Total 35 mg 32.5 mg 32.5 mg   Sun 5 mg 5 mg 5 mg   Mon 5 mg 5 mg 5 mg   Tue 5 mg 5 mg 5 mg   Wed 5 mg 5 mg 5 mg   Thu 5 mg 5 mg 5 mg   Fri 7.5 mg (); Otherwise 5 mg 5 mg 5 mg   Sat 2.5 mg 2.5 mg 2.5 mg   Visit Report - - -    Some recent data might be hidden       Plan:  1. INR is therapeutic today- see above in Anticoagulation Summary.   Will instruct Anna Gilbert to continue their warfarin regimen- see above in Anticoagulation Summary.  2. Follow up in 4 weeks.  3. Patient declines warfarin refills.  4. Verbal and written information provided. Patient expresses understanding and has no further questions at this time.    Roxanne Otero

## 2021-06-21 ENCOUNTER — APPOINTMENT (OUTPATIENT)
Dept: PHARMACY | Facility: HOSPITAL | Age: 76
End: 2021-06-21

## 2021-06-22 ENCOUNTER — ANTICOAGULATION VISIT (OUTPATIENT)
Dept: PHARMACY | Facility: HOSPITAL | Age: 76
End: 2021-06-22

## 2021-06-22 DIAGNOSIS — I48.20 CHRONIC ATRIAL FIBRILLATION (HCC): Primary | ICD-10-CM

## 2021-06-22 LAB
INR PPP: 1.9 (ref 0.91–1.09)
PROTHROMBIN TIME: 22.4 SECONDS (ref 10–13.8)

## 2021-06-22 PROCEDURE — 36416 COLLJ CAPILLARY BLOOD SPEC: CPT

## 2021-06-22 PROCEDURE — 85610 PROTHROMBIN TIME: CPT

## 2021-06-22 NOTE — PROGRESS NOTES
Anticoagulation Clinic Progress Note    Anticoagulation Summary  As of 2021    INR goal:  2.0-3.0   TTR:  61.4 % (2.7 y)   INR used for dosin.9 (2021)   Warfarin maintenance plan:  2.5 mg every Sat; 5 mg all other days   Weekly warfarin total:  32.5 mg   No change documented:  Roxanne Otero   Plan last modified:  Uzma Woodall RPH (2020)   Next INR check:  2021   Priority:  Maintenance   Target end date:  Indefinite    Indications    Chronic atrial fibrillation (CMS/HCC) [I48.20]             Anticoagulation Episode Summary     INR check location:      Preferred lab:      Send INR reminders to:   [facility] [place]    Comments:  [place]      Anticoagulation Care Providers     Provider Role Specialty Phone number    Adeel Mina III, MD Referring Cardiology [phone]          Clinic Interview:  Patient Findings     Negatives:  Signs/symptoms of thrombosis, Signs/symptoms of bleeding,   Laboratory test error suspected, Change in health, Change in alcohol use,   Change in activity, Upcoming invasive procedure, Emergency department   visit, Upcoming dental procedure, Missed doses, Extra doses, Change in   medications, Change in diet/appetite, Hospital admission, Bruising, Other   complaints       Clinical Outcomes     Negatives:  Major bleeding event, Thromboembolic event,   Anticoagulation-related hospital admission, Anticoagulation-related ED   visit, Anticoagulation-related fatality        INR History:  Anticoagulation Monitoring 2021   INR 3.0 2.1 1.9   INR Date 2021   INR Goal 2.0-3.0 2.0-3.0 2.0-3.0   Trend Same Same Same   Last Week Total 32.5 mg 32.5 mg 32.5 mg   Next Week Total 32.5 mg 32.5 mg 32.5 mg   Sun 5 mg 5 mg 5 mg   Mon 5 mg 5 mg 5 mg   Tue 5 mg 5 mg 5 mg   Wed 5 mg 5 mg 5 mg   Thu 5 mg 5 mg 5 mg   Fri 5 mg 5 mg 5 mg   Sat 2.5 mg 2.5 mg 2.5 mg   Visit Report - - -   Some recent data might be  hidden       Plan:  1. INR is out of range- see above in Anticoagulation Summary.   Will instruct Anna Gilbert to Continue  their warfarin regimen- see above in Anticoagulation Summary.  2. Follow up in 4 weeks.   3. Patient declines warfarin refills.  4. Verbal and written information provided. Patient expresses understanding and has no further questions at this time.    Roxanne Otero

## 2021-07-18 DIAGNOSIS — R06.02 SOB (SHORTNESS OF BREATH): ICD-10-CM

## 2021-07-18 DIAGNOSIS — Z95.0 PACEMAKER: Chronic | ICD-10-CM

## 2021-07-18 DIAGNOSIS — I20.9 ANGINA PECTORIS (HCC): ICD-10-CM

## 2021-07-18 DIAGNOSIS — I50.33 ACUTE ON CHRONIC DIASTOLIC CHF (CONGESTIVE HEART FAILURE) (HCC): ICD-10-CM

## 2021-07-18 DIAGNOSIS — I48.20 CHRONIC ATRIAL FIBRILLATION (HCC): ICD-10-CM

## 2021-07-18 DIAGNOSIS — G47.33 OSA (OBSTRUCTIVE SLEEP APNEA): Chronic | ICD-10-CM

## 2021-07-18 DIAGNOSIS — E78.2 MIXED HYPERLIPIDEMIA: ICD-10-CM

## 2021-07-18 DIAGNOSIS — Z98.890 H/O MITRAL VALVE REPAIR: Chronic | ICD-10-CM

## 2021-07-18 DIAGNOSIS — Z95.2 AORTIC VALVE REPLACED: Chronic | ICD-10-CM

## 2021-07-20 ENCOUNTER — ANTICOAGULATION VISIT (OUTPATIENT)
Dept: PHARMACY | Facility: HOSPITAL | Age: 76
End: 2021-07-20

## 2021-07-20 DIAGNOSIS — I48.20 CHRONIC ATRIAL FIBRILLATION (HCC): Primary | ICD-10-CM

## 2021-07-20 LAB
INR PPP: 2.4 (ref 0.91–1.09)
PROTHROMBIN TIME: 28.5 SECONDS (ref 10–13.8)

## 2021-07-20 PROCEDURE — 85610 PROTHROMBIN TIME: CPT

## 2021-07-20 PROCEDURE — 36416 COLLJ CAPILLARY BLOOD SPEC: CPT

## 2021-07-20 RX ORDER — BUMETANIDE 2 MG/1
TABLET ORAL
Qty: 180 TABLET | Refills: 2 | Status: SHIPPED | OUTPATIENT
Start: 2021-07-20 | End: 2021-11-03

## 2021-07-20 NOTE — PROGRESS NOTES
Anticoagulation Clinic Progress Note    Anticoagulation Summary  As of 2021    INR goal:  2.0-3.0   TTR:  61.9 % (2.7 y)   INR used for dosin.4 (2021)   Warfarin maintenance plan:  2.5 mg every Sat; 5 mg all other days   Weekly warfarin total:  32.5 mg   No change documented:  Roxanne Otero   Plan last modified:  Uzma Woodall RPH (2020)   Next INR check:  2021   Priority:  Maintenance   Target end date:  Indefinite    Indications    Chronic atrial fibrillation (CMS/HCC) [I48.20]             Anticoagulation Episode Summary     INR check location:      Preferred lab:      Send INR reminders to:   [facility] [place]    Comments:  [place]      Anticoagulation Care Providers     Provider Role Specialty Phone number    Adeel Mina III, MD Referring Cardiology [phone]          Clinic Interview:  Patient Findings     Negatives:  Signs/symptoms of thrombosis, Signs/symptoms of bleeding,   Laboratory test error suspected, Change in health, Change in alcohol use,   Change in activity, Upcoming invasive procedure, Emergency department   visit, Upcoming dental procedure, Missed doses, Extra doses, Change in   medications, Change in diet/appetite, Hospital admission, Bruising, Other   complaints      Clinical Outcomes     Negatives:  Major bleeding event, Thromboembolic event,   Anticoagulation-related hospital admission, Anticoagulation-related ED   visit, Anticoagulation-related fatality        INR History:  Anticoagulation Monitoring 2021   INR 2.1 1.9 2.4   INR Date 2021   INR Goal 2.0-3.0 2.0-3.0 2.0-3.0   Trend Same Same Same   Last Week Total 32.5 mg 32.5 mg 32.5 mg   Next Week Total 32.5 mg 32.5 mg 32.5 mg   Sun 5 mg 5 mg 5 mg   Mon 5 mg 5 mg 5 mg   Tue 5 mg 5 mg 5 mg   Wed 5 mg 5 mg 5 mg   Thu 5 mg 5 mg 5 mg   Fri 5 mg 5 mg 5 mg   Sat 2.5 mg 2.5 mg 2.5 mg   Visit Report - - -   Some recent data might be hidden        Plan:  1. INR is therapeutic today- see above in Anticoagulation Summary.   Will instruct Anna Gilbert to continue their warfarin regimen- see above in Anticoagulation Summary.  2. Follow up in 4 weeks.  3. Patient declines warfarin refills.  4. Verbal and written information provided. Patient expresses understanding and has no further questions at this time.    Roxanne Otero

## 2021-08-19 DIAGNOSIS — Z95.2 AORTIC VALVE REPLACED: Chronic | ICD-10-CM

## 2021-08-19 DIAGNOSIS — Z95.0 PACEMAKER: Chronic | ICD-10-CM

## 2021-08-19 DIAGNOSIS — G47.33 OSA (OBSTRUCTIVE SLEEP APNEA): Chronic | ICD-10-CM

## 2021-08-19 DIAGNOSIS — R06.02 SOB (SHORTNESS OF BREATH): ICD-10-CM

## 2021-08-19 DIAGNOSIS — I48.20 CHRONIC ATRIAL FIBRILLATION (HCC): ICD-10-CM

## 2021-08-19 DIAGNOSIS — Z98.890 H/O MITRAL VALVE REPAIR: Chronic | ICD-10-CM

## 2021-08-19 DIAGNOSIS — I50.33 ACUTE ON CHRONIC DIASTOLIC CHF (CONGESTIVE HEART FAILURE) (HCC): ICD-10-CM

## 2021-08-19 DIAGNOSIS — E78.2 MIXED HYPERLIPIDEMIA: ICD-10-CM

## 2021-08-19 DIAGNOSIS — I20.9 ANGINA PECTORIS (HCC): ICD-10-CM

## 2021-08-20 RX ORDER — POTASSIUM CHLORIDE 1500 MG/1
TABLET, EXTENDED RELEASE ORAL
Qty: 90 TABLET | Refills: 0 | Status: SHIPPED | OUTPATIENT
Start: 2021-08-20 | End: 2021-11-17

## 2021-08-31 ENCOUNTER — ANTICOAGULATION VISIT (OUTPATIENT)
Dept: PHARMACY | Facility: HOSPITAL | Age: 76
End: 2021-08-31

## 2021-08-31 DIAGNOSIS — I48.20 CHRONIC ATRIAL FIBRILLATION (HCC): Primary | ICD-10-CM

## 2021-08-31 LAB
INR PPP: 2.4 (ref 0.91–1.09)
PROTHROMBIN TIME: 28.5 SECONDS (ref 10–13.8)

## 2021-08-31 PROCEDURE — 36416 COLLJ CAPILLARY BLOOD SPEC: CPT

## 2021-08-31 PROCEDURE — 85610 PROTHROMBIN TIME: CPT

## 2021-08-31 NOTE — PROGRESS NOTES
Anticoagulation Clinic Progress Note    Anticoagulation Summary  As of 2021    INR goal:  2.0-3.0   TTR:  63.4 % (2.9 y)   INR used for dosin.4 (2021)   Warfarin maintenance plan:  2.5 mg every Sat; 5 mg all other days   Weekly warfarin total:  32.5 mg   No change documented:  Rylee Bernal   Plan last modified:  Uzma Woodall [facility] (2020)   Next INR check:  10/12/2021   Priority:  Maintenance   Target end date:  Indefinite    Indications    Chronic atrial fibrillation (CMS/HCC) [I48.20]             Anticoagulation Episode Summary     INR check location:      Preferred lab:      Send INR reminders to:  [facility]    Comments:  [place]      Anticoagulation Care Providers     Provider Role Specialty Phone number    Adeel Mina III, MD Referring Cardiology [phone]          Clinic Interview:      INR History:  Anticoagulation Monitoring 2021   INR 1.9 2.4 2.4   INR Date 2021   INR Goal 2.0-3.0 2.0-3.0 2.0-3.0   Trend Same Same Same   Last Week Total 32.5 mg 32.5 mg 32.5 mg   Next Week Total 32.5 mg 32.5 mg 32.5 mg   Sun 5 mg 5 mg 5 mg   Mon 5 mg 5 mg 5 mg   Tue 5 mg 5 mg 5 mg   Wed 5 mg 5 mg 5 mg   Thu 5 mg 5 mg 5 mg   Fri 5 mg 5 mg 5 mg   Sat 2.5 mg 2.5 mg 2.5 mg   Visit Report - - -   Some recent data might be hidden       Plan:  1. INR is therapeutic today- see above in Anticoagulation Summary.   Will instruct Anna Gilbert to continue their warfarin regimen- see above in Anticoagulation Summary.  2. Follow up in 6 weeks.  3. Patient declines warfarin refills.  4. Verbal and written information provided. Patient expresses understanding and has no further questions at this time.    Rylee Bernal

## 2021-10-19 ENCOUNTER — ANTICOAGULATION VISIT (OUTPATIENT)
Dept: PHARMACY | Facility: HOSPITAL | Age: 76
End: 2021-10-19

## 2021-10-19 DIAGNOSIS — I48.20 CHRONIC ATRIAL FIBRILLATION (HCC): Primary | ICD-10-CM

## 2021-10-19 LAB
INR PPP: 1.6 (ref 0.91–1.09)
PROTHROMBIN TIME: 19.2 SECONDS (ref 10–13.8)

## 2021-10-19 PROCEDURE — 36416 COLLJ CAPILLARY BLOOD SPEC: CPT

## 2021-10-19 PROCEDURE — G0463 HOSPITAL OUTPT CLINIC VISIT: HCPCS

## 2021-10-19 PROCEDURE — 85610 PROTHROMBIN TIME: CPT

## 2021-10-19 NOTE — PROGRESS NOTES
Anticoagulation Clinic Progress Note    Anticoagulation Summary  As of 10/19/2021    INR goal:  2.0-3.0   TTR:  62.8 % (3 y)   INR used for dosin.6 (10/19/2021)   Warfarin maintenance plan:  2.5 mg every Sat; 5 mg all other days   Weekly warfarin total:  32.5 mg   Plan last modified:  Uzma Woodall RPH (2020)   Next INR check:  2021   Priority:  Maintenance   Target end date:  Indefinite    Indications    Chronic atrial fibrillation (HCC) [I48.20]             Anticoagulation Episode Summary     INR check location:      Preferred lab:      Send INR reminders to:   [facility] [place]    Comments:  [place]      Anticoagulation Care Providers     Provider Role Specialty Phone number    Adeel Mina III, MD Referring Cardiology [phone]          Clinic Interview:  Patient Findings     Negatives:  Signs/symptoms of thrombosis, Signs/symptoms of bleeding,   Laboratory test error suspected, Change in health, Change in alcohol use,   Change in activity, Upcoming invasive procedure, Emergency department   visit, Upcoming dental procedure, Missed doses, Extra doses, Change in   medications, Change in diet/appetite, Hospital admission, Bruising, Other   complaints      Clinical Outcomes     Negatives:  Major bleeding event, Thromboembolic event,   Anticoagulation-related hospital admission, Anticoagulation-related ED   visit, Anticoagulation-related fatality        INR History:  Anticoagulation Monitoring 2021 2021 10/19/2021   INR 2.4 2.4 1.6   INR Date 2021 2021 10/19/2021   INR Goal 2.0-3.0 2.0-3.0 2.0-3.0   Trend Same Same Same   Last Week Total 32.5 mg 32.5 mg 32.5 mg   Next Week Total 32.5 mg 32.5 mg 35 mg   Sun 5 mg 5 mg 5 mg   Mon 5 mg 5 mg 5 mg   Tue 5 mg 5 mg 7.5 mg (10/19); Otherwise 5 mg   Wed 5 mg 5 mg 5 mg   Thu 5 mg 5 mg 5 mg   Fri 5 mg 5 mg 5 mg   Sat 2.5 mg 2.5 mg 2.5 mg   Visit Report - - -   Some recent data might be hidden       Plan:  1. INR is  Subtherapeutic today- see above in Anticoagulation Summary.  Will instruct Anna Gilbert to Increase their warfarin regimen- see above in Anticoagulation Summary.  2. Follow up in 2 weeks (patient is typically very stable)   3. Patient declines warfarin refills.  4. Verbal and written information provided. Patient expresses understanding and has no further questions at this time.    Lia Squires, [facility]

## 2021-11-03 ENCOUNTER — TELEPHONE (OUTPATIENT)
Dept: CARDIOLOGY | Facility: CLINIC | Age: 76
End: 2021-11-03

## 2021-11-03 ENCOUNTER — OFFICE VISIT (OUTPATIENT)
Dept: CARDIOLOGY | Facility: CLINIC | Age: 76
End: 2021-11-03

## 2021-11-03 VITALS
BODY MASS INDEX: 33.49 KG/M2 | DIASTOLIC BLOOD PRESSURE: 80 MMHG | HEART RATE: 81 BPM | WEIGHT: 182 LBS | HEIGHT: 62 IN | RESPIRATION RATE: 16 BRPM | OXYGEN SATURATION: 96 % | SYSTOLIC BLOOD PRESSURE: 142 MMHG

## 2021-11-03 DIAGNOSIS — Z98.890 H/O MITRAL VALVE REPAIR: Chronic | ICD-10-CM

## 2021-11-03 DIAGNOSIS — G47.33 OSA (OBSTRUCTIVE SLEEP APNEA): Chronic | ICD-10-CM

## 2021-11-03 DIAGNOSIS — I47.29 NSVT (NONSUSTAINED VENTRICULAR TACHYCARDIA) (HCC): Chronic | ICD-10-CM

## 2021-11-03 DIAGNOSIS — E78.2 MIXED HYPERLIPIDEMIA: ICD-10-CM

## 2021-11-03 DIAGNOSIS — I50.33 ACUTE ON CHRONIC DIASTOLIC CHF (CONGESTIVE HEART FAILURE) (HCC): ICD-10-CM

## 2021-11-03 DIAGNOSIS — Z95.0 PACEMAKER: Chronic | ICD-10-CM

## 2021-11-03 DIAGNOSIS — I48.20 CHRONIC ATRIAL FIBRILLATION (HCC): Primary | Chronic | ICD-10-CM

## 2021-11-03 PROCEDURE — 93000 ELECTROCARDIOGRAM COMPLETE: CPT | Performed by: NURSE PRACTITIONER

## 2021-11-03 PROCEDURE — 99214 OFFICE O/P EST MOD 30 MIN: CPT | Performed by: NURSE PRACTITIONER

## 2021-11-03 RX ORDER — TORSEMIDE 20 MG/1
40 TABLET ORAL DAILY
Qty: 60 TABLET | Refills: 3 | Status: SHIPPED | OUTPATIENT
Start: 2021-11-03 | End: 2022-02-14

## 2021-11-03 RX ORDER — FLUCONAZOLE 150 MG/1
150 TABLET ORAL AS NEEDED
COMMUNITY
Start: 2021-11-01

## 2021-11-03 RX ORDER — CELECOXIB 200 MG/1
CAPSULE ORAL
COMMUNITY
Start: 2021-10-15 | End: 2021-12-04 | Stop reason: HOSPADM

## 2021-11-03 NOTE — PROGRESS NOTES
Date of Office Visit: 2021  Encounter Provider: LUIS ARMANDO Quiñones  Place of Service: [facility]  Patient Name: Anna Gilbert  :1945  Primary Cardiologist: Dr. Mina    CC:  Rashida EVANS    Dear Dr. Isaac    HPI: Anna Gilbert is a pleasant 76 y.o. female who presents 2021 for cardiac follow up.     She has a history of chronic atrial fibrillation, nonsustained ventricular tachycardia, as well as bioprosthetic aortic valve and prior mitral valve repair. She also has an AICD in place.     She had a telehealth appt with Dr. Mina in 2020 and was concerned about her easy bruising and questioned transitioning to something other than warfarin or stopping the warfarin.  He had a conversation that it was necessary to prevent a stroke given her atrial fibrillation and she has remained on the warfarin.  She states she is now wearing gloves on her hands and using some lotion that has helped.       Her AICD has been at end-of-life and she is been wondering whether she should get the AICD replaced.  She really has had several discussions about this with the device clinic.  She thinks the device now may no longer be functioning.She also had an appointment with Dr. Perez and the decision was not to replace  Her AICD.     Event Monitor  Event monitor enrollment date was 2016. Enrollment ended on 2016. The manufacturing company for the event monitor is [employer]. Zio monitor is reviewed. A total of 6 days and 10 hours are available for review. Atrial fibrillation is present throughout. Average heart rate is 70 bpm. Maximal heart rate is approximately 150 bpm. Occasional wide complex beats are seen which appear to be atrial fibrillation with aberrancy. Minimal heart rate is atrial fibrillation at a rate of 39 bpm. No other ectopy is seen.      She had a stress test and echocardiogram performed 2019:  19 ECHO  LVSF NORMAL EF=54%  ESTIMATED  EF WAS IN DISAGREEMENT W THE CALCULATED EF . EST EF APPEARS TO BE IN RANGE 61-65%  NORMAL LEFT VENTRICULAR CAVITY / LEFT VENT WALL THICKNESS IS CONSISTENT W MILD LVH  LEFT VENTRICULAR DIASTOLIC FUNCTION WAS INDETERMINATE.  RT VENT CAVITY IS MILD TO MODERATE DILATED  LEFT ATRIAL CAVITY SEVERELY DILATED  RT ATRIAL CAVITY IS SEVERELY DILATED  THE AORTIC VALVE PEAK AND MEAN GRADIENTS ARE WITHIN DEFINED LIMITS . THE PROSTHETIC VALVE IS GROSSLY NORMAL  NO SIGNIFICANT MITRAL VALVE REGURG OR STENOSIS  MILD TO MOD TR VALVE REGURG.  ESTIMATED RT VENTRICULAR SYSTOLIC PRESSURE FROM TR REGURG IS MILDLY ELEVATED 35-45MMHG. CALCULATED RT VS PRESSURE FROM TR IS 38MMHG    04/03/19 STRESS TREADMILL  The patient reported shortness of breath during the stress test.  · AFib present throughout  · No ECG evidence of myocardial ischemia.Negative clinical evidence of   myocardial ischemia. Findings consistent with a normal ECG stress test     Saw her in March 2021 and she had been having increasing lower extremity edema for about the last 2 months.  She had self increased her bumetanide Jos to 3 mg in the morning and 2 in the evening.  She has been doing that for about a week.  She stated it really did not help very much.  She still had lower extremity pitting edema left greater than right.  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  She denied any shortness of breath or chest pain.  She did complain of some fatigue.  She had previously had an echo scheduled but had not that done.  We reschedule that as below.  I also checked a pro BNP that was 681.    Echo 3/2021 Interpretation Summary  · Left ventricular wall thickness is consistent with moderate concentric hypertrophy.  · Calculated left ventricular EF = 54.6% Estimated left ventricular EF was in agreement with the calculated left ventricular EF. Left ventricular systolic function is normal.  · Left atrial volume is severely increased.  · Estimated right ventricular  systolic pressure from tricuspid regurgitation is normal (<35 mmHg).  · Trace aortic valve regurgitation is present. There is a 21 mm, porcine bioprosthetic aortic valve present. The aortic valve peak and mean gradients are within defined limits. The prosthetic aortic valve is normal.  · Trace mitral valve regurgitation is present. No significant mitral valve stenosis is present. There is a mitral valve ring present.    She presents today with complaints of hypertension and lower extremity edema.  She states last night she had an episode of shortness of breath and when she took her blood pressure it was elevated 160s/90s.  She states she took an extra 50 mg of metoprolol and about half hour later she states it was back down to her normal 140s systolic.  She denies any palpitations, she cannot feel her heart racing and skipping.  She is in permanent atrial fibrillation.  She will occasionally have some dizziness but it does not last.  She denies any chest pain or chest pressure.  She states she has been having more fatigue lately.  She also states she has had a lot going on.  She states her gallbladder has been acting up for about the last 2 years but she has been trying not to go undergo surgery and thinks that that is part of her problem.  She also states that she was gardening and was using a very heavy tool and she is got back strain.  She states she is been off work all this week due to that.  She also has bad knees and has been getting shots in those.  She states she does wear compression hose quite a bit of the time.  She also states the lower extremity edema seems to be worse in the summer but lately she has been having more in her feet.  She states it is very intermittent some days that she has more than others.  She states she normally takes her Bumex 2 mg every morning but rarely takes it at night.  She states if she takes it in the morning and the fluid resolves that she does not take any more.  But if  she takes it in the morning and then around 1 or 2 if it still there she will take a half of Bumex and then reassess at nighttime and take another half if needed.  Past Medical History:   Diagnosis Date   • Coronary artery disease    • Health care maintenance    • Hyperthyroidism    • NSVT (nonsustained ventricular tachycardia) ([facility]) 9/23/2020   • SHEILA (obstructive sleep apnea) 7/20/2016   • PAF (paroxysmal atrial fibrillation) ([facility])    • Sick sinus syndrome ([facility])        Past Surgical History:   Procedure Laterality Date   • AORTIC VALVE REPAIR/REPLACEMENT  2010    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   • CARDIAC CATHETERIZATION  01/01/2011   • CARDIAC DEFIBRILLATOR PLACEMENT  2010   • MITRAL VALVE REPAIR/REPLACEMENT  2010       Social History     Socioeconomic History   • Marital status: Single   Tobacco Use   • Smoking status: Former Smoker   • Smokeless tobacco: Never Used   • Tobacco comment: caffeine use   Substance and Sexual Activity   • Alcohol use: No   • Drug use: No   • Sexual activity: Defer       Family History   Problem Relation Age of Onset   • Coronary artery disease Father    • No Known Problems Mother        The following portion of the patient's history were reviewed and updated as appropriate: past medical history, past surgical history, past social history, past family history, allergies, current medications, and problem list.    Review of Systems   Constitutional: Positive for malaise/fatigue. Negative for diaphoresis and fever.   HENT: Negative for congestion, hearing loss, hoarse voice, nosebleeds and sore throat.    Eyes: Negative for photophobia, vision loss in left eye, vision loss in right eye and visual disturbance.   Cardiovascular: Positive for leg swelling (intermittent). Negative for chest pain, dyspnea on exertion, irregular heartbeat, near-syncope, orthopnea, palpitations, paroxysmal nocturnal dyspnea and syncope.   Respiratory: Positive for shortness of breath (intermittent). Negative for  cough, hemoptysis, sleep disturbances due to breathing, snoring, sputum production and wheezing.    Endocrine: Negative for cold intolerance, heat intolerance, polydipsia, polyphagia and polyuria.   Hematologic/Lymphatic: Negative for bleeding problem. Does not bruise/bleed easily.   Skin: Negative for color change, dry skin, poor wound healing, rash and suspicious lesions.   Musculoskeletal: Positive for back pain (muscle strain) and joint pain (knees). Negative for arthritis, falls, gout, joint swelling, muscle cramps, muscle weakness and myalgias.   Gastrointestinal: Positive for abdominal pain (gall bladder pain). Negative for bloating, constipation, diarrhea, dysphagia, melena, nausea and vomiting.   Neurological: Positive for dizziness (occ). Negative for excessive daytime sleepiness, headaches, light-headedness, loss of balance, numbness, paresthesias, seizures, vertigo and weakness.   Psychiatric/Behavioral: Negative for depression, memory loss and substance abuse. The patient is not nervous/anxious.        Allergies   Allergen Reactions   • Gabapentin Unknown - High Severity, Other (See Comments) and Rash     Feet peeling         Current Outpatient Medications:   •  albuterol sulfate  (90 Base) MCG/ACT inhaler, Inhale 2 puffs As Needed., Disp: , Rfl:   •  APPLE CIDER VINEGAR PO, Take 1 tablet by mouth Daily., Disp: , Rfl:   •  b complex vitamins capsule, Take 1 capsule by mouth Daily., Disp: , Rfl:   •  BIOTIN PO, Take 1 tablet by mouth Daily., Disp: , Rfl:   •  bumetanide (BUMEX) 2 MG tablet, TAKE ONE TABLET BY MOUTH TWICE A DAY, Disp: 180 tablet, Rfl: 2  •  CALCIUM-MAGNESIUM-ZINC PO, Take 1 tablet by mouth Daily., Disp: , Rfl:   •  celecoxib (CeleBREX) 200 MG capsule, , Disp: , Rfl:   •  Cholecalciferol (VITAMIN D-3 PO), Take 1 tablet by mouth As Needed. Only through the winter, Disp: , Rfl:   •  fluconazole (DIFLUCAN) 150 MG tablet, , Disp: , Rfl:   •  KLOR-CON 20 MEQ CR tablet, TAKE ONE TABLET BY  "MOUTH DAILY, Disp: 90 tablet, Rfl: 0  •  levothyroxine (SYNTHROID, LEVOTHROID) 150 MCG tablet, Take 150 mcg by mouth Daily., Disp: , Rfl:   •  metoprolol succinate XL (TOPROL-XL) 50 MG 24 hr tablet, TAKE ONE TABLET BY MOUTH DAILY (Patient taking differently: 100 mg.), Disp: 90 tablet, Rfl: 2  •  Multiple Vitamin (MULTIVITAMIN) capsule, Take 1 capsule by mouth Daily., Disp: , Rfl:   •  warfarin (COUMADIN) 5 MG tablet, Take 2.5mg (1/2 Tablet) on Saturday, Take 5mg (1 Tablet) all other days OR as directed by the [facility], Disp: 90 tablet, Rfl: 3        Objective:     Vitals:    11/03/21 1356   BP: 142/80   Pulse: 81   Resp: 16   SpO2: 96%   Weight: 82.6 kg (182 lb)   Height: 157.5 cm (62\")     Body mass index is 33.29 kg/m².      Vitals reviewed.   Constitutional:       General: Not in acute distress.     Appearance: Normal and healthy appearance. Well-developed and not in distress.   Eyes:      General:         Right eye: No discharge.         Left eye: No discharge.      Conjunctiva/sclera: Conjunctivae normal.   HENT:      Head: Normocephalic and atraumatic.      Right Ear: External ear normal.      Left Ear: External ear normal.      Nose: Nose normal.   Neck:      Thyroid: No thyromegaly.      Vascular: No JVD.      Trachea: No tracheal deviation.      Lymphadenopathy: No cervical adenopathy.   Pulmonary:      Effort: Pulmonary effort is normal. No respiratory distress.      Breath sounds: Normal breath sounds. No wheezing. No rales.   Chest:      Chest wall: Not tender to palpatation.   Cardiovascular:      Normal rate. Irregularly irregular rhythm.      No gallop.   Pulses:     Intact distal pulses.   Edema:     Peripheral edema present.     Pretibial: bilateral 1+ pitting edema of the pretibial area.     Ankle: bilateral 1+ pitting edema of the ankle.     Feet: bilateral 1+ pitting edema of the feet.  Abdominal:      General: There is no distension.      Palpations: Abdomen is soft.      " Tenderness: There is no abdominal tenderness.   Musculoskeletal: Normal range of motion.         General: No tenderness or deformity.      Cervical back: Normal range of motion and neck supple. Skin:     General: Skin is warm and dry.      Findings: No erythema or rash.   Neurological:      Mental Status: Alert and oriented to person, place, and time.      Coordination: Coordination normal.   Psychiatric:         Attention and Perception: Attention normal.         Mood and Affect: Mood normal.         Speech: Speech normal.         Behavior: Behavior normal. Behavior is cooperative.         Thought Content: Thought content normal.         Cognition and Memory: Cognition normal.         Judgment: Judgment normal.               ECG 12 Lead    Date/Time: 11/3/2021 2:04 PM  Performed by: Deedee Adam APRN  Authorized by: Deedee Adam APRN   Comparison: compared with previous ECG from 3/1/2021  Similar to previous ECG  Rhythm: atrial fibrillation  Ectopy: unifocal PVCs  Rate: normal  Conduction: incomplete right bundle branch block and left anterior fascicular block  Q waves: aVL, V1, V2, V3 and V4    ST Segments: ST segments normal  T Waves: T waves normal  QRS axis: indeterminate    Clinical impression: abnormal EKG              Assessment:       Diagnosis Plan   1. Chronic atrial fibrillation (HCC)     2. H/O mitral valve repair     3. NSVT (nonsustained ventricular tachycardia) ([facility])     4. Pacemaker     5. Mixed hyperlipidemia     6. SHEILA (obstructive sleep apnea)            Plan:       1.    Atrial Fibrillation and Atrial Flutter  Assessment  • She has permanent afib, rate controlled.  Continue beta-blocker and warfarin.  • This is valvular in etiology  • The patient's CHADS2-VASc score is 4  • A [id number] score of 2 or more is considered a high risk for a thromboembolic event  • Warfarin prescribed  Plan  • Continue in atrial fibrillation with rate control  • Continue warfarin for antithrombotic therapy,  bleeding issues discussed  • Continue beta blocker for rate control     2.  Bioprosthetic aortic valve replacement with prior mitral valve repair-echocardiogram April 2019 demonstrated normal function.  Echo 3/2021 that showed EF 54.6% and trace mitral valve and aortic valve regurgitation.  3.  AICD in place- patient is seen in our device clinic. Device was placed in 2010, we have never been able to find documentation regarding that.  Serial echocardiograms dating from 2014 have consistently demonstrated normal ejection fraction.  She has a past history of ventricular tachycardia/nonsustained ventricular tachycardia.  She met with Dr. Perez  December 2020 and the decision has been made not to place AICD. She is comfortable with this decision.  4.  Mediastinal adenopathy-status post biopsy,   5.  Chronic diastolic congestive heart failure -she has intermittent lower extremity edema with varying degrees of swelling.  She self adjusts her Bumex and mostly just takes 2 mg in the morning and seldom takes it in the afternoon.  She has been on this for 12-1/2 years.  We will switch her over to torsemide 40 mg daily.  We will check a BMP in 1 week.  6.  Chronic anticoagulation-she continues on warfarin.     Change Bumex to torsemide 40 mg daily.  Check BMP in 1 week.  RTO in 6 months with JA    As always, it has been a pleasure to participate in your patient's care. Thank you.       Sincerely,       LUIS ARMANDO Quiñones      Current Outpatient Medications:   •  albuterol sulfate  (90 Base) MCG/ACT inhaler, Inhale 2 puffs As Needed., Disp: , Rfl:   •  APPLE CIDER VINEGAR PO, Take 1 tablet by mouth Daily., Disp: , Rfl:   •  b complex vitamins capsule, Take 1 capsule by mouth Daily., Disp: , Rfl:   •  BIOTIN PO, Take 1 tablet by mouth Daily., Disp: , Rfl:   •  CALCIUM-MAGNESIUM-ZINC PO, Take 1 tablet by mouth Daily., Disp: , Rfl:   •  celecoxib (CeleBREX) 200 MG capsule, , Disp: , Rfl:   •  Cholecalciferol (VITAMIN D-3  PO), Take 1 tablet by mouth As Needed. Only through the winter, Disp: , Rfl:   •  fluconazole (DIFLUCAN) 150 MG tablet, , Disp: , Rfl:   •  KLOR-CON 20 MEQ CR tablet, TAKE ONE TABLET BY MOUTH DAILY, Disp: 90 tablet, Rfl: 0  •  levothyroxine (SYNTHROID, LEVOTHROID) 150 MCG tablet, Take 150 mcg by mouth Daily., Disp: , Rfl:   •  metoprolol succinate XL (TOPROL-XL) 50 MG 24 hr tablet, TAKE ONE TABLET BY MOUTH DAILY (Patient taking differently: 100 mg.), Disp: 90 tablet, Rfl: 2  •  Multiple Vitamin (MULTIVITAMIN) capsule, Take 1 capsule by mouth Daily., Disp: , Rfl:   •  warfarin (COUMADIN) 5 MG tablet, Take 2.5mg (1/2 Tablet) on Saturday, Take 5mg (1 Tablet) all other days OR as directed by the [facility], Disp: 90 tablet, Rfl: 3  •  torsemide (Demadex) 20 MG tablet, Take 2 tablets by mouth Daily., Disp: 60 tablet, Rfl: 3    Dictated utilizing Dragon dictation

## 2021-11-03 NOTE — TELEPHONE ENCOUNTER
Pt is calling with concerns about her bp.  She said it was very elevated and was able to get it down to 161/88.  She is also very SOA.  I have scheduled her for further evaluation today at our [place] office with kip Rodgers RN  Triage nurse

## 2021-11-15 DIAGNOSIS — E78.2 MIXED HYPERLIPIDEMIA: ICD-10-CM

## 2021-11-15 DIAGNOSIS — I50.33 ACUTE ON CHRONIC DIASTOLIC CHF (CONGESTIVE HEART FAILURE) (HCC): ICD-10-CM

## 2021-11-15 DIAGNOSIS — G47.33 OSA (OBSTRUCTIVE SLEEP APNEA): Chronic | ICD-10-CM

## 2021-11-15 DIAGNOSIS — I48.20 CHRONIC ATRIAL FIBRILLATION (HCC): ICD-10-CM

## 2021-11-15 DIAGNOSIS — R06.02 SOB (SHORTNESS OF BREATH): ICD-10-CM

## 2021-11-15 DIAGNOSIS — I20.9 ANGINA PECTORIS (HCC): ICD-10-CM

## 2021-11-15 DIAGNOSIS — Z95.0 PACEMAKER: Chronic | ICD-10-CM

## 2021-11-15 DIAGNOSIS — Z95.2 AORTIC VALVE REPLACED: Chronic | ICD-10-CM

## 2021-11-15 DIAGNOSIS — Z98.890 H/O MITRAL VALVE REPAIR: Chronic | ICD-10-CM

## 2021-11-15 NOTE — TELEPHONE ENCOUNTER
Rx Refill Note  Requested Prescriptions     Pending Prescriptions Disp Refills    KLOR-CON 20 MEQ CR tablet [Pharmacy Med Name: KLOR-CON M20 TABLET] 90 tablet 0     Sig: TAKE ONE TABLET BY MOUTH DAILY      Last office visit with prescribing clinician: Visit date not found      Next office visit with prescribing clinician: Visit date not found            Alexandra Boss MA  11/15/21, 08:40 EST

## 2021-11-17 RX ORDER — POTASSIUM CHLORIDE 1500 MG/1
TABLET, EXTENDED RELEASE ORAL
Qty: 90 TABLET | Refills: 0 | Status: SHIPPED | OUTPATIENT
Start: 2021-11-17 | End: 2022-07-12 | Stop reason: SDUPTHER

## 2021-11-29 ENCOUNTER — HOSPITAL ENCOUNTER (EMERGENCY)
Facility: HOSPITAL | Age: 76
Discharge: ANOTHER HOSPITAL | End: 2021-11-29
Attending: EMERGENCY MEDICINE | Admitting: EMERGENCY MEDICINE

## 2021-11-29 ENCOUNTER — APPOINTMENT (OUTPATIENT)
Dept: GENERAL RADIOLOGY | Facility: HOSPITAL | Age: 76
End: 2021-11-29

## 2021-11-29 ENCOUNTER — HOSPITAL ENCOUNTER (INPATIENT)
Facility: HOSPITAL | Age: 76
LOS: 5 days | Discharge: HOME | End: 2021-12-04
Attending: INTERNAL MEDICINE | Admitting: INTERNAL MEDICINE

## 2021-11-29 VITALS
WEIGHT: 186.7 LBS | HEIGHT: 62 IN | OXYGEN SATURATION: 94 % | BODY MASS INDEX: 34.36 KG/M2 | RESPIRATION RATE: 18 BRPM | HEART RATE: 74 BPM | DIASTOLIC BLOOD PRESSURE: 67 MMHG | SYSTOLIC BLOOD PRESSURE: 155 MMHG | TEMPERATURE: 97.5 F

## 2021-11-29 DIAGNOSIS — I48.20 CHRONIC ATRIAL FIBRILLATION: Chronic | ICD-10-CM

## 2021-11-29 DIAGNOSIS — Z79.01 ANTICOAGULATED ON COUMADIN: ICD-10-CM

## 2021-11-29 DIAGNOSIS — I47.20 SUSTAINED VENTRICULAR TACHYCARDIA: Primary | ICD-10-CM

## 2021-11-29 DIAGNOSIS — I47.20 VT (VENTRICULAR TACHYCARDIA): Primary | ICD-10-CM

## 2021-11-29 DIAGNOSIS — I47.20 VENTRICULAR TACHYCARDIA: ICD-10-CM

## 2021-11-29 LAB
ALBUMIN SERPL-MCNC: 4.3 G/DL (ref 3.5–5.2)
ALBUMIN/GLOB SERPL: 1.4 G/DL
ALP SERPL-CCNC: 72 U/L (ref 39–117)
ALT SERPL W P-5'-P-CCNC: 15 U/L (ref 1–33)
ANION GAP SERPL CALCULATED.3IONS-SCNC: 10.6 MMOL/L (ref 5–15)
AST SERPL-CCNC: 22 U/L (ref 1–32)
BASOPHILS # BLD AUTO: 0.07 10*3/MM3 (ref 0–0.2)
BASOPHILS NFR BLD AUTO: 0.6 % (ref 0–1.5)
BILIRUB SERPL-MCNC: 0.5 MG/DL (ref 0–1.2)
BUN SERPL-MCNC: 13 MG/DL (ref 8–23)
BUN/CREAT SERPL: 14.9 (ref 7–25)
CALCIUM SPEC-SCNC: 9.8 MG/DL (ref 8.6–10.5)
CHLORIDE SERPL-SCNC: 102 MMOL/L (ref 98–107)
CO2 SERPL-SCNC: 28.4 MMOL/L (ref 22–29)
CREAT SERPL-MCNC: 0.87 MG/DL (ref 0.57–1)
DEPRECATED RDW RBC AUTO: 43.2 FL (ref 37–54)
EOSINOPHIL # BLD AUTO: 0.16 10*3/MM3 (ref 0–0.4)
EOSINOPHIL NFR BLD AUTO: 1.4 % (ref 0.3–6.2)
ERYTHROCYTE [DISTWIDTH] IN BLOOD BY AUTOMATED COUNT: 12.8 % (ref 12.3–15.4)
GFR SERPL CREATININE-BSD FRML MDRD: 63 ML/MIN/1.73
GLOBULIN UR ELPH-MCNC: 3.1 GM/DL
GLUCOSE SERPL-MCNC: 112 MG/DL (ref 65–99)
HCT VFR BLD AUTO: 45.3 % (ref 34–46.6)
HGB BLD-MCNC: 14.5 G/DL (ref 12–15.9)
IMM GRANULOCYTES # BLD AUTO: 0.04 10*3/MM3 (ref 0–0.05)
IMM GRANULOCYTES NFR BLD AUTO: 0.4 % (ref 0–0.5)
INR PPP: 2.31 (ref 0.9–1.1)
LYMPHOCYTES # BLD AUTO: 1.22 10*3/MM3 (ref 0.7–3.1)
LYMPHOCYTES NFR BLD AUTO: 10.9 % (ref 19.6–45.3)
MAGNESIUM SERPL-MCNC: 2.3 MG/DL (ref 1.6–2.4)
MCH RBC QN AUTO: 29.1 PG (ref 26.6–33)
MCHC RBC AUTO-ENTMCNC: 32 G/DL (ref 31.5–35.7)
MCV RBC AUTO: 91 FL (ref 79–97)
MONOCYTES # BLD AUTO: 1.22 10*3/MM3 (ref 0.1–0.9)
MONOCYTES NFR BLD AUTO: 10.9 % (ref 5–12)
NEUTROPHILS NFR BLD AUTO: 75.8 % (ref 42.7–76)
NEUTROPHILS NFR BLD AUTO: 8.44 10*3/MM3 (ref 1.7–7)
PLATELET # BLD AUTO: 256 10*3/MM3 (ref 140–450)
PMV BLD AUTO: 9.3 FL (ref 6–12)
POTASSIUM SERPL-SCNC: 4 MMOL/L (ref 3.5–5.2)
PROT SERPL-MCNC: 7.4 G/DL (ref 6–8.5)
PROTHROMBIN TIME: 24.9 SECONDS (ref 12.1–15)
QT INTERVAL: 386 MS
RBC # BLD AUTO: 4.98 10*6/MM3 (ref 3.77–5.28)
SARS-COV-2 RNA PNL SPEC NAA+PROBE: NOT DETECTED
SODIUM SERPL-SCNC: 141 MMOL/L (ref 136–145)
TROPONIN T SERPL-MCNC: <0.01 NG/ML (ref 0–0.03)
TSH SERPL DL<=0.05 MIU/L-ACNC: 0.61 UIU/ML (ref 0.27–4.2)
WBC NRBC COR # BLD: 11.15 10*3/MM3 (ref 3.4–10.8)

## 2021-11-29 PROCEDURE — 84443 ASSAY THYROID STIM HORMONE: CPT | Performed by: NURSE PRACTITIONER

## 2021-11-29 PROCEDURE — 99291 CRITICAL CARE FIRST HOUR: CPT | Performed by: EMERGENCY MEDICINE

## 2021-11-29 PROCEDURE — 80053 COMPREHEN METABOLIC PANEL: CPT | Performed by: EMERGENCY MEDICINE

## 2021-11-29 PROCEDURE — 99223 1ST HOSP IP/OBS HIGH 75: CPT | Performed by: NURSE PRACTITIONER

## 2021-11-29 PROCEDURE — 83735 ASSAY OF MAGNESIUM: CPT | Performed by: EMERGENCY MEDICINE

## 2021-11-29 PROCEDURE — 36415 COLL VENOUS BLD VENIPUNCTURE: CPT

## 2021-11-29 PROCEDURE — 96365 THER/PROPH/DIAG IV INF INIT: CPT

## 2021-11-29 PROCEDURE — 25010000002 AMIODARONE IN DEXTROSE 5% 360-4.14 MG/200ML-% SOLUTION

## 2021-11-29 PROCEDURE — 85025 COMPLETE CBC W/AUTO DIFF WBC: CPT | Performed by: EMERGENCY MEDICINE

## 2021-11-29 PROCEDURE — 99284 EMERGENCY DEPT VISIT MOD MDM: CPT

## 2021-11-29 PROCEDURE — 85610 PROTHROMBIN TIME: CPT | Performed by: EMERGENCY MEDICINE

## 2021-11-29 PROCEDURE — 93010 ELECTROCARDIOGRAM REPORT: CPT | Performed by: INTERNAL MEDICINE

## 2021-11-29 PROCEDURE — 93005 ELECTROCARDIOGRAM TRACING: CPT | Performed by: EMERGENCY MEDICINE

## 2021-11-29 PROCEDURE — 84484 ASSAY OF TROPONIN QUANT: CPT | Performed by: EMERGENCY MEDICINE

## 2021-11-29 PROCEDURE — 71045 X-RAY EXAM CHEST 1 VIEW: CPT

## 2021-11-29 PROCEDURE — 87635 SARS-COV-2 COVID-19 AMP PRB: CPT | Performed by: EMERGENCY MEDICINE

## 2021-11-29 PROCEDURE — 25010000002 AMIODARONE IN DEXTROSE 5% 360-4.14 MG/200ML-% SOLUTION: Performed by: NURSE PRACTITIONER

## 2021-11-29 RX ORDER — AMIODARONE HYDROCHLORIDE 200 MG/1
400 TABLET ORAL 2 TIMES DAILY WITH MEALS
Status: DISCONTINUED | OUTPATIENT
Start: 2021-11-29 | End: 2021-12-02

## 2021-11-29 RX ORDER — LEVOTHYROXINE SODIUM 0.15 MG/1
150 TABLET ORAL DAILY
Status: DISCONTINUED | OUTPATIENT
Start: 2021-11-29 | End: 2021-12-04 | Stop reason: HOSPADM

## 2021-11-29 RX ORDER — ACETAMINOPHEN 500 MG
1000 TABLET ORAL 2 TIMES DAILY PRN
Status: DISCONTINUED | OUTPATIENT
Start: 2021-11-29 | End: 2021-12-04 | Stop reason: HOSPADM

## 2021-11-29 RX ORDER — POTASSIUM CHLORIDE 750 MG/1
20 TABLET, FILM COATED, EXTENDED RELEASE ORAL DAILY
Status: DISCONTINUED | OUTPATIENT
Start: 2021-11-29 | End: 2021-12-04 | Stop reason: HOSPADM

## 2021-11-29 RX ORDER — TORSEMIDE 20 MG/1
40 TABLET ORAL DAILY
Status: DISCONTINUED | OUTPATIENT
Start: 2021-11-29 | End: 2021-12-04 | Stop reason: HOSPADM

## 2021-11-29 RX ORDER — METOPROLOL SUCCINATE 100 MG/1
100 TABLET, EXTENDED RELEASE ORAL DAILY
Status: DISCONTINUED | OUTPATIENT
Start: 2021-11-29 | End: 2021-12-02

## 2021-11-29 RX ADMIN — ACETAMINOPHEN 1000 MG: 500 TABLET, FILM COATED ORAL at 21:23

## 2021-11-29 RX ADMIN — POTASSIUM CHLORIDE 20 MEQ: 750 TABLET, EXTENDED RELEASE ORAL at 16:27

## 2021-11-29 RX ADMIN — AMIODARONE HYDROCHLORIDE 0.5 MG/MIN: 1.8 INJECTION, SOLUTION INTRAVENOUS at 16:27

## 2021-11-29 RX ADMIN — LEVOTHYROXINE SODIUM 150 MCG: 0.15 TABLET ORAL at 16:27

## 2021-11-29 RX ADMIN — AMIODARONE HYDROCHLORIDE 400 MG: 200 TABLET ORAL at 18:02

## 2021-11-29 RX ADMIN — METOPROLOL SUCCINATE 100 MG: 100 TABLET, EXTENDED RELEASE ORAL at 16:26

## 2021-11-29 RX ADMIN — TORSEMIDE 40 MG: 20 TABLET ORAL at 16:26

## 2021-11-29 RX ADMIN — AMIODARONE HYDROCHLORIDE 1 MG/MIN: 1.8 INJECTION, SOLUTION INTRAVENOUS at 11:11

## 2021-11-30 LAB
ANION GAP SERPL CALCULATED.3IONS-SCNC: 15.6 MMOL/L (ref 5–15)
BUN SERPL-MCNC: 14 MG/DL (ref 8–23)
BUN/CREAT SERPL: 16.5 (ref 7–25)
CALCIUM SPEC-SCNC: 9.2 MG/DL (ref 8.6–10.5)
CHLORIDE SERPL-SCNC: 103 MMOL/L (ref 98–107)
CO2 SERPL-SCNC: 23.4 MMOL/L (ref 22–29)
CREAT SERPL-MCNC: 0.85 MG/DL (ref 0.57–1)
GFR SERPL CREATININE-BSD FRML MDRD: 65 ML/MIN/1.73
GLUCOSE SERPL-MCNC: 92 MG/DL (ref 65–99)
INR PPP: 1.95 (ref 0.9–1.1)
INR PPP: 2.01 (ref 0.9–1.1)
POTASSIUM SERPL-SCNC: 4.1 MMOL/L (ref 3.5–5.2)
PROTHROMBIN TIME: 22 SECONDS (ref 11.7–14.2)
PROTHROMBIN TIME: 22.6 SECONDS (ref 11.7–14.2)
SODIUM SERPL-SCNC: 142 MMOL/L (ref 136–145)

## 2021-11-30 PROCEDURE — 4A023N7 MEASUREMENT OF CARDIAC SAMPLING AND PRESSURE, LEFT HEART, PERCUTANEOUS APPROACH: ICD-10-PCS | Performed by: INTERNAL MEDICINE

## 2021-11-30 PROCEDURE — 0 IOPAMIDOL PER 1 ML: Performed by: INTERNAL MEDICINE

## 2021-11-30 PROCEDURE — 4A033BC MEASUREMENT OF ARTERIAL PRESSURE, CORONARY, PERCUTANEOUS APPROACH: ICD-10-PCS | Performed by: INTERNAL MEDICINE

## 2021-11-30 PROCEDURE — 93571 IV DOP VEL&/PRESS C FLO 1ST: CPT | Performed by: INTERNAL MEDICINE

## 2021-11-30 PROCEDURE — B2111ZZ FLUOROSCOPY OF MULTIPLE CORONARY ARTERIES USING LOW OSMOLAR CONTRAST: ICD-10-PCS | Performed by: INTERNAL MEDICINE

## 2021-11-30 PROCEDURE — 85610 PROTHROMBIN TIME: CPT | Performed by: NURSE PRACTITIONER

## 2021-11-30 PROCEDURE — 25010000002 MIDAZOLAM PER 1 MG: Performed by: INTERNAL MEDICINE

## 2021-11-30 PROCEDURE — C1769 GUIDE WIRE: HCPCS | Performed by: INTERNAL MEDICINE

## 2021-11-30 PROCEDURE — 93458 L HRT ARTERY/VENTRICLE ANGIO: CPT | Performed by: INTERNAL MEDICINE

## 2021-11-30 PROCEDURE — 99153 MOD SED SAME PHYS/QHP EA: CPT | Performed by: INTERNAL MEDICINE

## 2021-11-30 PROCEDURE — B2151ZZ FLUOROSCOPY OF LEFT HEART USING LOW OSMOLAR CONTRAST: ICD-10-PCS | Performed by: INTERNAL MEDICINE

## 2021-11-30 PROCEDURE — C1887 CATHETER, GUIDING: HCPCS | Performed by: INTERNAL MEDICINE

## 2021-11-30 PROCEDURE — 25010000002 FENTANYL CITRATE (PF) 50 MCG/ML SOLUTION: Performed by: INTERNAL MEDICINE

## 2021-11-30 PROCEDURE — 25010000002 AMIODARONE IN DEXTROSE 5% 360-4.14 MG/200ML-% SOLUTION: Performed by: NURSE PRACTITIONER

## 2021-11-30 PROCEDURE — C1894 INTRO/SHEATH, NON-LASER: HCPCS | Performed by: INTERNAL MEDICINE

## 2021-11-30 PROCEDURE — 80048 BASIC METABOLIC PNL TOTAL CA: CPT | Performed by: NURSE PRACTITIONER

## 2021-11-30 PROCEDURE — 99152 MOD SED SAME PHYS/QHP 5/>YRS: CPT | Performed by: INTERNAL MEDICINE

## 2021-11-30 RX ORDER — ONDANSETRON 2 MG/ML
4 INJECTION INTRAMUSCULAR; INTRAVENOUS EVERY 6 HOURS PRN
Status: DISCONTINUED | OUTPATIENT
Start: 2021-11-30 | End: 2021-12-04 | Stop reason: HOSPADM

## 2021-11-30 RX ORDER — LIDOCAINE HYDROCHLORIDE 20 MG/ML
INJECTION, SOLUTION INFILTRATION; PERINEURAL AS NEEDED
Status: DISCONTINUED | OUTPATIENT
Start: 2021-11-30 | End: 2021-11-30 | Stop reason: HOSPADM

## 2021-11-30 RX ORDER — MIDAZOLAM HYDROCHLORIDE 1 MG/ML
INJECTION INTRAMUSCULAR; INTRAVENOUS AS NEEDED
Status: DISCONTINUED | OUTPATIENT
Start: 2021-11-30 | End: 2021-11-30 | Stop reason: HOSPADM

## 2021-11-30 RX ORDER — HYDROCODONE BITARTRATE AND ACETAMINOPHEN 5; 325 MG/1; MG/1
1 TABLET ORAL EVERY 4 HOURS PRN
Status: DISCONTINUED | OUTPATIENT
Start: 2021-11-30 | End: 2021-12-04 | Stop reason: HOSPADM

## 2021-11-30 RX ORDER — SODIUM CHLORIDE 9 MG/ML
INJECTION, SOLUTION INTRAVENOUS CONTINUOUS PRN
Status: COMPLETED | OUTPATIENT
Start: 2021-11-30 | End: 2021-11-30

## 2021-11-30 RX ORDER — SODIUM CHLORIDE 9 MG/ML
50 INJECTION, SOLUTION INTRAVENOUS CONTINUOUS
Status: ACTIVE | OUTPATIENT
Start: 2021-11-30 | End: 2021-11-30

## 2021-11-30 RX ORDER — ONDANSETRON 4 MG/1
4 TABLET, FILM COATED ORAL EVERY 6 HOURS PRN
Status: DISCONTINUED | OUTPATIENT
Start: 2021-11-30 | End: 2021-12-04 | Stop reason: HOSPADM

## 2021-11-30 RX ORDER — NITROGLYCERIN 0.4 MG/1
0.4 TABLET SUBLINGUAL
Status: DISCONTINUED | OUTPATIENT
Start: 2021-11-30 | End: 2021-12-04 | Stop reason: HOSPADM

## 2021-11-30 RX ORDER — ATORVASTATIN CALCIUM 20 MG/1
40 TABLET, FILM COATED ORAL NIGHTLY
Status: DISCONTINUED | OUTPATIENT
Start: 2021-11-30 | End: 2021-12-02

## 2021-11-30 RX ORDER — FENTANYL CITRATE 50 UG/ML
INJECTION, SOLUTION INTRAMUSCULAR; INTRAVENOUS AS NEEDED
Status: DISCONTINUED | OUTPATIENT
Start: 2021-11-30 | End: 2021-11-30 | Stop reason: HOSPADM

## 2021-11-30 RX ADMIN — ATORVASTATIN CALCIUM 40 MG: 20 TABLET, FILM COATED ORAL at 20:07

## 2021-11-30 RX ADMIN — LEVOTHYROXINE SODIUM 150 MCG: 0.15 TABLET ORAL at 12:09

## 2021-11-30 RX ADMIN — METOPROLOL SUCCINATE 100 MG: 100 TABLET, EXTENDED RELEASE ORAL at 12:08

## 2021-11-30 RX ADMIN — AMIODARONE HYDROCHLORIDE 0.5 MG/MIN: 1.8 INJECTION, SOLUTION INTRAVENOUS at 06:11

## 2021-11-30 RX ADMIN — AMIODARONE HYDROCHLORIDE 400 MG: 200 TABLET ORAL at 08:32

## 2021-11-30 RX ADMIN — AMIODARONE HYDROCHLORIDE 400 MG: 200 TABLET ORAL at 18:51

## 2021-11-30 RX ADMIN — ACETAMINOPHEN 1000 MG: 500 TABLET, FILM COATED ORAL at 20:07

## 2021-11-30 RX ADMIN — ACETAMINOPHEN 1000 MG: 500 TABLET, FILM COATED ORAL at 12:08

## 2021-11-30 RX ADMIN — POTASSIUM CHLORIDE 20 MEQ: 750 TABLET, EXTENDED RELEASE ORAL at 12:17

## 2021-11-30 RX ADMIN — TORSEMIDE 20 MG: 20 TABLET ORAL at 12:10

## 2021-12-01 LAB
ANION GAP SERPL CALCULATED.3IONS-SCNC: 9.4 MMOL/L (ref 5–15)
BUN SERPL-MCNC: 15 MG/DL (ref 8–23)
BUN/CREAT SERPL: 21.1 (ref 7–25)
CALCIUM SPEC-SCNC: 9.1 MG/DL (ref 8.6–10.5)
CHLORIDE SERPL-SCNC: 103 MMOL/L (ref 98–107)
CO2 SERPL-SCNC: 24.6 MMOL/L (ref 22–29)
CREAT SERPL-MCNC: 0.71 MG/DL (ref 0.57–1)
DEPRECATED RDW RBC AUTO: 41.9 FL (ref 37–54)
ERYTHROCYTE [DISTWIDTH] IN BLOOD BY AUTOMATED COUNT: 12.9 % (ref 12.3–15.4)
GFR SERPL CREATININE-BSD FRML MDRD: 80 ML/MIN/1.73
GLUCOSE SERPL-MCNC: 104 MG/DL (ref 65–99)
HCT VFR BLD AUTO: 41.6 % (ref 34–46.6)
HGB BLD-MCNC: 14 G/DL (ref 12–15.9)
INR PPP: 1.48 (ref 0.9–1.1)
MCH RBC QN AUTO: 30.2 PG (ref 26.6–33)
MCHC RBC AUTO-ENTMCNC: 33.7 G/DL (ref 31.5–35.7)
MCV RBC AUTO: 89.7 FL (ref 79–97)
PLATELET # BLD AUTO: 249 10*3/MM3 (ref 140–450)
PMV BLD AUTO: 9.6 FL (ref 6–12)
POTASSIUM SERPL-SCNC: 4.1 MMOL/L (ref 3.5–5.2)
PROTHROMBIN TIME: 17.7 SECONDS (ref 11.7–14.2)
RBC # BLD AUTO: 4.64 10*6/MM3 (ref 3.77–5.28)
SODIUM SERPL-SCNC: 137 MMOL/L (ref 136–145)
WBC NRBC COR # BLD: 12.41 10*3/MM3 (ref 3.4–10.8)

## 2021-12-01 PROCEDURE — 33262 RMVL& REPLC PULSE GEN 1 LEAD: CPT | Performed by: INTERNAL MEDICINE

## 2021-12-01 PROCEDURE — 99152 MOD SED SAME PHYS/QHP 5/>YRS: CPT | Performed by: INTERNAL MEDICINE

## 2021-12-01 PROCEDURE — 0JH608Z INSERTION OF DEFIBRILLATOR GENERATOR INTO CHEST SUBCUTANEOUS TISSUE AND FASCIA, OPEN APPROACH: ICD-10-PCS | Performed by: INTERNAL MEDICINE

## 2021-12-01 PROCEDURE — 99153 MOD SED SAME PHYS/QHP EA: CPT | Performed by: INTERNAL MEDICINE

## 2021-12-01 PROCEDURE — 25010000002 VANCOMYCIN PER 500 MG: Performed by: INTERNAL MEDICINE

## 2021-12-01 PROCEDURE — C1722 AICD, SINGLE CHAMBER: HCPCS | Performed by: INTERNAL MEDICINE

## 2021-12-01 PROCEDURE — 25010000002 MIDAZOLAM PER 1 MG: Performed by: INTERNAL MEDICINE

## 2021-12-01 PROCEDURE — 25010000002 FENTANYL CITRATE (PF) 50 MCG/ML SOLUTION: Performed by: INTERNAL MEDICINE

## 2021-12-01 PROCEDURE — 80048 BASIC METABOLIC PNL TOTAL CA: CPT | Performed by: INTERNAL MEDICINE

## 2021-12-01 PROCEDURE — 0JPT0PZ REMOVAL OF CARDIAC RHYTHM RELATED DEVICE FROM TRUNK SUBCUTANEOUS TISSUE AND FASCIA, OPEN APPROACH: ICD-10-PCS | Performed by: INTERNAL MEDICINE

## 2021-12-01 PROCEDURE — 85027 COMPLETE CBC AUTOMATED: CPT | Performed by: INTERNAL MEDICINE

## 2021-12-01 PROCEDURE — 85610 PROTHROMBIN TIME: CPT | Performed by: INTERNAL MEDICINE

## 2021-12-01 DEVICE — ICD VISIA AF VR SURESCAN DF1 DVFB1D1: Type: IMPLANTABLE DEVICE | Status: FUNCTIONAL

## 2021-12-01 RX ORDER — LIDOCAINE HYDROCHLORIDE AND EPINEPHRINE 10; 10 MG/ML; UG/ML
INJECTION, SOLUTION INFILTRATION; PERINEURAL AS NEEDED
Status: DISCONTINUED | OUTPATIENT
Start: 2021-12-01 | End: 2021-12-01 | Stop reason: HOSPADM

## 2021-12-01 RX ORDER — ACETAMINOPHEN 325 MG/1
650 TABLET ORAL EVERY 4 HOURS PRN
Status: DISCONTINUED | OUTPATIENT
Start: 2021-12-01 | End: 2021-12-04 | Stop reason: HOSPADM

## 2021-12-01 RX ORDER — SODIUM CHLORIDE 0.9 % (FLUSH) 0.9 %
10 SYRINGE (ML) INJECTION EVERY 12 HOURS SCHEDULED
Status: DISCONTINUED | OUTPATIENT
Start: 2021-12-01 | End: 2021-12-04 | Stop reason: HOSPADM

## 2021-12-01 RX ORDER — FENTANYL CITRATE 50 UG/ML
INJECTION, SOLUTION INTRAMUSCULAR; INTRAVENOUS AS NEEDED
Status: DISCONTINUED | OUTPATIENT
Start: 2021-12-01 | End: 2021-12-01 | Stop reason: HOSPADM

## 2021-12-01 RX ORDER — HYDROCODONE BITARTRATE AND ACETAMINOPHEN 7.5; 325 MG/1; MG/1
1 TABLET ORAL EVERY 4 HOURS PRN
Status: DISCONTINUED | OUTPATIENT
Start: 2021-12-01 | End: 2021-12-04 | Stop reason: HOSPADM

## 2021-12-01 RX ORDER — WARFARIN SODIUM 5 MG/1
5 TABLET ORAL
Status: DISCONTINUED | OUTPATIENT
Start: 2021-12-01 | End: 2021-12-04 | Stop reason: HOSPADM

## 2021-12-01 RX ORDER — NALOXONE HCL 0.4 MG/ML
0.4 VIAL (ML) INJECTION
Status: DISCONTINUED | OUTPATIENT
Start: 2021-12-01 | End: 2021-12-04 | Stop reason: HOSPADM

## 2021-12-01 RX ORDER — SODIUM CHLORIDE 9 MG/ML
INJECTION, SOLUTION INTRAVENOUS CONTINUOUS PRN
Status: COMPLETED | OUTPATIENT
Start: 2021-12-01 | End: 2021-12-01

## 2021-12-01 RX ORDER — VANCOMYCIN HYDROCHLORIDE 1 G/200ML
INJECTION, SOLUTION INTRAVENOUS CONTINUOUS PRN
Status: COMPLETED | OUTPATIENT
Start: 2021-12-01 | End: 2021-12-01

## 2021-12-01 RX ORDER — ACETAMINOPHEN 650 MG/1
650 SUPPOSITORY RECTAL EVERY 4 HOURS PRN
Status: DISCONTINUED | OUTPATIENT
Start: 2021-12-01 | End: 2021-12-04 | Stop reason: HOSPADM

## 2021-12-01 RX ORDER — MIDAZOLAM HYDROCHLORIDE 1 MG/ML
INJECTION INTRAMUSCULAR; INTRAVENOUS AS NEEDED
Status: DISCONTINUED | OUTPATIENT
Start: 2021-12-01 | End: 2021-12-01 | Stop reason: HOSPADM

## 2021-12-01 RX ORDER — HYDROMORPHONE HYDROCHLORIDE 1 MG/ML
0.5 INJECTION, SOLUTION INTRAMUSCULAR; INTRAVENOUS; SUBCUTANEOUS
Status: DISCONTINUED | OUTPATIENT
Start: 2021-12-01 | End: 2021-12-04 | Stop reason: HOSPADM

## 2021-12-01 RX ORDER — WARFARIN SODIUM 2.5 MG/1
2.5 TABLET ORAL
Status: DISCONTINUED | OUTPATIENT
Start: 2021-12-04 | End: 2021-12-04 | Stop reason: HOSPADM

## 2021-12-01 RX ORDER — SODIUM CHLORIDE 0.9 % (FLUSH) 0.9 %
10 SYRINGE (ML) INJECTION AS NEEDED
Status: DISCONTINUED | OUTPATIENT
Start: 2021-12-01 | End: 2021-12-04 | Stop reason: HOSPADM

## 2021-12-01 RX ADMIN — ATORVASTATIN CALCIUM 40 MG: 20 TABLET, FILM COATED ORAL at 22:06

## 2021-12-01 RX ADMIN — SODIUM CHLORIDE, PRESERVATIVE FREE 10 ML: 5 INJECTION INTRAVENOUS at 22:00

## 2021-12-01 RX ADMIN — LEVOTHYROXINE SODIUM 150 MCG: 0.15 TABLET ORAL at 08:29

## 2021-12-01 RX ADMIN — ACETAMINOPHEN 1000 MG: 500 TABLET, FILM COATED ORAL at 10:34

## 2021-12-01 RX ADMIN — METOPROLOL SUCCINATE 100 MG: 100 TABLET, EXTENDED RELEASE ORAL at 08:29

## 2021-12-01 RX ADMIN — WARFARIN 5 MG: 5 TABLET ORAL at 19:15

## 2021-12-01 RX ADMIN — AMIODARONE HYDROCHLORIDE 400 MG: 200 TABLET ORAL at 18:03

## 2021-12-01 RX ADMIN — TORSEMIDE 20 MG: 20 TABLET ORAL at 08:29

## 2021-12-01 RX ADMIN — HYDROCODONE BITARTRATE AND ACETAMINOPHEN 1 TABLET: 5; 325 TABLET ORAL at 05:18

## 2021-12-01 RX ADMIN — POTASSIUM CHLORIDE 20 MEQ: 750 TABLET, EXTENDED RELEASE ORAL at 08:29

## 2021-12-01 RX ADMIN — ACETAMINOPHEN 1000 MG: 500 TABLET, FILM COATED ORAL at 22:05

## 2021-12-01 RX ADMIN — AMIODARONE HYDROCHLORIDE 400 MG: 200 TABLET ORAL at 08:29

## 2021-12-02 LAB
INR PPP: 1.39 (ref 0.9–1.1)
PROTHROMBIN TIME: 16.8 SECONDS (ref 11.7–14.2)
QT INTERVAL: 442 MS
QT INTERVAL: 457 MS

## 2021-12-02 PROCEDURE — 25010000002 ONDANSETRON PER 1 MG: Performed by: NURSE PRACTITIONER

## 2021-12-02 PROCEDURE — 99024 POSTOP FOLLOW-UP VISIT: CPT | Performed by: NURSE PRACTITIONER

## 2021-12-02 PROCEDURE — 93010 ELECTROCARDIOGRAM REPORT: CPT | Performed by: INTERNAL MEDICINE

## 2021-12-02 PROCEDURE — 85610 PROTHROMBIN TIME: CPT | Performed by: NURSE PRACTITIONER

## 2021-12-02 PROCEDURE — 93005 ELECTROCARDIOGRAM TRACING: CPT | Performed by: INTERNAL MEDICINE

## 2021-12-02 PROCEDURE — 63710000001 ONDANSETRON PER 8 MG: Performed by: NURSE PRACTITIONER

## 2021-12-02 PROCEDURE — 93005 ELECTROCARDIOGRAM TRACING: CPT | Performed by: NURSE PRACTITIONER

## 2021-12-02 RX ORDER — AMIODARONE HYDROCHLORIDE 200 MG/1
200 TABLET ORAL 2 TIMES DAILY WITH MEALS
Status: DISCONTINUED | OUTPATIENT
Start: 2021-12-02 | End: 2021-12-04 | Stop reason: HOSPADM

## 2021-12-02 RX ORDER — POLYETHYLENE GLYCOL 3350 17 G/17G
17 POWDER, FOR SOLUTION ORAL DAILY PRN
Status: DISCONTINUED | OUTPATIENT
Start: 2021-12-02 | End: 2021-12-04 | Stop reason: HOSPADM

## 2021-12-02 RX ORDER — METOPROLOL SUCCINATE 50 MG/1
50 TABLET, EXTENDED RELEASE ORAL DAILY
Status: DISCONTINUED | OUTPATIENT
Start: 2021-12-03 | End: 2021-12-03

## 2021-12-02 RX ORDER — PROCHLORPERAZINE EDISYLATE 5 MG/ML
5 INJECTION INTRAMUSCULAR; INTRAVENOUS EVERY 4 HOURS PRN
Status: DISCONTINUED | OUTPATIENT
Start: 2021-12-02 | End: 2021-12-03

## 2021-12-02 RX ADMIN — POLYETHYLENE GLYCOL 3350 17 G: 17 POWDER, FOR SOLUTION ORAL at 09:44

## 2021-12-02 RX ADMIN — WARFARIN 5 MG: 5 TABLET ORAL at 17:55

## 2021-12-02 RX ADMIN — SODIUM CHLORIDE, PRESERVATIVE FREE 10 ML: 5 INJECTION INTRAVENOUS at 08:36

## 2021-12-02 RX ADMIN — ACETAMINOPHEN 1000 MG: 500 TABLET, FILM COATED ORAL at 08:35

## 2021-12-02 RX ADMIN — POTASSIUM CHLORIDE 20 MEQ: 750 TABLET, EXTENDED RELEASE ORAL at 08:36

## 2021-12-02 RX ADMIN — ONDANSETRON 4 MG: 2 INJECTION INTRAMUSCULAR; INTRAVENOUS at 23:10

## 2021-12-02 RX ADMIN — ONDANSETRON HYDROCHLORIDE 4 MG: 4 TABLET, FILM COATED ORAL at 17:04

## 2021-12-02 RX ADMIN — AMIODARONE HYDROCHLORIDE 400 MG: 200 TABLET ORAL at 08:35

## 2021-12-02 RX ADMIN — HYDROCODONE BITARTRATE AND ACETAMINOPHEN 1 TABLET: 7.5; 325 TABLET ORAL at 23:11

## 2021-12-02 RX ADMIN — TORSEMIDE 40 MG: 20 TABLET ORAL at 08:35

## 2021-12-02 RX ADMIN — AMIODARONE HYDROCHLORIDE 200 MG: 200 TABLET ORAL at 17:55

## 2021-12-02 RX ADMIN — LEVOTHYROXINE SODIUM 150 MCG: 0.15 TABLET ORAL at 08:35

## 2021-12-02 RX ADMIN — SODIUM CHLORIDE, PRESERVATIVE FREE 10 ML: 5 INJECTION INTRAVENOUS at 23:11

## 2021-12-03 ENCOUNTER — APPOINTMENT (OUTPATIENT)
Dept: ULTRASOUND IMAGING | Facility: HOSPITAL | Age: 76
End: 2021-12-03

## 2021-12-03 PROBLEM — R11.2 NAUSEA & VOMITING: Status: ACTIVE | Noted: 2021-12-03

## 2021-12-03 PROBLEM — K59.00 ACUTE CONSTIPATION: Status: ACTIVE | Noted: 2021-12-03

## 2021-12-03 LAB
ALBUMIN SERPL-MCNC: 3.7 G/DL (ref 3.5–5.2)
ALP SERPL-CCNC: 54 U/L (ref 39–117)
ALT SERPL W P-5'-P-CCNC: 12 U/L (ref 1–33)
AMYLASE SERPL-CCNC: 76 U/L (ref 28–100)
ANION GAP SERPL CALCULATED.3IONS-SCNC: 12.2 MMOL/L (ref 5–15)
AST SERPL-CCNC: 18 U/L (ref 1–32)
BASOPHILS # BLD AUTO: 0.06 10*3/MM3 (ref 0–0.2)
BASOPHILS NFR BLD AUTO: 0.5 % (ref 0–1.5)
BILIRUB CONJ SERPL-MCNC: 0.2 MG/DL (ref 0–0.3)
BILIRUB INDIRECT SERPL-MCNC: 0.3 MG/DL
BILIRUB SERPL-MCNC: 0.5 MG/DL (ref 0–1.2)
BUN SERPL-MCNC: 20 MG/DL (ref 8–23)
BUN/CREAT SERPL: 20.6 (ref 7–25)
CALCIUM SPEC-SCNC: 9 MG/DL (ref 8.6–10.5)
CHLORIDE SERPL-SCNC: 98 MMOL/L (ref 98–107)
CO2 SERPL-SCNC: 24.8 MMOL/L (ref 22–29)
CREAT SERPL-MCNC: 0.97 MG/DL (ref 0.57–1)
DEPRECATED RDW RBC AUTO: 39.9 FL (ref 37–54)
EOSINOPHIL # BLD AUTO: 0.09 10*3/MM3 (ref 0–0.4)
EOSINOPHIL NFR BLD AUTO: 0.7 % (ref 0.3–6.2)
ERYTHROCYTE [DISTWIDTH] IN BLOOD BY AUTOMATED COUNT: 12.7 % (ref 12.3–15.4)
GFR SERPL CREATININE-BSD FRML MDRD: 56 ML/MIN/1.73
GLUCOSE SERPL-MCNC: 112 MG/DL (ref 65–99)
HCT VFR BLD AUTO: 38.8 % (ref 34–46.6)
HGB BLD-MCNC: 13.5 G/DL (ref 12–15.9)
IMM GRANULOCYTES # BLD AUTO: 0.06 10*3/MM3 (ref 0–0.05)
IMM GRANULOCYTES NFR BLD AUTO: 0.5 % (ref 0–0.5)
INR PPP: 1.48 (ref 0.9–1.1)
LIPASE SERPL-CCNC: 41 U/L (ref 13–60)
LYMPHOCYTES # BLD AUTO: 1.1 10*3/MM3 (ref 0.7–3.1)
LYMPHOCYTES NFR BLD AUTO: 8.9 % (ref 19.6–45.3)
MCH RBC QN AUTO: 30.3 PG (ref 26.6–33)
MCHC RBC AUTO-ENTMCNC: 34.8 G/DL (ref 31.5–35.7)
MCV RBC AUTO: 87 FL (ref 79–97)
MONOCYTES # BLD AUTO: 1.68 10*3/MM3 (ref 0.1–0.9)
MONOCYTES NFR BLD AUTO: 13.5 % (ref 5–12)
NEUTROPHILS NFR BLD AUTO: 75.9 % (ref 42.7–76)
NEUTROPHILS NFR BLD AUTO: 9.43 10*3/MM3 (ref 1.7–7)
NRBC BLD AUTO-RTO: 0 /100 WBC (ref 0–0.2)
PLATELET # BLD AUTO: 239 10*3/MM3 (ref 140–450)
PMV BLD AUTO: 9.9 FL (ref 6–12)
POTASSIUM SERPL-SCNC: 5.1 MMOL/L (ref 3.5–5.2)
PROT SERPL-MCNC: 6.7 G/DL (ref 6–8.5)
PROTHROMBIN TIME: 17.7 SECONDS (ref 11.7–14.2)
QT INTERVAL: 410 MS
QT INTERVAL: 449 MS
QT INTERVAL: 473 MS
RBC # BLD AUTO: 4.46 10*6/MM3 (ref 3.77–5.28)
SODIUM SERPL-SCNC: 135 MMOL/L (ref 136–145)
WBC NRBC COR # BLD: 12.42 10*3/MM3 (ref 3.4–10.8)

## 2021-12-03 PROCEDURE — 99024 POSTOP FOLLOW-UP VISIT: CPT | Performed by: NURSE PRACTITIONER

## 2021-12-03 PROCEDURE — 83690 ASSAY OF LIPASE: CPT | Performed by: NURSE PRACTITIONER

## 2021-12-03 PROCEDURE — 25010000002 ONDANSETRON PER 1 MG: Performed by: NURSE PRACTITIONER

## 2021-12-03 PROCEDURE — 93010 ELECTROCARDIOGRAM REPORT: CPT | Performed by: INTERNAL MEDICINE

## 2021-12-03 PROCEDURE — 85610 PROTHROMBIN TIME: CPT | Performed by: NURSE PRACTITIONER

## 2021-12-03 PROCEDURE — 85025 COMPLETE CBC W/AUTO DIFF WBC: CPT | Performed by: NURSE PRACTITIONER

## 2021-12-03 PROCEDURE — 76705 ECHO EXAM OF ABDOMEN: CPT

## 2021-12-03 PROCEDURE — 93005 ELECTROCARDIOGRAM TRACING: CPT | Performed by: INTERNAL MEDICINE

## 2021-12-03 PROCEDURE — 80048 BASIC METABOLIC PNL TOTAL CA: CPT | Performed by: NURSE PRACTITIONER

## 2021-12-03 PROCEDURE — 80076 HEPATIC FUNCTION PANEL: CPT | Performed by: NURSE PRACTITIONER

## 2021-12-03 PROCEDURE — 82150 ASSAY OF AMYLASE: CPT | Performed by: NURSE PRACTITIONER

## 2021-12-03 RX ORDER — FAMOTIDINE 20 MG/1
20 TABLET, FILM COATED ORAL
Status: DISCONTINUED | OUTPATIENT
Start: 2021-12-03 | End: 2021-12-03

## 2021-12-03 RX ORDER — PROCHLORPERAZINE MALEATE 5 MG/1
5 TABLET ORAL EVERY 6 HOURS PRN
Status: DISCONTINUED | OUTPATIENT
Start: 2021-12-03 | End: 2021-12-04 | Stop reason: HOSPADM

## 2021-12-03 RX ORDER — PANTOPRAZOLE SODIUM 40 MG/1
40 TABLET, DELAYED RELEASE ORAL EVERY MORNING
Status: DISCONTINUED | OUTPATIENT
Start: 2021-12-04 | End: 2021-12-04 | Stop reason: HOSPADM

## 2021-12-03 RX ADMIN — POLYETHYLENE GLYCOL 3350 17 G: 17 POWDER, FOR SOLUTION ORAL at 09:26

## 2021-12-03 RX ADMIN — SODIUM CHLORIDE, PRESERVATIVE FREE 10 ML: 5 INJECTION INTRAVENOUS at 09:16

## 2021-12-03 RX ADMIN — SODIUM CHLORIDE, PRESERVATIVE FREE 10 ML: 5 INJECTION INTRAVENOUS at 20:25

## 2021-12-03 RX ADMIN — POTASSIUM CHLORIDE 20 MEQ: 750 TABLET, EXTENDED RELEASE ORAL at 09:14

## 2021-12-03 RX ADMIN — TORSEMIDE 40 MG: 20 TABLET ORAL at 09:13

## 2021-12-03 RX ADMIN — HYDROCODONE BITARTRATE AND ACETAMINOPHEN 1 TABLET: 7.5; 325 TABLET ORAL at 20:24

## 2021-12-03 RX ADMIN — AMIODARONE HYDROCHLORIDE 200 MG: 200 TABLET ORAL at 18:21

## 2021-12-03 RX ADMIN — AMIODARONE HYDROCHLORIDE 200 MG: 200 TABLET ORAL at 09:13

## 2021-12-03 RX ADMIN — ONDANSETRON 4 MG: 2 INJECTION INTRAMUSCULAR; INTRAVENOUS at 09:13

## 2021-12-03 RX ADMIN — ACETAMINOPHEN 650 MG: 325 TABLET, FILM COATED ORAL at 12:34

## 2021-12-03 RX ADMIN — WARFARIN 5 MG: 5 TABLET ORAL at 18:21

## 2021-12-03 RX ADMIN — POLYETHYLENE GLYCOL 3350 17 G: 17 POWDER, FOR SOLUTION ORAL at 20:31

## 2021-12-03 RX ADMIN — LEVOTHYROXINE SODIUM 150 MCG: 0.15 TABLET ORAL at 09:13

## 2021-12-04 VITALS
DIASTOLIC BLOOD PRESSURE: 54 MMHG | WEIGHT: 186.73 LBS | HEART RATE: 62 BPM | HEIGHT: 62 IN | BODY MASS INDEX: 34.36 KG/M2 | RESPIRATION RATE: 16 BRPM | SYSTOLIC BLOOD PRESSURE: 144 MMHG | TEMPERATURE: 98.2 F | OXYGEN SATURATION: 94 %

## 2021-12-04 LAB
INR PPP: 1.79 (ref 0.9–1.1)
PROTHROMBIN TIME: 20.6 SECONDS (ref 11.7–14.2)

## 2021-12-04 PROCEDURE — 99024 POSTOP FOLLOW-UP VISIT: CPT | Performed by: INTERNAL MEDICINE

## 2021-12-04 PROCEDURE — 85610 PROTHROMBIN TIME: CPT | Performed by: NURSE PRACTITIONER

## 2021-12-04 RX ORDER — PANTOPRAZOLE SODIUM 40 MG/1
40 TABLET, DELAYED RELEASE ORAL EVERY MORNING
Qty: 30 TABLET | Refills: 6 | Status: SHIPPED | OUTPATIENT
Start: 2021-12-05 | End: 2022-06-09

## 2021-12-04 RX ORDER — AMIODARONE HYDROCHLORIDE 200 MG/1
TABLET ORAL
Qty: 58 TABLET | Refills: 3 | Status: SHIPPED | OUTPATIENT
Start: 2021-12-04 | End: 2022-01-17

## 2021-12-04 RX ADMIN — ACETAMINOPHEN 650 MG: 325 TABLET, FILM COATED ORAL at 14:29

## 2021-12-04 RX ADMIN — POTASSIUM CHLORIDE 20 MEQ: 750 TABLET, EXTENDED RELEASE ORAL at 08:42

## 2021-12-04 RX ADMIN — LEVOTHYROXINE SODIUM 150 MCG: 0.15 TABLET ORAL at 08:42

## 2021-12-04 RX ADMIN — PANTOPRAZOLE SODIUM 40 MG: 40 TABLET, DELAYED RELEASE ORAL at 06:49

## 2021-12-04 RX ADMIN — TORSEMIDE 40 MG: 20 TABLET ORAL at 08:42

## 2021-12-04 RX ADMIN — AMIODARONE HYDROCHLORIDE 200 MG: 200 TABLET ORAL at 08:42

## 2021-12-04 RX ADMIN — SODIUM CHLORIDE, PRESERVATIVE FREE 10 ML: 5 INJECTION INTRAVENOUS at 08:47

## 2021-12-05 ENCOUNTER — READMISSION MANAGEMENT (OUTPATIENT)
Dept: CALL CENTER | Facility: HOSPITAL | Age: 76
End: 2021-12-05

## 2021-12-09 ENCOUNTER — READMISSION MANAGEMENT (OUTPATIENT)
Dept: CALL CENTER | Facility: HOSPITAL | Age: 76
End: 2021-12-09

## 2021-12-09 NOTE — OUTREACH NOTE
Medical Week 1 Survey      Responses   [facility] patient discharged from? [place]   Does the patient have one of the following disease processes/diagnoses(primary or secondary)? Other   Week 1 attempt successful? Yes   Call start time 0914   Rescheduled Rescheduled-pt requested  [sister Juanita states to try patient again tomorrow on her number]   Call end time 0916   Discharge diagnosis Ventricular tachycardia with ICD shockICD generator change   Is patient permission given to speak with other caregiver? Yes   List who call center can speak with ramos Grant   Person spoke with today (if not patient) and relationship sister Lynda Hardy RN

## 2021-12-14 ENCOUNTER — READMISSION MANAGEMENT (OUTPATIENT)
Dept: CALL CENTER | Facility: HOSPITAL | Age: 76
End: 2021-12-14

## 2021-12-14 NOTE — OUTREACH NOTE
Medical Week 1 Survey      Responses   [facility] patient discharged from? [place]   Does the patient have one of the following disease processes/diagnoses(primary or secondary)? Other   Week 1 attempt successful? Yes   Call start time 0754   Call end time 0802   Discharge diagnosis Ventricular tachycardia with ICD shockICD generator change   Is patient permission given to speak with other caregiver? Yes   List who call center can speak with ramos Butler reviewed with patient/caregiver? Yes   Is the patient having any side effects they believe may be caused by any medication additions or changes? No   Does the patient have all medications ordered at discharge? Yes   Is the patient taking all medications as directed (includes completed medication regime)? Yes   Does the patient have a primary care provider?  Yes   Does the patient have an appointment with their PCP within 7 days of discharge? No   Comments regarding PCP Pacemaker clinic - 12/14/2021- 1pm - ( She will be out of isolation on this day) She tested positive for Covid. Mild symptoms.    What is preventing the patient from scheduling follow up appointments within 7 days of discharge? Haven't had time   Nursing Interventions Advised patient to make appointment,  Educated patient on importance of making appointment  [She will make an appt. She was waiting to get out of isolation from Covid. ]   Has the patient kept scheduled appointments due by today? N/A   Has home health visited the patient within 72 hours of discharge? N/A   Psychosocial issues? No   Did the patient receive a copy of their discharge instructions? Yes   Nursing interventions Reviewed instructions with patient,  Educated on MyChart   What is the patient's perception of their health status since discharge? Improving  [She was dx: with Covid- since being home, she is isolation.  She has been vaccinated. She is doing okay. ]   Is the patient/caregiver able to teach back signs  and symptoms related to disease process for when to call PCP? Yes   Is the patient/caregiver able to teach back signs and symptoms related to disease process for when to call 911? Yes   Is the patient/caregiver able to teach back the hierarchy of who to call/visit for symptoms/problems? PCP, Specialist, Home health nurse, Urgent Care, ED, 911 Yes   If the patient is a current smoker, are they able to teach back resources for cessation? Not a smoker   Additional teach back comments Her pacer site- looks well no signs or symptoms of infection.    Week 1 call completed? Yes          Corinne Conde RN

## 2021-12-15 ENCOUNTER — ANTICOAGULATION VISIT (OUTPATIENT)
Dept: PHARMACY | Facility: HOSPITAL | Age: 76
End: 2021-12-15

## 2021-12-15 ENCOUNTER — CLINICAL SUPPORT NO REQUIREMENTS (OUTPATIENT)
Dept: CARDIOLOGY | Facility: CLINIC | Age: 76
End: 2021-12-15

## 2021-12-15 DIAGNOSIS — I48.20 CHRONIC ATRIAL FIBRILLATION (HCC): Primary | ICD-10-CM

## 2021-12-15 LAB
INR PPP: 3.3 (ref 0.91–1.09)
PROTHROMBIN TIME: 39.6 SECONDS (ref 10–13.8)

## 2021-12-15 PROCEDURE — G0463 HOSPITAL OUTPT CLINIC VISIT: HCPCS

## 2021-12-15 PROCEDURE — 85610 PROTHROMBIN TIME: CPT

## 2021-12-15 PROCEDURE — 36416 COLLJ CAPILLARY BLOOD SPEC: CPT

## 2021-12-15 PROCEDURE — 93282 PRGRMG EVAL IMPLANTABLE DFB: CPT | Performed by: INTERNAL MEDICINE

## 2021-12-15 NOTE — PROGRESS NOTES
Anticoagulation Clinic Progress Note    Anticoagulation Summary  As of 12/15/2021    INR goal:  2.0-3.0   TTR:  62.1 % (3.1 y)   INR used for dosing:  3.3 (12/15/2021)   Warfarin maintenance plan:  2.5 mg every Wed, Sat; 5 mg all other days   Weekly warfarin total:  30 mg   Plan last modified:  Simon Ivy, PharmD (12/15/2021)   Next INR check:  12/29/2021   Priority:  Maintenance   Target end date:  Indefinite    Indications    Chronic atrial fibrillation (HCC) [I48.20]             Anticoagulation Episode Summary     INR check location:      Preferred lab:      Send INR reminders to:   [facility]    Comments:  [place]      Anticoagulation Care Providers     Provider Role Specialty Phone number    Adeel Mina III, MD Referring Cardiology [phone]          Clinic Interview:  Patient Findings     Positives:  Change in health, Change in medications, Change in   diet/appetite, Hospital admission    Negatives:  Signs/symptoms of thrombosis, Signs/symptoms of bleeding,   Laboratory test error suspected, Change in alcohol use, Change in   activity, Upcoming invasive procedure, Emergency department visit,   Upcoming dental procedure, Missed doses, Extra doses, Bruising, Other   complaints    Comments:  Hospital admission 11/29-12/4 r/t V tach. Discharged on   amiodaron 200 mg BID, which will decrease to 200 mg daily on 12/18/21.   Reports she contracted COVID-19 a few days after her hospitalization,   during which she experience fever, nausea, and muscle aches for 3 days.       Clinical Outcomes     Negatives:  Major bleeding event, Thromboembolic event,   Anticoagulation-related hospital admission, Anticoagulation-related ED   visit, Anticoagulation-related fatality    Comments:  Hospital admission 11/29-12/4 r/t V tach. Discharged on   amiodaron 200 mg BID, which will decrease to 200 mg daily on 12/18/21.   Reports she contracted COVID-19 a few days after her hospitalization,   during which she  experience fever, nausea, and muscle aches for 3 days.         INR History:  Anticoagulation Monitoring 8/31/2021 10/19/2021 12/15/2021   INR 2.4 1.6 3.3   INR Date 8/31/2021 10/19/2021 12/15/2021   INR Goal 2.0-3.0 2.0-3.0 2.0-3.0   Trend Same Same Down   Last Week Total 32.5 mg 32.5 mg 32.5 mg   Next Week Total 32.5 mg 35 mg 30 mg   Sun 5 mg 5 mg 5 mg   Mon 5 mg 5 mg 5 mg   Tue 5 mg 7.5 mg (10/19); Otherwise 5 mg 5 mg   Wed 5 mg 5 mg 2.5 mg   Thu 5 mg 5 mg 5 mg   Fri 5 mg 5 mg 5 mg   Sat 2.5 mg 2.5 mg 2.5 mg   Visit Report - - -   Some recent data might be hidden       Plan:  1. INR is Supratherapeutic today- see above in Anticoagulation Summary.  Will instruct Anna Gilbert to Change their warfarin regimen- see above in Anticoagulation Summary.  2. Follow up in 2 weeks.  3. Patient declines warfarin refills.  4. Verbal and written information provided. Patient expresses understanding and has no further questions at this time.    Simon Ivy, PharmD

## 2021-12-20 ENCOUNTER — READMISSION MANAGEMENT (OUTPATIENT)
Dept: CALL CENTER | Facility: HOSPITAL | Age: 76
End: 2021-12-20

## 2021-12-20 NOTE — OUTREACH NOTE
Medical Week 2 Survey      Responses   [facility] patient discharged from? [place]   Does the patient have one of the following disease processes/diagnoses(primary or secondary)? Other   Week 2 attempt successful? Yes   Call start time 1031   Call end time 1034   Meds reviewed with patient/caregiver? Yes   Week 2 Call Completed? Yes   Wrap up additional comments Very brief call. PT reports improving. 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          Leora Monge RN

## 2021-12-27 ENCOUNTER — TELEPHONE (OUTPATIENT)
Dept: CARDIOLOGY | Facility: CLINIC | Age: 76
End: 2021-12-27

## 2021-12-27 NOTE — TELEPHONE ENCOUNTER
These let her know that I have reviewed the procedure note from Dr. Perez and he wants her to remain on the amiodarone.  If she wants to make an appointment to come in and discuss this further please schedule that for her.

## 2021-12-27 NOTE — TELEPHONE ENCOUNTER
"Pt is calling in to let you know that she stopped her amiodarone 12/23 due to nausea and edema.  She looked up the side effects and noted that it will \"worsen her CHF\" so she stopped it.    She is reporting her bp 150/74    bp 150/74 62 pt reports she took an extra 1/2 of metoprolol  Thanks  Judy Rodgers RN  Triage nurse    Dr le, dr Perez and lilian are out.  Thanks  Judy Rodgers RN  Triage nurse    "

## 2021-12-28 NOTE — TELEPHONE ENCOUNTER
Marily,  Can you assist in this?  Pt has stopped her amiodarone, Dr Mina wanted me to get her an apt to discuss this.  She is scheduled to see you 1/18.    Please advise  Thanks  Judy Rodgers RN  Triage nurse

## 2021-12-28 NOTE — TELEPHONE ENCOUNTER
Do you want her to restart the amiodarone?  She stopped taking it on 12/23  Thanks  Judy Rodgers RN  Triage nurse

## 2021-12-29 ENCOUNTER — ANTICOAGULATION VISIT (OUTPATIENT)
Dept: PHARMACY | Facility: HOSPITAL | Age: 76
End: 2021-12-29

## 2021-12-29 DIAGNOSIS — I48.20 CHRONIC ATRIAL FIBRILLATION (HCC): Primary | ICD-10-CM

## 2021-12-29 LAB
INR PPP: 2 (ref 0.91–1.09)
PROTHROMBIN TIME: 24.3 SECONDS (ref 10–13.8)

## 2021-12-29 PROCEDURE — 85610 PROTHROMBIN TIME: CPT

## 2021-12-29 PROCEDURE — 36416 COLLJ CAPILLARY BLOOD SPEC: CPT

## 2021-12-29 PROCEDURE — G0463 HOSPITAL OUTPT CLINIC VISIT: HCPCS

## 2021-12-29 NOTE — PROGRESS NOTES
Anticoagulation Clinic Progress Note    Anticoagulation Summary  As of 2021    INR goal:  2.0-3.0   TTR:  62.3 % (3.1 y)   INR used for dosin.0 (2021)   Warfarin maintenance plan:  2.5 mg every Sat; 5 mg all other days   Weekly warfarin total:  32.5 mg   Plan last modified:  Simon Ivy, PharmD (2021)   Next INR check:  2022   Priority:  Maintenance   Target end date:  Indefinite    Indications    Chronic atrial fibrillation (HCC) [I48.20]             Anticoagulation Episode Summary     INR check location:      Preferred lab:      Send INR reminders to:   [facility]    Comments:  [place]      Anticoagulation Care Providers     Provider Role Specialty Phone number    Adeel Mina III, MD Referring Cardiology [phone]          Clinic Interview:  Patient Findings     Positives:  Change in health, Change in medications, Other complaints    Negatives:  Signs/symptoms of thrombosis, Signs/symptoms of bleeding,   Laboratory test error suspected, Change in alcohol use, Change in   activity, Upcoming invasive procedure, Emergency department visit,   Upcoming dental procedure, Missed doses, Extra doses, Change in   diet/appetite, Hospital admission, Bruising    Comments:  Reports she experienced significantly worsening SOA and fluid   retention last week, which she attributes to amiodarone. Upon   self-discontinuing amiodarone ~5-7 days ago, she reports these issues   resolved quickly. She asked that I add this as a medication allergy.   Cardiology is aware that she discontinued, and she has a follow-up   scheduled with Cardiology on 22.      Clinical Outcomes     Negatives:  Major bleeding event, Thromboembolic event,   Anticoagulation-related hospital admission, Anticoagulation-related ED   visit, Anticoagulation-related fatality    Comments:  Reports she experienced significantly worsening SOA and fluid   retention last week, which she attributes to amiodarone.  Upon   self-discontinuing amiodarone ~5-7 days ago, she reports these issues   resolved quickly. She asked that I add this as a medication allergy.   Cardiology is aware that she discontinued, and she has a follow-up   scheduled with Cardiology on 1/18/22.        INR History:  Anticoagulation Monitoring 10/19/2021 12/15/2021 12/29/2021   INR 1.6 3.3 2.0   INR Date 10/19/2021 12/15/2021 12/29/2021   INR Goal 2.0-3.0 2.0-3.0 2.0-3.0   Trend Same Down Up   Last Week Total 32.5 mg 32.5 mg 30 mg   Next Week Total 35 mg 30 mg 32.5 mg   Sun 5 mg 5 mg 5 mg   Mon 5 mg 5 mg 5 mg   Tue 7.5 mg (10/19); Otherwise 5 mg 5 mg 5 mg   Wed 5 mg 2.5 mg 5 mg   Thu 5 mg 5 mg 5 mg   Fri 5 mg 5 mg 5 mg   Sat 2.5 mg 2.5 mg 2.5 mg   Visit Report - - -   Some recent data might be hidden       Plan:  1. INR is Therapeutic today- see above in Anticoagulation Summary. With amiodarone discontinued, expect INR may decrease further on current dose of warfarin.   Will instruct Anna Gilbert to Increase their warfarin regimen back to 2.5 mg Sat, 5 mg all other days - see above in Anticoagulation Summary.  2. Follow up in 2 weeks  3. Patient declines warfarin refills.  4. Verbal and written information provided. Patient expresses understanding and has no further questions at this time.    Simon Ivy, PharmD

## 2021-12-30 ENCOUNTER — READMISSION MANAGEMENT (OUTPATIENT)
Dept: CALL CENTER | Facility: HOSPITAL | Age: 76
End: 2021-12-30

## 2021-12-30 NOTE — OUTREACH NOTE
Medical Week 3 Survey      Responses   [facility] patient discharged from? [place]   Does the patient have one of the following disease processes/diagnoses(primary or secondary)? Other   Week 3 attempt successful? Yes   Call start time 1030   Call end time 1038   Discharge diagnosis Ventricular tachycardia with ICD shockICD generator change   Is patient permission given to speak with other caregiver? Yes   List who call center can speak with - Juanita Butler reviewed with patient/caregiver? Yes   Is the patient having any side effects they believe may be caused by any medication additions or changes? No   Does the patient have all medications ordered at discharge? Yes   Is the patient taking all medications as directed (includes completed medication regime)? Yes   Medication comments She has decreased the [facility] - She is waiting for Cardiac to call back .    Does the patient have a primary care provider?  Yes   Comments regarding PCP Pacemaker clinic - 12/14/2021- 1pm - ( She will be out of isolation on this day) She tested positive for Covid. Mild symptoms.    Does the patient have an appointment with their PCP within 7 days of discharge? Yes   Has the patient kept scheduled appointments due by today? Yes   Did the patient receive a copy of their discharge instructions? Yes   What is the patient's perception of their health status since discharge? Worsening  [She is very upset about the INR clinic bathroom and unhappy about the situation. She is awaiting the Cardiac to call back. ]   Is the patient/caregiver able to teach back signs and symptoms related to disease process for when to call PCP? Yes   Is the patient/caregiver able to teach back signs and symptoms related to disease process for when to call 911? Yes   Is the patient/caregiver able to teach back the hierarchy of who to call/visit for symptoms/problems? PCP, Specialist, Home health nurse, Urgent Care, ED, 911 Yes   If  the patient is a current smoker, are they able to teach back resources for cessation? Not a smoker   Additional teach back comments Her pacer site- looks well no signs or symptoms of infection.    Week 3 Call Completed? Yes   Wrap up additional comments She was the given the number for compliants.           Corinne Conde RN

## 2022-01-10 ENCOUNTER — READMISSION MANAGEMENT (OUTPATIENT)
Dept: CALL CENTER | Facility: HOSPITAL | Age: 77
End: 2022-01-10

## 2022-01-10 ENCOUNTER — TELEPHONE (OUTPATIENT)
Dept: CARDIOLOGY | Facility: CLINIC | Age: 77
End: 2022-01-10

## 2022-01-10 NOTE — TELEPHONE ENCOUNTER
Pt notified and verbalized understanding.  She is scheduled on the 18th  Thanks  Judy Rodgers RN  Triage nurse

## 2022-01-10 NOTE — OUTREACH NOTE
Medical Week 4 Survey      Responses   [facility] patient discharged from? [place]   Does the patient have one of the following disease processes/diagnoses(primary or secondary)? Other   Week 4 attempt successful? Yes   Call start time 1300   Call end time 1307   Discharge diagnosis Ventricular tachycardia with ICD shockICD generator change   Meds reviewed with patient/caregiver? Yes   Is the patient having any side effects they believe may be caused by any medication additions or changes? No   Is the patient taking all medications as directed (includes completed medication regime)? Yes   Has the patient kept scheduled appointments due by today? Yes   Is the patient still receiving Home Health Services? N/A   Psychosocial issues? 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 Improving   Is the patient/caregiver able to teach back signs and symptoms related to disease process for when to call PCP? Yes   Is the patient/caregiver able to teach back the hierarchy of who to call/visit for symptoms/problems? PCP, Specialist, Home health nurse, Urgent Care, ED, 911 Yes   Additional teach back comments pt states has spoken with MD to reduce meds due to low heart rate, plans to start new dosing today   Week 4 Call Completed? Yes   Would the patient like one additional call? No   Graduated Yes   Is the patient interested in additional calls from an ambulatory ?  NOTE:  applies to high risk patients requiring additional follow-up. No   Did the patient feel the follow up calls were helpful during their recovery period? Yes   Was the number of calls appropriate? Yes          Tiffanie Andrade RN

## 2022-01-10 NOTE — TELEPHONE ENCOUNTER
Pt is calling in with concerns ab her heart rate of 41-43 bp 120/63 this is via a wrist cuff.  She denies feeling lightheaded, she is SOA with activity or while laying flat.  She did have some edema in her feet and ankles and took extra torsemide yesterday for a total of 60mg    Cardiac meds  klorcon 20meq daily  Torsemide 20mg BID- took extra 20mg dose  Metoprolol 100mg daily  Warfarin as directed    Please advise on how to proceed  Thanks  Judy Rodgers RN  Triage nurse

## 2022-01-13 ENCOUNTER — ANTICOAGULATION VISIT (OUTPATIENT)
Dept: PHARMACY | Facility: HOSPITAL | Age: 77
End: 2022-01-13

## 2022-01-13 DIAGNOSIS — I48.20 CHRONIC ATRIAL FIBRILLATION: Primary | ICD-10-CM

## 2022-01-13 LAB
INR PPP: 2.7 (ref 0.91–1.09)
PROTHROMBIN TIME: 32.9 SECONDS (ref 10–13.8)

## 2022-01-13 PROCEDURE — 85610 PROTHROMBIN TIME: CPT

## 2022-01-13 PROCEDURE — 36416 COLLJ CAPILLARY BLOOD SPEC: CPT

## 2022-01-13 RX ORDER — WARFARIN SODIUM 5 MG/1
TABLET ORAL
Qty: 90 TABLET | Refills: 1 | Status: SHIPPED | OUTPATIENT
Start: 2022-01-13 | End: 2022-02-24

## 2022-01-13 NOTE — PROGRESS NOTES
Anticoagulation Clinic Progress Note    Anticoagulation Summary  As of 2022    INR goal:  2.0-3.0   TTR:  62.8 % (3.2 y)   INR used for dosin.7 (2022)   Warfarin maintenance plan:  2.5 mg every Sat; 5 mg all other days   Weekly warfarin total:  32.5 mg   No change documented:  Erica Welch, PharmD   Plan last modified:  Simon Ivy, PharmD (2021)   Next INR check:  2022   Priority:  Maintenance   Target end date:  Indefinite    Indications    Chronic atrial fibrillation (HCC) [I48.20]             Anticoagulation Episode Summary     INR check location:      Preferred lab:      [facility]:   [facility] [place]    Comments:  [place]      Anticoagulation Care Providers     Provider Role Specialty Phone number    Adeel Mina III, MD Referring Cardiology [phone]          Clinic Interview:  Patient Findings     Positives:  Change in health, Change in activity    Negatives:  Signs/symptoms of thrombosis, Signs/symptoms of bleeding,   Laboratory test error suspected, Change in alcohol use, Upcoming invasive   procedure, Emergency department visit, Upcoming dental procedure, Missed   doses, Extra doses, Change in medications, Change in diet/appetite,   Hospital admission, Bruising, Other complaints    Comments:  Patient reports fluid rentention/SOA has resolved and   improvement of fatigue.      Clinical Outcomes     Negatives:  Major bleeding event, Thromboembolic event,   Anticoagulation-related hospital admission, Anticoagulation-related ED   visit, Anticoagulation-related fatality    Comments:      INR History:  Anticoagulation Monitoring 12/15/2021 2021 2022   INR 3.3 2.0 2.7   INR Date 12/15/2021 2021 2022   INR Goal 2.0-3.0 2.0-3.0 2.0-3.0   Trend Down Up Same   Last Week Total 32.5 mg 30 mg 32.5 mg   Next Week Total 30 mg 32.5 mg 32.5 mg   Sun 5 mg 5 mg 5 mg   Mon 5 mg 5 mg 5 mg   Tue 5 mg 5 mg 5 mg   Wed 2.5 mg 5 mg 5 mg   Thu 5 mg 5  mg 5 mg   Fri 5 mg 5 mg 5 mg   Sat 2.5 mg 2.5 mg 2.5 mg   Visit Report - - -   Some recent data might be hidden       Plan:  1. INR is Therapeutic today- see above in Anticoagulation Summary.  Will instruct Anna Gilbert to Continue their warfarin regimen- see above in Anticoagulation Summary.  2. Follow up in 2 weeks  3. Patient declines and desires warfarin refills. A prescription has been sent to patient's preferred pharmacy.   4. Verbal and written information provided. Patient expresses understanding and has no further questions at this time.    Erica Welch, PharmD

## 2022-01-14 DIAGNOSIS — R06.02 SOB (SHORTNESS OF BREATH): ICD-10-CM

## 2022-01-14 DIAGNOSIS — G47.33 OSA (OBSTRUCTIVE SLEEP APNEA): Chronic | ICD-10-CM

## 2022-01-14 DIAGNOSIS — Z98.890 H/O MITRAL VALVE REPAIR: Chronic | ICD-10-CM

## 2022-01-14 DIAGNOSIS — Z95.0 PACEMAKER: Chronic | ICD-10-CM

## 2022-01-14 DIAGNOSIS — E78.2 MIXED HYPERLIPIDEMIA: ICD-10-CM

## 2022-01-14 DIAGNOSIS — Z95.2 AORTIC VALVE REPLACED: Chronic | ICD-10-CM

## 2022-01-14 DIAGNOSIS — I48.20 CHRONIC ATRIAL FIBRILLATION: ICD-10-CM

## 2022-01-14 DIAGNOSIS — I50.33 ACUTE ON CHRONIC DIASTOLIC CHF (CONGESTIVE HEART FAILURE): ICD-10-CM

## 2022-01-14 DIAGNOSIS — I20.9 ANGINA PECTORIS: ICD-10-CM

## 2022-01-14 RX ORDER — METOPROLOL SUCCINATE 50 MG/1
TABLET, EXTENDED RELEASE ORAL
Qty: 90 TABLET | Refills: 0 | Status: SHIPPED | OUTPATIENT
Start: 2022-01-14 | End: 2022-07-20

## 2022-01-18 ENCOUNTER — CLINICAL SUPPORT NO REQUIREMENTS (OUTPATIENT)
Dept: CARDIOLOGY | Facility: CLINIC | Age: 77
End: 2022-01-18

## 2022-01-18 ENCOUNTER — OFFICE VISIT (OUTPATIENT)
Dept: CARDIOLOGY | Facility: CLINIC | Age: 77
End: 2022-01-18

## 2022-01-18 VITALS
HEART RATE: 87 BPM | WEIGHT: 177 LBS | BODY MASS INDEX: 32.57 KG/M2 | SYSTOLIC BLOOD PRESSURE: 148 MMHG | DIASTOLIC BLOOD PRESSURE: 86 MMHG | HEIGHT: 62 IN

## 2022-01-18 DIAGNOSIS — Z95.810 ICD (IMPLANTABLE CARDIOVERTER-DEFIBRILLATOR) IN PLACE: ICD-10-CM

## 2022-01-18 DIAGNOSIS — I47.20 VT (VENTRICULAR TACHYCARDIA): Primary | ICD-10-CM

## 2022-01-18 DIAGNOSIS — I48.21 PERMANENT ATRIAL FIBRILLATION: ICD-10-CM

## 2022-01-18 PROCEDURE — 93279 PRGRMG DEV EVAL PM/LDLS PM: CPT | Performed by: INTERNAL MEDICINE

## 2022-01-18 PROCEDURE — 99024 POSTOP FOLLOW-UP VISIT: CPT | Performed by: NURSE PRACTITIONER

## 2022-01-18 PROCEDURE — 93000 ELECTROCARDIOGRAM COMPLETE: CPT | Performed by: NURSE PRACTITIONER

## 2022-01-18 NOTE — PROGRESS NOTES
Date of Office Visit: 2022  Encounter Provider: LUIS ARMANDO Franklin  Place of Service: [facility]  Patient Name: Anna Gilbert  :1945    Chief Complaint   Patient presents with   • 1 month BHE f/u   • Pacemaker Check   • Rapid Heart Rate     VT   :     HPI: Anna Gilbert is a 76 y.o. female who is a patient of Dr. Mina with perm afib, NSVT, valvular heart disease, s/p AVR and MV repair (), about a month after her heart surgery she was told she needed a pacemaker and single chamber ICD was placed (MDT).     She reached AIME in  and EOS in ---she had never received a shock, V paced <1%, follow up echo showed normal EF and Zio showed no sustained ventricular arrhythmias--saw Dr. Perez in 2020 and the decision was made not to replaced device since there was no indication.     She was seen in Nov by LUIS ARMANDO Morales w/HTN, LE edema and dyspnea, switched from bumetanide to torsemide with improvement.      Presented to [facility] in  w/ICD shock x 2, interrogation showed appropriate shock for VT, she was transferred to [facility] for further evaluation and treatment.     She underwent cardiac cath which showed non-obstruction disease, recent echo with normal EF but with sustained VT and shock, ICD generator was replaced.     She was loaded initially with IV amio and transitioned to oral load.     She called the office with complaints of side effects from amiodarone---worsening heart failure/fluid overload, dyspnea, edema and nausea. She stopped the medication and reports that within in a few days improved and now feels back to baseline.     She has no chest pain, dyspnea, PND or orthopnea.     She had recent labs with PCP---we reviewed together, renal function 1.13--she is euvolemic by exam today, discussed cutting torsemide back to 20 mg daily and she knows to take extra dose if weight or edema increases.     Device interrogation  shows normal testing and function. No ventricular arrhythmia episodes or therapies.     Warfarin for AC, no bleeding issues.           Past Medical History:   Diagnosis Date   • Bradycardia    • Chronic atrial fibrillation (HCC)    • Coronary artery disease    • H/O mitral valve repair    • Health care maintenance    • Hyperthyroidism    • Mixed hyperlipidemia    • NSVT (nonsustained ventricular tachycardia) (HCC) 9/23/2020   • SHEILA (obstructive sleep apnea) 7/20/2016   • Pacemaker    • PAF (paroxysmal atrial fibrillation) (HCC)    • Permanent atrial fibrillation (HCC)    • Sick sinus syndrome (HCC)    • Ventricular tachycardia (HCC)     with ICD shock       Past Surgical History:   Procedure Laterality Date   • AORTIC VALVE REPAIR/REPLACEMENT  2010    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   • CARDIAC CATHETERIZATION  01/01/2011   • CARDIAC CATHETERIZATION N/A 11/30/2021    Procedure: Left Heart Cath;  Surgeon: Nigel Egan MD;  Location:  [facility];  Service: Cardiovascular;  Laterality: N/A;   • CARDIAC CATHETERIZATION N/A 11/30/2021    Procedure: Coronary angiography;  Surgeon: Nigel Egan MD;  Location:  [facility];  Service: Cardiovascular;  Laterality: N/A;   • CARDIAC CATHETERIZATION N/A 11/30/2021    Procedure: Resting Full Cycle Ratio;  Surgeon: Nigel Egan MD;  Location:  [facility];  Service: Cardiovascular;  Laterality: N/A;   • CARDIAC DEFIBRILLATOR PLACEMENT  2010   • CARDIAC ELECTROPHYSIOLOGY PROCEDURE N/A 12/1/2021    Procedure: ICD DC generator change---[employer];  Surgeon: Mick Perez MD;  Location:  [facility];  Service: Cardiology;  Laterality: N/A;   • MITRAL VALVE REPAIR/REPLACEMENT  2010       Social History     Socioeconomic History   • Marital status: Single   Tobacco Use   • Smoking status: Former Smoker   • Smokeless tobacco: Never Used   • Tobacco comment: caffeine use   Substance and Sexual  Activity   • Alcohol use: No   • Drug use: No   • Sexual activity: Defer       Family History   Problem Relation Age of Onset   • Coronary artery disease Father    • No Known Problems Mother        Review of Systems   Constitutional: Negative for chills, fever and malaise/fatigue.   Cardiovascular: Negative for chest pain, dyspnea on exertion, leg swelling, near-syncope, orthopnea, palpitations, paroxysmal nocturnal dyspnea and syncope.   Respiratory: Negative for cough and shortness of breath.    Hematologic/Lymphatic: Negative.    Musculoskeletal: Negative for joint pain, joint swelling and myalgias.   Gastrointestinal: Negative for abdominal pain, diarrhea, melena, nausea and vomiting.   Genitourinary: Negative for frequency and hematuria.   Neurological: Negative for light-headedness, numbness, paresthesias and seizures.   Allergic/Immunologic: Negative.    All other systems reviewed and are negative.      Allergies   Allergen Reactions   • Amiodarone Shortness Of Breath, Nausea Only and Swelling     She reports she experienced significant fluid retention resulting in SOA and inability to walk, which she reports resolved as soon as she self-discontinued this medication.    • Gabapentin Unknown - High Severity, Other (See Comments) and Rash     Feet peeling         Current Outpatient Medications:   •  albuterol sulfate  (90 Base) MCG/ACT inhaler, Inhale 2 puffs As Needed., Disp: , Rfl:   •  APPLE CIDER VINEGAR PO, Take 1 tablet by mouth Daily., Disp: , Rfl:   •  b complex vitamins capsule, Take 1 capsule by mouth Daily., Disp: , Rfl:   •  BIOTIN PO, Take 1 tablet by mouth Daily., Disp: , Rfl:   •  CALCIUM-MAGNESIUM-ZINC PO, Take 1 tablet by mouth Daily., Disp: , Rfl:   •  Cholecalciferol (VITAMIN D-3 PO), Take 1 tablet by mouth As Needed. Only through the winter, Disp: , Rfl:   •  fluconazole (DIFLUCAN) 150 MG tablet, Take 150 mg by mouth As Needed., Disp: , Rfl:   •  KLOR-CON 20 MEQ CR tablet, TAKE ONE  "TABLET BY MOUTH DAILY, Disp: 90 tablet, Rfl: 0  •  levothyroxine (SYNTHROID, LEVOTHROID) 150 MCG tablet, Take 150 mcg by mouth Daily., Disp: , Rfl:   •  metoprolol succinate XL (TOPROL-XL) 50 MG 24 hr tablet, TAKE ONE TABLET BY MOUTH DAILY, Disp: 90 tablet, Rfl: 0  •  Multiple Vitamin (MULTIVITAMIN) capsule, Take 1 capsule by mouth Daily., Disp: , Rfl:   •  pantoprazole (PROTONIX) 40 MG EC tablet, Take 1 tablet by mouth Every Morning., Disp: 30 tablet, Rfl: 6  •  torsemide (Demadex) 20 MG tablet, Take 2 tablets by mouth Daily., Disp: 60 tablet, Rfl: 3  •  warfarin (COUMADIN) 5 MG tablet, Take 2.5mg (1/2 Tablet) by mouth on Saturday and Take 5mg (1 Tablet) all other days OR as directed by the [facility], Disp: 90 tablet, Rfl: 1      Objective:     Vitals:    01/18/22 1124   BP: 148/86   BP Location: Right arm   Patient Position: Sitting   Pulse: 87   Weight: 80.3 kg (177 lb)   Height: 157.5 cm (62.01\")     Body mass index is 32.36 kg/m².    PHYSICAL EXAM:    Vitals Reviewed.   General Appearance: No acute distress, well developed and well nourished.   Eyes: Conjunctiva and lids: No erythema, swelling, or discharge. Sclera non-icteric.   HENT: Atraumatic, normocephalic. External eyes, ears, and nose normal.   Respiratory: No signs of respiratory distress. Respiration rhythm and depth normal.   Clear to auscultation. No rales, crackles, rhonchi, or wheezing auscultated.   Cardiovascular:  Heart Rate and Rhythm: Irregularly, irregular. Heart Sounds: S1 and S2.   Murmurs: No murmurs noted. No rubs, thrills, or gallops.   Arterial Pulses:  Posterior tibialis and dorsalis pedis pulses normal.   Lower Extremities: No edema noted.  Gastrointestinal:  Abdomen soft, non-distended, non-tender.   Musculoskeletal: Normal movement of extremities  Skin: Warm and dry.   Psychiatric: Patient alert and oriented to person, place, and time. Speech and behavior appropriate. Normal mood and affect.       ECG 12 " Lead    Date/Time: 1/18/2022 12:42 PM  Performed by: Marily Arvizu APRN  Authorized by: Marily Arvizu APRN   Comparison: compared with previous ECG   Similar to previous ECG  Rhythm: atrial fibrillation  Ectopy: multifocal PVCs and couplets  BPM: 87                Assessment:       Diagnosis Plan   1. VT (ventricular tachycardia) (HCC)     2. ICD (implantable cardioverter-defibrillator) in place     3. Permanent atrial fibrillation (HCC)            Plan:       1.-2. VT, s/p ICD shock x 2, generator replaced. Normal testing and function, no ventricular arrhythmia episodes or therapies. She was intolerant to amiodarone and stopped medication prior to appt with improvement of symptoms. For now will continue metoprolol and monitor. If she has recurrence will have to re-evalaute.     3. Perm afib, HR controlled BB and she is on warfarin for AC.     Follow up with Dr. Mina in April as scheduled, follow up with Dr. Perez with device check in 6 months.     As always, it has been a pleasure to participate in your patient's care.      Sincerely,         LUIS ARMANDO Wolfe

## 2022-02-08 ENCOUNTER — TELEPHONE (OUTPATIENT)
Dept: PHARMACY | Facility: HOSPITAL | Age: 77
End: 2022-02-08

## 2022-02-08 NOTE — TELEPHONE ENCOUNTER
Oncology End of Shift Note      Bedside shift change report given to DOMENIC Ward (incoming nurse) by Yasir Navarro (outgoing nurse) on [facility]. Report included the following information SBAR, Kardex and MAR. Shift Summary: Hourly rounding completed, patient tolerated care well throughout shift. Blood pressure elevated, PRN labetolol given (see flowchart). Patient complained of pain and nausea throughout shift, PRN dilaudid and zofran given (see MAR). Patient resting in bed. IV patent and infusing. Issues for Physician to Address:  Blood pressures elevated     Patient on Cardiac Monitoring?     [x] Yes  [] No    Rhythm: Telemetry: sinus tach     Osiel Scale:     Osiel Score: [address] 75 Patient called to report she was started on cefdinir 300 mg BID starting today. Last INR appointment scheduled for 1/27 but patient did not make the appointment. Plan for INR check on the 14th to determine need for dose adjustment due to interaction between warfarin and cefdinir

## 2022-02-14 ENCOUNTER — ANTICOAGULATION VISIT (OUTPATIENT)
Dept: PHARMACY | Facility: HOSPITAL | Age: 77
End: 2022-02-14

## 2022-02-14 DIAGNOSIS — I48.20 CHRONIC ATRIAL FIBRILLATION: Primary | ICD-10-CM

## 2022-02-14 LAB
INR PPP: 1.8 (ref 0.91–1.09)
PROTHROMBIN TIME: 21.3 SECONDS (ref 10–13.8)

## 2022-02-14 PROCEDURE — G0463 HOSPITAL OUTPT CLINIC VISIT: HCPCS

## 2022-02-14 PROCEDURE — 36416 COLLJ CAPILLARY BLOOD SPEC: CPT

## 2022-02-14 PROCEDURE — 85610 PROTHROMBIN TIME: CPT

## 2022-02-14 RX ORDER — TORSEMIDE 20 MG/1
TABLET ORAL
Qty: 60 TABLET | Refills: 3 | Status: SHIPPED | OUTPATIENT
Start: 2022-02-14 | End: 2022-08-04

## 2022-02-14 NOTE — PROGRESS NOTES
Anticoagulation Clinic Progress Note    Anticoagulation Summary  As of 2022    INR goal:  2.0-3.0   TTR:  63.2 % (3.3 y)   INR used for dosin.8 (2022)   Warfarin maintenance plan:  2.5 mg every Sat; 5 mg all other days   Weekly warfarin total:  32.5 mg   Plan last modified:  Simon Ivy, PharmD (2021)   Next INR check:  3/1/2022   Priority:  Maintenance   Target end date:  Indefinite    Indications    Chronic atrial fibrillation (HCC) [I48.20]             Anticoagulation Episode Summary     INR check location:      Preferred lab:      Send INR reminders to:   [facility]    Comments:  [place]      Anticoagulation Care Providers     Provider Role Specialty Phone number    Adeel Mina III, MD Referring Cardiology [phone]          Clinic Interview:  Patient Findings     Positives:  Change in medications    Negatives:  Signs/symptoms of thrombosis, Signs/symptoms of bleeding,   Laboratory test error suspected, Change in health, Change in alcohol use,   Change in activity, Upcoming invasive procedure, Emergency department   visit, Upcoming dental procedure, Missed doses, Extra doses, Change in   diet/appetite, Hospital admission, Bruising, Other complaints    Comments:  Started 7-day course of cefdinir on 22. Reports cabbage a   couple nights ago, but she indicates she typically has a couple   times/month.       Clinical Outcomes     Negatives:  Major bleeding event, Thromboembolic event,   Anticoagulation-related hospital admission, Anticoagulation-related ED   visit, Anticoagulation-related fatality    Comments:  Started 7-day course of cefdinir on 22. Reports cabbage a   couple nights ago, but she indicates she typically has a couple   times/month.         INR History:  Anticoagulation Monitoring 2021   INR 2.0 2.7 1.8   INR Date 2021   INR Goal 2.0-3.0 2.0-3.0 2.0-3.0   Trend Up Same Same   Last Week Total 30  mg 32.5 mg 32.5 mg   Next Week Total 32.5 mg 32.5 mg 35 mg   Sun 5 mg 5 mg 5 mg   Mon 5 mg 5 mg 7.5 mg (2/14); Otherwise 5 mg   Tue 5 mg 5 mg 5 mg   Wed 5 mg 5 mg 5 mg   Thu 5 mg 5 mg 5 mg   Fri 5 mg 5 mg 5 mg   Sat 2.5 mg 2.5 mg 2.5 mg   Visit Report - - -   Some recent data might be hidden       Plan:  1. INR is Subtherapeutic today- see above in Anticoagulation Summary.  Will instruct Anna Gilbert to Change their warfarin regimen- see above in Anticoagulation Summary.  2. Follow up in 2 weeks  3. Patient declines warfarin refills.  4. Verbal and written information provided. Patient expresses understanding and has no further questions at this time.    Simon Ivy, PharmD

## 2022-02-24 RX ORDER — WARFARIN SODIUM 5 MG/1
TABLET ORAL
Qty: 85 TABLET | Refills: 0 | Status: SHIPPED | OUTPATIENT
Start: 2022-02-24 | End: 2022-05-17

## 2022-03-01 ENCOUNTER — ANTICOAGULATION VISIT (OUTPATIENT)
Dept: PHARMACY | Facility: HOSPITAL | Age: 77
End: 2022-03-01

## 2022-03-01 DIAGNOSIS — I48.20 CHRONIC ATRIAL FIBRILLATION: Primary | ICD-10-CM

## 2022-03-01 LAB
INR PPP: 2.2 (ref 0.91–1.09)
PROTHROMBIN TIME: 26.7 SECONDS (ref 10–13.8)

## 2022-03-01 PROCEDURE — 85610 PROTHROMBIN TIME: CPT

## 2022-03-01 PROCEDURE — 36416 COLLJ CAPILLARY BLOOD SPEC: CPT

## 2022-03-01 RX ORDER — ROSUVASTATIN CALCIUM 10 MG/1
10 TABLET, COATED ORAL DAILY
COMMUNITY
Start: 2022-01-18 | End: 2022-09-17

## 2022-03-01 NOTE — PROGRESS NOTES
Anticoagulation Clinic Progress Note    Anticoagulation Summary  As of 3/1/2022    INR goal:  2.0-3.0   TTR:  63.1 % (3.3 y)   INR used for dosin.2 (3/1/2022)   Warfarin maintenance plan:  2.5 mg every Sat; 5 mg all other days   Weekly warfarin total:  32.5 mg   No change documented:  Simon Ivy, Judy   Plan last modified:  Simon Ivy PharmD (2021)   Next INR check:  3/15/2022   Priority:  Maintenance   Target end date:  Indefinite    Indications    Chronic atrial fibrillation (HCC) [I48.20]             Anticoagulation Episode Summary     INR check location:      Preferred lab:      Send INR reminders to:   [facility]    Comments:  [place]      Anticoagulation Care Providers     Provider Role Specialty Phone number    Adeel Mina III, MD Referring Cardiology [phone]          Clinic Interview:  Patient Findings     Positives:  Change in diet/appetite    Negatives:  Signs/symptoms of thrombosis, Signs/symptoms of bleeding,   Laboratory test error suspected, Change in health, Change in alcohol use,   Change in activity, Upcoming invasive procedure, Emergency department   visit, Upcoming dental procedure, Missed doses, Extra doses, Change in   medications, Hospital admission, Bruising, Other complaints    Comments:  Reports more vit k in diet the past week, and she plans to   continue higher intake.      Clinical Outcomes     Negatives:  Major bleeding event, Thromboembolic event,   Anticoagulation-related hospital admission, Anticoagulation-related ED   visit, Anticoagulation-related fatality    Comments:  Reports more vit k in diet the past week, and she plans to   continue higher intake.        INR History:  Anticoagulation Monitoring 2022 2022 3/1/2022   INR 2.7 1.8 2.2   INR Date 2022 2022 3/1/2022   INR Goal 2.0-3.0 2.0-3.0 2.0-3.0   Trend Same Same Same   Last Week Total 32.5 mg 32.5 mg 32.5 mg   Next Week Total 32.5 mg 35 mg 32.5 mg   Sun 5 mg 5  mg 5 mg   Mon 5 mg 7.5 mg (2/14); Otherwise 5 mg 5 mg   Tue 5 mg 5 mg 5 mg   Wed 5 mg 5 mg 5 mg   Thu 5 mg 5 mg 5 mg   Fri 5 mg 5 mg 5 mg   Sat 2.5 mg 2.5 mg 2.5 mg   Visit Report - - -   Some recent data might be hidden       Plan:  1. INR is Therapeutic today- see above in Anticoagulation Summary.  Will instruct Anna Gilbert to Continue their warfarin regimen- see above in Anticoagulation Summary.  2. Follow up in 2 weeks to ensure stable in light of new diet.   3. Patient declines warfarin refills.  4. Verbal and written information provided. Patient expresses understanding and has no further questions at this time.    Simon Ivy, PharmD

## 2022-03-24 ENCOUNTER — ANTICOAGULATION VISIT (OUTPATIENT)
Dept: PHARMACY | Facility: HOSPITAL | Age: 77
End: 2022-03-24

## 2022-03-24 DIAGNOSIS — I48.20 CHRONIC ATRIAL FIBRILLATION: Primary | ICD-10-CM

## 2022-03-24 LAB
INR PPP: 3.4 (ref 0.91–1.09)
PROTHROMBIN TIME: 40.6 SECONDS (ref 10–13.8)

## 2022-03-24 PROCEDURE — 36416 COLLJ CAPILLARY BLOOD SPEC: CPT

## 2022-03-24 PROCEDURE — 85610 PROTHROMBIN TIME: CPT

## 2022-03-24 PROCEDURE — G0463 HOSPITAL OUTPT CLINIC VISIT: HCPCS

## 2022-03-24 NOTE — PROGRESS NOTES
Anticoagulation Clinic Progress Note    Anticoagulation Summary  As of 3/24/2022    INR goal:  2.0-3.0   TTR:  63.1 % (3.4 y)   INR used for dosing:  3.4 (3/24/2022)   Warfarin maintenance plan:  2.5 mg every Sat; 5 mg all other days   Weekly warfarin total:  32.5 mg   Plan last modified:  Simon Ivy, PharmD (12/29/2021)   Next INR check:  4/7/2022   Priority:  Maintenance   Target end date:  Indefinite    Indications    Chronic atrial fibrillation (HCC) [I48.20]             Anticoagulation Episode Summary     INR check location:      Preferred lab:      Send INR reminders to:   [facility]    Comments:  [place]      Anticoagulation Care Providers     Provider Role Specialty Phone number    Adeel Mina III, MD Referring Cardiology [phone]          Clinic Interview:  Patient Findings     Positives:  Change in medications, Change in diet/appetite    Negatives:  Signs/symptoms of thrombosis, Signs/symptoms of bleeding,   Laboratory test error suspected, Change in health, Change in alcohol use,   Change in activity, Upcoming invasive procedure, Emergency department   visit, Upcoming dental procedure, Missed doses, Extra doses, Hospital   admission, Bruising, Other complaints    Comments:  Reports drinking some grapefruit juice and using less tylenol.        Clinical Outcomes     Negatives:  Major bleeding event, Thromboembolic event,   Anticoagulation-related hospital admission, Anticoagulation-related ED   visit, Anticoagulation-related fatality    Comments:  Reports drinking some grapefruit juice and using less tylenol.          INR History:  Anticoagulation Monitoring 2/14/2022 3/1/2022 3/24/2022   INR 1.8 2.2 3.4   INR Date 2/14/2022 3/1/2022 3/24/2022   INR Goal 2.0-3.0 2.0-3.0 2.0-3.0   Trend Same Same Same   Last Week Total 32.5 mg 32.5 mg 32.5 mg   Next Week Total 35 mg 32.5 mg 30 mg   Sun 5 mg 5 mg 5 mg   Mon 7.5 mg (2/14); Otherwise 5 mg 5 mg 5 mg   Tue 5 mg 5 mg 5 mg   Wed 5 mg 5  mg 5 mg   Thu 5 mg 5 mg 2.5 mg (3/24); Otherwise 5 mg   Fri 5 mg 5 mg 5 mg   Sat 2.5 mg 2.5 mg 2.5 mg   Visit Report - - -   Some recent data might be hidden       Plan:  1. INR is Supratherapeutic today- see above in Anticoagulation Summary.  Will instruct Anna Gilbert to Change their warfarin regimen- see above in Anticoagulation Summary.  2. Follow up in 2 weeks  3. Patient declines warfarin refills.  4. Verbal and written information provided. Patient expresses understanding and has no further questions at this time.    Christiane Santoyo, Pharmacy Intern

## 2022-03-24 NOTE — PROGRESS NOTES
I have supervised and reviewed the notes, assessments, and/or procedures performed by our Pharmacy Intern. The documented assessment and plan were developed cooperatively, and the plan was implemented in my presence. I concur with the documentation of this patient encounter.    Simon Ivy, PharmD

## 2022-04-01 PROCEDURE — 93296 REM INTERROG EVL PM/IDS: CPT | Performed by: INTERNAL MEDICINE

## 2022-04-01 PROCEDURE — 93295 DEV INTERROG REMOTE 1/2/MLT: CPT | Performed by: INTERNAL MEDICINE

## 2022-04-18 ENCOUNTER — ANTICOAGULATION VISIT (OUTPATIENT)
Dept: PHARMACY | Facility: HOSPITAL | Age: 77
End: 2022-04-18

## 2022-04-18 DIAGNOSIS — I48.20 CHRONIC ATRIAL FIBRILLATION: Primary | ICD-10-CM

## 2022-04-18 LAB
INR PPP: 2.1 (ref 0.91–1.09)
PROTHROMBIN TIME: 25.3 SECONDS (ref 10–13.8)

## 2022-04-18 PROCEDURE — 85610 PROTHROMBIN TIME: CPT

## 2022-04-18 PROCEDURE — 36416 COLLJ CAPILLARY BLOOD SPEC: CPT

## 2022-04-18 NOTE — PROGRESS NOTES
I have supervised and reviewed the notes, assessments, and/or procedures performed by our Pharmacy Intern. The documented assessment and plan were developed cooperatively. I concur with the documentation of this patient encounter.    Simon Ivy, PharmD

## 2022-04-18 NOTE — PROGRESS NOTES
Anticoagulation Clinic Progress Note    Anticoagulation Summary  As of 2022    INR goal:  2.0-3.0   TTR:  63.2 % (3.4 y)   INR used for dosin.1 (2022)   Warfarin maintenance plan:  2.5 mg every Sat; 5 mg all other days   Weekly warfarin total:  32.5 mg   No change documented:  Christiane Santoyo, Pharmacy Intern   Plan last modified:  Simon Ivy, PharmD (2021)   Next INR check:  2022   Priority:  Maintenance   Target end date:  Indefinite    Indications    Chronic atrial fibrillation (HCC) [I48.20]             Anticoagulation Episode Summary     INR check location:      Preferred lab:      Send INR reminders to:   [facility] [place]    Comments:  [place]      Anticoagulation Care Providers     Provider Role Specialty Phone number    Adeel Mina III, MD Referring Cardiology [phone]          Clinic Interview:  Patient Findings     Positives:  Change in medications    Negatives:  Signs/symptoms of thrombosis, Signs/symptoms of bleeding,   Laboratory test error suspected, Change in health, Change in alcohol use,   Change in activity, Upcoming invasive procedure, Emergency department   visit, Upcoming dental procedure, Missed doses, Extra doses, Change in   diet/appetite, Hospital admission, Bruising, Other complaints    Comments:  Reports starting Serovital about 5 wks ago.      Clinical Outcomes     Negatives:  Major bleeding event, Thromboembolic event,   Anticoagulation-related hospital admission, Anticoagulation-related ED   visit, Anticoagulation-related fatality    Comments:  Reports starting Serovital about 5 wks ago.        INR History:  Anticoagulation Monitoring 3/1/2022 3/24/2022 2022   INR 2.2 3.4 2.1   INR Date 3/1/2022 3/24/2022 2022   INR Goal 2.0-3.0 2.0-3.0 2.0-3.0   Trend Same Same Same   Last Week Total 32.5 mg 32.5 mg 32.5 mg   Next Week Total 32.5 mg 30 mg 32.5 mg   Sun 5 mg 5 mg 5 mg   Mon 5 mg 5 mg 5 mg   Tue 5 mg 5 mg 5 mg   Wed 5 mg 5 mg 5 mg    Thu 5 mg 2.5 mg (3/24); Otherwise 5 mg 5 mg   Fri 5 mg 5 mg 5 mg   Sat 2.5 mg 2.5 mg 2.5 mg   Visit Report - - -   Some recent data might be hidden       Plan:  1. INR is Therapeutic today- see above in Anticoagulation Summary.  Will instruct Anna Gilbert to Continue their warfarin regimen- see above in Anticoagulation Summary.  2. Follow up in 2 weeks  3. Patient declines warfarin refills.  4. Verbal and written information provided. Patient expresses understanding and has no further questions at this time.    Christiane Santoyo, Pharmacy Intern

## 2022-04-28 ENCOUNTER — LAB (OUTPATIENT)
Dept: LAB | Facility: HOSPITAL | Age: 77
End: 2022-04-28

## 2022-04-28 ENCOUNTER — OFFICE VISIT (OUTPATIENT)
Dept: CARDIOLOGY | Facility: CLINIC | Age: 77
End: 2022-04-28

## 2022-04-28 VITALS
DIASTOLIC BLOOD PRESSURE: 88 MMHG | HEIGHT: 62 IN | BODY MASS INDEX: 31.83 KG/M2 | WEIGHT: 173 LBS | SYSTOLIC BLOOD PRESSURE: 120 MMHG | HEART RATE: 62 BPM

## 2022-04-28 DIAGNOSIS — R06.02 SOB (SHORTNESS OF BREATH): ICD-10-CM

## 2022-04-28 DIAGNOSIS — R07.89 OTHER CHEST PAIN: ICD-10-CM

## 2022-04-28 DIAGNOSIS — I48.20 CHRONIC ATRIAL FIBRILLATION: Primary | Chronic | ICD-10-CM

## 2022-04-28 DIAGNOSIS — Z95.810 ICD (IMPLANTABLE CARDIOVERTER-DEFIBRILLATOR) IN PLACE: ICD-10-CM

## 2022-04-28 DIAGNOSIS — Z95.2 AORTIC VALVE REPLACED: Chronic | ICD-10-CM

## 2022-04-28 DIAGNOSIS — I48.20 CHRONIC ATRIAL FIBRILLATION: Chronic | ICD-10-CM

## 2022-04-28 DIAGNOSIS — I47.29 NSVT (NONSUSTAINED VENTRICULAR TACHYCARDIA): Chronic | ICD-10-CM

## 2022-04-28 DIAGNOSIS — Z98.890 H/O MITRAL VALVE REPAIR: Chronic | ICD-10-CM

## 2022-04-28 LAB
ALBUMIN SERPL-MCNC: 4 G/DL (ref 3.5–5.2)
ALBUMIN/GLOB SERPL: 1.5 G/DL
ALP SERPL-CCNC: 61 U/L (ref 39–117)
ALT SERPL W P-5'-P-CCNC: 15 U/L (ref 1–33)
ANION GAP SERPL CALCULATED.3IONS-SCNC: 10.3 MMOL/L (ref 5–15)
AST SERPL-CCNC: 23 U/L (ref 1–32)
BILIRUB SERPL-MCNC: 0.3 MG/DL (ref 0–1.2)
BUN SERPL-MCNC: 19 MG/DL (ref 8–23)
BUN/CREAT SERPL: 18.6 (ref 7–25)
CALCIUM SPEC-SCNC: 9.5 MG/DL (ref 8.6–10.5)
CHLORIDE SERPL-SCNC: 106 MMOL/L (ref 98–107)
CO2 SERPL-SCNC: 23.7 MMOL/L (ref 22–29)
CREAT SERPL-MCNC: 1.02 MG/DL (ref 0.57–1)
DEPRECATED RDW RBC AUTO: 39.5 FL (ref 37–54)
EGFRCR SERPLBLD CKD-EPI 2021: 56.4 ML/MIN/1.73
ERYTHROCYTE [DISTWIDTH] IN BLOOD BY AUTOMATED COUNT: 12.6 % (ref 12.3–15.4)
GLOBULIN UR ELPH-MCNC: 2.7 GM/DL
GLUCOSE SERPL-MCNC: 97 MG/DL (ref 65–99)
HCT VFR BLD AUTO: 43.4 % (ref 34–46.6)
HGB BLD-MCNC: 14.6 G/DL (ref 12–15.9)
MCH RBC QN AUTO: 29.4 PG (ref 26.6–33)
MCHC RBC AUTO-ENTMCNC: 33.6 G/DL (ref 31.5–35.7)
MCV RBC AUTO: 87.3 FL (ref 79–97)
NT-PROBNP SERPL-MCNC: 1490 PG/ML (ref 0–1800)
PLATELET # BLD AUTO: 250 10*3/MM3 (ref 140–450)
PMV BLD AUTO: 10.3 FL (ref 6–12)
POTASSIUM SERPL-SCNC: 4.4 MMOL/L (ref 3.5–5.2)
PROT SERPL-MCNC: 6.7 G/DL (ref 6–8.5)
RBC # BLD AUTO: 4.97 10*6/MM3 (ref 3.77–5.28)
SODIUM SERPL-SCNC: 140 MMOL/L (ref 136–145)
WBC NRBC COR # BLD: 9.17 10*3/MM3 (ref 3.4–10.8)

## 2022-04-28 PROCEDURE — 83880 ASSAY OF NATRIURETIC PEPTIDE: CPT

## 2022-04-28 PROCEDURE — 99214 OFFICE O/P EST MOD 30 MIN: CPT | Performed by: INTERNAL MEDICINE

## 2022-04-28 PROCEDURE — 85027 COMPLETE CBC AUTOMATED: CPT

## 2022-04-28 PROCEDURE — 93000 ELECTROCARDIOGRAM COMPLETE: CPT | Performed by: INTERNAL MEDICINE

## 2022-04-28 PROCEDURE — 36415 COLL VENOUS BLD VENIPUNCTURE: CPT

## 2022-04-28 PROCEDURE — 80053 COMPREHEN METABOLIC PANEL: CPT

## 2022-04-28 NOTE — PROGRESS NOTES
Subjective:     Encounter Date:  04/28/22        Patient ID: Anna Gilbert is a 76 y.o. female.    Chief Complaint: CAF  History of Present Illness    Dear Dr. Isaac,    I had the pleasure of having a visit with this patient  today.    She has a history of chronic atrial fibrillation, nonsustained ventricular tachycardia, as well as bioprosthetic aortic valve and prior mitral valve repair. She also has an AICD in place.    She really has not been feeling as good lately.  She has been getting short of breath more easily.  She has noted her heart rate has been beating faster more recently.  She just put herself on a higher dose of the metoprolol because at night she notes it.  She has had some more issues with lower extremity edema.  She has not had any chest pain or chest discomfort.  She has been feeling more fatigued.  She has some dyspnea that is gotten worse with activity.    She did have COVID in December November 2020 when she was hospitalized for ventricular tachycardia.  She was seen by Dr. Perez.  Her AICD was replaced at that time.  She also had a cardiac catheterization at that time:  Results for orders placed during the hospital encounter of 11/29/21    Cardiac Catheterization/Vascular Study    Narrative  Interventionalist: Nigel Egan M.D., F.A.C.C.    Procedure findings:  Left main: Large caliber vessel that bifurcates to an LAD and circumflex.  The left main coronary artery is normal.  LAD: Medium caliber vessel that gives rise to a small caliber D1 and medium caliber D2 branch.  The LAD has mild calcification of the proximal segment but no stenosis.  The mid LAD has a tubular 50% mid vessel stenosis.  Small caliber vessel from the mid to distal LAD.  LCX: Large caliber vessel gives rise to a medium caliber OM1 and small caliber OM 2 branch.  Normal.  RCA: Large caliber, dominant vessel gives rise to a medium caliber PDA and large caliber, long RPL branch.  The RCA has luminal  irregularities in the distal segment.  RPL has a discrete 20% to 30% mid vessel stenosis    Hemodynamics:  LV:145/17  AO: 145/60    Interventional report  6 Salvadorean XB 3.0 guide catheter was used to engage the left main.  The Aeris pressure wire was advanced the left main and equalization was performed.  Following this the pressure wire was advanced past the mid LAD stenosis and seated.  RFR was then performed.  Minimum RFR measurement 0.94.  Not hemodynamically significant.    Conclusions:  1. Left main: Normal  2. LAD: Mid LAD with tubular 50% stenosis.  Small caliber from the mid to distal segment.  3. LCX:Normal  4. RCA: Dominant.  Discrete 20 to 30% mid vessel RPL stenosis.  5.  RFR of the LAD 0.94. Not hemodynamically significant    The following portions of the patient's history were reviewed and updated as appropriate: allergies, current medications, past family history, past medical history, past social history, past surgical history and problem list.    Past Medical History:   Diagnosis Date   • Bradycardia    • Chronic atrial fibrillation (HCC)    • Coronary artery disease    • H/O mitral valve repair    • Health care maintenance    • Hyperthyroidism    • Mixed hyperlipidemia    • NSVT (nonsustained ventricular tachycardia) ([facility]) 9/23/2020   • SHEILA (obstructive sleep apnea) 7/20/2016   • Pacemaker    • PAF (paroxysmal atrial fibrillation) ([facility])    • Permanent atrial fibrillation ([facility])    • Sick sinus syndrome ([facility])    • Ventricular tachycardia ([facility])     with ICD shock       Past Surgical History:   Procedure Laterality Date   • AORTIC VALVE REPAIR/REPLACEMENT  2010    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   • CARDIAC CATHETERIZATION  01/01/2011   • CARDIAC CATHETERIZATION N/A 11/30/2021    Procedure: Left Heart Cath;  Surgeon: Nigel Egan MD;  Location: [facility] CATH INVASIVE LOCATION;  Service: Cardiovascular;  Laterality: N/A;   • CARDIAC CATHETERIZATION N/A 11/30/2021    Procedure: Coronary angiography;   "Surgeon: Nigel Egan MD;  Location:  [facility] CATH INVASIVE LOCATION;  Service: Cardiovascular;  Laterality: N/A;   • CARDIAC CATHETERIZATION N/A 11/30/2021    Procedure: Resting Full Cycle Ratio;  Surgeon: Nigel Egan MD;  Location:  [facility] CATH INVASIVE LOCATION;  Service: Cardiovascular;  Laterality: N/A;   • CARDIAC DEFIBRILLATOR PLACEMENT  2010   • CARDIAC ELECTROPHYSIOLOGY PROCEDURE N/A 12/1/2021    Procedure: ICD DC generator change---[employer];  Surgeon: Mick Perez MD;  Location:  [facility] CATH INVASIVE LOCATION;  Service: Cardiology;  Laterality: N/A;   • MITRAL VALVE REPAIR/REPLACEMENT  2010             ECG 12 Lead    Date/Time: 4/28/2022 1:29 PM  Performed by: Adeel Mina III, MD  Authorized by: Adeel Mina III, MD   Comparison: compared with previous ECG   Similar to previous ECG  Rhythm: atrial fibrillation  Rate: normal  Conduction: conduction normal  ST Segments: ST segments normal  T Waves: T waves normal  QRS axis: normal  Other: no other findings    Clinical impression: abnormal EKG               Objective:     Vitals:    04/28/22 1315   BP: 120/88   Pulse: 62   Weight: 78.5 kg (173 lb)   Height: 157.5 cm (62.01\")     General Appearance:    Alert, cooperative, in no acute distress   Head:    Normocephalic, without obvious abnormality   Eyes:            Lids and lashes normal, conjunctivae and sclerae normal, no icterus, no pallor, corneas clear   Ears:    Ears appear intact with no abnormalities noted   Throat:   No oral lesions, oral mucosa moist   Neck:   No adenopathy, supple, trachea midline, no thyromegaly, no carotid bruit, no JVD   Back:     No kyphosis present, no erythema or scars, no tenderness to palpation    Lungs:     Clear to auscultation,respirations regular, even and unlabored    Heart:    irregular rhythm and normal rate, normal S1 and S2, no murmur, no gallop, no rub, no click   Chest Wall:    No abnormalities observed   Abdomen:     Normal bowel sounds, " no masses, no organomegaly, soft        non-tender, non-distended, no guarding   Extremities:   Moves all extremities well, + edema, no cyanosis, no redness   Pulses:  Bilateral carotids brisk   Skin:  Psychiatric:   No bleeding or rash    Alert and oriented, normal mood and affect         Lab Review:             Results for orders placed during the hospital encounter of 03/19/21    Adult Transthoracic Echo Complete W/ Cont if Necessary Per Protocol    Interpretation Summary  · Left ventricular wall thickness is consistent with moderate concentric hypertrophy.  · Calculated left ventricular EF = 54.6% Estimated left ventricular EF was in agreement with the calculated left ventricular EF. Left ventricular systolic function is normal.  · Left atrial volume is severely increased.  · Estimated right ventricular systolic pressure from tricuspid regurgitation is normal (<35 mmHg).  · Trace aortic valve regurgitation is present. There is a 21 mm, porcine bioprosthetic aortic valve present. The aortic valve peak and mean gradients are within defined limits. The prosthetic aortic valve is normal.  · Trace mitral valve regurgitation is present. No significant mitral valve stenosis is present. There is a mitral valve ring present.            Assessment:          Diagnosis Plan   1. Chronic atrial fibrillation (HCC)  ECG 12 Lead    BNP    Comprehensive Metabolic Panel    CBC (No Diff)    Adult Transthoracic Echo Complete W/ Cont if Necessary Per Protocol   2. Aortic valve replaced  ECG 12 Lead    BNP    Comprehensive Metabolic Panel    CBC (No Diff)    Adult Transthoracic Echo Complete W/ Cont if Necessary Per Protocol   3. H/O mitral valve repair  ECG 12 Lead    BNP    Comprehensive Metabolic Panel    CBC (No Diff)    Adult Transthoracic Echo Complete W/ Cont if Necessary Per Protocol   4. NSVT (nonsustained ventricular tachycardia) (HCC)  ECG 12 Lead    BNP    Comprehensive Metabolic Panel    CBC (No Diff)    Adult  Transthoracic Echo Complete W/ Cont if Necessary Per Protocol   5. ICD (implantable cardioverter-defibrillator) in place  ECG 12 Lead    BNP    Comprehensive Metabolic Panel    CBC (No Diff)    Adult Transthoracic Echo Complete W/ Cont if Necessary Per Protocol   6. SOB (shortness of breath)  BNP    Comprehensive Metabolic Panel    CBC (No Diff)    Adult Transthoracic Echo Complete W/ Cont if Necessary Per Protocol   7. Other chest pain  BNP    Comprehensive Metabolic Panel    CBC (No Diff)    Adult Transthoracic Echo Complete W/ Cont if Necessary Per Protocol          Plan:       1.   Atrial Fibrillation and Atrial Flutter  Assessment  • The patient has permanent atrial fibrillation  • This is valvular in etiology  • The patient's CHADS2-VASc score is 4  • A [id number] score of 2 or more is considered a high risk for a thromboembolic event  • Warfarin prescribed    Plan  • Continue in atrial fibrillation with rate control  • Continue warfarin for antithrombotic therapy, bleeding issues discussed  • Continue beta blocker for rate control  • Rate controlled, continue current medical therapy    2.  Bioprosthetic aortic valve replacement with prior mitral valve repair- having worsening shortness of breath, lower extreme edema, we will check an echocardiogram along with blood work  3.  VT, status post ICD shock x2, generator replaced, AICD in place, follows in device clinic, intolerant to amiodarone remains on metoprolol, seen by Alina DURÁN in January 2022  4.  Mediastinal adenopathy-status post biopsy,   5.  Chronic diastolic congestive heart failure, now with increasing shortness of breath, increasing lower extremity edema, await results of echo and lab.  6.  Chronic anticoagulation-we discussed again the indications for this and she will continue on her current regimen, remains on warfarin    Current Outpatient Medications:   •  albuterol sulfate  (90 Base) MCG/ACT inhaler, Inhale 2 puffs As Needed.,  Disp: , Rfl:   •  APPLE CIDER VINEGAR PO, Take 1 tablet by mouth Daily., Disp: , Rfl:   •  b complex vitamins capsule, Take 1 capsule by mouth Daily., Disp: , Rfl:   •  BIOTIN PO, Take 1 tablet by mouth Daily., Disp: , Rfl:   •  CALCIUM-MAGNESIUM-ZINC PO, Take 1 tablet by mouth Daily., Disp: , Rfl:   •  Cholecalciferol (VITAMIN D-3 PO), Take 1 tablet by mouth As Needed. Only through the winter, Disp: , Rfl:   •  fluconazole (DIFLUCAN) 150 MG tablet, Take 150 mg by mouth As Needed., Disp: , Rfl:   •  KLOR-CON 20 MEQ CR tablet, TAKE ONE TABLET BY MOUTH DAILY, Disp: 90 tablet, Rfl: 0  •  levothyroxine (SYNTHROID, LEVOTHROID) 150 MCG tablet, Take 150 mcg by mouth Daily., Disp: , Rfl:   •  metoprolol succinate XL (TOPROL-XL) 50 MG 24 hr tablet, TAKE ONE TABLET BY MOUTH DAILY (Patient taking differently: Take 50 mg by mouth Daily.), Disp: 90 tablet, Rfl: 0  •  Multiple Vitamin (MULTIVITAMIN) capsule, Take 1 capsule by mouth Daily., Disp: , Rfl:   •  pantoprazole (PROTONIX) 40 MG EC tablet, Take 1 tablet by mouth Every Morning., Disp: 30 tablet, Rfl: 6  •  rosuvastatin (CRESTOR) 10 MG tablet, Take 10 mg by mouth Daily., Disp: , Rfl:   •  torsemide (DEMADEX) 20 MG tablet, TAKE TWO TABLETS BY MOUTH DAILY (Patient taking differently: Take 20 mg by mouth Daily.), Disp: 60 tablet, Rfl: 3  •  warfarin (COUMADIN) 5 MG tablet, TAKE ONE-HALF OF A TABLET BY MOUTH ON SAT AND TAKE ONE TABLET BY MOUTH ALL OTHER DAYS OR AS DIRECTED, Disp: 85 tablet, Rfl: 0

## 2022-04-29 ENCOUNTER — TELEPHONE (OUTPATIENT)
Dept: CARDIOLOGY | Facility: CLINIC | Age: 77
End: 2022-04-29

## 2022-04-29 NOTE — TELEPHONE ENCOUNTER
Left voicemail for Anna Gilbert requesting callback.    Thank you,  Meena Abbott RN  Triage Nurse [facility]

## 2022-04-29 NOTE — TELEPHONE ENCOUNTER
----- Message from Adeel Mina III, MD sent at 4/29/2022  3:18 PM EDT -----  Please call- normal results on her labs, have her hold her rosuvastatin which potentially could be causing her symptoms and have her report back in 2 weeks

## 2022-05-05 ENCOUNTER — HOSPITAL ENCOUNTER (OUTPATIENT)
Dept: CARDIOLOGY | Facility: HOSPITAL | Age: 77
Discharge: HOME | End: 2022-05-05

## 2022-05-05 ENCOUNTER — TELEPHONE (OUTPATIENT)
Dept: PHARMACY | Facility: HOSPITAL | Age: 77
End: 2022-05-05

## 2022-05-05 DIAGNOSIS — Z95.2 AORTIC VALVE REPLACED: ICD-10-CM

## 2022-05-05 DIAGNOSIS — I47.29 NSVT (NONSUSTAINED VENTRICULAR TACHYCARDIA): ICD-10-CM

## 2022-05-05 DIAGNOSIS — I48.20 CHRONIC ATRIAL FIBRILLATION: ICD-10-CM

## 2022-05-05 DIAGNOSIS — Z95.810 ICD (IMPLANTABLE CARDIOVERTER-DEFIBRILLATOR) IN PLACE: ICD-10-CM

## 2022-05-05 DIAGNOSIS — Z98.890 H/O MITRAL VALVE REPAIR: ICD-10-CM

## 2022-05-05 DIAGNOSIS — R06.02 SOB (SHORTNESS OF BREATH): ICD-10-CM

## 2022-05-05 DIAGNOSIS — R07.89 OTHER CHEST PAIN: ICD-10-CM

## 2022-05-06 ENCOUNTER — ANTICOAGULATION VISIT (OUTPATIENT)
Dept: PHARMACY | Facility: HOSPITAL | Age: 77
End: 2022-05-06

## 2022-05-06 DIAGNOSIS — I48.20 CHRONIC ATRIAL FIBRILLATION: Primary | ICD-10-CM

## 2022-05-06 LAB
INR PPP: 3 (ref 0.91–1.09)
PROTHROMBIN TIME: 35.4 SECONDS (ref 10–13.8)

## 2022-05-06 PROCEDURE — 36416 COLLJ CAPILLARY BLOOD SPEC: CPT

## 2022-05-06 PROCEDURE — 85610 PROTHROMBIN TIME: CPT

## 2022-05-06 NOTE — PROGRESS NOTES
Anticoagulation Clinic Progress Note    Anticoagulation Summary  As of 5/6/2022    INR goal:  2.0-3.0   TTR:  63.8 % (3.5 y)   INR used for dosing:  3.0 (5/6/2022)   Warfarin maintenance plan:  2.5 mg every Sat; 5 mg all other days   Weekly warfarin total:  32.5 mg   No change documented:  Chantelle Nur, Pharmacy Technician   Plan last modified:  Simon Ivy, PharmD (12/29/2021)   Next INR check:  6/2/2022   Priority:  Maintenance   Target end date:  Indefinite    Indications    Chronic atrial fibrillation (HCC) [I48.20]             Anticoagulation Episode Summary     INR check location:      Preferred lab:      Send INR reminders to:   [facility]    Comments:  [place]      Anticoagulation Care Providers     Provider Role Specialty Phone number    Adeel Mina III, MD Referring Cardiology [phone]          Clinic Interview:  Patient Findings     Negatives:  Signs/symptoms of thrombosis, Signs/symptoms of bleeding,   Laboratory test error suspected, Change in health, Change in alcohol use,   Change in activity, Upcoming invasive procedure, Emergency department   visit, Upcoming dental procedure, Missed doses, Extra doses, Change in   medications, Change in diet/appetite, Hospital admission, Bruising, Other   complaints    Comments:  Pt reports sister has been hospitalized for a week, discharged   today.      Clinical Outcomes     Negatives:  Major bleeding event, Thromboembolic event,   Anticoagulation-related hospital admission, Anticoagulation-related ED   visit, Anticoagulation-related fatality    Comments:  Pt reports sister has been hospitalized for a week, discharged   today.        INR History:  Anticoagulation Monitoring 3/24/2022 4/18/2022 5/6/2022   INR 3.4 2.1 3.0   INR Date 3/24/2022 4/18/2022 5/6/2022   INR Goal 2.0-3.0 2.0-3.0 2.0-3.0   Trend Same Same Same   Last Week Total 32.5 mg 32.5 mg 32.5 mg   Next Week Total 30 mg 32.5 mg 32.5 mg   Sun 5 mg 5 mg 5 mg   Mon 5 mg 5 mg 5  mg   Tue 5 mg 5 mg 5 mg   Wed 5 mg 5 mg 5 mg   Thu 2.5 mg (3/24); Otherwise 5 mg 5 mg 5 mg   Fri 5 mg 5 mg 5 mg   Sat 2.5 mg 2.5 mg 2.5 mg   Visit Report - - -   Some recent data might be hidden       Plan:  1. INR is therapeutic today- see above in Anticoagulation Summary.   Will instruct Anna Gilbert to continue their warfarin regimen- see above in Anticoagulation Summary.  2. Follow up in 4 weeks.  3. Patient declines warfarin refills.  4. Verbal and written information provided. Patient expresses understanding and has no further questions at this time.    Chantelle Nur, Pharmacy Technician

## 2022-05-10 ENCOUNTER — HOSPITAL ENCOUNTER (OUTPATIENT)
Dept: CARDIOLOGY | Facility: HOSPITAL | Age: 77
End: 2022-05-10

## 2022-05-12 ENCOUNTER — HOSPITAL ENCOUNTER (OUTPATIENT)
Dept: CARDIOLOGY | Facility: HOSPITAL | Age: 77
Discharge: HOME | End: 2022-05-12
Admitting: INTERNAL MEDICINE

## 2022-05-12 ENCOUNTER — APPOINTMENT (OUTPATIENT)
Dept: CARDIOLOGY | Facility: HOSPITAL | Age: 77
End: 2022-05-12

## 2022-05-12 VITALS — WEIGHT: 171.96 LBS | BODY MASS INDEX: 31.64 KG/M2 | HEIGHT: 62 IN

## 2022-05-12 LAB
AORTIC DIMENSIONLESS INDEX: 0.26 (DI)
BH CV ECHO MEAS - AO MAX PG: 46.8 MMHG
BH CV ECHO MEAS - AO MEAN PG: 26.3 MMHG
BH CV ECHO MEAS - AO V2 MAX: 341.6 CM/SEC
BH CV ECHO MEAS - AO V2 VTI: 73.7 CM
BH CV ECHO MEAS - AVA(I,D): 0.97 CM2
BH CV ECHO MEAS - EDV(CUBED): 51.7 ML
BH CV ECHO MEAS - EDV(MOD-SP2): 74.1 ML
BH CV ECHO MEAS - EDV(MOD-SP4): 66.5 ML
BH CV ECHO MEAS - EF(MOD-BP): 63 %
BH CV ECHO MEAS - EF(MOD-SP2): 69.6 %
BH CV ECHO MEAS - EF(MOD-SP4): 60.3 %
BH CV ECHO MEAS - ESV(MOD-SP2): 22.5 ML
BH CV ECHO MEAS - ESV(MOD-SP4): 26.4 ML
BH CV ECHO MEAS - IVS/LVPW: 1.33 CM
BH CV ECHO MEAS - IVSD: 1.57 CM
BH CV ECHO MEAS - LAT PEAK E' VEL: 7.8 CM/SEC
BH CV ECHO MEAS - LV DIASTOLIC VOL/BSA (35-75): 37.2 CM2
BH CV ECHO MEAS - LV MASS(C)D: 184 GRAMS
BH CV ECHO MEAS - LV MAX PG: 3.5 MMHG
BH CV ECHO MEAS - LV MEAN PG: 1.99 MMHG
BH CV ECHO MEAS - LV SYSTOLIC VOL/BSA (12-30): 14.8 CM2
BH CV ECHO MEAS - LV V1 MAX: 93.4 CM/SEC
BH CV ECHO MEAS - LV V1 VTI: 19.7 CM
BH CV ECHO MEAS - LVIDD: 3.7 CM
BH CV ECHO MEAS - LVOT AREA: 3.6 CM2
BH CV ECHO MEAS - LVOT DIAM: 2.15 CM
BH CV ECHO MEAS - LVPWD: 1.18 CM
BH CV ECHO MEAS - MED PEAK E' VEL: 4.5 CM/SEC
BH CV ECHO MEAS - MV DEC SLOPE: 633.9 CM/SEC2
BH CV ECHO MEAS - MV DEC TIME: 0.24 MSEC
BH CV ECHO MEAS - MV MEAN PG: 3.4 MMHG
BH CV ECHO MEAS - MV P1/2T: 83.3 MSEC
BH CV ECHO MEAS - MV V2 VTI: 46.8 CM
BH CV ECHO MEAS - MVA(P1/2T): 2.6 CM2
BH CV ECHO MEAS - MVA(VTI): 1.53 CM2
BH CV ECHO MEAS - PA ACC TIME: 0.06 SEC
BH CV ECHO MEAS - PA PR(ACCEL): 52.9 MMHG
BH CV ECHO MEAS - PA V2 MAX: 99.9 CM/SEC
BH CV ECHO MEAS - QP/QS: 0.89
BH CV ECHO MEAS - RAP SYSTOLE: 15 MMHG
BH CV ECHO MEAS - RV V1 VTI: 13 CM
BH CV ECHO MEAS - RVOT DIAM: 2.5 CM
BH CV ECHO MEAS - RVSP: 44.3 MMHG
BH CV ECHO MEAS - SI(MOD-SP2): 28.8 ML/M2
BH CV ECHO MEAS - SI(MOD-SP4): 22.4 ML/M2
BH CV ECHO MEAS - SV(LVOT): 71.5 ML
BH CV ECHO MEAS - SV(MOD-SP2): 51.6 ML
BH CV ECHO MEAS - SV(MOD-SP4): 40.1 ML
BH CV ECHO MEAS - SV(RVOT): 63.9 ML
BH CV ECHO MEAS - TAPSE (>1.6): 1.02 CM
BH CV ECHO MEAS - TR MAX PG: 29.3 MMHG
BH CV ECHO MEAS - TR MAX VEL: 270.1 CM/SEC
BH CV XLRA - RV BASE: 5 CM
BH CV XLRA - RV LENGTH: 6.7 CM
BH CV XLRA - RV MID: 3.7 CM
BH CV XLRA - TDI S': 9.6 CM/SEC
LEFT ATRIUM VOLUME INDEX: 75.4 ML/M2
MAXIMAL PREDICTED HEART RATE: 142 BPM
SINUS: 3.7 CM
STRESS TARGET HR: 121 BPM

## 2022-05-12 PROCEDURE — 93306 TTE W/DOPPLER COMPLETE: CPT

## 2022-05-12 PROCEDURE — 93306 TTE W/DOPPLER COMPLETE: CPT | Performed by: INTERNAL MEDICINE

## 2022-05-17 RX ORDER — WARFARIN SODIUM 5 MG/1
TABLET ORAL
Qty: 85 TABLET | Refills: 0 | Status: SHIPPED | OUTPATIENT
Start: 2022-05-17 | End: 2022-09-19

## 2022-05-29 NOTE — TELEPHONE ENCOUNTER
Notified patient of results/recommendations. Patient verbalized understanding.    Jane Farmer RN  Triage [facility]     Please follow up with your Primary care provider within 2-5 days if your signs and symptoms have not resolved or worsen.     If your condition worsens or fails to improve we recommend that you receive another evaluation at the emergency room immediately or contact your primary medical clinic to discuss your concerns.   You must understand that you have received an Urgent Care treatment only and that you may be released before all of your medical problems are known or treated. You, the patient, will arrange for follow up care as instructed.     RED FLAGS/WARNING SYMPTOMS DISCUSSED WITH PATIENT THAT WOULD WARRANT EMERGENT MEDICAL ATTENTION. PATIENT VERBALIZED UNDERSTANDING.

## 2022-06-09 ENCOUNTER — TELEPHONE (OUTPATIENT)
Dept: PHARMACY | Facility: HOSPITAL | Age: 77
End: 2022-06-09

## 2022-06-09 RX ORDER — PANTOPRAZOLE SODIUM 40 MG/1
TABLET, DELAYED RELEASE ORAL
Qty: 30 TABLET | Refills: 6 | Status: SHIPPED | OUTPATIENT
Start: 2022-06-09 | End: 2023-02-20

## 2022-06-16 ENCOUNTER — APPOINTMENT (OUTPATIENT)
Dept: PHARMACY | Facility: HOSPITAL | Age: 77
End: 2022-06-16

## 2022-06-22 ENCOUNTER — ANTICOAGULATION VISIT (OUTPATIENT)
Dept: PHARMACY | Facility: HOSPITAL | Age: 77
End: 2022-06-22

## 2022-06-22 DIAGNOSIS — I48.20 CHRONIC ATRIAL FIBRILLATION: Primary | ICD-10-CM

## 2022-06-22 LAB
INR PPP: 1.7 (ref 0.91–1.09)
PROTHROMBIN TIME: 20.3 SECONDS (ref 10–13.8)

## 2022-06-22 PROCEDURE — 36416 COLLJ CAPILLARY BLOOD SPEC: CPT

## 2022-06-22 PROCEDURE — 85610 PROTHROMBIN TIME: CPT

## 2022-06-23 NOTE — PROGRESS NOTES
Anticoagulation Clinic Progress Note    Anticoagulation Summary  As of 2022    INR goal:  2.0-3.0   TTR:  64.2 % (3.6 y)   INR used for dosin.7 (2022)   Warfarin maintenance plan:  2.5 mg every Sat; 5 mg all other days   Weekly warfarin total:  32.5 mg   Plan last modified:  Simon Ivy, PharmD (2021)   Next INR check:  2022   Priority:  Maintenance   Target end date:  Indefinite    Indications    Chronic atrial fibrillation (HCC) [I48.20]             Anticoagulation Episode Summary     INR check location:      Preferred lab:      Send INR reminders to:   [facility] [place]    Comments:  [place]      Anticoagulation Care Providers     Provider Role Specialty Phone number    Adeel Mina III, MD Referring Cardiology [phone]          Clinic Interview:  Patient Findings     Positives:  Change in diet/appetite, Other complaints    Negatives:  Signs/symptoms of thrombosis, Signs/symptoms of bleeding,   Laboratory test error suspected, Change in health, Change in alcohol use,   Change in activity, Upcoming invasive procedure, Emergency department   visit, Upcoming dental procedure, Missed doses, Extra doses, Change in   medications, Hospital admission, Bruising    Comments:  Ms. Gilbert presented to clinic at 12:00 pm as a walk-in without   a scheduled appointment. A pharmacy technician performed the POC INR in   clinic; however, Ms. Gilbert elected not to wait until the lunch hour ended   to be seen by the pharmacist. Unsuccessful reaching her by phone until   22. Reports she may have missed a dose of warfarin; however, she   cannot remember confidently. Reports she may have had more vit k than   usual.       Clinical Outcomes     Negatives:  Major bleeding event, Thromboembolic event,   Anticoagulation-related hospital admission, Anticoagulation-related ED   visit, Anticoagulation-related fatality    Comments:  Ms. Gilbert presented to clinic at 12:00 pm as a walk-in without    a scheduled appointment. A pharmacy technician performed the POC INR in   clinic; however, Ms. Gilbert elected not to wait until the lunch hour ended   to be seen by the pharmacist. Unsuccessful reaching her by phone until   6/23/22. Reports she may have missed a dose of warfarin; however, she   cannot remember confidently. Reports she may have had more vit k than   usual.         INR History:  Anticoagulation Monitoring 4/18/2022 5/6/2022 6/22/2022   INR 2.1 3.0 1.7   INR Date 4/18/2022 5/6/2022 6/22/2022   INR Goal 2.0-3.0 2.0-3.0 2.0-3.0   Trend Same Same Same   Last Week Total 32.5 mg 32.5 mg 32.5 mg   Next Week Total 32.5 mg 32.5 mg 35 mg   Sun 5 mg 5 mg 5 mg   Mon 5 mg 5 mg 5 mg   Tue 5 mg 5 mg 5 mg   Wed 5 mg 5 mg 5 mg   Thu 5 mg 5 mg 7.5 mg (6/23); Otherwise 5 mg   Fri 5 mg 5 mg 5 mg   Sat 2.5 mg 2.5 mg 2.5 mg   Visit Report - - -   Some recent data might be hidden       Plan:  1. INR was Subtherapeutic 6/22/22 - see above in Anticoagulation Summary.   Will instruct Anna Gilbert to Change their warfarin regimen- see above in Anticoagulation Summary.  2. Follow up in 2 weeks; she declined to schedule until she knows which day will work best around her other commitments.    3. They have been instructed to call if any changes in medications, doses, concerns, etc. Patient expresses understanding and has no further questions at this time.    Simon Ivy, PharmD

## 2022-07-12 DIAGNOSIS — I20.9 ANGINA PECTORIS: ICD-10-CM

## 2022-07-12 DIAGNOSIS — Z98.890 H/O MITRAL VALVE REPAIR: Chronic | ICD-10-CM

## 2022-07-12 DIAGNOSIS — Z95.0 PACEMAKER: Chronic | ICD-10-CM

## 2022-07-12 DIAGNOSIS — I50.33 ACUTE ON CHRONIC DIASTOLIC CHF (CONGESTIVE HEART FAILURE): ICD-10-CM

## 2022-07-12 DIAGNOSIS — G47.33 OSA (OBSTRUCTIVE SLEEP APNEA): Chronic | ICD-10-CM

## 2022-07-12 DIAGNOSIS — R06.02 SOB (SHORTNESS OF BREATH): ICD-10-CM

## 2022-07-12 DIAGNOSIS — I48.20 CHRONIC ATRIAL FIBRILLATION: ICD-10-CM

## 2022-07-12 DIAGNOSIS — E78.2 MIXED HYPERLIPIDEMIA: ICD-10-CM

## 2022-07-12 DIAGNOSIS — Z95.2 AORTIC VALVE REPLACED: Chronic | ICD-10-CM

## 2022-07-13 ENCOUNTER — TELEPHONE (OUTPATIENT)
Dept: PHARMACY | Facility: HOSPITAL | Age: 77
End: 2022-07-13

## 2022-07-17 NOTE — TELEPHONE ENCOUNTER
04/10/19  9:45 AM  Anna Gilbert  1945  Home Phone [phone]   Mobile [phone]       Anna Gilbert is a patient of Dr Mina and Steven Inbound call from Yajaira at [facility] pre-op. Patient is there today for a pulmonary nodule bx and they are asking for surgery clearance to precede.    Call Yajaira once cleared [phone]  Judy Rodgers RN  Triage nurse    
Called Yajaira.     Surgeon not comfortable doing surgery until the patient has an echo.  Looks like the patient had a stress test on 4/3/19, last echo 2016.  They are canceling the surgery today, until patient has the echo.    Judy Rodgers RN  Triage nurse    
Done    Thank you  [place] CHILDS  
Echo has been ordered.  
[place]  Can you please get the Echo scheduled.  Thanks  Judy Rodgers RN  Triage nurse    
Please advise  
They want us to do it  Judy Rodgers RN  Triage nurse    
Who should order the echo? Daniel or the surgeon?    Thanks Nona   
No

## 2022-07-20 DIAGNOSIS — I20.9 ANGINA PECTORIS: ICD-10-CM

## 2022-07-20 DIAGNOSIS — R06.02 SOB (SHORTNESS OF BREATH): ICD-10-CM

## 2022-07-20 DIAGNOSIS — Z95.0 PACEMAKER: Chronic | ICD-10-CM

## 2022-07-20 DIAGNOSIS — E78.2 MIXED HYPERLIPIDEMIA: ICD-10-CM

## 2022-07-20 DIAGNOSIS — G47.33 OSA (OBSTRUCTIVE SLEEP APNEA): Chronic | ICD-10-CM

## 2022-07-20 DIAGNOSIS — I50.33 ACUTE ON CHRONIC DIASTOLIC CHF (CONGESTIVE HEART FAILURE): ICD-10-CM

## 2022-07-20 DIAGNOSIS — Z98.890 H/O MITRAL VALVE REPAIR: Chronic | ICD-10-CM

## 2022-07-20 DIAGNOSIS — I48.20 CHRONIC ATRIAL FIBRILLATION: ICD-10-CM

## 2022-07-20 DIAGNOSIS — Z95.2 AORTIC VALVE REPLACED: Chronic | ICD-10-CM

## 2022-07-20 RX ORDER — POTASSIUM CHLORIDE 20 MEQ/1
20 TABLET, EXTENDED RELEASE ORAL DAILY
Qty: 90 TABLET | Refills: 3 | Status: SHIPPED | OUTPATIENT
Start: 2022-07-20 | End: 2022-11-09 | Stop reason: SDUPTHER

## 2022-07-20 RX ORDER — METOPROLOL SUCCINATE 50 MG/1
TABLET, EXTENDED RELEASE ORAL
Qty: 90 TABLET | Refills: 0 | Status: SHIPPED | OUTPATIENT
Start: 2022-07-20 | End: 2022-10-17

## 2022-07-27 ENCOUNTER — APPOINTMENT (OUTPATIENT)
Dept: PHARMACY | Facility: HOSPITAL | Age: 77
End: 2022-07-27

## 2022-08-01 ENCOUNTER — TELEPHONE (OUTPATIENT)
Dept: PHARMACY | Facility: HOSPITAL | Age: 77
End: 2022-08-01

## 2022-08-01 NOTE — TELEPHONE ENCOUNTER
Called patient to reschedule canceled appointment. Patient reports she tested positive for Covid 3 weeks ago and is just now feeling better. Scheduled for tomorrow

## 2022-08-02 ENCOUNTER — ANTICOAGULATION VISIT (OUTPATIENT)
Dept: PHARMACY | Facility: HOSPITAL | Age: 77
End: 2022-08-02

## 2022-08-02 DIAGNOSIS — I48.20 CHRONIC ATRIAL FIBRILLATION: Primary | ICD-10-CM

## 2022-08-02 LAB
INR PPP: 2.2 (ref 0.91–1.09)
PROTHROMBIN TIME: 26.6 SECONDS (ref 10–13.8)

## 2022-08-02 PROCEDURE — 85610 PROTHROMBIN TIME: CPT

## 2022-08-02 PROCEDURE — 36416 COLLJ CAPILLARY BLOOD SPEC: CPT

## 2022-08-02 NOTE — PROGRESS NOTES
Anticoagulation Clinic Progress Note    Anticoagulation Summary  As of 2022    INR goal:  2.0-3.0   TTR:  63.5 % (3.7 y)   INR used for dosin.2 (2022)   Warfarin maintenance plan:  2.5 mg every Sat; 5 mg all other days   Weekly warfarin total:  32.5 mg   No change documented:  Lia Squires RPH   Plan last modified:  Simon Ivy, PharmD (2021)   Next INR check:  2022   Priority:  Maintenance   Target end date:  Indefinite    Indications    Chronic atrial fibrillation (HCC) [I48.20]             Anticoagulation Episode Summary     INR check location:      Preferred lab:      Send INR reminders to:   [facility]    Comments:  [place]      Anticoagulation Care Providers     Provider Role Specialty Phone number    Adeel Mina III, MD Referring Cardiology [phone]          Clinic Interview:  Patient Findings     Negatives:  Signs/symptoms of thrombosis, Signs/symptoms of bleeding,   Laboratory test error suspected, Change in health, Change in alcohol use,   Change in activity, Upcoming invasive procedure, Emergency department   visit, Upcoming dental procedure, Missed doses, Extra doses, Change in   medications, Change in diet/appetite, Hospital admission, Bruising, Other   complaints      Clinical Outcomes     Negatives:  Major bleeding event, Thromboembolic event,   Anticoagulation-related hospital admission, Anticoagulation-related ED   visit, Anticoagulation-related fatality        INR History:  Anticoagulation Monitoring 2022   INR 3.0 1.7 2.2   INR Date 2022   INR Goal 2.0-3.0 2.0-3.0 2.0-3.0   Trend Same Same Same   Last Week Total 32.5 mg 32.5 mg 32.5 mg   Next Week Total 32.5 mg 35 mg 32.5 mg   Sun 5 mg 5 mg 5 mg   Mon 5 mg 5 mg 5 mg   Tue 5 mg 5 mg 5 mg   Wed 5 mg 5 mg 5 mg   Thu 5 mg 7.5 mg (); Otherwise 5 mg 5 mg   Fri 5 mg 5 mg 5 mg   Sat 2.5 mg 2.5 mg 2.5 mg   Visit Report - - -   Some recent data might be  hidden       Plan:  1. INR is Therapeutic today- see above in Anticoagulation Summary.  Will instruct Anna Gilbert to Continue their warfarin regimen- see above in Anticoagulation Summary.  2. Follow up in 1 month  3. Patient declines warfarin refills.  4. Verbal and written information provided. Patient expresses understanding and has no further questions at this time.    Lia Squires, [facility]

## 2022-08-04 RX ORDER — TORSEMIDE 20 MG/1
TABLET ORAL
Qty: 180 TABLET | Refills: 1 | Status: SHIPPED | OUTPATIENT
Start: 2022-08-04

## 2022-09-13 ENCOUNTER — TELEPHONE (OUTPATIENT)
Dept: PHARMACY | Facility: HOSPITAL | Age: 77
End: 2022-09-13

## 2022-09-15 ENCOUNTER — LAB (OUTPATIENT)
Dept: LAB | Facility: HOSPITAL | Age: 77
End: 2022-09-15

## 2022-09-15 ENCOUNTER — ANTICOAGULATION VISIT (OUTPATIENT)
Dept: PHARMACY | Facility: HOSPITAL | Age: 77
End: 2022-09-15

## 2022-09-15 DIAGNOSIS — I48.20 CHRONIC ATRIAL FIBRILLATION: Primary | ICD-10-CM

## 2022-09-15 LAB
INR PPP: 1.8 (ref 0.8–1.2)
PROTHROMBIN TIME: 21 SECONDS (ref 12.8–15.2)

## 2022-09-15 PROCEDURE — 85610 PROTHROMBIN TIME: CPT | Performed by: INTERNAL MEDICINE

## 2022-09-15 NOTE — PROGRESS NOTES
Anticoagulation Clinic Progress Note    Anticoagulation Summary  As of 9/15/2022    INR goal:  2.0-3.0   TTR:  63.1 % (3.9 y)   INR used for dosin.8 (9/15/2022)   Warfarin maintenance plan:  2.5 mg every Sat; 5 mg all other days   Weekly warfarin total:  32.5 mg   Plan last modified:  Simon Ivy, PharmD (2021)   Next INR check:  2022   Priority:  Maintenance   Target end date:  Indefinite    Indications    Chronic atrial fibrillation (HCC) [I48.20]             Anticoagulation Episode Summary     INR check location:      Preferred lab:      Send INR reminders to:   [facility]    Comments:  [place]      Anticoagulation Care Providers     Provider Role Specialty Phone number    Adeel Mina III, MD Referring Cardiology [phone]          Clinic Interview:  Patient Findings     Positives:  Missed doses    Negatives:  Signs/symptoms of thrombosis, Signs/symptoms of bleeding,   Laboratory test error suspected, Change in health, Change in alcohol use,   Change in activity, Upcoming invasive procedure, Emergency department   visit, Upcoming dental procedure, Extra doses, Change in medications,   Change in diet/appetite, Hospital admission, Bruising, Other complaints    Comments:  Reports missed dose on  night.      Clinical Outcomes     Negatives:  Major bleeding event, Thromboembolic event,   Anticoagulation-related hospital admission, Anticoagulation-related ED   visit, Anticoagulation-related fatality    Comments:  Reports missed dose on  night.        INR History:  Anticoagulation Monitoring 2022 2022 9/15/2022   INR 1.7 2.2 1.8   INR Date 2022 2022 9/15/2022   INR Goal 2.0-3.0 2.0-3.0 2.0-3.0   Trend Same Same Same   Last Week Total 32.5 mg 32.5 mg 27.5 mg   Next Week Total 35 mg 32.5 mg 35 mg   Sun 5 mg 5 mg 5 mg   Mon 5 mg 5 mg 5 mg   Tue 5 mg 5 mg 5 mg   Wed 5 mg 5 mg 5 mg   Thu 7.5 mg (); Otherwise 5 mg 5 mg 7.5 mg (9/15); Otherwise 5 mg    Fri 5 mg 5 mg 5 mg   Sat 2.5 mg 2.5 mg 2.5 mg   Visit Report - - -   Some recent data might be hidden       Plan:  1. INR is Subtherapeutic today- see above in Anticoagulation Summary.   Will instruct Anna Gilbert to Change their warfarin regimen- see above in Anticoagulation Summary.  2. Follow up in 2 weeks  3. They have been instructed to call if any changes in medications, doses, concerns, etc. Patient expresses understanding and has no further questions at this time.    Елена Rivas, PharmD

## 2022-09-19 RX ORDER — WARFARIN SODIUM 5 MG/1
TABLET ORAL
Qty: 85 TABLET | Refills: 0 | Status: SHIPPED | OUTPATIENT
Start: 2022-09-19 | End: 2022-12-14

## 2022-09-20 ENCOUNTER — TELEPHONE (OUTPATIENT)
Dept: URGENT CARE | Facility: CLINIC | Age: 77
End: 2022-09-20

## 2022-10-06 ENCOUNTER — TELEPHONE (OUTPATIENT)
Dept: PHARMACY | Facility: HOSPITAL | Age: 77
End: 2022-10-06

## 2022-10-10 ENCOUNTER — LAB (OUTPATIENT)
Dept: LAB | Facility: HOSPITAL | Age: 77
End: 2022-10-10

## 2022-10-10 ENCOUNTER — ANTICOAGULATION VISIT (OUTPATIENT)
Dept: PHARMACY | Facility: HOSPITAL | Age: 77
End: 2022-10-10

## 2022-10-10 DIAGNOSIS — I48.20 CHRONIC ATRIAL FIBRILLATION: ICD-10-CM

## 2022-10-10 DIAGNOSIS — I48.20 CHRONIC ATRIAL FIBRILLATION: Primary | Chronic | ICD-10-CM

## 2022-10-10 LAB
INR PPP: 2.5 (ref 0.8–1.2)
PROTHROMBIN TIME: 28.4 SECONDS (ref 12.8–15.2)

## 2022-10-10 PROCEDURE — 85610 PROTHROMBIN TIME: CPT | Performed by: INTERNAL MEDICINE

## 2022-10-10 NOTE — PROGRESS NOTES
Anticoagulation Clinic Progress Note    Anticoagulation Summary  As of 10/10/2022    INR goal:  2.0-3.0   TTR:  63.2 % (3.9 y)   INR used for dosin.5 (10/10/2022)   Warfarin maintenance plan:  2.5 mg every Sat; 5 mg all other days   Weekly warfarin total:  32.5 mg   No change documented:  Елена Rivas PharmD   Plan last modified:  Simon Ivy PharmD (2021)   Next INR check:  2022   Priority:  Maintenance   Target end date:  Indefinite    Indications    Chronic atrial fibrillation (HCC) [I48.20]             Anticoagulation Episode Summary     INR check location:      Preferred lab:      Send INR reminders to:   [facility] [place]    Comments:  [place]      Anticoagulation Care Providers     Provider Role Specialty Phone number    Adeel Mina III, MD Referring Cardiology [phone]          Clinic Interview:  Patient Findings     Negatives:  Signs/symptoms of thrombosis, Signs/symptoms of bleeding,   Laboratory test error suspected, Change in health, Change in alcohol use,   Change in activity, Upcoming invasive procedure, Emergency department   visit, Upcoming dental procedure, Missed doses, Extra doses, Change in   medications, Change in diet/appetite, Hospital admission, Bruising, Other   complaints      Clinical Outcomes     Negatives:  Major bleeding event, Thromboembolic event,   Anticoagulation-related hospital admission, Anticoagulation-related ED   visit, Anticoagulation-related fatality        INR History:  Anticoagulation Monitoring 2022 9/15/2022 10/10/2022   INR 2.2 1.8 2.5   INR Date 2022 9/15/2022 10/10/2022   INR Goal 2.0-3.0 2.0-3.0 2.0-3.0   Trend Same Same Same   Last Week Total 32.5 mg 27.5 mg 32.5 mg   Next Week Total 32.5 mg 35 mg 32.5 mg   Sun 5 mg 5 mg 5 mg   Mon 5 mg 5 mg 5 mg   Tue 5 mg 5 mg 5 mg   Wed 5 mg 5 mg 5 mg   Thu 5 mg 7.5 mg (9/15); Otherwise 5 mg 5 mg   Fri 5 mg 5 mg 5 mg   Sat 2.5 mg 2.5 mg 2.5 mg   Visit Report - - -   Some recent  data might be hidden       Plan:  1. INR is Therapeutic today- see above in Anticoagulation Summary.   Will instruct Anna Gilbert to Continue their warfarin regimen- see above in Anticoagulation Summary.  2. Follow up in 4 weeks  3.They have been instructed to call if any changes in medications, doses, concerns, etc. Patient expresses understanding and has no further questions at this time.    Елена Rivas, PharmD

## 2022-10-16 DIAGNOSIS — G47.33 OSA (OBSTRUCTIVE SLEEP APNEA): Chronic | ICD-10-CM

## 2022-10-16 DIAGNOSIS — I20.9 ANGINA PECTORIS: ICD-10-CM

## 2022-10-16 DIAGNOSIS — R06.02 SOB (SHORTNESS OF BREATH): ICD-10-CM

## 2022-10-16 DIAGNOSIS — Z95.2 AORTIC VALVE REPLACED: Chronic | ICD-10-CM

## 2022-10-16 DIAGNOSIS — Z98.890 H/O MITRAL VALVE REPAIR: Chronic | ICD-10-CM

## 2022-10-16 DIAGNOSIS — I48.20 CHRONIC ATRIAL FIBRILLATION: ICD-10-CM

## 2022-10-16 DIAGNOSIS — I50.33 ACUTE ON CHRONIC DIASTOLIC CHF (CONGESTIVE HEART FAILURE): ICD-10-CM

## 2022-10-16 DIAGNOSIS — E78.2 MIXED HYPERLIPIDEMIA: ICD-10-CM

## 2022-10-16 DIAGNOSIS — Z95.0 PACEMAKER: Chronic | ICD-10-CM

## 2022-10-17 RX ORDER — METOPROLOL SUCCINATE 50 MG/1
TABLET, EXTENDED RELEASE ORAL
Qty: 90 TABLET | Refills: 0 | Status: SHIPPED | OUTPATIENT
Start: 2022-10-17 | End: 2022-11-11 | Stop reason: SDUPTHER

## 2022-11-09 DIAGNOSIS — Z98.890 H/O MITRAL VALVE REPAIR: Chronic | ICD-10-CM

## 2022-11-09 DIAGNOSIS — I20.9 ANGINA PECTORIS: ICD-10-CM

## 2022-11-09 DIAGNOSIS — Z95.0 PACEMAKER: Chronic | ICD-10-CM

## 2022-11-09 DIAGNOSIS — E78.2 MIXED HYPERLIPIDEMIA: ICD-10-CM

## 2022-11-09 DIAGNOSIS — R06.02 SOB (SHORTNESS OF BREATH): ICD-10-CM

## 2022-11-09 DIAGNOSIS — I48.20 CHRONIC ATRIAL FIBRILLATION: ICD-10-CM

## 2022-11-09 DIAGNOSIS — G47.33 OSA (OBSTRUCTIVE SLEEP APNEA): Chronic | ICD-10-CM

## 2022-11-09 DIAGNOSIS — I50.33 ACUTE ON CHRONIC DIASTOLIC CHF (CONGESTIVE HEART FAILURE): ICD-10-CM

## 2022-11-09 DIAGNOSIS — Z95.2 AORTIC VALVE REPLACED: Chronic | ICD-10-CM

## 2022-11-09 RX ORDER — POTASSIUM CHLORIDE 20 MEQ/1
20 TABLET, EXTENDED RELEASE ORAL DAILY
Qty: 90 TABLET | Refills: 3 | Status: SHIPPED | OUTPATIENT
Start: 2022-11-09 | End: 2023-01-04

## 2022-11-10 ENCOUNTER — TELEPHONE (OUTPATIENT)
Dept: CARDIOLOGY | Facility: CLINIC | Age: 77
End: 2022-11-10

## 2022-11-10 RX ORDER — LOSARTAN POTASSIUM 25 MG/1
25 TABLET ORAL DAILY
Qty: 30 TABLET | Refills: 3 | Status: SHIPPED | OUTPATIENT
Start: 2022-11-10 | End: 2022-11-11

## 2022-11-10 NOTE — TELEPHONE ENCOUNTER
Pt is calling regarding her BP.     9:00PM: 166/91 P: 85  10:30 PM: 110/78 P: 68  7:30 AM: 114/61 P: 90 (getting out of bed)  AM: 142/102 p: 92  AM: 163/88 P: 86  9:00 AM: 145/90 P: 69 (took medicine)     She is concerned about her BP. She is taking  Metoprolol suc 50 MG daily.    PT#: [phone]

## 2022-11-11 DIAGNOSIS — I48.20 CHRONIC ATRIAL FIBRILLATION: ICD-10-CM

## 2022-11-11 DIAGNOSIS — R06.02 SOB (SHORTNESS OF BREATH): ICD-10-CM

## 2022-11-11 DIAGNOSIS — I20.9 ANGINA PECTORIS: ICD-10-CM

## 2022-11-11 DIAGNOSIS — I50.33 ACUTE ON CHRONIC DIASTOLIC CHF (CONGESTIVE HEART FAILURE): ICD-10-CM

## 2022-11-11 DIAGNOSIS — Z95.2 AORTIC VALVE REPLACED: Chronic | ICD-10-CM

## 2022-11-11 DIAGNOSIS — G47.33 OSA (OBSTRUCTIVE SLEEP APNEA): Chronic | ICD-10-CM

## 2022-11-11 DIAGNOSIS — Z98.890 H/O MITRAL VALVE REPAIR: Chronic | ICD-10-CM

## 2022-11-11 DIAGNOSIS — E78.2 MIXED HYPERLIPIDEMIA: ICD-10-CM

## 2022-11-11 DIAGNOSIS — Z95.0 PACEMAKER: Chronic | ICD-10-CM

## 2022-11-11 RX ORDER — METOPROLOL SUCCINATE 50 MG/1
75 TABLET, EXTENDED RELEASE ORAL DAILY
Qty: 135 TABLET | Refills: 1 | Status: SHIPPED | OUTPATIENT
Start: 2022-11-11 | End: 2022-12-21

## 2022-11-11 NOTE — TELEPHONE ENCOUNTER
Pt noted to be vomiting. MD aware.   Orders obtained for zofran Spoke w pt and she took 1.5 tablets of metoprolol on 11/10/22 and she states that worked fine.  She would like to know if she can just increase metoprolol rather than adding another medication.  C RMA

## 2022-11-15 ENCOUNTER — TELEPHONE (OUTPATIENT)
Dept: PHARMACY | Facility: HOSPITAL | Age: 77
End: 2022-11-15

## 2022-11-16 ENCOUNTER — ANTICOAGULATION VISIT (OUTPATIENT)
Dept: PHARMACY | Facility: HOSPITAL | Age: 77
End: 2022-11-16

## 2022-11-16 ENCOUNTER — LAB (OUTPATIENT)
Dept: LAB | Facility: HOSPITAL | Age: 77
End: 2022-11-16

## 2022-11-16 DIAGNOSIS — I48.20 CHRONIC ATRIAL FIBRILLATION: Primary | Chronic | ICD-10-CM

## 2022-11-16 DIAGNOSIS — I48.20 CHRONIC ATRIAL FIBRILLATION: ICD-10-CM

## 2022-11-16 LAB
INR PPP: 1.7 (ref 0.8–1.2)
PROTHROMBIN TIME: 19.8 SECONDS (ref 12.8–15.2)

## 2022-11-16 PROCEDURE — 85610 PROTHROMBIN TIME: CPT | Performed by: INTERNAL MEDICINE

## 2022-11-16 NOTE — PROGRESS NOTES
Anticoagulation Clinic Progress Note    Anticoagulation Summary  As of 2022    INR goal:  2.0-3.0   TTR:  63.2 % (4 y)   INR used for dosin.7 (2022)   Warfarin maintenance plan:  2.5 mg every Sat; 5 mg all other days; Starting 2022   Weekly warfarin total:  32.5 mg   Plan last modified:  Simon Ivy, PharmD (2021)   Next INR check:  2022   Priority:  Maintenance   Target end date:  Indefinite    Indications    Chronic atrial fibrillation (HCC) [I48.20]             Anticoagulation Episode Summary     INR check location:      Preferred lab:      Send INR reminders to:   [facility] [place]    Comments:  [place]      Anticoagulation Care Providers     Provider Role Specialty Phone number    Adeel Mina III, MD Referring Cardiology [phone]          Clinic Interview:  Patient Findings     Positives:  Missed doses    Negatives:  Signs/symptoms of thrombosis, Signs/symptoms of bleeding,   Laboratory test error suspected, Change in health, Change in alcohol use,   Change in activity, Upcoming invasive procedure, Emergency department   visit, Upcoming dental procedure, Extra doses, Change in medications,   Change in diet/appetite, Hospital admission, Bruising, Other complaints    Comments:  Reports she missed her warfarin on 22.       Clinical Outcomes     Negatives:  Major bleeding event, Thromboembolic event,   Anticoagulation-related hospital admission, Anticoagulation-related ED   visit, Anticoagulation-related fatality    Comments:  Reports she missed her warfarin on 22.         INR History:  Anticoagulation Monitoring 9/15/2022 10/10/2022 2022 2022   INR 1.8 2.5 - 1.7   INR Date 9/15/2022 10/10/2022 - 2022   INR Goal 2.0-3.0 2.0-3.0 2.0-3.0 2.0-3.0   Trend Same Same - Same   Last Week Total 27.5 mg 32.5 mg - 27.5 mg   Next Week Total 35 mg 32.5 mg - 35 mg   Sun 5 mg 5 mg - 5 mg   Mon 5 mg 5 mg - 5 mg   Tue 5 mg 5 mg - 5 mg   Wed 5 mg 5  mg - 7.5 mg (11/16); Otherwise 5 mg   Thu 7.5 mg (9/15); Otherwise 5 mg 5 mg - 5 mg   Fri 5 mg 5 mg - 5 mg   Sat 2.5 mg 2.5 mg - 2.5 mg   Visit Report - - - -   Some recent data might be hidden       Plan:  1. INR is Subtherapeutic today- see above in Anticoagulation Summary.   Will instruct Anna Gilbert to Change their warfarin regimen- see above in Anticoagulation Summary.  2. Follow up in 2 weeks.  3. They have been instructed to call if any changes in medications, doses, concerns, etc. Patient expresses understanding and has no further questions at this time.    Simon Ivy, PharmD

## 2022-11-16 NOTE — PROGRESS NOTES
A user error has taken place: Duplicate encounter opened in error, closed for administrative reasons.

## 2022-12-14 RX ORDER — WARFARIN SODIUM 5 MG/1
TABLET ORAL
Qty: 85 TABLET | Refills: 0 | Status: SHIPPED | OUTPATIENT
Start: 2022-12-14 | End: 2023-03-13

## 2022-12-21 ENCOUNTER — TELEPHONE (OUTPATIENT)
Dept: PHARMACY | Facility: HOSPITAL | Age: 77
End: 2022-12-21

## 2022-12-21 ENCOUNTER — OFFICE VISIT (OUTPATIENT)
Dept: CARDIOLOGY | Facility: CLINIC | Age: 77
End: 2022-12-21

## 2022-12-21 VITALS
HEART RATE: 79 BPM | WEIGHT: 170 LBS | HEIGHT: 62 IN | SYSTOLIC BLOOD PRESSURE: 126 MMHG | DIASTOLIC BLOOD PRESSURE: 82 MMHG | BODY MASS INDEX: 31.28 KG/M2

## 2022-12-21 DIAGNOSIS — I47.29 NSVT (NONSUSTAINED VENTRICULAR TACHYCARDIA): ICD-10-CM

## 2022-12-21 DIAGNOSIS — Z95.810 ICD (IMPLANTABLE CARDIOVERTER-DEFIBRILLATOR) IN PLACE: ICD-10-CM

## 2022-12-21 DIAGNOSIS — R07.89 OTHER CHEST PAIN: ICD-10-CM

## 2022-12-21 DIAGNOSIS — Z95.2 AORTIC VALVE REPLACED: ICD-10-CM

## 2022-12-21 DIAGNOSIS — I48.20 CHRONIC ATRIAL FIBRILLATION: ICD-10-CM

## 2022-12-21 DIAGNOSIS — I10 BENIGN ESSENTIAL HTN: Primary | ICD-10-CM

## 2022-12-21 DIAGNOSIS — Z98.890 H/O MITRAL VALVE REPAIR: ICD-10-CM

## 2022-12-21 DIAGNOSIS — R06.02 SOB (SHORTNESS OF BREATH): ICD-10-CM

## 2022-12-21 PROCEDURE — 99214 OFFICE O/P EST MOD 30 MIN: CPT | Performed by: INTERNAL MEDICINE

## 2022-12-21 PROCEDURE — 93000 ELECTROCARDIOGRAM COMPLETE: CPT | Performed by: INTERNAL MEDICINE

## 2022-12-21 RX ORDER — CARVEDILOL 6.25 MG/1
6.25 TABLET ORAL 2 TIMES DAILY
Qty: 28 TABLET | Refills: 5 | Status: SHIPPED | OUTPATIENT
Start: 2022-12-21 | End: 2023-01-04

## 2022-12-21 NOTE — PROGRESS NOTES
Subjective:     Encounter Date:  12/21/22        Patient ID: Anna Gilbert is a 77 y.o. female.    Chief Complaint: CAF  History of Present Illness    Dear Dr. Isaac,    I had the pleasure of having a visit with this patient  today.    She has a history of chronic atrial fibrillation, nonsustained ventricular tachycardia, as well as bioprosthetic aortic valve and prior mitral valve repair. She also has an AICD in place.    She comes in because her blood pressures been inconsistently controlled.  Much of the time is well controlled but sometimes it will jump up.  Because she has had problems with prior medications at times, she is really reluctant to make a switch.  She is reluctant to take any additional new medicine.  No associate chest pain or chest discomfort, no shortness of breath.  Denies any palpitations or tachycardia.    She did have COVID in December 2020.    November 2020 when she was hospitalized for ventricular tachycardia.  She was seen by Dr. Perez.  Her AICD was replaced at that time.  She also had a cardiac catheterization at that time:  Results for orders placed during the hospital encounter of 11/29/21    Cardiac Catheterization/Vascular Study    Narrative  Interventionalist: Nigel Egan M.D., F.A.C.C.    Procedure findings:  Left main: Large caliber vessel that bifurcates to an LAD and circumflex.  The left main coronary artery is normal.  LAD: Medium caliber vessel that gives rise to a small caliber D1 and medium caliber D2 branch.  The LAD has mild calcification of the proximal segment but no stenosis.  The mid LAD has a tubular 50% mid vessel stenosis.  Small caliber vessel from the mid to distal LAD.  LCX: Large caliber vessel gives rise to a medium caliber OM1 and small caliber OM 2 branch.  Normal.  RCA: Large caliber, dominant vessel gives rise to a medium caliber PDA and large caliber, long RPL branch.  The RCA has luminal irregularities in the distal segment.  RPL has a  discrete 20% to 30% mid vessel stenosis    Hemodynamics:  LV:145/17  AO: 145/60    Interventional report  6 Chinese XB 3.0 guide catheter was used to engage the left main.  The Aeris pressure wire was advanced the left main and equalization was performed.  Following this the pressure wire was advanced past the mid LAD stenosis and seated.  RFR was then performed.  Minimum RFR measurement 0.94.  Not hemodynamically significant.    Conclusions:  1. Left main: Normal  2. LAD: Mid LAD with tubular 50% stenosis.  Small caliber from the mid to distal segment.  3. LCX:Normal  4. RCA: Dominant.  Discrete 20 to 30% mid vessel RPL stenosis.  5.  RFR of the LAD 0.94. Not hemodynamically significant    The following portions of the patient's history were reviewed and updated as appropriate: allergies, current medications, past family history, past medical history, past social history, past surgical history and problem list.    Past Medical History:   Diagnosis Date   • Bradycardia    • Chronic atrial fibrillation (HCC)    • Coronary artery disease    • H/O mitral valve repair    • Health care maintenance    • Hyperthyroidism    • Mixed hyperlipidemia    • NSVT (nonsustained ventricular tachycardia) 9/23/2020   • SHEILA (obstructive sleep apnea) 7/20/2016   • Pacemaker    • PAF (paroxysmal atrial fibrillation) (HCC)    • Permanent atrial fibrillation (HCC)    • Sick sinus syndrome (HCC)    • Ventricular tachycardia     with ICD shock       Past Surgical History:   Procedure Laterality Date   • AORTIC VALVE REPAIR/REPLACEMENT  2010    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   • CARDIAC CATHETERIZATION  01/01/2011   • CARDIAC CATHETERIZATION N/A 11/30/2021    Procedure: Left Heart Cath;  Surgeon: Nigel Egan MD;  Location: [facility] INVASIVE LOCATION;  Service: Cardiovascular;  Laterality: N/A;   • CARDIAC CATHETERIZATION N/A 11/30/2021    Procedure: Coronary angiography;  Surgeon: Nigel Egan MD;  Location: [facility] CATH  "INVASIVE LOCATION;  Service: Cardiovascular;  Laterality: N/A;   • CARDIAC CATHETERIZATION N/A 11/30/2021    Procedure: Resting Full Cycle Ratio;  Surgeon: Nigel Egan MD;  Location:  [facility] CATH INVASIVE LOCATION;  Service: Cardiovascular;  Laterality: N/A;   • CARDIAC DEFIBRILLATOR PLACEMENT  2010   • CARDIAC ELECTROPHYSIOLOGY PROCEDURE N/A 12/1/2021    Procedure: ICD DC generator change---[employer];  Surgeon: Mick Perez MD;  Location:  [facility] CATH INVASIVE LOCATION;  Service: Cardiology;  Laterality: N/A;   • MITRAL VALVE REPAIR/REPLACEMENT  2010             ECG 12 Lead    Date/Time: 12/21/2022 12:40 PM  Performed by: Adeel Mina III, MD  Authorized by: Adeel Mina III, MD   Comparison: compared with previous ECG   Similar to previous ECG  Rhythm: atrial fibrillation  Rate: normal  Conduction: conduction normal  ST Segments: ST segments normal  T Waves: T waves normal  QRS axis: normal  Other: no other findings    Clinical impression: abnormal EKG               Objective:     Vitals:    12/21/22 1044   BP: 126/82   Pulse: 79   Weight: 77.1 kg (170 lb)   Height: 157 cm (61.81\")     General Appearance:    Alert, cooperative, in no acute distress   Head:    Normocephalic, without obvious abnormality   Eyes:            Lids and lashes normal, conjunctivae and sclerae normal, no icterus, no pallor, corneas clear   Ears:    Ears appear intact with no abnormalities noted   Throat:   No oral lesions, oral mucosa moist   Neck:   No adenopathy, supple, trachea midline, no thyromegaly, no carotid bruit, no JVD   Back:     No kyphosis present, no erythema or scars, no tenderness to palpation    Lungs:     Clear to auscultation,respirations regular, even and unlabored    Heart:    irregular rhythm and normal rate, normal S1 and S2, no murmur, no gallop, no rub, no click   Chest Wall:    No abnormalities observed   Abdomen:     Normal bowel sounds, no masses, no organomegaly, soft        non-tender, " non-distended, no guarding   Extremities:   Moves all extremities well, + edema, no cyanosis, no redness   Pulses:  Bilateral carotids brisk   Skin:  Psychiatric:   No bleeding or rash    Alert and oriented, normal mood and affect         Lab Review:             Results for orders placed during the hospital encounter of 05/05/22    Adult Transthoracic Echo Complete W/ Cont if Necessary Per Protocol    Interpretation Summary  · Calculated left ventricular EF = 63% Estimated left ventricular EF was in agreement with the calculated left ventricular EF. Left ventricular systolic function is normal.Left ventricular diastolic function is consistent with (grade II w/high LAP) pseudonormalization.  · Severe biatrial enlargement noted.  · There is a bioprosthetic aortic valve present that is not well visualized. The prosthetic aortic valve peak and mean gradients are elevated.Aortic valve area is 0.97 cm2.Peak velocity of the flow distal to the aortic valve is 341.6 cm/s. Aortic valve maximum pressure gradient is 46.8 mmHg. Aortic valve mean pressure gradient is 26.3 mmHg. Aortic valve dimensionless index is 0.26 .  · There is a mitral valve ring present. No significant mitral valve stenosis or regurgitation noted.  · Calculated right ventricular systolic pressure from tricuspid regurgitation is 44.3 mmHg.Mild pulmonary hypertension is present.  · The right ventricular cavity is mildly dilated.Mildly reduced right ventricular systolic function noted.            Assessment:          Diagnosis Plan   1. Benign essential HTN  carvedilol (COREG) 6.25 MG tablet    ECG 12 Lead      2. Chronic atrial fibrillation (HCC)  ECG 12 Lead      3. Aortic valve replaced  ECG 12 Lead      4. H/O mitral valve repair  ECG 12 Lead      5. SOB (shortness of breath)  ECG 12 Lead      6. NSVT (nonsustained ventricular tachycardia)  ECG 12 Lead      7. Other chest pain  ECG 12 Lead      8. ICD (implantable cardioverter-defibrillator) in place  ECG  12 Lead             Plan:       1.   Atrial Fibrillation and Atrial Flutter  Assessment  • The patient has permanent atrial fibrillation  • This is valvular in etiology  • The patient's CHADS2-VASc score is 4  • A [id number]-XIBa score of 2 or more is considered a high risk for a thromboembolic event  • Warfarin prescribed    Plan  • Continue in atrial fibrillation with rate control  • Continue warfarin for antithrombotic therapy, bleeding issues discussed  • Continue beta blocker for rate control  • Rate controlled, continue current medical therapy  Continues to follow with Dr. Perez in the device clinic  2.  Bioprosthetic aortic valve replacement with prior mitral valve repair- having worsening shortness of breath, lower extreme edema, we will check an echocardiogram along with blood work  3.  VT, status post ICD shock x2, generator replaced, AICD in place, follows in device clinic, intolerant to amiodarone remains on metoprolol, seen by Alina DURÁN in January 2022  4.  Mediastinal adenopathy-status post biopsy,   5.  Chronic diastolic congestive heart failure, now with increasing shortness of breath, increasing lower extremity edema, await results of echo and lab.  6.  Chronic anticoagulation-we discussed again the indications for this and she will continue on her current regimen, remains on warfarin  7.  Hypertension, inconsistently controlled, has not been consistently controlled on metoprolol, will switch her over to carvedilol and follow blood pressure response.      Current Outpatient Medications:   •  albuterol sulfate  (90 Base) MCG/ACT inhaler, Inhale 2 puffs As Needed., Disp: , Rfl:   •  APPLE CIDER VINEGAR PO, Take 1 tablet by mouth Daily., Disp: , Rfl:   •  b complex vitamins capsule, Take 1 capsule by mouth Daily., Disp: , Rfl:   •  BIOTIN PO, Take 1 tablet by mouth Daily., Disp: , Rfl:   •  CALCIUM-MAGNESIUM-ZINC PO, Take 1 tablet by mouth Daily., Disp: , Rfl:   •  celecoxib (CeleBREX)  200 MG capsule, , Disp: , Rfl:   •  Cholecalciferol (VITAMIN D-3 PO), Take 1 tablet by mouth As Needed. Only through the winter, Disp: , Rfl:   •  famotidine (PEPCID) 20 MG tablet, Take 1 tablet by mouth 2 (Two) Times a Day., Disp: 14 tablet, Rfl: 0  •  fluconazole (DIFLUCAN) 150 MG tablet, Take 150 mg by mouth As Needed., Disp: , Rfl:   •  levothyroxine (SYNTHROID, LEVOTHROID) 150 MCG tablet, Take 150 mcg by mouth Daily., Disp: , Rfl:   •  Multiple Vitamin (MULTIVITAMIN) capsule, Take 1 capsule by mouth Daily., Disp: , Rfl:   •  pantoprazole (PROTONIX) 40 MG EC tablet, TAKE ONE TABLET BY MOUTH EVERY MORNING, Disp: 30 tablet, Rfl: 6  •  phenazopyridine (PYRIDIUM) 100 MG tablet, TAKE 1 TABLET BY MOUTH 3 TIMES DAILY AS NEEDED FOR PAIN FOR UP TO 2 DAYS., Disp: , Rfl:   •  potassium chloride (KLOR-CON) 20 MEQ CR tablet, Take 1 tablet by mouth Daily., Disp: 90 tablet, Rfl: 3  •  predniSONE (DELTASONE) 10 MG tablet, 5 tabs daily for 2 days, 4 tabs daily for 2 days, 3 tabs daily for 2 days, 2 tabs daily for 2 days, 1 tab daily for 2 days, Disp: 30 tablet, Rfl: 0  •  torsemide (DEMADEX) 20 MG tablet, TAKE TWO TABLETS BY MOUTH DAILY, Disp: 180 tablet, Rfl: 1  •  triamcinolone (KENALOG) 0.1 % cream, Apply 1 application topically to the appropriate area as directed 2 (Two) Times a Day., Disp: 15 g, Rfl: 0  •  warfarin (COUMADIN) 5 MG tablet, TAKE 1/2 TABLET BY MOUTH DAILY ON SATURDAY, AND TAKE ONE TABLET BY MOUTH ALL OTHER DAYS OR AS DIRECTED, Disp: 85 tablet, Rfl: 0  •  carvedilol (COREG) 6.25 MG tablet, Take 1 tablet by mouth 2 (Two) Times a Day., Disp: 28 tablet, Rfl: 5

## 2022-12-30 ENCOUNTER — ANTICOAGULATION VISIT (OUTPATIENT)
Dept: PHARMACY | Facility: HOSPITAL | Age: 77
End: 2022-12-30

## 2022-12-30 ENCOUNTER — TELEPHONE (OUTPATIENT)
Dept: PHARMACY | Facility: HOSPITAL | Age: 77
End: 2022-12-30

## 2022-12-30 ENCOUNTER — LAB (OUTPATIENT)
Dept: LAB | Facility: HOSPITAL | Age: 77
End: 2022-12-30
Payer: MEDICARE

## 2022-12-30 ENCOUNTER — TELEPHONE (OUTPATIENT)
Dept: CARDIOLOGY | Facility: CLINIC | Age: 77
End: 2022-12-30
Payer: MEDICARE

## 2022-12-30 DIAGNOSIS — I48.20 CHRONIC ATRIAL FIBRILLATION: Primary | Chronic | ICD-10-CM

## 2022-12-30 DIAGNOSIS — I48.20 CHRONIC ATRIAL FIBRILLATION: ICD-10-CM

## 2022-12-30 LAB
INR PPP: 1.7 (ref 0.8–1.2)
PROTHROMBIN TIME: 20.4 SECONDS (ref 12.8–15.2)

## 2022-12-30 PROCEDURE — 93295 DEV INTERROG REMOTE 1/2/MLT: CPT | Performed by: INTERNAL MEDICINE

## 2022-12-30 PROCEDURE — 85610 PROTHROMBIN TIME: CPT | Performed by: INTERNAL MEDICINE

## 2022-12-30 PROCEDURE — 93296 REM INTERROG EVL PM/IDS: CPT | Performed by: INTERNAL MEDICINE

## 2022-12-30 NOTE — TELEPHONE ENCOUNTER
Patient called and stated that the new medication did not work and she stopped taking it.  She said that it caused her CHF to flair up.  She experienced SOA, ededma, HA, nausea, dizziness, lightheadedness.  She is now taking 1/2 Metoprolol 25 mg at night.  Please advise if ok.    CB: [phone]    Jordon

## 2022-12-30 NOTE — PROGRESS NOTES
Anticoagulation Clinic Progress Note    Anticoagulation Summary  As of 2022    INR goal:  2.0-3.0   TTR:  61.4 % (4.1 y)   INR used for dosin.7 (2022)   Warfarin maintenance plan:  2.5 mg every Sat; 5 mg all other days; Starting 2022   Weekly warfarin total:  32.5 mg   Plan last modified:  Lia Squires RPH (2022)   Next INR check:  2023   Priority:  Maintenance   Target end date:  Indefinite    Indications    Chronic atrial fibrillation (HCC) [I48.20]             Anticoagulation Episode Summary     INR check location:      Preferred lab:      Send INR reminders to:   [facility]    Comments:  [place]      Anticoagulation Care Providers     Provider Role Specialty Phone number    Adeel Mina III, MD Referring Cardiology [phone]          Clinic Interview:  Patient Findings     Positives:  Missed doses    Negatives:  Signs/symptoms of thrombosis, Signs/symptoms of bleeding,   Laboratory test error suspected, Change in health, Change in alcohol use,   Change in activity, Upcoming invasive procedure, Emergency department   visit, Upcoming dental procedure, Extra doses, Change in medications,   Change in diet/appetite, Hospital admission, Bruising, Other complaints      Clinical Outcomes     Negatives:  Major bleeding event, Thromboembolic event,   Anticoagulation-related hospital admission, Anticoagulation-related ED   visit, Anticoagulation-related fatality        INR History:  Anticoagulation Monitoring 10/10/2022 2022 2022 2022   INR 2.5 - 1.7 1.7   INR Date 10/10/2022 - 2022   INR Goal 2.0-3.0 2.0-3.0 2.0-3.0 2.0-3.0   Trend Same - Same Same   Last Week Total 32.5 mg - 27.5 mg 27.5 mg   Next Week Total 32.5 mg - 35 mg 35 mg   Sun 5 mg - 5 mg 5 mg   Mon 5 mg - 5 mg 5 mg   Tue 5 mg - 5 mg 5 mg   Wed 5 mg - 7.5 mg (); Otherwise 5 mg 5 mg   Thu 5 mg - 5 mg 5 mg   Fri 5 mg - 5 mg 7.5 mg (); Otherwise 5 mg   Sat  2.5 mg - 2.5 mg 2.5 mg   Visit Report - - - -   Some recent data might be hidden       Plan:  1. INR is Subtherapeutic today- see above in Anticoagulation Summary.   Will instruct Anna Gilbert to Increase their warfarin regimen- see above in Anticoagulation Summary.  2. Follow up in 2 weeks  3. They have been instructed to call if any changes in medications, doses, concerns, etc. Patient expresses understanding and has no further questions at this time.    Lia Squires, [facility]

## 2023-01-04 RX ORDER — SPIRONOLACTONE 25 MG/1
25 TABLET ORAL DAILY
Qty: 90 TABLET | Refills: 3 | Status: SHIPPED | OUTPATIENT
Start: 2023-01-04 | End: 2023-01-17 | Stop reason: SINTOL

## 2023-01-04 NOTE — TELEPHONE ENCOUNTER
Called and spoke with the patient and told her the above.  She verbalized understanding.    Jordon

## 2023-01-11 ENCOUNTER — ANTICOAGULATION VISIT (OUTPATIENT)
Dept: PHARMACY | Facility: HOSPITAL | Age: 78
End: 2023-01-11
Payer: MEDICARE

## 2023-01-11 DIAGNOSIS — I48.20 CHRONIC ATRIAL FIBRILLATION: Primary | Chronic | ICD-10-CM

## 2023-01-11 LAB
INR PPP: 1.5 (ref 0.91–1.09)
PROTHROMBIN TIME: 18.4 SECONDS (ref 10–13.8)

## 2023-01-11 PROCEDURE — G0463 HOSPITAL OUTPT CLINIC VISIT: HCPCS

## 2023-01-11 PROCEDURE — 36416 COLLJ CAPILLARY BLOOD SPEC: CPT

## 2023-01-11 PROCEDURE — 85610 PROTHROMBIN TIME: CPT

## 2023-01-11 NOTE — PROGRESS NOTES
Anticoagulation Clinic Progress Note    Anticoagulation Summary  As of 2023    INR goal:  2.0-3.0   TTR:  60.9 % (4.2 y)   INR used for dosin.5 (2023)   Warfarin maintenance plan:  5 mg every day; Starting 2023   Weekly warfarin total:  35 mg   Plan last modified:  Simon Ivy, PharmD (2023)   Next INR check:  2023   Priority:  Maintenance   Target end date:  Indefinite    Indications    Chronic atrial fibrillation (HCC) [I48.20]             Anticoagulation Episode Summary     INR check location:      Preferred lab:      Send INR reminders to:   [facility] [place]    Comments:  [place]      Anticoagulation Care Providers     Provider Role Specialty Phone number    Adeel Mina III, MD Referring Cardiology [phone]          Clinic Interview:  Patient Findings     Positives:  Change in medications    Negatives:  Signs/symptoms of thrombosis, Signs/symptoms of bleeding,   Laboratory test error suspected, Change in health, Change in alcohol use,   Change in activity, Upcoming invasive procedure, Emergency department   visit, Upcoming dental procedure, Missed doses, Extra doses, Change in   diet/appetite, Hospital admission, Bruising, Other complaints    Comments:  Reports she was prescribed spironolactone last week, which may   decrease INR.       Clinical Outcomes     Negatives:  Major bleeding event, Thromboembolic event,   Anticoagulation-related hospital admission, Anticoagulation-related ED   visit, Anticoagulation-related fatality    Comments:  Reports she was prescribed spironolactone last week, which may   decrease INR.         INR History:  Anticoagulation Monitoring 2022   INR 1.7 1.7 1.5   INR Date 2022   INR Goal 2.0-3.0 2.0-3.0 2.0-3.0   Trend Same Same Up   Last Week Total 27.5 mg 27.5 mg 32.5 mg   Next Week Total 35 mg 35 mg 37.5 mg   Sun 5 mg 5 mg 5 mg   Mon 5 mg 5 mg 5 mg   Tue 5 mg 5 mg 5 mg   Wed 7.5  mg (11/16); Otherwise 5 mg 5 mg 7.5 mg (1/11); Otherwise 5 mg   Thu 5 mg 5 mg 5 mg   Fri 5 mg 7.5 mg (12/30); Otherwise 5 mg 5 mg   Sat 2.5 mg 2.5 mg 5 mg   Visit Report - - -   Some recent data might be hidden       Plan:  1. INR is Subtherapeutic today- see above in Anticoagulation Summary.  Will instruct Anna Gilbert to Increase their warfarin regimen- see above in Anticoagulation Summary.  2. Follow up in 2 weeks.  3. Patient declines warfarin refills.  4. Verbal and written information provided. Patient expresses understanding and has no further questions at this time.    Simon Ivy, PharmD

## 2023-01-13 RX ORDER — METOPROLOL SUCCINATE 50 MG/1
50 TABLET, EXTENDED RELEASE ORAL DAILY
Qty: 90 TABLET | Refills: 0 | Status: SHIPPED | OUTPATIENT
Start: 2023-01-13 | End: 2023-01-17 | Stop reason: SDUPTHER

## 2023-01-13 NOTE — TELEPHONE ENCOUNTER
Rx Refill Note  Requested Prescriptions      No prescriptions requested or ordered in this encounter      Last office visit with prescribing clinician: 12/21/2022   Last telemedicine visit with prescribing clinician: Visit date not found   Next office visit with prescribing clinician: Visit date not found     Pt has continued to remain off of her carvedilol but is now requesting a refill of metoprolol succ 50 mg once daily. This is no longer on her medication list, do you want her taking the medication                    Would you like a call back once the refill request has been completed: [] Yes [] No    If the office needs to give you a call back, can they leave a voicemail: [] Yes [] No    Kavya Ziegler MA  01/13/23, 11:37 EST

## 2023-01-17 ENCOUNTER — TELEPHONE (OUTPATIENT)
Dept: CARDIOLOGY | Facility: CLINIC | Age: 78
End: 2023-01-17
Payer: MEDICARE

## 2023-01-17 RX ORDER — METOPROLOL SUCCINATE 50 MG/1
50 TABLET, EXTENDED RELEASE ORAL DAILY
Qty: 135 TABLET | Refills: 0
Start: 2023-01-17

## 2023-01-17 RX ORDER — METOPROLOL SUCCINATE 50 MG/1
TABLET, EXTENDED RELEASE ORAL
Qty: 135 TABLET | Refills: 0 | Status: SHIPPED | OUTPATIENT
Start: 2023-01-17 | End: 2023-01-17

## 2023-01-17 NOTE — TELEPHONE ENCOUNTER
OK to stop spironolactone. I think the issue is that her BP has not been well-controlled. I would have her take 25 mg metoprolol every morning and 50 mg metoprolol nightly.     Thanks!  LUIS ARMANDO Davis

## 2023-01-17 NOTE — TELEPHONE ENCOUNTER
Notified patient of recommendations. She believes that her BP was reading high because she had an old BP cuff that was a wrist cuff. She has now gotten a new one that goes over her arm over the weekend and it has been running in the 129-132/78-84 range. She said she did have an episode last week where her BP was high for 4 days, however she does not know how high because she had her old BP cuff, but she could tell it was high because her head was pounding. Overall she feels like she does not need to change her medicines she feels like she should just take 50mg of metoprolol daily and then an extra 25mg if she ever has issues with high BP. HR has been in the 75-85 range.     Jane Farmer RN  Triage [facility]

## 2023-01-17 NOTE — TELEPHONE ENCOUNTER
Pt is calling today with an update.    She has been having dizziness, Muscle cramps, Headache, extreme fatigue and nausea.    She stop the spirolactone 25 MG daily on 1/13/23. All of the symptoms has subsided    She is only taking Metoprolol 50 MG daily.    She is wanting to know if she can take an extra half  A dose of metoprolol if she needs it?    PT#: [phone]

## 2023-01-20 RX ORDER — AMLODIPINE BESYLATE 2.5 MG/1
2.5 TABLET ORAL DAILY
Qty: 30 TABLET | Refills: 11 | Status: SHIPPED | OUTPATIENT
Start: 2023-01-20 | End: 2023-01-20

## 2023-01-20 RX ORDER — AMLODIPINE BESYLATE 2.5 MG/1
2.5 TABLET ORAL DAILY
Qty: 15 TABLET | Refills: 1 | Status: SHIPPED | OUTPATIENT
Start: 2023-01-20 | End: 2023-03-22 | Stop reason: SINTOL

## 2023-01-20 NOTE — TELEPHONE ENCOUNTER
Pt is concerned about her BP and thinks she needs another medication to control her BP.    This is her BP lo/18/23: 10:30 AM: 165/89 P: 55      6:00 PM: 171/89 P: 83     10:00 PM: 155/79 P: 79    23: 9:00 AM: 160/98 P: 75     4:00 PM: 159/89 P: 76     9:30 PM: 140/82 P: 62     23: 196/106 P: 76 (BEFORE MEDICINE)    9:50 AM: 177/97 P: 50    She is wanting to know if vitamin D can effect her BP? She is taking Metoprolol 50 MG AM and 50MG PM.    PT#: [phone]

## 2023-01-20 NOTE — TELEPHONE ENCOUNTER
I spoke with Anna Gilbert and updated pt on recommendations from provider.  Pt verbalized understanding and has no further questions at this time.    Pt was requesting for us to send in only a 15 day supply because she didn't want to be wasteful with medication since this will be the 5th medication she's tried.  I adjusted her prescription for a 15 day supply per her request.    Thank you,    Suzette Rose RN  Triage [facility] [place] [facility]  01/20/23 16:04 EST

## 2023-01-25 ENCOUNTER — LAB (OUTPATIENT)
Dept: LAB | Facility: HOSPITAL | Age: 78
End: 2023-01-25
Payer: MEDICARE

## 2023-01-25 ENCOUNTER — ANTICOAGULATION VISIT (OUTPATIENT)
Dept: PHARMACY | Facility: HOSPITAL | Age: 78
End: 2023-01-25
Payer: MEDICARE

## 2023-01-25 DIAGNOSIS — I48.20 CHRONIC ATRIAL FIBRILLATION: Primary | Chronic | ICD-10-CM

## 2023-01-25 DIAGNOSIS — I48.20 CHRONIC ATRIAL FIBRILLATION: Chronic | ICD-10-CM

## 2023-01-25 LAB
INR PPP: 2.6 (ref 0.8–1.2)
PROTHROMBIN TIME: 29.4 SECONDS (ref 12.8–15.2)

## 2023-01-25 PROCEDURE — 85610 PROTHROMBIN TIME: CPT | Performed by: INTERNAL MEDICINE

## 2023-01-25 NOTE — PROGRESS NOTES
Anticoagulation Clinic Progress Note    Anticoagulation Summary  As of 2023    INR goal:  2.0-3.0   TTR:  60.8 % (4.2 y)   INR used for dosin.6 (2023)   Warfarin maintenance plan:  5 mg every day; Starting 2023   Weekly warfarin total:  35 mg   No change documented:  Simon Ivy, Judy   Plan last modified:  Simon Ivy PharmD (2023)   Next INR check:  2023   Priority:  Maintenance   Target end date:  Indefinite    Indications    Chronic atrial fibrillation (HCC) [I48.20]             Anticoagulation Episode Summary     INR check location:      Preferred lab:      Send INR reminders to:   [facility]    Comments:  [place]      Anticoagulation Care Providers     Provider Role Specialty Phone number    Adeel Mina III, MD Referring Cardiology [phone]          Clinic Interview:  Patient Findings     Positives:  Change in medications    Negatives:  Signs/symptoms of thrombosis, Signs/symptoms of bleeding,   Laboratory test error suspected, Change in health, Change in alcohol use,   Change in activity, Upcoming invasive procedure, Emergency department   visit, Upcoming dental procedure, Missed doses, Extra doses, Change in   diet/appetite, Hospital admission, Bruising, Other complaints    Comments:  Reports she took 1 dose of ibuprofen x 2 days this week. She   voices understanding of risks and our recommendation to avoid.       Clinical Outcomes     Negatives:  Major bleeding event, Thromboembolic event,   Anticoagulation-related hospital admission, Anticoagulation-related ED   visit, Anticoagulation-related fatality    Comments:  Reports she took 1 dose of ibuprofen x 2 days this week. She   voices understanding of risks and our recommendation to avoid.         INR History:  Anticoagulation Monitoring 2022   INR 1.7 1.5 2.6   INR Date 2022   INR Goal 2.0-3.0 2.0-3.0 2.0-3.0   Trend Same Up Same   Last Week  Total 27.5 mg 32.5 mg 35 mg   Next Week Total 35 mg 37.5 mg 35 mg   Sun 5 mg 5 mg 5 mg   Mon 5 mg 5 mg 5 mg   Tue 5 mg 5 mg 5 mg   Wed 5 mg 7.5 mg (1/11); Otherwise 5 mg 5 mg   Thu 5 mg 5 mg 5 mg   Fri 7.5 mg (12/30); Otherwise 5 mg 5 mg 5 mg   Sat 2.5 mg 5 mg 5 mg   Visit Report - - -   Some recent data might be hidden       Plan:  1. INR is Therapeutic today- see above in Anticoagulation Summary.   Will instruct Anna Gilbert to Continue their warfarin regimen- see above in Anticoagulation Summary.  2. Follow up in 2 weeks.  3. They have been instructed to call if any changes in medications, doses, concerns, etc. Patient expresses understanding and has no further questions at this time.    Simon Ivy, PharmD

## 2023-02-16 ENCOUNTER — TELEPHONE (OUTPATIENT)
Dept: PHARMACY | Facility: HOSPITAL | Age: 78
End: 2023-02-16
Payer: MEDICARE

## 2023-02-17 ENCOUNTER — LAB (OUTPATIENT)
Dept: LAB | Facility: HOSPITAL | Age: 78
End: 2023-02-17
Payer: MEDICARE

## 2023-02-17 ENCOUNTER — ANTICOAGULATION VISIT (OUTPATIENT)
Dept: PHARMACY | Facility: HOSPITAL | Age: 78
End: 2023-02-17
Payer: MEDICARE

## 2023-02-17 DIAGNOSIS — I48.20 CHRONIC ATRIAL FIBRILLATION: Primary | Chronic | ICD-10-CM

## 2023-02-17 DIAGNOSIS — I48.20 CHRONIC ATRIAL FIBRILLATION: ICD-10-CM

## 2023-02-17 LAB
INR PPP: 2.8 (ref 0.8–1.2)
PROTHROMBIN TIME: 31.7 SECONDS (ref 12.8–15.2)

## 2023-02-17 PROCEDURE — 85610 PROTHROMBIN TIME: CPT | Performed by: INTERNAL MEDICINE

## 2023-02-20 RX ORDER — PANTOPRAZOLE SODIUM 40 MG/1
TABLET, DELAYED RELEASE ORAL
Qty: 30 TABLET | Refills: 0 | Status: SHIPPED | OUTPATIENT
Start: 2023-02-20 | End: 2023-03-20

## 2023-02-20 NOTE — PROGRESS NOTES
Anticoagulation Clinic Progress Note    Anticoagulation Summary  As of 2023    INR goal:  2.0-3.0   TTR:  61.4 % (4.3 y)   INR used for dosin.8 (2023)   Warfarin maintenance plan:  5 mg every day; Starting 2023   Weekly warfarin total:  35 mg   No change documented:  Lia Squires RPH   Plan last modified:  Simon Ivy, PharmD (2023)   Next INR check:  3/17/2023   Priority:  Maintenance   Target end date:  Indefinite    Indications    Chronic atrial fibrillation (HCC) [I48.20]             Anticoagulation Episode Summary     INR check location:      Preferred lab:      Send INR reminders to:   [facility]    Comments:  [place]      Anticoagulation Care Providers     Provider Role Specialty Phone number    Adeel Mina III, MD Referring Cardiology [phone]          Clinic Interview:  Patient Findings     Negatives:  Signs/symptoms of thrombosis, Signs/symptoms of bleeding,   Laboratory test error suspected, Change in health, Change in alcohol use,   Change in activity, Upcoming invasive procedure, Emergency department   visit, Upcoming dental procedure, Missed doses, Extra doses, Change in   medications, Change in diet/appetite, Hospital admission, Bruising, Other   complaints      Clinical Outcomes     Negatives:  Major bleeding event, Thromboembolic event,   Anticoagulation-related hospital admission, Anticoagulation-related ED   visit, Anticoagulation-related fatality        INR History:  Anticoagulation Monitoring 2023   INR 1.5 2.6 2.8   INR Date 2023   INR Goal 2.0-3.0 2.0-3.0 2.0-3.0   Trend Up Same Same   Last Week Total 32.5 mg 35 mg 35 mg   Next Week Total 37.5 mg 35 mg 35 mg   Sun 5 mg 5 mg 5 mg   Mon 5 mg 5 mg 5 mg   Tue 5 mg 5 mg 5 mg   Wed 7.5 mg (); Otherwise 5 mg 5 mg 5 mg   Thu 5 mg 5 mg 5 mg   Fri 5 mg 5 mg 5 mg   Sat 5 mg 5 mg 5 mg   Visit Report - - -   Some recent data might be hidden        Plan:  1. INR is Therapeutic today- see above in Anticoagulation Summary.   Will instruct Anna Gilbert to Continue their warfarin regimen- see above in Anticoagulation Summary.  2. Follow up in 4 weeks  3.  They have been instructed to call if any changes in medications, doses, concerns, etc. Patient expresses understanding and has no further questions at this time.    Lia Squires, [facility]

## 2023-03-13 RX ORDER — WARFARIN SODIUM 5 MG/1
TABLET ORAL
Qty: 95 TABLET | Refills: 1 | Status: SHIPPED | OUTPATIENT
Start: 2023-03-13

## 2023-03-20 ENCOUNTER — ANTICOAGULATION VISIT (OUTPATIENT)
Dept: PHARMACY | Facility: HOSPITAL | Age: 78
End: 2023-03-20
Payer: MEDICARE

## 2023-03-20 DIAGNOSIS — I48.20 CHRONIC ATRIAL FIBRILLATION: Primary | Chronic | ICD-10-CM

## 2023-03-20 LAB
INR PPP: 2.4 (ref 0.91–1.09)
PROTHROMBIN TIME: 29.3 SECONDS (ref 10–13.8)

## 2023-03-20 PROCEDURE — 36416 COLLJ CAPILLARY BLOOD SPEC: CPT

## 2023-03-20 PROCEDURE — 85610 PROTHROMBIN TIME: CPT

## 2023-03-20 RX ORDER — PANTOPRAZOLE SODIUM 40 MG/1
TABLET, DELAYED RELEASE ORAL
Qty: 30 TABLET | Refills: 0 | Status: SHIPPED | OUTPATIENT
Start: 2023-03-20 | End: 2023-04-06 | Stop reason: SDUPTHER

## 2023-03-20 NOTE — PROGRESS NOTES
Anticoagulation Clinic Progress Note    Anticoagulation Summary  As of 3/20/2023    INR goal:  2.0-3.0   TTR:  62.2 % (4.4 y)   INR used for dosin.4 (3/20/2023)   Warfarin maintenance plan:  5 mg every day; Starting 3/20/2023   Weekly warfarin total:  35 mg   No change documented:  Jeanette Lieberman, PharmD   Plan last modified:  Simon Ivy, PharmD (2023)   Next INR check:  2023   Priority:  Maintenance   Target end date:  Indefinite    Indications    Chronic atrial fibrillation (HCC) [I48.20]             Anticoagulation Episode Summary     INR check location:      Preferred lab:      Send INR reminders to:   [facility]    Comments:  [place]      Anticoagulation Care Providers     Provider Role Specialty Phone number    Adeel Mina III, MD Referring Cardiology [phone]          Clinic Interview:  Patient Findings     Positives:  Signs/symptoms of bleeding    Negatives:  Signs/symptoms of thrombosis, Laboratory test error   suspected, Change in health, Change in alcohol use, Change in activity,   Upcoming invasive procedure, Emergency department visit, Upcoming dental   procedure, Missed doses, Extra doses, Change in medications, Change in   diet/appetite, Hospital admission, Bruising, Other complaints    Comments:  Patient has been having more frequent than usual nose bleeds   in right nostril.       Clinical Outcomes     Negatives:  Major bleeding event, Thromboembolic event,   Anticoagulation-related hospital admission, Anticoagulation-related ED   visit, Anticoagulation-related fatality    Comments:  Patient has been having more frequent than usual nose bleeds   in right nostril.         INR History:  Anticoagulation Monitoring 2023 2023 3/20/2023   INR 2.6 2.8 2.4   INR Date 2023 2023 3/20/2023   INR Goal 2.0-3.0 2.0-3.0 2.0-3.0   Trend Same Same Same   Last Week Total 35 mg 35 mg 35 mg   Next Week Total 35 mg 35 mg 35 mg   Sun 5 mg 5 mg 5 mg   Mon 5  mg 5 mg 5 mg   Tue 5 mg 5 mg 5 mg   Wed 5 mg 5 mg 5 mg   Thu 5 mg 5 mg 5 mg   Fri 5 mg 5 mg 5 mg   Sat 5 mg 5 mg 5 mg   Visit Report - - -   Some recent data might be hidden       Plan:  1. Patient has been having frequent nosebleeds in right nostril. Reviewed how to care for nosebleeds when on warfarin, including using saline nasal sprays and humidifiers. Provided educational handout.   2. INR is Therapeutic today- see above in Anticoagulation Summary.  Will instruct Anna Gilbert to Continue their warfarin regimen- see above in Anticoagulation Summary.  3. Follow up in 4 weeks  4. Patient declines warfarin refills.  5. Verbal and written information provided. Patient expresses understanding and has no further questions at this time.    Jeanette Lieberman, PharmD

## 2023-03-22 ENCOUNTER — OFFICE VISIT (OUTPATIENT)
Dept: CARDIOLOGY | Facility: CLINIC | Age: 78
End: 2023-03-22
Payer: MEDICARE

## 2023-03-22 ENCOUNTER — CLINICAL SUPPORT NO REQUIREMENTS (OUTPATIENT)
Dept: CARDIOLOGY | Facility: CLINIC | Age: 78
End: 2023-03-22
Payer: MEDICARE

## 2023-03-22 VITALS
WEIGHT: 167 LBS | HEIGHT: 62 IN | HEART RATE: 98 BPM | SYSTOLIC BLOOD PRESSURE: 138 MMHG | DIASTOLIC BLOOD PRESSURE: 84 MMHG | BODY MASS INDEX: 30.73 KG/M2

## 2023-03-22 DIAGNOSIS — I47.29 NSVT (NONSUSTAINED VENTRICULAR TACHYCARDIA): Primary | ICD-10-CM

## 2023-03-22 DIAGNOSIS — Z95.2 AORTIC VALVE REPLACED: Chronic | ICD-10-CM

## 2023-03-22 DIAGNOSIS — I47.20 VENTRICULAR TACHYCARDIA: ICD-10-CM

## 2023-03-22 DIAGNOSIS — Z95.810 ICD (IMPLANTABLE CARDIOVERTER-DEFIBRILLATOR) IN PLACE: ICD-10-CM

## 2023-03-22 DIAGNOSIS — I47.29 NSVT (NONSUSTAINED VENTRICULAR TACHYCARDIA): Primary | Chronic | ICD-10-CM

## 2023-03-22 DIAGNOSIS — I48.21 PERMANENT ATRIAL FIBRILLATION: ICD-10-CM

## 2023-03-22 DIAGNOSIS — Z98.890 H/O MITRAL VALVE REPAIR: Chronic | ICD-10-CM

## 2023-03-22 PROCEDURE — 93000 ELECTROCARDIOGRAM COMPLETE: CPT | Performed by: INTERNAL MEDICINE

## 2023-03-22 PROCEDURE — 93290 INTERROG DEV EVAL ICPMS IP: CPT | Performed by: INTERNAL MEDICINE

## 2023-03-22 PROCEDURE — 93282 PRGRMG EVAL IMPLANTABLE DFB: CPT | Performed by: INTERNAL MEDICINE

## 2023-03-22 PROCEDURE — 99214 OFFICE O/P EST MOD 30 MIN: CPT | Performed by: INTERNAL MEDICINE

## 2023-03-22 NOTE — PROGRESS NOTES
"Date of Office Visit: 2023  Encounter Provider: Mick Perez MD  Place of Service: [facility] CARDIOLOGY  Patient Name: Anna Gilbert  : 1945    Subjective:     Encounter Date:2023      Patient ID: Anna Gilbert is a 77 y.o. female who has a cc of  Perm  AF and NICM and she has an interesting story     Had an ICD way back and then had normalization of the EF and we had decided not to replace. Then she had VF and the device still had juice and shocked her back.     Cath -- no severe CAD and EF still good.     Then I did an ICD change. Obviously.     She feels pretty good but \"my biggest problem now is water retention     She was started on amiodarone and had side effects and it had to to be stopped.           Past Medical History:   Diagnosis Date   • Bradycardia    • Chronic atrial fibrillation (HCC)    • Coronary artery disease    • H/O mitral valve repair    • Health care maintenance    • Hyperthyroidism    • Mixed hyperlipidemia    • NSVT (nonsustained ventricular tachycardia) 2020   • SHEILA (obstructive sleep apnea) 2016   • Pacemaker    • PAF (paroxysmal atrial fibrillation) (HCC)    • Permanent atrial fibrillation (HCC)    • Sick sinus syndrome (HCC)    • Ventricular tachycardia     with ICD shock       Social History     Socioeconomic History   • Marital status: Single   Tobacco Use   • Smoking status: Former   • Smokeless tobacco: Never   • Tobacco comments:     caffeine use   Substance and Sexual Activity   • Alcohol use: No   • Drug use: No   • Sexual activity: Defer       Family History   Problem Relation Age of Onset   • Coronary artery disease Father    • No Known Problems Mother        Review of Systems   Constitutional: Negative for fever and night sweats.   HENT: Negative for ear pain and stridor.    Eyes: Negative for discharge and visual halos.   Cardiovascular: Negative for cyanosis.   Respiratory: Negative for hemoptysis and sputum production.  " "  Hematologic/Lymphatic: Negative for adenopathy.   Skin: Negative for nail changes and unusual hair distribution.   Musculoskeletal: Positive for arthritis and joint pain. Negative for gout and joint swelling.   Gastrointestinal: Negative for bowel incontinence and flatus.   Genitourinary: Negative for dysuria and flank pain.   Neurological: Negative for seizures and tremors.   Psychiatric/Behavioral: Negative for altered mental status. The patient is not nervous/anxious.             Objective:     Vitals:    03/22/23 1249   BP: 138/84   Pulse: 98   Weight: 75.8 kg (167 lb)   Height: 157.5 cm (62\")         Eyes:      General:         Right eye: No discharge.         Left eye: No discharge.   HENT:      Head: Normocephalic and atraumatic.   Neck:      Thyroid: No thyromegaly.      Vascular: No JVD.   Pulmonary:      Effort: Pulmonary effort is normal.      Breath sounds: Normal breath sounds. No rales.   Cardiovascular:      Normal rate. Irregularly irregular rhythm.      No gallop.   Edema:     Peripheral edema absent.   Abdominal:      General: Bowel sounds are normal.      Palpations: Abdomen is soft.      Tenderness: There is no abdominal tenderness.   Musculoskeletal: Normal range of motion.         General: No deformity. Skin:     General: Skin is warm and dry.      Findings: No erythema.   Neurological:      Mental Status: Alert and oriented to person, place, and time.      Motor: Normal muscle tone.   Psychiatric:         Behavior: Behavior normal.         Thought Content: Thought content normal.           ECG 12 Lead    Date/Time: 3/22/2023 1:39 PM  Performed by: iMck Perez MD  Authorized by: Mick Perez MD   Comparison: compared with previous ECG   Similar to previous ECG  Rhythm: atrial fibrillation  Ectopy: unifocal PVCs  Other findings: non-specific ST-T wave changes            Lab Review:       Assessment:          Diagnosis Plan   1. NSVT (nonsustained ventricular tachycardia)        2. H/O " mitral valve repair        3. Aortic valve replaced        4. Ventricular tachycardia        5. ICD (implantable cardioverter-defibrillator) in place        6. Permanent atrial fibrillation (HCC)               Plan:     VT -- no recurrences, thank goodness     Edema -- I bet this is not HF but venous congestion, Optivol ok.     AF -- on warfarin.     AVR -- per Dr le.     I reviewed the pacemaker/ICD tracings and the pacing and sensing parameters are normal.

## 2023-03-31 PROCEDURE — 93296 REM INTERROG EVL PM/IDS: CPT | Performed by: INTERNAL MEDICINE

## 2023-03-31 PROCEDURE — 93295 DEV INTERROG REMOTE 1/2/MLT: CPT | Performed by: INTERNAL MEDICINE

## 2023-04-06 RX ORDER — PANTOPRAZOLE SODIUM 40 MG/1
40 TABLET, DELAYED RELEASE ORAL EVERY MORNING
Qty: 90 TABLET | Refills: 2 | Status: SHIPPED | OUTPATIENT
Start: 2023-04-06

## 2023-04-12 RX ORDER — METOPROLOL SUCCINATE 50 MG/1
TABLET, EXTENDED RELEASE ORAL
Qty: 90 TABLET | Refills: 3 | Status: SHIPPED | OUTPATIENT
Start: 2023-04-12

## 2023-05-31 ENCOUNTER — ANTICOAGULATION VISIT (OUTPATIENT)
Dept: PHARMACY | Facility: HOSPITAL | Age: 78
End: 2023-05-31

## 2023-05-31 ENCOUNTER — OFFICE VISIT (OUTPATIENT)
Dept: CARDIOLOGY | Facility: CLINIC | Age: 78
End: 2023-05-31

## 2023-05-31 VITALS
HEIGHT: 62 IN | DIASTOLIC BLOOD PRESSURE: 72 MMHG | SYSTOLIC BLOOD PRESSURE: 118 MMHG | BODY MASS INDEX: 30.91 KG/M2 | HEART RATE: 62 BPM | WEIGHT: 168 LBS | OXYGEN SATURATION: 96 %

## 2023-05-31 DIAGNOSIS — I48.20 CHRONIC ATRIAL FIBRILLATION: Primary | Chronic | ICD-10-CM

## 2023-05-31 DIAGNOSIS — Z95.0 PACEMAKER: Chronic | ICD-10-CM

## 2023-05-31 DIAGNOSIS — Z95.2 AORTIC VALVE REPLACED: Primary | Chronic | ICD-10-CM

## 2023-05-31 DIAGNOSIS — I47.29 NSVT (NONSUSTAINED VENTRICULAR TACHYCARDIA): Chronic | ICD-10-CM

## 2023-05-31 DIAGNOSIS — Z98.890 H/O MITRAL VALVE REPAIR: Chronic | ICD-10-CM

## 2023-05-31 DIAGNOSIS — I48.20 CHRONIC ATRIAL FIBRILLATION: Chronic | ICD-10-CM

## 2023-05-31 LAB
INR PPP: 1.7 (ref 0.91–1.09)
PROTHROMBIN TIME: 19.9 SECONDS (ref 10–13.8)

## 2023-05-31 PROCEDURE — 85610 PROTHROMBIN TIME: CPT

## 2023-05-31 PROCEDURE — 36416 COLLJ CAPILLARY BLOOD SPEC: CPT

## 2023-05-31 PROCEDURE — G0463 HOSPITAL OUTPT CLINIC VISIT: HCPCS

## 2023-05-31 RX ORDER — CYCLOBENZAPRINE HCL 5 MG
TABLET ORAL
COMMUNITY
Start: 2023-04-03

## 2023-05-31 RX ORDER — POTASSIUM CHLORIDE 1500 MG/1
TABLET, EXTENDED RELEASE ORAL DAILY
COMMUNITY
Start: 2023-04-23

## 2023-05-31 RX ORDER — CELECOXIB 200 MG/1
CAPSULE ORAL
COMMUNITY
Start: 2023-04-12

## 2023-05-31 NOTE — PROGRESS NOTES
Subjective:     Encounter Date:  06/01/23        Patient ID: Anna Gilbert is a 77 y.o. female.    Chief Complaint: CAF  History of Present Illness    Dear Dr. Isaac,    I had the pleasure of having a visit with this patient  today.    She has a history of chronic atrial fibrillation, nonsustained ventricular tachycardia, as well as bioprosthetic aortic valve and prior mitral valve repair. She also has an AICD in place.    She comes in because she wanted talk about being on a blood thinner.  She has had a lot of bruising.  She does not like being on the blood thinner.  She is back taking Celebrex intermittently because her arthritis has been so bad.  She is scheduled see a rheumatologist but this can be another month and a half.  No chest pain or chest discomfort.  No shortness of breath, no cardiac symptoms.  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    She did have COVID in December 2020.    November 2020 when she was hospitalized for ventricular tachycardia.  She was seen by Dr. Perez.  Her AICD was replaced at that time.  She also had a cardiac catheterization at that time:  Results for orders placed during the hospital encounter of 11/29/21    Cardiac Catheterization/Vascular Study    Narrative  Interventionalist: Nigel Egan M.D., F.A.C.C.    Procedure findings:  Left main: Large caliber vessel that bifurcates to an LAD and circumflex.  The left main coronary artery is normal.  LAD: Medium caliber vessel that gives rise to a small caliber D1 and medium caliber D2 branch.  The LAD has mild calcification of the proximal segment but no stenosis.  The mid LAD has a tubular 50% mid vessel stenosis.  Small caliber vessel from the mid to distal LAD.  LCX: Large caliber vessel gives rise to a medium caliber OM1 and small caliber OM 2 branch.  Normal.  RCA: Large caliber, dominant vessel gives rise to a medium caliber PDA and large caliber, long RPL branch.  The RCA  has luminal irregularities in the distal segment.  RPL has a discrete 20% to 30% mid vessel stenosis    Hemodynamics:  LV:145/17  AO: 145/60    Interventional report  6 Salvadorean XB 3.0 guide catheter was used to engage the left main.  The Aeris pressure wire was advanced the left main and equalization was performed.  Following this the pressure wire was advanced past the mid LAD stenosis and seated.  RFR was then performed.  Minimum RFR measurement 0.94.  Not hemodynamically significant.    Conclusions:  1. Left main: Normal  2. LAD: Mid LAD with tubular 50% stenosis.  Small caliber from the mid to distal segment.  3. LCX:Normal  4. RCA: Dominant.  Discrete 20 to 30% mid vessel RPL stenosis.  5.  RFR of the LAD 0.94. Not hemodynamically significant    The following portions of the patient's history were reviewed and updated as appropriate: allergies, current medications, past family history, past medical history, past social history, past surgical history and problem list.    Past Medical History:   Diagnosis Date   • Bradycardia    • Chronic atrial fibrillation    • Coronary artery disease    • H/O mitral valve repair    • Health care maintenance    • Hyperthyroidism    • Mixed hyperlipidemia    • NSVT (nonsustained ventricular tachycardia) 9/23/2020   • SHEILA (obstructive sleep apnea) 7/20/2016   • Pacemaker    • PAF (paroxysmal atrial fibrillation)    • Permanent atrial fibrillation    • Sick sinus syndrome    • Ventricular tachycardia     with ICD shock       Past Surgical History:   Procedure Laterality Date   • AORTIC VALVE REPAIR/REPLACEMENT  2010    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   • CARDIAC CATHETERIZATION  01/01/2011   • CARDIAC CATHETERIZATION N/A 11/30/2021    Procedure: Left Heart Cath;  Surgeon: Nigel Egan MD;  Location: [facility] INVASIVE LOC[facility];  Service: Cardiovascular;  Laterality: N/A;   • CARDIAC CATHETERIZATION N/A 11/30/2021    Procedure: Coronary angiography;  Surgeon: Jignesh  "Nigel BECERRA MD;  Location:  [facility] CATH INVASIVE LOCATION;  Service: Cardiovascular;  Laterality: N/A;   • CARDIAC CATHETERIZATION N/A 11/30/2021    Procedure: Resting Full Cycle Ratio;  Surgeon: Nigel Egan MD;  Location:  [facility] INVASIVE LOCATION;  Service: Cardiovascular;  Laterality: N/A;   • CARDIAC DEFIBRILLATOR PLACEMENT  2010   • CARDIAC ELECTROPHYSIOLOGY PROCEDURE N/A 12/1/2021    Procedure: ICD DC generator change---[employer];  Surgeon: Mick Perez MD;  Location:  [facility] INVASIVE LOCATION;  Service: Cardiology;  Laterality: N/A;   • MITRAL VALVE REPAIR/REPLACEMENT  2010           Procedures       Objective:     Vitals:    05/31/23 1350   BP: 118/72   BP Location: Left arm   Patient Position: Sitting   Pulse: 62   SpO2: 96%   Weight: 76.2 kg (168 lb)   Height: 157.5 cm (62\")     General Appearance:    Alert, cooperative, in no acute distress   Head:    Normocephalic, without obvious abnormality   Eyes:            Lids and lashes normal, conjunctivae and sclerae normal, no icterus, no pallor, corneas clear   Ears:    Ears appear intact with no abnormalities noted   Throat:   No oral lesions, oral mucosa moist   Neck:   No adenopathy, supple, trachea midline, no thyromegaly, no carotid bruit, no JVD   Back:     No kyphosis present, no erythema or scars, no tenderness to palpation    Lungs:     Clear to auscultation,respirations regular, even and unlabored    Heart:    irregular rhythm and normal rate, normal S1 and S2, no murmur, no gallop, no rub, no click   Chest Wall:    No abnormalities observed   Abdomen:     Normal bowel sounds, no masses, no organomegaly, soft        non-tender, non-distended, no guarding   Extremities:   Moves all extremities well, + edema, no cyanosis, no redness   Pulses:  Bilateral carotids brisk   Skin:  Psychiatric:   No bleeding or rash    Alert and oriented, normal mood and affect         Lab Review:             Results for orders placed during the " hospital encounter of 05/05/22    Adult Transthoracic Echo Complete W/ Cont if Necessary Per Protocol    Interpretation Summary  · Calculated left ventricular EF = 63% Estimated left ventricular EF was in agreement with the calculated left ventricular EF. Left ventricular systolic function is normal.Left ventricular diastolic function is consistent with (grade II w/high LAP) pseudonormalization.  · Severe biatrial enlargement noted.  · There is a bioprosthetic aortic valve present that is not well visualized. The prosthetic aortic valve peak and mean gradients are elevated.Aortic valve area is 0.97 cm2.Peak velocity of the flow distal to the aortic valve is 341.6 cm/s. Aortic valve maximum pressure gradient is 46.8 mmHg. Aortic valve mean pressure gradient is 26.3 mmHg. Aortic valve dimensionless index is 0.26 .  · There is a mitral valve ring present. No significant mitral valve stenosis or regurgitation noted.  · Calculated right ventricular systolic pressure from tricuspid regurgitation is 44.3 mmHg.Mild pulmonary hypertension is present.  · The right ventricular cavity is mildly dilated.Mildly reduced right ventricular systolic function noted.    CHADS-VASc Risk Assessment            3 Total Score    2 Age >/= 75    1 Sex: Female        Criteria that do not apply:    CHF    Hypertension    DM    PRIOR STROKE/TIA/THROMBO    Vascular Disease    Age 65-74                Assessment:          Diagnosis Plan   1. Aortic valve replaced        2. H/O mitral valve repair        3. Chronic atrial fibrillation        4. Pacemaker        5. NSVT (nonsustained ventricular tachycardia)               Plan:       1.   Atrial Fibrillation and Atrial Flutter  Assessment  • The patient has permanent atrial fibrillation  • This is valvular in etiology  • The patient's CHADS2-VASc score is 4  • A [id number] score of 2 or more is considered a high risk for a thromboembolic event  • Warfarin prescribed    Plan  • Continue in atrial  fibrillation with rate control  • Continue warfarin for antithrombotic therapy, bleeding issues discussed  • Continue beta blocker for rate control  • Rate controlled, continue current medical therapy  Continues to follow with Dr. Perez in the device clinic  2.  Bioprosthetic aortic valve replacement with prior mitral valve repair- having worsening shortness of breath, lower extreme edema, we will check an echocardiogram along with blood work  3.  VT, status post ICD shock x2, generator replaced, AICD in place, follows in device clinic, intolerant to amiodarone remains on metoprolol, follows with EP  4.  Mediastinal adenopathy-status post biopsy,   5.  Chronic diastolic congestive heart failure, no evidence of volume overload.  6.  Chronic anticoagulation-we discussed again the indications for this and she will continue on her current regimen, remains on warfarin, we discussed risk of stroke if she stopped taking anticoagulants.  7.  Hypertension, continue current medical therapy.      Current Outpatient Medications:   •  albuterol sulfate  (90 Base) MCG/ACT inhaler, Inhale 2 puffs As Needed., Disp: , Rfl:   •  APPLE CIDER VINEGAR PO, Take 1 tablet by mouth Daily., Disp: , Rfl:   •  b complex vitamins capsule, Take 1 capsule by mouth Daily., Disp: , Rfl:   •  BIOTIN PO, Take 1 tablet by mouth Daily., Disp: , Rfl:   •  CALCIUM-MAGNESIUM-ZINC PO, Take 1 tablet by mouth Daily., Disp: , Rfl:   •  celecoxib (CeleBREX) 200 MG capsule, , Disp: , Rfl:   •  cyclobenzaprine (FLEXERIL) 5 MG tablet, Patient takes 1 tablet every other night, Disp: , Rfl:   •  fluconazole (DIFLUCAN) 150 MG tablet, Take 1 tablet by mouth As Needed., Disp: , Rfl:   •  KLOR-CON 20 MEQ CR tablet, Daily., Disp: , Rfl:   •  levothyroxine (SYNTHROID, LEVOTHROID) 150 MCG tablet, Take 1 tablet by mouth Daily., Disp: , Rfl:   •  metoprolol succinate XL (TOPROL-XL) 50 MG 24 hr tablet, TAKE ONE TABLET BY MOUTH DAILY, Disp: 90 tablet, Rfl: 3  •   Multiple Vitamin (MULTIVITAMIN) capsule, Take 1 capsule by mouth Daily., Disp: , Rfl:   •  pantoprazole (PROTONIX) 40 MG EC tablet, Take 1 tablet by mouth Every Morning., Disp: 90 tablet, Rfl: 2  •  torsemide (DEMADEX) 20 MG tablet, TAKE TWO TABLETS BY MOUTH DAILY, Disp: 180 tablet, Rfl: 1  •  triamcinolone (KENALOG) 0.1 % cream, Apply 1 application topically to the appropriate area as directed 2 (Two) Times a Day., Disp: 15 g, Rfl: 0  •  warfarin (COUMADIN) 5 MG tablet, TAKE  ONE TABLET BY MOUTH DAILY  OR AS DIRECTED, Disp: 95 tablet, Rfl: 1

## 2023-05-31 NOTE — PROGRESS NOTES
Anticoagulation Clinic Progress Note    Anticoagulation Summary  As of 2023    INR goal:  2.0-3.0   TTR:  61.9 % (4.6 y)   INR used for dosin.7 (2023)   Warfarin maintenance plan:  5 mg every day; Starting 2023   Weekly warfarin total:  35 mg   Plan last modified:  Simon Ivy, PharmD (2023)   Next INR check:  2023   Priority:  Maintenance   Target end date:  Indefinite    Indications    Chronic atrial fibrillation [I48.20]             Anticoagulation Episode Summary     INR check location:      Preferred lab:      Send INR reminders to:  [facility] [place]    Comments:  [place]      Anticoagulation Care Providers     Provider Role Specialty Phone number    Adeel Mina III, MD Referring Cardiology [phone]          Clinic Interview:  Patient Findings     Positives:  Missed doses    Negatives:  Signs/symptoms of thrombosis, Signs/symptoms of bleeding,   Laboratory test error suspected, Change in health, Change in alcohol use,   Change in activity, Upcoming invasive procedure, Emergency department   visit, Upcoming dental procedure, Extra doses, Change in medications,   Change in diet/appetite, Hospital admission, Bruising, Other complaints    Comments:  missed dose last week      Clinical Outcomes     Negatives:  Major bleeding event, Thromboembolic event,   Anticoagulation-related hospital admission, Anticoagulation-related ED   visit, Anticoagulation-related fatality    Comments:  missed dose last week        INR History:      2022     1:43 PM 2022     1:52 PM 2023     2:30 PM 2023     2:50 PM 2023     4:07 PM 3/20/2023    11:15 AM 2023    11:00 AM   Anticoagulation Monitoring   INR 1.7 1.7 1.5 2.6 2.8 2.4 1.7   INR Date 2022 2022 2023 2023 2023 3/20/2023 2023   INR Goal 2.0-3.0 2.0-3.0 2.0-3.0 2.0-3.0 2.0-3.0 2.0-3.0 2.0-3.0   Trend Same Same Up Same Same Same Same   Last Week Total 27.5 mg 27.5 mg  32.5 mg 35 mg 35 mg 35 mg 30 mg   Next Week Total 35 mg 35 mg 37.5 mg 35 mg 35 mg 35 mg 37.5 mg   Sun 5 mg 5 mg 5 mg 5 mg 5 mg 5 mg 5 mg   Mon 5 mg 5 mg 5 mg 5 mg 5 mg 5 mg 5 mg   Tue 5 mg 5 mg 5 mg 5 mg 5 mg 5 mg 5 mg   Wed 7.5 mg (11/16); Otherwise 5 mg 5 mg 7.5 mg (1/11); Otherwise 5 mg 5 mg 5 mg 5 mg 7.5 mg (5/31); Otherwise 5 mg   Thu 5 mg 5 mg 5 mg 5 mg 5 mg 5 mg 5 mg   Fri 5 mg 7.5 mg (12/30); Otherwise 5 mg 5 mg 5 mg 5 mg 5 mg 5 mg   Sat 2.5 mg 2.5 mg 5 mg 5 mg 5 mg 5 mg 5 mg       Plan:  1. INR is Subtherapeutic today- see above in Anticoagulation Summary.  Will instruct Anna Gilbert to Increase their warfarin regimen- see above in Anticoagulation Summary.  boost to 7.5 mg today then resume 5 mg daily, rck 2 weeks   2. Follow up in 2 weeks  3. Patient declines warfarin refills.  4. Verbal and written information provided. Patient expresses understanding and has no further questions at this time.    Lia Squires [facility]

## 2023-06-05 ENCOUNTER — TELEPHONE (OUTPATIENT)
Dept: CARDIOLOGY | Facility: CLINIC | Age: 78
End: 2023-06-05
Payer: MEDICARE

## 2023-06-05 NOTE — TELEPHONE ENCOUNTER
Patient called m stating that she is out of her water pill, Torsemide 20 MG tablet. She states that medication has been denied three times. Thank you kindly.     Last office visit : 5/31/23 with JOHANA  Next appointment : 10/2/2023 with LUIS ARMANDO Wolfe

## 2023-06-06 RX ORDER — TORSEMIDE 20 MG/1
40 TABLET ORAL DAILY
Qty: 180 TABLET | Refills: 1 | Status: SHIPPED | OUTPATIENT
Start: 2023-06-06

## 2023-06-11 ENCOUNTER — TELEPHONE (OUTPATIENT)
Dept: URGENT CARE | Facility: CLINIC | Age: 78
End: 2023-06-11
Payer: MEDICARE

## 2023-06-11 NOTE — TELEPHONE ENCOUNTER
Spoke with patient and told her urine culture did not show anything specific, normal skin bacteria She reports the antibiotic cefuroxime helped with her symptoms of dysuria. She will complete

## 2023-07-25 ENCOUNTER — LAB (OUTPATIENT)
Dept: LAB | Facility: HOSPITAL | Age: 78
End: 2023-07-25
Payer: MEDICARE

## 2023-07-25 ENCOUNTER — ANTICOAGULATION VISIT (OUTPATIENT)
Dept: PHARMACY | Facility: HOSPITAL | Age: 78
End: 2023-07-25
Payer: MEDICARE

## 2023-07-25 DIAGNOSIS — I48.20 CHRONIC ATRIAL FIBRILLATION: Primary | ICD-10-CM

## 2023-07-25 DIAGNOSIS — I48.20 CHRONIC ATRIAL FIBRILLATION: ICD-10-CM

## 2023-07-25 DIAGNOSIS — I48.20 CHRONIC ATRIAL FIBRILLATION: Primary | Chronic | ICD-10-CM

## 2023-07-25 LAB
INR PPP: 1.5 (ref 0.8–1.2)
PROTHROMBIN TIME: 17.6 SECONDS (ref 12.8–15.2)

## 2023-07-25 PROCEDURE — 85610 PROTHROMBIN TIME: CPT | Performed by: INTERNAL MEDICINE

## 2023-07-25 NOTE — PROGRESS NOTES
Anticoagulation Clinic Progress Note    Anticoagulation Summary  As of 2023      INR goal:  2.0-3.0   TTR:  60.0 % (4.7 y)   INR used for dosin.5 (2023)   Warfarin maintenance plan:  5 mg every day; Starting 2023   Weekly warfarin total:  35 mg   Plan last modified:  Simon Ivy, PharmD (2023)   Next INR check:  2023   Priority:  Maintenance   Target end date:  Indefinite    Indications    Chronic atrial fibrillation [I48.20]                 Anticoagulation Episode Summary       INR check location:      Preferred lab:      Send INR reminders to:  [facility] [place]    Comments:  [place]          Anticoagulation Care Providers       Provider Role Specialty Phone number    Adeel Mina III, MD Referring Cardiology [phone]            Clinic Interview:  Patient Findings     Positives:  Change in medications, Change in diet/appetite    Negatives:  Signs/symptoms of thrombosis, Signs/symptoms of bleeding,   Laboratory test error suspected, Change in health, Change in alcohol use,   Change in activity, Upcoming invasive procedure, Emergency department   visit, Upcoming dental procedure, Missed doses, Extra doses, Hospital   admission, Bruising, Other complaints    Comments:  Patient reports still taking cefdinir; has 2 more days left.   Says she ate a kale salad last night.       Clinical Outcomes     Negatives:  Major bleeding event, Thromboembolic event,   Anticoagulation-related hospital admission, Anticoagulation-related ED   visit, Anticoagulation-related fatality    Comments:  Patient reports still taking cefdinir; has 2 more days left.   Says she ate a kale salad last night.         INR History:      2022     1:52 PM 2023     2:30 PM 2023     2:50 PM 2023     4:07 PM 3/20/2023    11:15 AM 2023    11:00 AM 2023    11:24 AM   Anticoagulation Monitoring   INR 1.7 1.5 2.6 2.8 2.4 1.7 1.5   INR Date 2022  3/20/2023 5/31/2023 7/25/2023   INR Goal 2.0-3.0 2.0-3.0 2.0-3.0 2.0-3.0 2.0-3.0 2.0-3.0 2.0-3.0   Trend Same Up Same Same Same Same Same   Last Week Total 27.5 mg 32.5 mg 35 mg 35 mg 35 mg 30 mg 35 mg   Next Week Total 35 mg 37.5 mg 35 mg 35 mg 35 mg 37.5 mg 40 mg   Sun 5 mg 5 mg 5 mg 5 mg 5 mg 5 mg 5 mg   Mon 5 mg 5 mg 5 mg 5 mg 5 mg 5 mg 5 mg   Tue 5 mg 5 mg 5 mg 5 mg 5 mg 5 mg 10 mg (7/25); Otherwise 5 mg   Wed 5 mg 7.5 mg (1/11); Otherwise 5 mg 5 mg 5 mg 5 mg 7.5 mg (5/31); Otherwise 5 mg 5 mg   Thu 5 mg 5 mg 5 mg 5 mg 5 mg 5 mg 5 mg   Fri 7.5 mg (12/30); Otherwise 5 mg 5 mg 5 mg 5 mg 5 mg 5 mg 5 mg   Sat 2.5 mg 5 mg 5 mg 5 mg 5 mg 5 mg 5 mg   Visit Report      Report        Plan:  1. INR is Subtherapeutic today- see above in Anticoagulation Summary.   Will instruct Anna Gilbert to Change their warfarin regimen- see above in Anticoagulation Summary.  2. Follow up in 2 weeks  3.  They have been instructed to call if any changes in medications, doses, concerns, etc. Patient expresses understanding and has no further questions at this time.    Елена Rivas, PharmD

## 2023-08-11 ENCOUNTER — ANTICOAGULATION VISIT (OUTPATIENT)
Dept: PHARMACY | Facility: HOSPITAL | Age: 78
End: 2023-08-11
Payer: MEDICARE

## 2023-08-11 ENCOUNTER — LAB (OUTPATIENT)
Dept: LAB | Facility: HOSPITAL | Age: 78
End: 2023-08-11
Payer: MEDICARE

## 2023-08-11 DIAGNOSIS — I48.20 CHRONIC ATRIAL FIBRILLATION: ICD-10-CM

## 2023-08-11 DIAGNOSIS — I48.20 CHRONIC ATRIAL FIBRILLATION: Primary | Chronic | ICD-10-CM

## 2023-08-11 LAB
INR PPP: 1.7 (ref 0.8–1.2)
PROTHROMBIN TIME: 20 SECONDS (ref 12.8–15.2)

## 2023-08-11 PROCEDURE — 85610 PROTHROMBIN TIME: CPT | Performed by: INTERNAL MEDICINE

## 2023-09-06 ENCOUNTER — TELEPHONE (OUTPATIENT)
Dept: PHARMACY | Facility: HOSPITAL | Age: 78
End: 2023-09-06
Payer: MEDICARE

## 2023-09-06 NOTE — TELEPHONE ENCOUNTER
INR Reminder: Called patient regarding overdue INR. Last INR on  08/11/2023 . She will try to go to lab this week.

## 2023-09-11 ENCOUNTER — ANTICOAGULATION VISIT (OUTPATIENT)
Dept: PHARMACY | Facility: HOSPITAL | Age: 78
End: 2023-09-11
Payer: MEDICARE

## 2023-09-11 DIAGNOSIS — I48.20 CHRONIC ATRIAL FIBRILLATION: Primary | Chronic | ICD-10-CM

## 2023-09-11 LAB
INR PPP: 1.4 (ref 0.91–1.09)
PROTHROMBIN TIME: 17.1 SECONDS (ref 10–13.8)

## 2023-09-11 PROCEDURE — 36416 COLLJ CAPILLARY BLOOD SPEC: CPT

## 2023-09-11 PROCEDURE — G0463 HOSPITAL OUTPT CLINIC VISIT: HCPCS

## 2023-09-11 PROCEDURE — 85610 PROTHROMBIN TIME: CPT

## 2023-09-11 NOTE — PROGRESS NOTES
I have supervised and reviewed the notes, assessments, and/or procedures performed by our  pharmacy resident  . The documented assessment and plan were developed cooperatively, and the plan was implemented in my presence. I concur with the documentation of this patient encounter.

## 2023-09-11 NOTE — PROGRESS NOTES
Anticoagulation Clinic Progress Note    Anticoagulation Summary  As of 2023      INR goal:  2.0-3.0   TTR:  58.3 % (4.8 y)   INR used for dosin.4 (2023)   Warfarin maintenance plan:  5 mg every day   Weekly warfarin total:  35 mg   Plan last modified:  Daxa Ellis RPH (2023)   Next INR check:  2023   Priority:  Maintenance   Target end date:  Indefinite    Indications    Chronic atrial fibrillation [I48.20]                 Anticoagulation Episode Summary       INR check location:      Preferred lab:      Send INR reminders to:   [facility] [place]    Comments:  [place]          Anticoagulation Care Providers       Provider Role Specialty Phone number    Adeel Mina III, MD Referring Cardiology [phone]            Clinic Interview:  Patient Findings     Positives:  Missed doses, Bruising, Other complaints    Negatives:  Signs/symptoms of thrombosis, Signs/symptoms of bleeding,   Laboratory test error suspected, Change in health, Change in alcohol use,   Change in activity, Upcoming invasive procedure, Emergency department   visit, Upcoming dental procedure, Extra doses, Change in medications,   Change in diet/appetite, Hospital admission    Comments:  Patient has been self-adjusting doses (2.5mg Wed and 5mg AOD),   complains of bruising/bleeding that is healing. Adjusted to 5mg daily,   investigating transition to DOAC - pending insurance coverage, will follow   up for transition      Clinical Outcomes     Negatives:  Major bleeding event, Thromboembolic event,   Anticoagulation-related hospital admission, Anticoagulation-related ED   visit, Anticoagulation-related fatality    Comments:  Patient has been self-adjusting doses (2.5mg Wed and 5mg AOD),   complains of bruising/bleeding that is healing. Adjusted to 5mg daily,   investigating transition to DOAC - pending insurance coverage, will follow   up for transition        INR History:      2023     2:50 PM 2023      4:07 PM 3/20/2023    11:15 AM 5/31/2023    11:00 AM 7/25/2023    11:24 AM 8/11/2023     2:14 PM 9/11/2023     2:30 PM   Anticoagulation Monitoring   INR 2.6 2.8 2.4 1.7 1.5 1.7 1.4   INR Date 1/25/2023 2/17/2023 3/20/2023 5/31/2023 7/25/2023 8/11/2023 9/11/2023   INR Goal 2.0-3.0 2.0-3.0 2.0-3.0 2.0-3.0 2.0-3.0 2.0-3.0 2.0-3.0   Trend Same Same Same Same Same Up Down   Last Week Total 35 mg 35 mg 35 mg 30 mg 35 mg 35 mg 32.5 mg   Next Week Total 35 mg 35 mg 35 mg 37.5 mg 40 mg 37.5 mg 35 mg   Sun 5 mg 5 mg 5 mg 5 mg 5 mg 5 mg 5 mg   Mon 5 mg 5 mg 5 mg 5 mg 5 mg 5 mg 5 mg   Tue 5 mg 5 mg 5 mg 5 mg 10 mg (7/25); Otherwise 5 mg 5 mg 5 mg   Wed 5 mg 5 mg 5 mg 7.5 mg (5/31); Otherwise 5 mg 5 mg 5 mg 5 mg   Thu 5 mg 5 mg 5 mg 5 mg 5 mg 5 mg 5 mg   Fri 5 mg 5 mg 5 mg 5 mg 5 mg 7.5 mg 5 mg   Sat 5 mg 5 mg 5 mg 5 mg 5 mg 5 mg 5 mg   Visit Report    Report          Plan:  1. INR is Subtherapeutic today- see above in Anticoagulation Summary.  Will instruct Anna Gilbert to Change their warfarin regimen- see above in Anticoagulation Summary.  Adjusted to 5mg daily, investigating transition to DOAC - pending insurance coverage, will follow up for transition  2. Follow up in 2 weeks (checking change to DOAC)  3. Patient declines warfarin refills.  4. Verbal and written information provided. Patient expresses understanding and has no further questions at this time.    Daxa Ellis, [facility]

## 2023-09-12 ENCOUNTER — TELEPHONE (OUTPATIENT)
Dept: CARDIOLOGY | Facility: CLINIC | Age: 78
End: 2023-09-12
Payer: MEDICARE

## 2023-09-12 ENCOUNTER — ANTICOAGULATION VISIT (OUTPATIENT)
Dept: PHARMACY | Facility: HOSPITAL | Age: 78
End: 2023-09-12
Payer: MEDICARE

## 2023-09-12 DIAGNOSIS — I48.20 CHRONIC ATRIAL FIBRILLATION: Primary | Chronic | ICD-10-CM

## 2023-09-12 NOTE — TELEPHONE ENCOUNTER
Patient lvm wanting to know if she may get off of warfarin , and switch to another blood thinner as she's having a hard time getting back and forth to have blood checked.

## 2023-09-12 NOTE — PROGRESS NOTES
Anticoagulation Clinic Progress Note    Anticoagulation Summary  As of 9/12/2023      INR goal:  2.0-3.0   TTR:  58.3 % (4.8 y)   INR used for dosing:  No new INR was available at the time of this encounter.   Warfarin maintenance plan:  5 mg every day   Weekly warfarin total:  35 mg   Plan last modified:  Daxa Ellis RPH (9/11/2023)   Next INR check:  9/14/2023   Priority:  Maintenance   Target end date:  Indefinite    Indications    Chronic atrial fibrillation [I48.20]                 Anticoagulation Episode Summary       INR check location:      Preferred lab:      Send INR reminders to:  [facility] [place]    Comments:  [place]          Anticoagulation Care Providers       Provider Role Specialty Phone number    Adeel Mina III, MD Referring Cardiology [phone]            Clinic Interview:  Patient Findings     Positives:  Other complaints    Comments:  Transition to apixaban.       Clinical Outcomes     Comments:  Transition to apixaban.         INR History:      2/17/2023     4:07 PM 3/20/2023    11:15 AM 5/31/2023    11:00 AM 7/25/2023    11:24 AM 8/11/2023     2:14 PM 9/11/2023     2:30 PM 9/12/2023    12:05 PM   Anticoagulation Monitoring   INR 2.8 2.4 1.7 1.5 1.7 1.4    INR Date 2/17/2023 3/20/2023 5/31/2023 7/25/2023 8/11/2023 9/11/2023    INR Goal 2.0-3.0 2.0-3.0 2.0-3.0 2.0-3.0 2.0-3.0 2.0-3.0 2.0-3.0   Trend Same Same Same Same Up Down Same   Last Week Total 35 mg 35 mg 30 mg 35 mg 35 mg 32.5 mg 32.5 mg   Next Week Total 35 mg 35 mg 37.5 mg 40 mg 37.5 mg 35 mg 25 mg   Sun 5 mg 5 mg 5 mg 5 mg 5 mg 5 mg -   Mon 5 mg 5 mg 5 mg 5 mg 5 mg 5 mg -   Tue 5 mg 5 mg 5 mg 10 mg (7/25); Otherwise 5 mg 5 mg 5 mg Hold (9/12)   Wed 5 mg 5 mg 7.5 mg (5/31); Otherwise 5 mg 5 mg 5 mg 5 mg Hold (9/13)   Thu 5 mg 5 mg 5 mg 5 mg 5 mg 5 mg -   Fri 5 mg 5 mg 5 mg 5 mg 7.5 mg 5 mg -   Sat 5 mg 5 mg 5 mg 5 mg 5 mg 5 mg -   Visit Report   Report           Plan:  1. INR is  NO INR  today- see above in  Anticoagulation Summary.   Will instruct Anna Gilbert to STOP  their warfarin regimen- see above in Anticoagulation Summary.  Hold warfarin today and tomorrow then start apixaban 5 mg twice daily on Thursday.         Brittany JonesD

## 2023-09-14 RX ORDER — WARFARIN SODIUM 5 MG/1
TABLET ORAL
Qty: 90 TABLET | OUTPATIENT
Start: 2023-09-14

## 2023-09-29 PROCEDURE — 93296 REM INTERROG EVL PM/IDS: CPT | Performed by: INTERNAL MEDICINE

## 2023-09-29 PROCEDURE — 93295 DEV INTERROG REMOTE 1/2/MLT: CPT | Performed by: INTERNAL MEDICINE

## 2023-10-04 ENCOUNTER — ANTICOAGULATION VISIT (OUTPATIENT)
Dept: PHARMACY | Facility: HOSPITAL | Age: 78
End: 2023-10-04
Payer: MEDICARE

## 2023-10-04 DIAGNOSIS — I48.20 CHRONIC ATRIAL FIBRILLATION: Primary | Chronic | ICD-10-CM

## 2023-10-04 NOTE — PROGRESS NOTES
This visit is for documentation purposes only: Patient has successfully transitioned to apixaban. No longer following in the [facility]. It has been a pleasure being part of her care.

## 2023-10-20 ENCOUNTER — TELEPHONE (OUTPATIENT)
Age: 78
End: 2023-10-20

## 2023-10-20 NOTE — TELEPHONE ENCOUNTER
Total duration of event is 10 minutes but part of EGM is markers only and noise is only seen at the end of EGM. Total event pasted below

## 2023-10-20 NOTE — TELEPHONE ENCOUNTER
If this was a short episode I would watch it.  I cannot see the end of the episode.  Can you let us know how long the episode was.

## 2023-10-20 NOTE — TELEPHONE ENCOUNTER
Pt with Medtronic VVI ICD and routine remote transmission from 10/12/23. Pt has AF monitor events which are known to pt and on warfarn   0.33%  AF burden 85%    1 AF monitor event on 10/11/23 @ 20:30- EGM shows irreg VS/AF which is known to pt and at the end of the strips appears to be some GODWIN noted. This is NOT marked by the device but is the first time noise is seen of her device.   I spoke with pt and she does not recall being aroud any electronic equipment or medical devices that would interfere with her device. She does carry her cell phone in her shirt pocket on occasion but has done this for the last year. I did recommend she keep her phone in a pants pocket or jacket pocket.     Measured Ventricular Sensitivity value: 8.875mV  Programmed Ventricular sensitivity settin.3mV    She has no upcoming f/u appts, they were cancelled d/t concerns over [employer] insurance. She is looking to reschedule device check and EP appt as well as appt with Dr. Leopoldo Castillo, can you help with this please? -Thanks!

## 2023-11-13 RX ORDER — PANTOPRAZOLE SODIUM 40 MG/1
40 TABLET, DELAYED RELEASE ORAL EVERY MORNING
Qty: 90 TABLET | Refills: 2 | Status: SHIPPED | OUTPATIENT
Start: 2023-11-13

## 2023-11-29 ENCOUNTER — OFFICE VISIT (OUTPATIENT)
Dept: CARDIOLOGY | Facility: CLINIC | Age: 78
End: 2023-11-29
Payer: MEDICARE

## 2023-11-29 VITALS
BODY MASS INDEX: 30.84 KG/M2 | DIASTOLIC BLOOD PRESSURE: 82 MMHG | WEIGHT: 167.6 LBS | HEIGHT: 62 IN | SYSTOLIC BLOOD PRESSURE: 116 MMHG | OXYGEN SATURATION: 98 % | HEART RATE: 89 BPM

## 2023-11-29 DIAGNOSIS — I48.0 PAROXYSMAL ATRIAL FIBRILLATION: ICD-10-CM

## 2023-11-29 DIAGNOSIS — G47.33 OSA (OBSTRUCTIVE SLEEP APNEA): Chronic | ICD-10-CM

## 2023-11-29 DIAGNOSIS — Z98.890 H/O MITRAL VALVE REPAIR: Chronic | ICD-10-CM

## 2023-11-29 DIAGNOSIS — R07.89 OTHER CHEST PAIN: Primary | ICD-10-CM

## 2023-11-29 DIAGNOSIS — Z95.2 AORTIC VALVE REPLACED: Chronic | ICD-10-CM

## 2023-11-29 DIAGNOSIS — R06.02 SOB (SHORTNESS OF BREATH): ICD-10-CM

## 2023-11-29 DIAGNOSIS — I47.29 NSVT (NONSUSTAINED VENTRICULAR TACHYCARDIA): Chronic | ICD-10-CM

## 2023-11-29 DIAGNOSIS — I48.20 CHRONIC ATRIAL FIBRILLATION: Chronic | ICD-10-CM

## 2023-11-29 DIAGNOSIS — Z95.810 ICD (IMPLANTABLE CARDIOVERTER-DEFIBRILLATOR) IN PLACE: ICD-10-CM

## 2023-11-29 DIAGNOSIS — Z95.0 PACEMAKER: Chronic | ICD-10-CM

## 2023-11-29 PROCEDURE — 93000 ELECTROCARDIOGRAM COMPLETE: CPT | Performed by: INTERNAL MEDICINE

## 2023-11-29 PROCEDURE — 1160F RVW MEDS BY RX/DR IN RCRD: CPT | Performed by: INTERNAL MEDICINE

## 2023-11-29 PROCEDURE — 99214 OFFICE O/P EST MOD 30 MIN: CPT | Performed by: INTERNAL MEDICINE

## 2023-11-29 PROCEDURE — 1159F MED LIST DOCD IN RCRD: CPT | Performed by: INTERNAL MEDICINE

## 2023-12-12 ENCOUNTER — HOSPITAL ENCOUNTER (OUTPATIENT)
Dept: CARDIOLOGY | Facility: HOSPITAL | Age: 78
Discharge: HOME | End: 2023-12-12
Payer: MEDICARE

## 2023-12-12 ENCOUNTER — HOSPITAL ENCOUNTER (OUTPATIENT)
Dept: NUCLEAR MEDICINE | Facility: HOSPITAL | Age: 78
Discharge: HOME | End: 2023-12-12
Payer: MEDICARE

## 2023-12-12 DIAGNOSIS — Z98.890 H/O MITRAL VALVE REPAIR: Chronic | ICD-10-CM

## 2023-12-12 DIAGNOSIS — G47.33 OSA (OBSTRUCTIVE SLEEP APNEA): Chronic | ICD-10-CM

## 2023-12-12 DIAGNOSIS — Z95.2 AORTIC VALVE REPLACED: Chronic | ICD-10-CM

## 2023-12-12 DIAGNOSIS — Z95.0 PACEMAKER: Chronic | ICD-10-CM

## 2023-12-12 DIAGNOSIS — I48.20 CHRONIC ATRIAL FIBRILLATION: Chronic | ICD-10-CM

## 2023-12-12 DIAGNOSIS — I47.29 NSVT (NONSUSTAINED VENTRICULAR TACHYCARDIA): Chronic | ICD-10-CM

## 2023-12-12 DIAGNOSIS — R07.89 OTHER CHEST PAIN: ICD-10-CM

## 2023-12-12 DIAGNOSIS — R06.02 SOB (SHORTNESS OF BREATH): ICD-10-CM

## 2023-12-12 DIAGNOSIS — Z95.810 ICD (IMPLANTABLE CARDIOVERTER-DEFIBRILLATOR) IN PLACE: ICD-10-CM

## 2023-12-12 LAB
BH CV REST NUCLEAR ISOTOPE DOSE: 11 MCI
BH CV STRESS BP STAGE 1: NORMAL
BH CV STRESS COMMENTS STAGE 1: NORMAL
BH CV STRESS DOSE REGADENOSON STAGE 1: 0.4
BH CV STRESS DURATION MIN STAGE 1: 0
BH CV STRESS DURATION SEC STAGE 1: 30
BH CV STRESS HR STAGE 1: 81
BH CV STRESS NUCLEAR ISOTOPE DOSE: 34.9 MCI
BH CV STRESS O2 STAGE 1: 96
BH CV STRESS PROTOCOL 1: NORMAL
BH CV STRESS RECOVERY BP: NORMAL MMHG
BH CV STRESS RECOVERY HR: 82 BPM
BH CV STRESS RECOVERY O2: 99 %
BH CV STRESS STAGE 1: 1
LV EF NUC BP: 54 %
MAXIMAL PREDICTED HEART RATE: 140 BPM
PERCENT MAX PREDICTED HR: 65.71 %
STRESS BASELINE BP: NORMAL MMHG
STRESS BASELINE HR: 69 BPM
STRESS O2 SAT REST: 97 %
STRESS PERCENT HR: 77 %
STRESS POST ESTIMATED WORKLOAD: 1 METS
STRESS POST EXERCISE DUR SEC: 30 SEC
STRESS POST O2 SAT PEAK: 100 %
STRESS POST PEAK BP: NORMAL MMHG
STRESS POST PEAK HR: 92 BPM
STRESS TARGET HR: 119 BPM

## 2023-12-12 PROCEDURE — 93016 CV STRESS TEST SUPVJ ONLY: CPT | Performed by: INTERNAL MEDICINE

## 2023-12-12 PROCEDURE — 93018 CV STRESS TEST I&R ONLY: CPT | Performed by: INTERNAL MEDICINE

## 2023-12-12 PROCEDURE — 78452 HT MUSCLE IMAGE SPECT MULT: CPT | Performed by: INTERNAL MEDICINE

## 2023-12-12 PROCEDURE — A9500 TC99M SESTAMIBI: HCPCS | Performed by: INTERNAL MEDICINE

## 2023-12-12 PROCEDURE — 25510000001 PERFLUTREN (DEFINITY) 8.476 MG IN SODIUM CHLORIDE (PF) 0.9 % 10 ML INJECTION: Performed by: INTERNAL MEDICINE

## 2023-12-12 PROCEDURE — 0 TECHNETIUM SESTAMIBI: Performed by: INTERNAL MEDICINE

## 2023-12-12 PROCEDURE — 25010000002 REGADENOSON 0.4 MG/5ML SOLUTION: Performed by: INTERNAL MEDICINE

## 2023-12-12 PROCEDURE — 93017 CV STRESS TEST TRACING ONLY: CPT

## 2023-12-12 PROCEDURE — 93306 TTE W/DOPPLER COMPLETE: CPT

## 2023-12-12 PROCEDURE — 78452 HT MUSCLE IMAGE SPECT MULT: CPT

## 2023-12-12 RX ORDER — REGADENOSON 0.08 MG/ML
0.4 INJECTION, SOLUTION INTRAVENOUS
Status: COMPLETED | OUTPATIENT
Start: 2023-12-12 | End: 2023-12-12

## 2023-12-12 RX ADMIN — TECHNETIUM TC 99M SESTAMIBI 1 DOSE: 1 INJECTION INTRAVENOUS at 09:51

## 2023-12-12 RX ADMIN — TECHNETIUM TC 99M SESTAMIBI 1 DOSE: 1 INJECTION INTRAVENOUS at 08:23

## 2023-12-12 RX ADMIN — REGADENOSON 0.4 MG: 0.08 INJECTION, SOLUTION INTRAVENOUS at 09:50

## 2023-12-12 RX ADMIN — SODIUM CHLORIDE 2 ML: 9 INJECTION INTRAMUSCULAR; INTRAVENOUS; SUBCUTANEOUS at 12:58

## 2023-12-13 LAB
AORTIC DIMENSIONLESS INDEX: 0.6 (DI)
ASCENDING AORTA: 3.4 CM
BH CV ECHO MEAS - AO MAX PG: 29.4 MMHG
BH CV ECHO MEAS - AO MEAN PG: 16.4 MMHG
BH CV ECHO MEAS - AO V2 MAX: 271.1 CM/SEC
BH CV ECHO MEAS - AO V2 VTI: 62.7 CM
BH CV ECHO MEAS - AVA(I,D): 1.88 CM2
BH CV ECHO MEAS - EDV(CUBED): 69.9 ML
BH CV ECHO MEAS - EDV(MOD-SP2): 60 ML
BH CV ECHO MEAS - EDV(MOD-SP4): 68 ML
BH CV ECHO MEAS - EF(MOD-BP): 44.3 %
BH CV ECHO MEAS - EF(MOD-SP2): 41.7 %
BH CV ECHO MEAS - EF(MOD-SP4): 45.6 %
BH CV ECHO MEAS - ESV(CUBED): 36.1 ML
BH CV ECHO MEAS - ESV(MOD-SP2): 35 ML
BH CV ECHO MEAS - ESV(MOD-SP4): 37 ML
BH CV ECHO MEAS - FS: 19.8 %
BH CV ECHO MEAS - IVS/LVPW: 1.21 CM
BH CV ECHO MEAS - IVSD: 1.78 CM
BH CV ECHO MEAS - LV DIASTOLIC VOL/BSA (35-75): 38.4 CM2
BH CV ECHO MEAS - LV MASS(C)D: 276 GRAMS
BH CV ECHO MEAS - LV MAX PG: 10 MMHG
BH CV ECHO MEAS - LV MEAN PG: 5.7 MMHG
BH CV ECHO MEAS - LV SYSTOLIC VOL/BSA (12-30): 20.9 CM2
BH CV ECHO MEAS - LV V1 MAX: 158.3 CM/SEC
BH CV ECHO MEAS - LV V1 VTI: 35.8 CM
BH CV ECHO MEAS - LVIDD: 4.1 CM
BH CV ECHO MEAS - LVIDS: 3.3 CM
BH CV ECHO MEAS - LVOT AREA: 3.3 CM2
BH CV ECHO MEAS - LVOT DIAM: 2.05 CM
BH CV ECHO MEAS - LVPWD: 1.47 CM
BH CV ECHO MEAS - MV DEC SLOPE: 689.9 CM/SEC2
BH CV ECHO MEAS - MV DEC TIME: 0.27 SEC
BH CV ECHO MEAS - MV E MAX VEL: 182 CM/SEC
BH CV ECHO MEAS - MV MAX PG: 11.1 MMHG
BH CV ECHO MEAS - MV MEAN PG: 4.3 MMHG
BH CV ECHO MEAS - MV P1/2T: 76.3 MSEC
BH CV ECHO MEAS - MV V2 VTI: 35.5 CM
BH CV ECHO MEAS - MVA(P1/2T): 2.9 CM2
BH CV ECHO MEAS - MVA(VTI): 3.3 CM2
BH CV ECHO MEAS - PA ACC TIME: 0.14 SEC
BH CV ECHO MEAS - PA V2 MAX: 102.7 CM/SEC
BH CV ECHO MEAS - QP/QS: 0.99
BH CV ECHO MEAS - RAP SYSTOLE: 3 MMHG
BH CV ECHO MEAS - RV MAX PG: 2.8 MMHG
BH CV ECHO MEAS - RV V1 MAX: 84.4 CM/SEC
BH CV ECHO MEAS - RV V1 VTI: 18.2 CM
BH CV ECHO MEAS - RVDD: 3.7 CM
BH CV ECHO MEAS - RVOT DIAM: 2.9 CM
BH CV ECHO MEAS - RVSP: 39 MMHG
BH CV ECHO MEAS - SI(MOD-SP2): 14.1 ML/M2
BH CV ECHO MEAS - SI(MOD-SP4): 17.5 ML/M2
BH CV ECHO MEAS - SUP REN AO DIAM: 1.7 CM
BH CV ECHO MEAS - SV(LVOT): 117.7 ML
BH CV ECHO MEAS - SV(MOD-SP2): 25 ML
BH CV ECHO MEAS - SV(MOD-SP4): 31 ML
BH CV ECHO MEAS - SV(RVOT): 117.1 ML
BH CV ECHO MEAS - TAPSE (>1.6): 1.7 CM
BH CV ECHO MEAS - TR MAX PG: 36 MMHG
BH CV ECHO MEAS - TR MAX VEL: 300 CM/SEC
BH CV XLRA - RV BASE: 3.4 CM
BH CV XLRA - RV LENGTH: 7.2 CM
BH CV XLRA - RV MID: 2.6 CM
LEFT ATRIUM VOLUME INDEX: 55.5 ML/M2
SINUS: 3.2 CM
STJ: 3 CM

## 2024-01-19 RX ORDER — POTASSIUM CHLORIDE 1500 MG/1
20 TABLET, EXTENDED RELEASE ORAL DAILY
Qty: 90 TABLET | Refills: 3 | Status: SHIPPED | OUTPATIENT
Start: 2024-01-19

## 2024-02-26 RX ORDER — SPIRONOLACTONE 25 MG/1
25 TABLET ORAL DAILY
Qty: 90 TABLET | Refills: 3 | OUTPATIENT
Start: 2024-02-26

## 2024-02-28 ENCOUNTER — CLINICAL SUPPORT NO REQUIREMENTS (OUTPATIENT)
Age: 79
End: 2024-02-28
Payer: MEDICARE

## 2024-02-28 ENCOUNTER — OFFICE VISIT (OUTPATIENT)
Age: 79
End: 2024-02-28
Payer: MEDICARE

## 2024-02-28 VITALS
BODY MASS INDEX: 31.47 KG/M2 | DIASTOLIC BLOOD PRESSURE: 88 MMHG | WEIGHT: 171 LBS | SYSTOLIC BLOOD PRESSURE: 120 MMHG | HEART RATE: 75 BPM | HEIGHT: 62 IN

## 2024-02-28 DIAGNOSIS — I47.20 VENTRICULAR TACHYCARDIA: ICD-10-CM

## 2024-02-28 DIAGNOSIS — Z95.810 ICD (IMPLANTABLE CARDIOVERTER-DEFIBRILLATOR) IN PLACE: ICD-10-CM

## 2024-02-28 DIAGNOSIS — I42.8 NICM (NONISCHEMIC CARDIOMYOPATHY): Primary | ICD-10-CM

## 2024-02-28 DIAGNOSIS — I48.21 PERMANENT ATRIAL FIBRILLATION: Primary | ICD-10-CM

## 2024-02-28 PROCEDURE — 99213 OFFICE O/P EST LOW 20 MIN: CPT | Performed by: NURSE PRACTITIONER

## 2024-02-28 PROCEDURE — 93290 INTERROG DEV EVAL ICPMS IP: CPT | Performed by: INTERNAL MEDICINE

## 2024-02-28 PROCEDURE — 1159F MED LIST DOCD IN RCRD: CPT | Performed by: NURSE PRACTITIONER

## 2024-02-28 PROCEDURE — 93000 ELECTROCARDIOGRAM COMPLETE: CPT | Performed by: NURSE PRACTITIONER

## 2024-02-28 PROCEDURE — 1160F RVW MEDS BY RX/DR IN RCRD: CPT | Performed by: NURSE PRACTITIONER

## 2024-02-28 PROCEDURE — 93282 PRGRMG EVAL IMPLANTABLE DFB: CPT | Performed by: INTERNAL MEDICINE

## 2024-02-28 RX ORDER — CYCLOBENZAPRINE HCL 5 MG
5 TABLET ORAL 3 TIMES DAILY PRN
COMMUNITY
Start: 2023-12-21

## 2024-02-28 NOTE — PROGRESS NOTES
Date of Office Visit: 2024  Encounter Provider: LUIS ARMANDO Franklin  Place of Service: [facility]  Patient Name: Anna Gilbert  :1945    Chief Complaint   Patient presents with    NSVT    ventricular tachycardia    permanent AFIB    Pacemaker Check   :     HPI: Anna Gilbert is a 78 y.o. female who follows with Dr. Mina and Dr. Perez---perm AF, NSVT, valvular heart disease, s/p AVR and MV repair (), about after her heart surgery she was told she needed a pacemaker and single chamber ICD.     AIME , EOS ---she had never received a shock, V paced <1%, follow up echo showed normal EF and Zio showed no sustained ventricular arrhythmias--saw Dr. Perez in 2020 and the decision was made not to replaced device since there was no indication.     2021  [place] with ICD shocks x 2-Interrogation showed appropriate shock for VT, she was transferred to [facility] for further evaluation and treatment.      2021 She underwent cardiac cath which showed non-obstruction disease, recent echo with normal EF but with sustained VT and shock, ICD generator was replaced. She had side effects from the amio and stopped it with resolution of side effects.     Presents for follow up and device check.     She is doing okay from cardiac standpoint--main issue currently is gallbladder symptoms--has upcoming appt with surgeon.     She saw Dr. Mina in Nov---had stress and echo.     She has no complaints of chest pain, significant dyspnea, PND or orthopnea. She has chronic LE lymphedema.     Device interrogation shows normal testing and function. No ventricular arrhythmia events.     Apixaban for OAC.      Past Medical History:   Diagnosis Date    Bradycardia     Chronic atrial fibrillation     Coronary artery disease     H/O mitral valve repair     Health care maintenance     Hyperthyroidism     Mixed hyperlipidemia     NSVT (nonsustained ventricular  tachycardia) 9/23/2020    SHEILA (obstructive sleep apnea) 7/20/2016    Pacemaker     PAF (paroxysmal atrial fibrillation)     Permanent atrial fibrillation     Sick sinus syndrome     Ventricular tachycardia     with ICD shock       Past Surgical History:   Procedure Laterality Date    AORTIC VALVE REPAIR/REPLACEMENT  2010    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    CARDIAC CATHETERIZATION  01/01/2011    CARDIAC CATHETERIZATION N/A 11/30/2021    Procedure: Left Heart Cath;  Surgeon: Nigel Egan MD;  Location: [facility] CATH INVASIVE LOCATION;  Service: Cardiovascular;  Laterality: N/A;    CARDIAC CATHETERIZATION N/A 11/30/2021    Procedure: Coronary angiography;  Surgeon: Nigel Egan MD;  [facility];  Service: Cardiovascular;  Laterality: N/A;    CARDIAC CATHETERIZATION N/A 11/30/2021    Procedure: Resting Full Cycle Ratio;  Surgeon: Nigel Egan MD;  [facility];  Service: Cardiovascular;  Laterality: N/A;    CARDIAC DEFIBRILLATOR PLACEMENT  2010    CARDIAC ELECTROPHYSIOLOGY PROCEDURE N/A 12/1/2021    Procedure: ICD DC generator change---Medtronic;  Surgeon: Mick Perez MD;  Location: [facility] CATH INVASIVE LOCATION;  Service: Cardiology;  Laterality: N/A;    MITRAL VALVE REPAIR/REPLACEMENT  2010       Social History     Socioeconomic History    Marital status: Single   Tobacco Use    Smoking status: Former    Smokeless tobacco: Never    Tobacco comments:     caffeine use   Vaping Use    Vaping Use: Never used   Substance and Sexual Activity    Alcohol use: No    Drug use: No    Sexual activity: Defer       Family History   Problem Relation Age of Onset    No Known Problems Mother     Coronary artery disease Father        Review of Systems   Constitutional: Negative for chills, fever and malaise/fatigue.   Cardiovascular:  Negative for chest pain, dyspnea on exertion, leg swelling, near-syncope, orthopnea, palpitations, paroxysmal nocturnal  dyspnea and syncope.   Respiratory:  Negative for cough and shortness of breath.    Hematologic/Lymphatic: Negative.    Musculoskeletal:  Negative for joint pain, joint swelling and myalgias.   Gastrointestinal:  Negative for abdominal pain, diarrhea, melena, nausea and vomiting.   Genitourinary:  Negative for frequency and hematuria.   Neurological:  Negative for light-headedness, numbness, paresthesias and seizures.   Allergic/Immunologic: Negative.    All other systems reviewed and are negative.      Allergies   Allergen Reactions    Amiodarone Shortness Of Breath, Nausea Only and Swelling     She reports she experienced significant fluid retention resulting in SOA and inability to walk, which she reports resolved as soon as she self-discontinued this medication.     Gabapentin Unknown - High Severity, Other (See Comments) and Rash     Feet peeling    Statins Other (See Comments)     Felt awful         Current Outpatient Medications:     albuterol sulfate  (90 Base) MCG/ACT inhaler, Inhale 2 puffs As Needed., Disp: , Rfl:     apixaban (ELIQUIS) 5 MG tablet tablet, Take one tablet (5 mg) by mouth every 12 hours, Disp: 60 tablet, Rfl: 3    APPLE CIDER VINEGAR PO, Take 1 tablet by mouth Daily., Disp: , Rfl:     b complex vitamins capsule, Take 1 capsule by mouth Daily., Disp: , Rfl:     BIOTIN PO, Take 1 tablet by mouth Daily., Disp: , Rfl:     CALCIUM-MAGNESIUM-ZINC PO, Take 1 tablet by mouth Daily., Disp: , Rfl:     celecoxib (CeleBREX) 200 MG capsule, Take 1 capsule by mouth Daily., Disp: , Rfl:     Cholecalciferol 50 MCG (2000 UT) capsule, Take  by mouth Daily., Disp: , Rfl:     cyclobenzaprine (FLEXERIL) 5 MG tablet, Take 1 tablet by mouth 3 (Three) Times a Day As Needed., Disp: , Rfl:     fluconazole (DIFLUCAN) 150 MG tablet, Take 1 tablet by mouth As Needed., Disp: , Rfl:     levothyroxine (SYNTHROID, LEVOTHROID) 150 MCG tablet, Take 1 tablet by mouth Daily., Disp: , Rfl:     metoprolol succinate XL  "(TOPROL-XL) 50 MG 24 hr tablet, TAKE ONE TABLET BY MOUTH DAILY, Disp: 90 tablet, Rfl: 3    Multiple Vitamin (MULTIVITAMIN) capsule, Take 1 capsule by mouth Daily., Disp: , Rfl:     pantoprazole (PROTONIX) 40 MG EC tablet, TAKE ONE TABLET BY MOUTH EVERY MORNING, Disp: 90 tablet, Rfl: 2    potassium chloride (KLOR-CON) 20 MEQ CR tablet, TAKE ONE TABLET BY MOUTH DAILY, Disp: 90 tablet, Rfl: 3    torsemide (DEMADEX) 20 MG tablet, Take 2 tablets by mouth Daily., Disp: 180 tablet, Rfl: 1      Objective:     Vitals:    02/28/24 1346   BP: 120/88   Pulse: 75   Weight: 77.6 kg (171 lb)   Height: 157.5 cm (62\")     Body mass index is 31.28 kg/m².    PHYSICAL EXAM:    Vitals Reviewed.   General Appearance: No acute distress  HENT: Atraumatic, normocephalic. External eyes, ears, and nose normal.   Respiratory: No signs of respiratory distress. Respiration rhythm and depth normal.   Clear to auscultation. No rales, crackles, rhonchi, or wheezing auscultated.   Cardiovascular:  Heart Rate and Rhythm: Irregularly, irregular.  Heart Sounds: S1 and S2.  Murmurs: No murmurs noted. No rubs, thrills, or gallops.   Lower Extremities: No edema noted.  Musculoskeletal: Normal movement of extremities  Skin: Warm and dry.   Psychiatric: Patient alert and oriented to person, place, and time. Speech and behavior appropriate. Normal mood and affect.       ECG 12 Lead    Date/Time: 2/28/2024 2:15 PM  Performed by: Marily Arvizu APRN    Authorized by: Marily Arvizu APRN  Comparison: compared with previous ECG   Similar to previous ECG  Rhythm: atrial fibrillation  Ectopy: multifocal PVCs  BPM: 75            Assessment:       Diagnosis Plan   1. Permanent atrial fibrillation        2. Ventricular tachycardia        3. ICD (implantable cardioverter-defibrillator) in place               Plan:       Perm afib, HR controlled, apixaban for OAC.     2.-3. VT, s/p ICD--no recurrent episodes/shocks.     4. Valvular heart disease, s/p AVR & MV repair, " follows with Dr. Mina.    Follow up with Dr. Perez in 1 year with device check and Dr. Mina in about 4-5 months.     As always, it has been a pleasure to participate in your patient's care.      Sincerely,         LUIS ARMANDO Wolfe

## 2024-03-05 ENCOUNTER — TELEPHONE (OUTPATIENT)
Dept: CARDIOLOGY | Facility: CLINIC | Age: 79
End: 2024-03-05
Payer: MEDICARE

## 2024-03-05 NOTE — TELEPHONE ENCOUNTER
Received fax asking for a cardiac clearance for the patient from [facility] to get a colonoscopy on 03/11/2024. Also asking for instructions for holding Eliquis.

## 2024-03-07 DIAGNOSIS — I83.813 VARICOSE VEINS OF BILATERAL LOWER EXTREMITIES WITH PAIN: ICD-10-CM

## 2024-03-07 DIAGNOSIS — I87.2 VENOUS INSUFFICIENCY (CHRONIC) (PERIPHERAL): Primary | ICD-10-CM

## 2024-03-07 DIAGNOSIS — I87.2 PERIPHERAL VENOUS INSUFFICIENCY: Primary | ICD-10-CM

## 2024-04-11 ENCOUNTER — TELEPHONE (OUTPATIENT)
Dept: CARDIOLOGY | Facility: CLINIC | Age: 79
End: 2024-04-11
Payer: MEDICARE

## 2024-04-15 RX ORDER — METOPROLOL SUCCINATE 50 MG/1
TABLET, EXTENDED RELEASE ORAL
Qty: 90 TABLET | Refills: 3 | Status: SHIPPED | OUTPATIENT
Start: 2024-04-15

## 2024-04-17 ENCOUNTER — TELEPHONE (OUTPATIENT)
Dept: CARDIOLOGY | Facility: CLINIC | Age: 79
End: 2024-04-17
Payer: MEDICARE

## 2024-04-17 NOTE — TELEPHONE ENCOUNTER
Pt called back in to triage.  Pt would very like to speak with you when you have time, and I asked her to keep her phone close.  She states that she would like to discuss 2 issues.  First, she is requesting to decrease her eliquis (currently 5mg BID), she states that she has horrible bruising, a hematoma on her calf, and her skin is sore to the touch.  Second,  she would like to discuss her A Fib, and is asking about increasing her meds for this.  Pt was unable to give me any HR/BP readings (cuff battery was dead, she was going to try to fix this and have numbers for you by the time you call her).    Larissa Pitt, RN  Triage RN  04/17/24 13:15 EDT

## 2024-04-17 NOTE — TELEPHONE ENCOUNTER
I don't see a phone call documented but I apologize regardless.   Dr. Mina is out this week, I would be happy to speak with her later today.   Can you find out what her concerns are?    Thanks!  LUIS ARMANDO Davis

## 2024-04-17 NOTE — TELEPHONE ENCOUNTER
Permission for the hub to transfer this call directly to the nurse triage line at [facility].    Attempted to call Anna Gilbert, no answer.  Left a voicemail for patient to call back and ask to speak with the triage nurses.  Will continue to try to reach patient.    Lyndsey Knutson RN  [facility] Triage  04/17/24 12:44 EDT

## 2024-04-17 NOTE — TELEPHONE ENCOUNTER
I spoke with her. She has cellulitis and is being treated with antibiotics. She feels that she is having more episodes of Afib; when this occurs, she takes 75 mg metoprolol succinate which is helping. Her BP and HR this afternoon were 127/72, HR 68. I told her that I agree with this plan.     She is going to reduce apixaban to 2.5 mg BID due to large hematoma on her calf. She is also having easy bruising. She understands that this dose is not adequate stroke protection given her weight and renal function. She states that at this time, she feels this lower dose is the best decision for her and she is willing to accept the higher stroke risk.     She wanted me to make you aware of this conversation.     Thanks!  LUIS ARMANDO Davis

## 2024-04-17 NOTE — TELEPHONE ENCOUNTER
Patient called stating she wants to discuss ALL of her meds with Dr. Roque's. She said she left a message on 04/09 but hasn't heard anything back. Can we call her to see what she needs to discuss?   Please and thank you!

## 2024-04-19 ENCOUNTER — TELEPHONE (OUTPATIENT)
Dept: CARDIOLOGY | Facility: CLINIC | Age: 79
End: 2024-04-19

## 2024-04-19 NOTE — TELEPHONE ENCOUNTER
Caller: SANDRITA SPRING     Relationship: SON    Best call back number: [phone]    What is your medical concern? PT'S SON IS CALLING TO LET DR. ROGERS KNOW THAT THE PT IS IN THE HOSPITAL BECAUSE HER LEGS AND MIDSECTION ARE SWOLLEN. HE SAID THAT THEY HAVE HER LEGS WRAPPED AND IS AT [facility].    HE SAID SHE CALLED HER PCP AND THEY SENT HER TO THE HOSPITAL FOR HER SYMPTOMS. PT HE ASKED IF SOMEONE COULD GIVE HIM A CALL BACK    How long has this issue been going on? 6 MONTHS OR SO    Is your provider already aware of this issue? YES    Have you been treated for this issue? YES

## 2024-04-19 NOTE — TELEPHONE ENCOUNTER
I spoke to the son. He just wanted to make us aware that patient is currently admitted at [facility]. He said that when she was admitted the family found out that the patient was not taking her meds the way she should be. I explained that I would pass this message off to Dr. Mina and for them to call us once she is discharged so we can get her in for a hospital FU appt.     Jane Farmer RN  Triage [facility]

## 2024-04-29 RX ORDER — SPIRONOLACTONE 25 MG/1
25 TABLET ORAL DAILY
Qty: 90 TABLET | Refills: 1 | OUTPATIENT
Start: 2024-04-29

## 2024-04-30 ENCOUNTER — TELEPHONE (OUTPATIENT)
Dept: CARDIOLOGY | Facility: CLINIC | Age: 79
End: 2024-04-30
Payer: MEDICARE

## 2024-04-30 RX ORDER — TORSEMIDE 20 MG/1
40 TABLET ORAL DAILY
Qty: 180 TABLET | Refills: 1 | Status: SHIPPED | OUTPATIENT
Start: 2024-04-30

## 2024-05-03 ENCOUNTER — TELEPHONE (OUTPATIENT)
Dept: CARDIOLOGY | Facility: CLINIC | Age: 79
End: 2024-05-03
Payer: MEDICARE

## 2024-05-29 ENCOUNTER — OFFICE VISIT (OUTPATIENT)
Dept: CARDIOLOGY | Facility: CLINIC | Age: 79
End: 2024-05-29
Payer: MEDICARE

## 2024-05-29 VITALS
HEIGHT: 62 IN | WEIGHT: 168 LBS | BODY MASS INDEX: 30.91 KG/M2 | OXYGEN SATURATION: 95 % | HEART RATE: 69 BPM | DIASTOLIC BLOOD PRESSURE: 79 MMHG | SYSTOLIC BLOOD PRESSURE: 124 MMHG

## 2024-05-29 DIAGNOSIS — G47.33 OSA (OBSTRUCTIVE SLEEP APNEA): Chronic | ICD-10-CM

## 2024-05-29 DIAGNOSIS — Z95.810 ICD (IMPLANTABLE CARDIOVERTER-DEFIBRILLATOR) IN PLACE: ICD-10-CM

## 2024-05-29 DIAGNOSIS — I89.0 LYMPHEDEMA: ICD-10-CM

## 2024-05-29 DIAGNOSIS — I47.29 NSVT (NONSUSTAINED VENTRICULAR TACHYCARDIA): Chronic | ICD-10-CM

## 2024-05-29 DIAGNOSIS — E78.2 MIXED HYPERLIPIDEMIA: ICD-10-CM

## 2024-05-29 DIAGNOSIS — I48.21 PERMANENT ATRIAL FIBRILLATION: ICD-10-CM

## 2024-05-29 DIAGNOSIS — U09.9 LONG COVID: ICD-10-CM

## 2024-05-29 DIAGNOSIS — Z95.2 AORTIC VALVE REPLACED: Primary | Chronic | ICD-10-CM

## 2024-05-29 DIAGNOSIS — Z98.890 H/O MITRAL VALVE REPAIR: Chronic | ICD-10-CM

## 2024-05-29 NOTE — PROGRESS NOTES
Subjective:     Encounter Date:  05/29/24        Patient ID: Anna Gilbert is a 78 y.o. female.    Chief Complaint: CAF  History of Present Illness    Dear Dr. Isaac,    I had the pleasure of having a visit with this patient today. She has a history of chronic atrial fibrillation, nonsustained ventricular tachycardia, as well as bioprosthetic aortic valve and prior mitral valve repair. She also has an AICD in place.  She follows with EP for her chronic atrial fibrillation and nonsustained ventricular tachycardia.    She was in to see Alina DURÁN in February 2024.    Patient's had COVID later in February and really had some major issues.  Started getting a lot more swelling, was seen by vascular and was found to have lymphedema.  She says that they explained to her that this was attributed to her COVID.  She is also been in the urgent care center with cellulitis and was then hospitalized for cellulitis.    Currently says she just did not feel good, feels very weak, feels very tired, symptoms feel like her heart is racing.    She comes in today because she is having new symptoms.  She started noticing that she is getting some mid precordial chest heaviness and pressure.  She does have a history of GERD, but she is did not feel like GERD and when she took antiacids it did not affect it at all.  She is also been having worsening shortness of breath with activity.  No recent illnesses, being compliant with her medical therapy, she was switched to NOAC by the medication management clinic earlier this year, was felt to have improved risk-benefit profile on the NOAC compared to warfarin.    November 2020 when she was hospitalized for ventricular tachycardia.  She was seen by Dr. Perez.  Her AICD was replaced at that time.  She also had a cardiac catheterization at that time:  Results for orders placed during the hospital encounter of 11/29/21    Cardiac Catheterization/Vascular  Study    Narrative  Interventionalist: Nigel Egan M.D., .A.C.C.    Procedure findings:  Left main: Large caliber vessel that bifurcates to an LAD and circumflex.  The left main coronary artery is normal.  LAD: Medium caliber vessel that gives rise to a small caliber D1 and medium caliber D2 branch.  The LAD has mild calcification of the proximal segment but no stenosis.  The mid LAD has a tubular 50% mid vessel stenosis.  Small caliber vessel from the mid to distal LAD.  LCX: Large caliber vessel gives rise to a medium caliber OM1 and small caliber OM 2 branch.  Normal.  RCA: Large caliber, dominant vessel gives rise to a medium caliber PDA and large caliber, long RPL branch.  The RCA has luminal irregularities in the distal segment.  RPL has a discrete 20% to 30% mid vessel stenosis    Hemodynamics:  LV:145/17  AO: 145/60    Interventional report  6 Salvadorean XB 3.0 guide catheter was used to engage the left main.  The Aeris pressure wire was advanced the left main and equalization was performed.  Following this the pressure wire was advanced past the mid LAD stenosis and seated.  RFR was then performed.  Minimum RFR measurement 0.94.  Not hemodynamically significant.    Conclusions:  1. Left main: Normal  2. LAD: Mid LAD with tubular 50% stenosis.  Small caliber from the mid to distal segment.  3. LCX:Normal  4. RCA: Dominant.  Discrete 20 to 30% mid vessel RPL stenosis.  5.  RFR of the LAD 0.94. Not hemodynamically significant    The following portions of the patient's history were reviewed and updated as appropriate: allergies, current medications, past family history, past medical history, past social history, past surgical history and problem list.    Past Medical History:   Diagnosis Date    Bradycardia     Chronic atrial fibrillation     Coronary artery disease     Essential (primary) hypertension     H/O mitral valve repair     Health care maintenance     Heart failure, unspecified      "Hyperthyroidism     Hypothyroid     Localized edema     Lymphedema 06/02/2012    Mixed hyperlipidemia     NSVT (nonsustained ventricular tachycardia) 09/23/2020    SHEILA (obstructive sleep apnea) 07/20/2016    Pacemaker     PAF (paroxysmal atrial fibrillation)     Permanent atrial fibrillation     Sick sinus syndrome     Venous insufficiency (chronic) (peripheral)     Ventricular tachycardia     with ICD shock       Past Surgical History:   Procedure Laterality Date    AORTIC VALVE REPAIR/REPLACEMENT  2010    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    CARDIAC CATHETERIZATION  01/01/2011    CARDIAC CATHETERIZATION N/A 11/30/2021    Procedure: Left Heart Cath;  Surgeon: Nigel Egan MD;  Location:  [facility];  Service: Cardiovascular;  Laterality: N/A;    CARDIAC CATHETERIZATION N/A 11/30/2021    Procedure: Coronary angiography;  Surgeon: Nigel Egan MD;  Location:  [facility]VAS[facility];  Service: Cardiovascular;  Laterality: N/A;    CARDIAC CATHETERIZATION N/A 11/30/2021    Procedure: Resting Full Cycle Ratio;  Surgeon: Nigel Egan MD;  Location: [facility] CATH INVASIVE LOCATION;  Service: Cardiovascular;  Laterality: N/A;    CARDIAC DEFIBRILLATOR PLACEMENT  2010    CARDIAC ELECTROPHYSIOLOGY PROCEDURE N/A 12/1/2021    Procedure: ICD DC generator change---Medtronic;  Surgeon: Mick Perez MD;  Location:  [facility] CATH INVASIVE LOCATION;  Service: Cardiology;  Laterality: N/A;    MITRAL VALVE REPAIR/REPLACEMENT  2010           Procedures       Objective:     Vitals:    05/29/24 1231   BP: 124/79   Pulse: 69   SpO2: 95%   Weight: 76.2 kg (168 lb)   Height: 157.5 cm (62\")     General Appearance:    Alert, cooperative, in no acute distress   Head:    Normocephalic, without obvious abnormality   Eyes:            Lids and lashes normal, conjunctivae and sclerae normal, no icterus, no pallor, corneas clear   Ears:    Ears appear intact with no abnormalities noted   Throat:   No oral " lesions, oral mucosa moist   Neck:   No adenopathy, supple, trachea midline, no thyromegaly, no carotid bruit, no JVD   Back:     No kyphosis present, no erythema or scars, no tenderness to palpation    Lungs:     Clear to auscultation,respirations regular, even and unlabored    Heart:    irregular rhythm and normal rate, normal S1 and S2, no murmur, no gallop, no rub, no click   Chest Wall:    No abnormalities observed   Abdomen:     Normal bowel sounds, no masses, no organomegaly, soft        non-tender, non-distended, no guarding   Extremities:   Moves all extremities well, + edema, no cyanosis, no redness   Pulses:  Bilateral carotids brisk   Skin:  Psychiatric:   No bleeding or rash    Alert and oriented, normal mood and affect         Lab Review:             Results for orders placed during the hospital encounter of 12/12/23    Adult Transthoracic Echo Complete W/ Cont if Necessary Per Protocol    Interpretation Summary    Left ventricular ejection fraction appears to be 51 - 55%.    Left ventricular wall thickness is consistent with moderate concentric hypertrophy.    Left ventricular diastolic function was indeterminate.    The left atrial cavity is severely dilated.    Left atrial volume is severely increased.    The right atrial cavity is severely  dilated.    Aortic valve area is 1.9 cm2.    Aortic valve maximum pressure gradient is 29 mmHg. Aortic valve mean pressure gradient is 16 mmHg.    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    There is a mitral valve ring present.    Moderate tricuspid valve regurgitation is present.    Calculated right ventricular systolic pressure from tricuspid regurgitation is 39 mmHg.    CHADS-VASc Risk Assessment              3 Total Score    2 Age >/= 75    1 Sex: Female        Criteria that do not apply:    CHF    Hypertension    DM    PRIOR STROKE/TIA/THROMBO    Vascular Disease    Age 65-74                  Assessment:          Diagnosis Plan   1. Aortic valve  replaced  Holter Monitor - 24 Hour      2. H/O mitral valve repair  Holter Monitor - 24 Hour      3. ICD (implantable cardioverter-defibrillator) in place  Holter Monitor - 24 Hour      4. NSVT (nonsustained ventricular tachycardia)  Holter Monitor - 24 Hour      5. Permanent atrial fibrillation  Holter Monitor - 24 Hour      6. SHEILA (obstructive sleep apnea)  Holter Monitor - 24 Hour      7. Mixed hyperlipidemia        8. Lymphedema        9. Long COVID                   Plan:       1.   Atrial Fibrillation and Atrial Flutter  Assessment   The patient has permanent atrial fibrillation   This is valvular in etiology   The patient's CHADS2-VASc score is 4   A [id number] score of 2 or more is considered a high risk for a thromboembolic event   Warfarin prescribed    Plan   Continue in atrial fibrillation with rate control   Continue warfarin for antithrombotic therapy, bleeding issues discussed   Continue beta blocker for rate control   Rate controlled, continue current medical therapy  Continues to follow with Dr. Perez in the device clinic, seen by them February of this year.  Sometimes feel like her heart is racing, I will place a Holter monitor.  2.  Bioprosthetic aortic valve replacement with prior mitral valve repair-echocardiogram December 2023 reviewed above  3.  VT, status post ICD shock-  4.  Mediastinal adenopathy-status post biopsy,   5.  Chronic diastolic congestive heart failure, has been taking her torsemide 20 mg twice daily but is having some volume overload, I will switch that to 40 mg once daily.  6.  Chronic anticoagulation- was switched back to warfarin from the Eliquis because of nausea, falls in the medication management clinic.  7.  Hypertension, continue current medical therapy.  8.  Long COVID  9.  Lymphedema, following with vascular surgery      Current Outpatient Medications:     amoxicillin-clavulanate (AUGMENTIN) 875-125 MG per tablet, Take 1 tablet by mouth 2 (Two) Times a Day.,  Disp: 20 tablet, Rfl: 0    APPLE CIDER VINEGAR PO, Take 1 tablet by mouth Daily., Disp: , Rfl:     b complex vitamins capsule, Take 1 capsule by mouth Daily., Disp: , Rfl:     BIOTIN PO, Take 1 tablet by mouth Daily., Disp: , Rfl:     CALCIUM-MAGNESIUM-ZINC PO, Take 1 tablet by mouth Daily., Disp: , Rfl:     celecoxib (CeleBREX) 200 MG capsule, Take 1 capsule by mouth Daily., Disp: , Rfl:     Cholecalciferol 50 MCG (2000 UT) capsule, Take  by mouth Daily., Disp: , Rfl:     cyclobenzaprine (FLEXERIL) 5 MG tablet, Take 1 tablet by mouth 3 (Three) Times a Day As Needed., Disp: , Rfl:     levothyroxine (SYNTHROID, LEVOTHROID) 150 MCG tablet, Take 1 tablet by mouth Daily., Disp: , Rfl:     metoprolol succinate XL (TOPROL-XL) 50 MG 24 hr tablet, TAKE ONE TABLET BY MOUTH DAILY, Disp: 90 tablet, Rfl: 3    Multiple Vitamin (MULTIVITAMIN) capsule, Take 1 capsule by mouth Daily., Disp: , Rfl:     pantoprazole (PROTONIX) 40 MG EC tablet, TAKE ONE TABLET BY MOUTH EVERY MORNING, Disp: 90 tablet, Rfl: 2    potassium chloride (KLOR-CON) 20 MEQ CR tablet, TAKE ONE TABLET BY MOUTH DAILY, Disp: 90 tablet, Rfl: 3    Progesterone 40 % cream, Use., Disp: , Rfl:     torsemide (DEMADEX) 20 MG tablet, TAKE TWO TABLETS BY MOUTH DAILY, Disp: 180 tablet, Rfl: 1    warfarin (COUMADIN) 5 MG tablet, Take  by mouth. TAKE AS DIRECTED, Disp: , Rfl:     albuterol sulfate  (90 Base) MCG/ACT inhaler, Inhale 2 puffs As Needed. (Patient not taking: Reported on 5/29/2024), Disp: , Rfl:     benzonatate (TESSALON) 200 MG capsule, Take 1 capsule by mouth 3 (Three) Times a Day As Needed for Cough. (Patient not taking: Reported on 5/29/2024), Disp: 30 capsule, Rfl: 0    estradiol (ESTRACE) 0.1 MG/GM vaginal cream, Insert 1 g into the vagina Daily. (Patient not taking: Reported on 5/29/2024), Disp: , Rfl:     estradiol (ESTRACE) 0.1 MG/GM vaginal cream, Insert 1 g into the vagina Daily. (Patient not taking: Reported on 5/29/2024), Disp: , Rfl:      guaiFENesin (MUCINEX) 600 MG 12 hr tablet, Take 2 tablets by mouth 2 (Two) Times a Day. (Patient not taking: Reported on 5/29/2024), Disp: 30 tablet, Rfl: 0    ondansetron ODT (ZOFRAN-ODT) 4 MG disintegrating tablet, Place 1 tablet on the tongue Every 8 (Eight) Hours As Needed for Nausea or Vomiting. (Patient not taking: Reported on 5/29/2024), Disp: 10 tablet, Rfl: 0

## 2024-06-04 ENCOUNTER — HOSPITAL ENCOUNTER (OUTPATIENT)
Dept: CARDIOLOGY | Facility: HOSPITAL | Age: 79
Discharge: HOME | End: 2024-06-04
Admitting: INTERNAL MEDICINE
Payer: MEDICARE

## 2024-06-04 DIAGNOSIS — Z98.890 H/O MITRAL VALVE REPAIR: Chronic | ICD-10-CM

## 2024-06-04 DIAGNOSIS — G47.33 OSA (OBSTRUCTIVE SLEEP APNEA): Chronic | ICD-10-CM

## 2024-06-04 DIAGNOSIS — Z95.810 ICD (IMPLANTABLE CARDIOVERTER-DEFIBRILLATOR) IN PLACE: ICD-10-CM

## 2024-06-04 DIAGNOSIS — I48.21 PERMANENT ATRIAL FIBRILLATION: ICD-10-CM

## 2024-06-04 DIAGNOSIS — I47.29 NSVT (NONSUSTAINED VENTRICULAR TACHYCARDIA): Chronic | ICD-10-CM

## 2024-06-04 DIAGNOSIS — Z95.2 AORTIC VALVE REPLACED: Chronic | ICD-10-CM

## 2024-06-04 PROCEDURE — 93226 XTRNL ECG REC<48 HR SCAN A/R: CPT

## 2024-06-04 PROCEDURE — 93225 XTRNL ECG REC<48 HRS REC: CPT

## 2024-06-05 ENCOUNTER — OFFICE VISIT (OUTPATIENT)
Dept: ORTHOPEDIC SURGERY | Facility: CLINIC | Age: 79
End: 2024-06-05
Payer: MEDICARE

## 2024-06-05 VITALS — WEIGHT: 168 LBS | HEIGHT: 62 IN | BODY MASS INDEX: 30.91 KG/M2

## 2024-06-05 DIAGNOSIS — M25.561 RIGHT KNEE PAIN, UNSPECIFIED CHRONICITY: Primary | ICD-10-CM

## 2024-06-05 DIAGNOSIS — M17.11 PRIMARY OSTEOARTHRITIS OF RIGHT KNEE: ICD-10-CM

## 2024-06-05 PROCEDURE — 99204 OFFICE O/P NEW MOD 45 MIN: CPT | Performed by: ORTHOPAEDIC SURGERY

## 2024-06-05 NOTE — PROGRESS NOTES
Subjective:     Patient ID: Anna Gilbert is a 78 y.o. female.    Chief Complaint:  Right knee pain, new patient  History of Present Illness  Anna Gilbert presents to clinic today for evaluation of right knee pain, states her pain has been present for approximately the last 7 years but particularly worse over the last 6 months, denies any prior injury to the onset of her pain.  Localizes pain to the medial and anterior aspect of her right knee, describes as throbbing stabbing shooting grinding and aching in nature, rated as 9 out of 10, does note associated swelling as well as clicking and popping to the knee.  She does have bilateral lower extremity lymphedema that requires regular treatments with wound clinic.  She has had both viscosupplement and cortisone injections for about 7 years with most recent cortisone injection back in January with very limited relief for approximately 2 to 3 weeks.  Pain is occurring on a daily basis with significant associated crepitus.  She has had minimal improvement with home exercise program, activity modification, and rest.  She denies radiation of pain from the knee itself.     Social History     Occupational History    Not on file   Tobacco Use    Smoking status: Former    Smokeless tobacco: Never    Tobacco comments:     caffeine use   Vaping Use    Vaping status: Never Used   Substance and Sexual Activity    Alcohol use: No    Drug use: No    Sexual activity: Defer      Past Medical History:   Diagnosis Date    Bradycardia     Chronic atrial fibrillation     Coronary artery disease     Essential (primary) hypertension     H/O mitral valve repair     Health care maintenance     Heart failure, unspecified     Hyperthyroidism     Hypothyroid     Localized edema     Lymphedema 06/02/2012    Mixed hyperlipidemia     NSVT (nonsustained ventricular tachycardia) 09/23/2020    SHEILA (obstructive sleep apnea) 07/20/2016    Pacemaker     PAF (paroxysmal atrial fibrillation)      "Permanent atrial fibrillation     Sick sinus syndrome     Venous insufficiency (chronic) (peripheral)     Ventricular tachycardia     with ICD shock     Past Surgical History:   Procedure Laterality Date    AORTIC VALVE REPAIR/REPLACEMENT  2010    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    CARDIAC CATHETERIZATION  01/01/2011    CARDIAC CATHETERIZATION N/A 11/30/2021    Procedure: Left Heart Cath;  Surgeon: Nigel Egan MD;  [facility];  Service: Cardiovascular;  Laterality: N/A;    CARDIAC CATHETERIZATION N/A 11/30/2021    Procedure: Coronary angiography;  Surgeon: Nigel Egan MD;  Location:  [facility];  Service: Cardiovascular;  Laterality: N/A;    CARDIAC CATHETERIZATION N/A 11/30/2021    Procedure: Resting Full Cycle Ratio;  Surgeon: Nigel Egan MD;  Location:  [facility] INVAS[facility] LOCATION;  Service: Cardiovascular;  Laterality: N/A;    CARDIAC DEFIBRILLATOR PLACEMENT  2010    CARDIAC ELECTROPHYSIOLOGY PROCEDURE N/A 12/1/2021    Procedure: ICD DC generator change---Medtronic;  Surgeon: Mick Perez MD;  [facility]: [facility] CATH INVASIVE LOC[facility];  Service: Cardiology;  Laterality: N/A;    MITRAL VALVE REPAIR/REPLACEMENT  2010       Family History   Problem Relation Age of Onset    Lung cancer Mother     Coronary artery disease Father     Heart attack Father     Stroke Other          Review of Systems        Objective:  Vitals:    06/05/24 1411   Weight: 76.2 kg (168 lb)   Height: 157.5 cm (62\")         06/05/24  1411   Weight: 76.2 kg (168 lb)     Body mass index is 30.73 kg/m².  Physical Exam    Vital signs reviewed.   General: No acute distress, alert and oriented  Eyes: conjunctiva clear; pupils equally round and reactive  ENT: external ears and nose atraumatic; oropharynx clear  CV: no peripheral edema  Resp: normal respiratory effort  Skin: no rashes or wounds; normal turgor  Psych: mood and affect appropriate; recent and remote memory " intact        Ortho Exam     Right knee-lymphedema wrapping noted below the level of the knee, pictures were most recent timeframe of beginning of May show good soft tissue edema control.  Right knee range of motion is 3 to 120 degrees, 4 out of 5 strength on flexion extension, maximal tenderness palpation medial joint line as well as medial lateral patellar facet.  Positive active patella compression test, stable to varus valgus stress at 3 and 30 degrees.  Moderate effusion.  No hip pain on logroll or Stinchfield exam.    Imaging:  Right Knee X-Ray  Indication: Pain    AP, Lateral, and McCall views    Findings:  No fracture  No bony lesion  Normal soft tissues  Moderate tricompartmental osteoarthritis no significant in the medial compartment with reactive osteophyte formation noted, mild bony demineralization appreciated.    No prior studies were available for comparison.    Assessment:        1. Right knee pain, unspecified chronicity           Plan:          Discussed treatment options at length with patient at today's visit.  Given significant pain with limited improvement with home exercise program for greater than 12 weeks, intra-articular injections, anti-inflammatory medications, activity modification, with pain affecting activities of daily living and significant levels of pain at this point in time, we discussed options and patient wished to proceed with right total knee arthroplasty at this point.  I reviewed anatomy and a model of a total knee arthroplasty, as well as typical postoperative recovery of 6-12 months before maximal recovery, and possible need for rehabilitation stay after hospitalization. We also discussed risks, benefits, and alternatives of procedure with risks including but not limited to neurovascular damage, bleeding, infection, malalignment, chronic pian, failure of implants, osteolysis, loosening of implants, loss of motion, weakness, stiffness, instability, DVT, pulmonary embolus,  death, stroke, complex regional pain syndrome, myocardial infarction, and need for additional procedures. Patient understood all these and had all questions answered before consenting to the procedure. No guarantees were given in regards to results from the surgery. We will have patient medically optimized by their primary care physician and plan on proceeding with surgery at next available date.   Will plan on observation stay postoperatively.  Patient will need lymphedema treatment postsurgery.  I did discuss with her that her edema will probably get worse after the procedure but we hope that it may be her return to baseline when she mobilizes well with therapy  Patient denies history of DVT or pulmonary embolus, she is on warfarin, she does have cardiac history of valve replacement and sees Dr. Mina.  She denies any history of diabetes.  Follow up: For surgical treatment      Anna ALMAZAN Gilbert was in agreement with plan and had all questions answered.     Orders:  Orders Placed This Encounter   Procedures    XR Knee 3+ View With [place] Right       Medications:  No orders of the defined types were placed in this encounter.      Followup:  No follow-ups on file.    Diagnoses and all orders for this visit:    1. Right knee pain, unspecified chronicity (Primary)  -     XR Knee 3+ View With [place] Right          Dictated utilizing Dragon dictation

## 2024-06-19 PROBLEM — Z96.659 S/P TOTAL KNEE ARTHROPLASTY: Status: ACTIVE | Noted: 2024-06-19

## 2024-06-19 RX ORDER — ACETAMINOPHEN 325 MG/1
1000 TABLET ORAL ONCE
OUTPATIENT
Start: 2024-06-19 | End: 2024-06-19

## 2024-06-19 RX ORDER — MELOXICAM 7.5 MG/1
15 TABLET ORAL ONCE
OUTPATIENT
Start: 2024-06-19 | End: 2024-06-19

## 2024-06-19 RX ORDER — PREGABALIN 150 MG/1
150 CAPSULE ORAL ONCE
OUTPATIENT
Start: 2024-06-19 | End: 2024-06-19

## 2024-06-21 ENCOUNTER — TELEPHONE (OUTPATIENT)
Dept: CARDIOLOGY | Facility: CLINIC | Age: 79
End: 2024-06-21
Payer: MEDICARE

## 2024-06-21 NOTE — TELEPHONE ENCOUNTER
Pt was last seen on 5/29/24 by Dr. Mina    I have placed the form in your in box at the main office for review and signature if cleared

## 2024-06-25 ENCOUNTER — TELEPHONE (OUTPATIENT)
Dept: ORTHOPEDIC SURGERY | Facility: CLINIC | Age: 79
End: 2024-06-25
Payer: MEDICARE

## 2024-06-25 NOTE — TELEPHONE ENCOUNTER
I submitted for auth with [employer] for patients upcoming surgery. I got a phone call saying it was going to be denied because there is no documentation of:  Patient using assistive device for mobility  No documented formal physical therapy  No attempt at weight reduction (her BMI is only 30, so I was kinfod surprised there).  I had sent your office note and several from her previous md, however all they ever did was repetitive injections into the knee. They offered for me to withdrawal the case so we could do additional conservative treatment and attempt to submit for approval at a later date, or they could send me a denial letter and we could appeal. I chose to have it withdrawn.  How would you like to proceed?

## 2024-07-03 ENCOUNTER — TRANSCRIBE ORDERS (OUTPATIENT)
Dept: NUCLEAR MEDICINE | Facility: HOSPITAL | Age: 79
End: 2024-07-03
Payer: MEDICARE

## 2024-07-03 DIAGNOSIS — R11.0 NAUSEA: Primary | ICD-10-CM

## 2024-07-08 ENCOUNTER — TELEPHONE (OUTPATIENT)
Dept: CARDIOLOGY | Facility: CLINIC | Age: 79
End: 2024-07-08
Payer: MEDICARE

## 2024-07-08 NOTE — TELEPHONE ENCOUNTER
Patient returned call.  She wishes to be seen at  office.  I have scheduled patient to see NM Wednesday 7/10.  She is agreeable and understands plan.    Lyndsey Knutson RN  [facility] Triage  07/08/24 16:09 EDT

## 2024-07-08 NOTE — TELEPHONE ENCOUNTER
I spoke with Anna Gilbert and gave them message from the provider.  They verbalized understanding & have no further questions at this time.    I offered to schedule appt but pt was driving.   She's going to call the office back to have FU with TK scheduled.    Thank you,    Suzette TIPTON RN  [facility] [facility]  07/08/24 14:47 EDT

## 2024-07-08 NOTE — TELEPHONE ENCOUNTER
She will need to be seen in the office for further discussion.  When she last saw Dr. Mina she was on warfarin.  I do not see any documentation about her starting the apixaban.  I have a opening on Tuesday and Friday.  Thank you

## 2024-07-08 NOTE — TELEPHONE ENCOUNTER
Pt called in to triage.  Pt states that about 2 weeks ago, Dr. Mina called and told her to change how she takes her demadex (now taking 40mg at once in the AM, instead of 20mg BID).  Pt states that since making this change, she stops urinating around 2-3p, and is getting SOA when laying down at night.  Pt states that she has lymphedema, and swelling in legs is actually a little bit better, and that her weight is stable.  Denies any CP or other new cardiac symptoms at this time.    Pt also states that she is always in A Fib, and that she had switched from warfarin to eliquis, which was making her constantly sick to her stomach.  She states that she stopped eliquis, and is still waiting on approval from insurance company for xarelto (which I don't see on her med list at all).  Pt states that today, she started to take 1/2 of her old warfarin prescription, just so that she was taking some blood thinner.  I discussed the need for monitoring with warfarin, and she verbalizes understanding, but states that this is all she can tolerate at this time, as she continues to have nausea.    Recommendations?    Thanks so much,  Larissa Pitt, RN  Triage RN  07/08/24 12:20 EDT

## 2024-07-09 ENCOUNTER — HOSPITAL ENCOUNTER (OUTPATIENT)
Dept: PHYSICAL THERAPY | Facility: HOSPITAL | Age: 79
Setting detail: THERAPIES SERIES
Discharge: HOME | End: 2024-07-09
Payer: MEDICARE

## 2024-07-09 ENCOUNTER — PRE-ADMISSION TESTING (OUTPATIENT)
Dept: PREADMISSION TESTING | Facility: HOSPITAL | Age: 79
End: 2024-07-09
Payer: MEDICARE

## 2024-07-09 VITALS
HEART RATE: 72 BPM | WEIGHT: 168 LBS | BODY MASS INDEX: 30.91 KG/M2 | RESPIRATION RATE: 16 BRPM | HEIGHT: 62 IN | DIASTOLIC BLOOD PRESSURE: 82 MMHG | OXYGEN SATURATION: 96 % | SYSTOLIC BLOOD PRESSURE: 142 MMHG

## 2024-07-09 DIAGNOSIS — M17.11 PRIMARY OSTEOARTHRITIS OF RIGHT KNEE: ICD-10-CM

## 2024-07-09 LAB
ABO GROUP BLD: NORMAL
ABO GROUP BLD: NORMAL
ANION GAP SERPL CALCULATED.3IONS-SCNC: 13.3 MMOL/L (ref 5–15)
APTT PPP: 29.8 SECONDS (ref 24.3–38.1)
BACTERIA UR QL AUTO: ABNORMAL /HPF
BASOPHILS # BLD AUTO: 0.09 10*3/MM3 (ref 0–0.2)
BASOPHILS NFR BLD AUTO: 1 % (ref 0–1.5)
BILIRUB UR QL STRIP: NEGATIVE
BLD GP AB SCN SERPL QL: NEGATIVE
BUN SERPL-MCNC: 13 MG/DL (ref 8–23)
BUN/CREAT SERPL: 14.4 (ref 7–25)
CALCIUM SPEC-SCNC: 9.7 MG/DL (ref 8.6–10.5)
CHLORIDE SERPL-SCNC: 101 MMOL/L (ref 98–107)
CLARITY UR: CLEAR
CO2 SERPL-SCNC: 25.7 MMOL/L (ref 22–29)
COLOR UR: YELLOW
CREAT SERPL-MCNC: 0.9 MG/DL (ref 0.57–1)
DEPRECATED RDW RBC AUTO: 48.5 FL (ref 37–54)
EGFRCR SERPLBLD CKD-EPI 2021: 64.8 ML/MIN/1.73
EOSINOPHIL # BLD AUTO: 0.23 10*3/MM3 (ref 0–0.4)
EOSINOPHIL NFR BLD AUTO: 2.5 % (ref 0.3–6.2)
ERYTHROCYTE [DISTWIDTH] IN BLOOD BY AUTOMATED COUNT: 14.9 % (ref 12.3–15.4)
GLUCOSE SERPL-MCNC: 108 MG/DL (ref 65–99)
GLUCOSE UR STRIP-MCNC: NEGATIVE MG/DL
HCT VFR BLD AUTO: 39.4 % (ref 34–46.6)
HGB BLD-MCNC: 12.7 G/DL (ref 12–15.9)
HGB UR QL STRIP.AUTO: NEGATIVE
HYALINE CASTS UR QL AUTO: ABNORMAL /LPF
IMM GRANULOCYTES # BLD AUTO: 0.07 10*3/MM3 (ref 0–0.05)
IMM GRANULOCYTES NFR BLD AUTO: 0.8 % (ref 0–0.5)
INR PPP: 1.22 (ref 0.9–1.1)
KETONES UR QL STRIP: NEGATIVE
LEUKOCYTE ESTERASE UR QL STRIP.AUTO: ABNORMAL
LYMPHOCYTES # BLD AUTO: 1.04 10*3/MM3 (ref 0.7–3.1)
LYMPHOCYTES NFR BLD AUTO: 11.5 % (ref 19.6–45.3)
MCH RBC QN AUTO: 28.5 PG (ref 26.6–33)
MCHC RBC AUTO-ENTMCNC: 32.2 G/DL (ref 31.5–35.7)
MCV RBC AUTO: 88.3 FL (ref 79–97)
MONOCYTES # BLD AUTO: 1.34 10*3/MM3 (ref 0.1–0.9)
MONOCYTES NFR BLD AUTO: 14.8 % (ref 5–12)
NEUTROPHILS NFR BLD AUTO: 6.28 10*3/MM3 (ref 1.7–7)
NEUTROPHILS NFR BLD AUTO: 69.4 % (ref 42.7–76)
NITRITE UR QL STRIP: NEGATIVE
NRBC BLD AUTO-RTO: 0 /100 WBC (ref 0–0.2)
PH UR STRIP.AUTO: 7 [PH] (ref 4.5–8)
PLATELET # BLD AUTO: 185 10*3/MM3 (ref 140–450)
PMV BLD AUTO: 10.7 FL (ref 6–12)
POTASSIUM SERPL-SCNC: 3.6 MMOL/L (ref 3.5–5.2)
PROT UR QL STRIP: NEGATIVE
PROTHROMBIN TIME: 15.3 SECONDS (ref 12.1–15)
RBC # BLD AUTO: 4.46 10*6/MM3 (ref 3.77–5.28)
RBC # UR STRIP: ABNORMAL /HPF
REF LAB TEST METHOD: ABNORMAL
RH BLD: NEGATIVE
RH BLD: NEGATIVE
SODIUM SERPL-SCNC: 140 MMOL/L (ref 136–145)
SP GR UR STRIP: 1.01 (ref 1–1.03)
SQUAMOUS #/AREA URNS HPF: ABNORMAL /HPF
T&S EXPIRATION DATE: NORMAL
UROBILINOGEN UR QL STRIP: ABNORMAL
WBC # UR STRIP: ABNORMAL /HPF
WBC NRBC COR # BLD AUTO: 9.05 10*3/MM3 (ref 3.4–10.8)

## 2024-07-09 PROCEDURE — 86901 BLOOD TYPING SEROLOGIC RH(D): CPT | Performed by: ORTHOPAEDIC SURGERY

## 2024-07-09 PROCEDURE — 80048 BASIC METABOLIC PNL TOTAL CA: CPT | Performed by: ORTHOPAEDIC SURGERY

## 2024-07-09 PROCEDURE — 86900 BLOOD TYPING SEROLOGIC ABO: CPT

## 2024-07-09 PROCEDURE — 85025 COMPLETE CBC W/AUTO DIFF WBC: CPT | Performed by: ORTHOPAEDIC SURGERY

## 2024-07-09 PROCEDURE — 85730 THROMBOPLASTIN TIME PARTIAL: CPT | Performed by: ORTHOPAEDIC SURGERY

## 2024-07-09 PROCEDURE — 87086 URINE CULTURE/COLONY COUNT: CPT | Performed by: ORTHOPAEDIC SURGERY

## 2024-07-09 PROCEDURE — 86901 BLOOD TYPING SEROLOGIC RH(D): CPT

## 2024-07-09 PROCEDURE — 36415 COLL VENOUS BLD VENIPUNCTURE: CPT

## 2024-07-09 PROCEDURE — 87081 CULTURE SCREEN ONLY: CPT | Performed by: ORTHOPAEDIC SURGERY

## 2024-07-09 PROCEDURE — 81001 URINALYSIS AUTO W/SCOPE: CPT | Performed by: ORTHOPAEDIC SURGERY

## 2024-07-09 PROCEDURE — 86850 RBC ANTIBODY SCREEN: CPT | Performed by: ORTHOPAEDIC SURGERY

## 2024-07-09 PROCEDURE — 86900 BLOOD TYPING SEROLOGIC ABO: CPT | Performed by: ORTHOPAEDIC SURGERY

## 2024-07-09 PROCEDURE — 85610 PROTHROMBIN TIME: CPT | Performed by: ORTHOPAEDIC SURGERY

## 2024-07-09 RX ORDER — HYDROCODONE BITARTRATE AND ACETAMINOPHEN 5; 300 MG/1; MG/1
1 TABLET ORAL EVERY 6 HOURS PRN
COMMUNITY

## 2024-07-09 NOTE — PAT
Pt here for PAT visit.  Pre-op tests completed, chg soap given, and instructions reviewed.  Instructed clears until 2 hrs prior to arrival time, voiced understanding. ARROW pain pump reviewed with pt and handout given, ERAS handouts given and reviewed

## 2024-07-09 NOTE — DISCHARGE INSTRUCTIONS
PRE-ADMISSION TESTING INSTRUCTIONS FOR TOTAL JOINT PATIENTS    Take these medications the morning of surgery with a small sip of water: levothyroxine, metoprolol,     Stop Coumadin 5 days prior to surgery per Dr Mina       No aspirin, advil, aleve, ibuprofen, naproxen, diet pills, decongestants, or vitamin/herbal supplements for a week prior to your surgery.     Tylenol/Acetaminophen ok to take if needed.    Do not take any insulin or diabetes medications the morning of surgery.    Your surgeon may give you Bactroban to use prior to surgery. This will be started 5 days prior to surgery, follow the directions on your prescription from your surgeon for use.      General Instructions:    DO NOT EAT SOLID FOOD AFTER MIDNIGHT THE NIGHT BEFORE SURGERY. No gum, mints, or hard candy after midnight the night before surgery.  You may drink clear liquids the day of surgery up until 2 hours before your arrival time.  Clear liquids are liquids you can see through. Nothing RED in color.    Plain water    Sports drinks      Gelatin (Jell-O)  Fruit juices without pulp such as white grape juice and apple juice  Popsicles that contain no fruit or yogurt  Tea or coffee (no cream or milk added)    It is beneficial for you to have a clear drink that contains carbohydrates 2 hours prior to your arrival time.  DRINK A BOTTLE OF SPORTS DRINK 2 HOURS BEFORE YOUR ARRIVAL TIME. IF YOU ARE DIABETIC, DRINK A LOW OR NO SUGAR SPORTS DRINK. ANY COLOR EXCEPT RED.    Patients who avoid smoking, chewing tobacco and alcohol for 4 weeks prior to surgery have a reduced risk of post-operative complications.  If at all possible, quit smoking as many days before surgery as you can.    Do not smoke, use chewing tobacco or drink alcohol after midnight the day of surgery.    Bring your C-PAP/ BI-PAP machine if you use one.  Wear clean comfortable clothes.  Do not wear contact lenses, lotion, deodorant, or make-up.  Bring a case for your glasses if  applicable.   You may brush your teeth the morning of surgery.  You may wear dentures/partials, do not put adhesive/glue on them.  Leave all other jewelry and valuables at home.  NOTIFY YOUR SURGEON IF YOU BECOME ILL, HAVE A FEVER, PRODUCTIVE COUGH, OR CANNOT BE HERE THE DAY OF SURGERY.      Preventing a Surgical Site Infection:    Shower the night before and on the morning of surgery using the chlorhexidine soap you were given.  Use a clean washcloth with the soap.  Place clean sheets on your bed after showering the night before surgery. Do not use the CHG soap on your hair, face, or private areas. Wash your body gently for five (5) minutes. Do not scrub your skin.  Dry with a clean towel and dress in clean clothing.    Do not shave the surgical area for 10 days-2 weeks prior to surgery  because the razor can irritate skin and make it easier to develop an infection.  Make sure you, your family, and all healthcare providers clean their hands with soap and water or an alcohol based hand  before caring for you or your wound.      Day of surgery:    Your surgeon’s office will advise you of your arrival time for the day of surgery.    Upon arrival, a Pre-op nurse and Anesthesia provider will review your health history, obtain vital signs, and answer questions you may have. The anesthesia provider will also discuss the type of anesthesia that will be needed for your procedure, which may include general anesthesia. The only belongings needed at this time will be your home medications and if applicable your C-PAP/BI-PAP machine.  If you are staying overnight your family can leave the rest of your belongings in the car and bring them to your room later.  A Pre-op nurse will start an IV and you may receive medication in preparation for surgery, including something to help you relax.  Your family will be able to see you in the Pre-op area.  While you are in surgery your family should notify the waiting room   if they leave the waiting room area and provide a contact phone number.    If you have any questions, you can call the Pre-Admission Department at [phone] or your surgeon's office.    Please be aware that surgery does come with discomfort.  We want to make every effort to control your discomfort so please discuss any uncontrolled symptoms with your nurse.   Your doctor will most likely have prescribed pain medications.     You may have bruising or discomfort from the tourniquet used in surgery.     Please leave all luggage in the car the morning of surgery.  You will be transported to your hospital room following the recovery period.  Your family can get your luggage at that time.      You may receive a survey regarding the care you received. Your feedback is very important and will be used to collect the necessary data to help us to continue to provide excellent care.

## 2024-07-10 LAB
BACTERIA SPEC AEROBE CULT: NORMAL
MRSA SPEC QL CULT: NORMAL

## 2024-07-16 ENCOUNTER — TELEPHONE (OUTPATIENT)
Dept: ORTHOPEDIC SURGERY | Facility: CLINIC | Age: 79
End: 2024-07-16

## 2024-07-16 NOTE — CASE MANAGEMENT/SOCIAL WORK
Continued Stay Note  KATERINA Celeste     Patient Name: Anna Gilbert  MRN: [id number]  Today's Date: 7/16/2024    Admit Date: (Not on file)        Discharge Plan       Row Name 07/16/24 1439       Plan    Plan Comments CM spoke with patient today to arrange any needed equipment and arrange OP PT for her surgery with Dr. Azevedo on 7/23/24. Patient states she has a rolling walker, and quad cane at home and will need a BSC at discharge. She states she will need to do home health for physical therapy since she will not have transportation to OP PT and after reviewing she is agreeable for [facility] and for [facility] to arrange this service. She had no other questions at this time. SOREN spoke with Noy with [facility] and she states they can accept for PT in the home. CM will follow.                   Discharge Codes    No documentation.                       Alina Rubio RN

## 2024-07-16 NOTE — TELEPHONE ENCOUNTER
Contacted patient. H&P visit rescheduled, and sending clearance request form to pulmonologist's office

## 2024-07-16 NOTE — TELEPHONE ENCOUNTER
Caller: Anna Gilbert    Relationship: Self    Best call back number: [phone]    What was the call regarding: PT CALLING WITH AN UPDATE REGARDING THIS THREAD. PT HAS AN APPT WITH HER LUNG DR ON 07/19/24 TO BE CLEARED FOR SURGERY. PT WOULD LIKE TO KEEP HER SURGERY ON THE SCHEDULE WITH DR BARRY UNTIL HER APPT ON 07/19. PLEASE CONTACT PT TO DISCUSS.

## 2024-07-16 NOTE — TELEPHONE ENCOUNTER
Provider:      Caller: REBECCA SPRING     Relationship to Patient: SELF     Phone Number: [phone] (home)      Reason for Call: PATIENT HAS BEEN SEEN WITH HER DR AND THEY BELIEVE SHE HAS DEVELOPED COPD. SHE WOULD LIKE TO RESCHEDULE HER KNEE SURGERY FOR A LITTLE BIT LATER SO SHE CAN GET THIS TAKEN CARE OF.

## 2024-07-22 ENCOUNTER — OFFICE VISIT (OUTPATIENT)
Dept: ORTHOPEDIC SURGERY | Facility: CLINIC | Age: 79
End: 2024-07-22
Payer: MEDICARE

## 2024-07-22 ENCOUNTER — ANESTHESIA EVENT (OUTPATIENT)
Dept: PERIOP | Facility: HOSPITAL | Age: 79
End: 2024-07-22
Payer: MEDICARE

## 2024-07-22 VITALS — WEIGHT: 168 LBS | HEIGHT: 62 IN | BODY MASS INDEX: 30.91 KG/M2

## 2024-07-22 DIAGNOSIS — M17.11 PRIMARY OSTEOARTHRITIS OF RIGHT KNEE: Primary | ICD-10-CM

## 2024-07-22 PROCEDURE — 1159F MED LIST DOCD IN RCRD: CPT | Performed by: ORTHOPAEDIC SURGERY

## 2024-07-22 PROCEDURE — 1160F RVW MEDS BY RX/DR IN RCRD: CPT | Performed by: ORTHOPAEDIC SURGERY

## 2024-07-22 PROCEDURE — 99024 POSTOP FOLLOW-UP VISIT: CPT | Performed by: ORTHOPAEDIC SURGERY

## 2024-07-22 NOTE — PROGRESS NOTES
Cc: f/u right knee pain, DJD    Patient presented to clinic today for preoperative history and physical visit in anticipation of upcoming scheduled right total knee arthroplasty.    I reviewed anatomy and a model of a total knee arthroplasty, as well as typical postoperative recovery of 6-12 months before maximal recovery, and possible need for rehabilitation stay after hospitalization. We also discussed risks, benefits, and alternatives of procedure with risks including but not limited to neurovascular damage, bleeding, infection, malalignment, chronic pian, failure of implants, osteolysis, loosening of implants, loss of motion, weakness, stiffness, instability, DVT, pulmonary embolus, death, stroke, complex regional pain syndrome, myocardial infarction, and need for additional procedures. Patient understood all these and had all questions answered before consenting to the procedure. No guarantees were given in regards to results from the surgery.  Patient has been medically optimize by his primary care physician.    Postoperative DVT prophylaxis will be with Coumadin-on at baseline     All remaining questions answered today.

## 2024-07-22 NOTE — H&P (VIEW-ONLY)
History & Physical       Patient: Anna Gilbert    YOB: 1945    Medical Record Number: [id number]    Surgeon:  Dr. Danny Azevedo    Chief Complaints:   Chief Complaint   Patient presents with    Right Knee - Follow-up, Pre-op Exam       Subjective:  This problem is not new to this examiner.     History of Present Illness: 78 y.o. female presents with   Chief Complaint   Patient presents with    Right Knee - Follow-up, Pre-op Exam   . Onset of symptoms was years ago and has been progressively worsening despite more conservative treatment measures.  Symptoms are associated with ability to move, exercise, and perform activities of daily living.  Symptoms are aggravated by weight bearing and ROM necessary for activities of daily living.   Symptoms improve with rest, ice and elevation only minimally.      Allergies:   Allergies   Allergen Reactions    Amiodarone Shortness Of Breath, Nausea Only and Swelling     She reports she experienced significant fluid retention resulting in SOA and inability to walk, which she reports resolved as soon as she self-discontinued this medication.     Vancomycin Shortness Of Breath     Turned blue in face and became tachycardic 160s    Adhesive Tape Other (See Comments)     Thin skin - causes skin to tear and blister     Gabapentin Unknown - High Severity, Other (See Comments) and Rash     Feet peeling    Statins Other (See Comments)     Felt awful       Medications:   Home Medications:  Current Outpatient Medications on File Prior to Visit   Medication Sig    levothyroxine (SYNTHROID, LEVOTHROID) 150 MCG tablet Take 1 tablet by mouth Daily.    metoprolol succinate XL (TOPROL-XL) 50 MG 24 hr tablet TAKE ONE TABLET BY MOUTH DAILY (Patient taking differently: Take 1 tablet by mouth Daily.)    pantoprazole (PROTONIX) 40 MG EC tablet TAKE ONE TABLET BY MOUTH EVERY MORNING (Patient taking differently: Take 1 tablet by mouth Every Morning. Havent been taking)    torsemide  (DEMADEX) 20 MG tablet TAKE TWO TABLETS BY MOUTH DAILY    albuterol sulfate  (90 Base) MCG/ACT inhaler Inhale 2 puffs As Needed. (Patient not taking: Reported on 7/22/2024)    APPLE CIDER VINEGAR PO Take 1 tablet by mouth Daily. (Patient not taking: Reported on 7/22/2024)    b complex vitamins capsule Take 1 capsule by mouth Daily. (Patient not taking: Reported on 7/22/2024)    BIOTIN PO Take 1 tablet by mouth Daily. (Patient not taking: Reported on 7/22/2024)    CALCIUM-MAGNESIUM-ZINC PO Take 1 tablet by mouth Daily. (Patient not taking: Reported on 7/22/2024)    celecoxib (CeleBREX) 200 MG capsule Take 1 capsule by mouth Daily. Havent been taking (Patient not taking: Reported on 7/22/2024)    Cholecalciferol 50 MCG (2000 UT) capsule Take  by mouth Daily. (Patient not taking: Reported on 7/22/2024)    cyclobenzaprine (FLEXERIL) 5 MG tablet Take 1 tablet by mouth 3 (Three) Times a Day As Needed. (Patient not taking: Reported on 7/22/2024)    estradiol (ESTRACE) 0.1 MG/GM vaginal cream Insert 1 g into the vagina Daily. (Patient not taking: Reported on 7/22/2024)    HYDROcodone-Acetaminophen (XODOL) 5-300 MG per tablet Take 1 tablet by mouth Every 6 (Six) Hours As Needed for Moderate Pain. (Patient not taking: Reported on 7/22/2024)    Multiple Vitamin (MULTIVITAMIN) capsule Take 1 capsule by mouth Daily. (Patient not taking: Reported on 7/22/2024)    ondansetron ODT (ZOFRAN-ODT) 4 MG disintegrating tablet Place 1 tablet on the tongue Every 8 (Eight) Hours As Needed for Nausea or Vomiting. (Patient not taking: Reported on 7/22/2024)    potassium chloride (KLOR-CON) 20 MEQ CR tablet TAKE ONE TABLET BY MOUTH DAILY (Patient not taking: Reported on 7/22/2024)    Progesterone 40 % cream Use. (Patient not taking: Reported on 7/22/2024)    warfarin (COUMADIN) 5 MG tablet Take 1 tablet by mouth Every Night. TAKE AS DIRECTED (Patient not taking: Reported on 7/22/2024)     Current Facility-Administered Medications on  "File Prior to Visit   Medication    bupivacaine (MARCAINE) 0.125% in 0.9% sodium chloride 400 mL elastomeric pump \"pain ball\"     Current Medications:  Scheduled Meds:  Continuous Infusions:No current facility-administered medications for this visit.    PRN Meds:.    I have reviewed the patient's medical history in detail and updated the computerized patient record.  Review and summarization of old records include:    Past Medical History:   Diagnosis Date    Bradycardia     Chronic atrial fibrillation     Coronary artery disease     Essential (primary) hypertension     H/O mitral valve repair     Health care maintenance     Heart failure, unspecified     Hyperthyroidism     Hypothyroid     Localized edema     Lymphedema 06/02/2012    Mixed hyperlipidemia     NSVT (nonsustained ventricular tachycardia) 09/23/2020    SHEILA (obstructive sleep apnea) 07/20/2016    Pacemaker     PAF (paroxysmal atrial fibrillation)     Permanent atrial fibrillation     Sick sinus syndrome     Venous insufficiency (chronic) (peripheral)     Ventricular tachycardia     with ICD shock        Past Surgical History:   Procedure Laterality Date    AORTIC VALVE REPAIR/REPLACEMENT  2010    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    CARDIAC CATHETERIZATION  01/01/2011    CARDIAC CATHETERIZATION N/A 11/30/2021    Procedure: Left Heart Cath;  Surgeon: Nigel Egan MD;  Location:  [facility];  Service: Cardiovascular;  Laterality: N/A;    CARDIAC CATHETERIZATION N/A 11/30/2021    Procedure: Coronary angiography;  Surgeon: Nigel Egan MD;  Location:  [facility];  Service: Cardiovascular;  Laterality: N/A;    CARDIAC CATHETERIZATION N/A 11/30/2021    Procedure: Resting Full Cycle Ratio;  Surgeon: Nigel Egan MD;  Location:  [facility];  Service: Cardiovascular;  Laterality: N/A;    CARDIAC DEFIBRILLATOR PLACEMENT  2010    CARDIAC ELECTROPHYSIOLOGY PROCEDURE N/A 12/1/2021    " Procedure: ICD DC generator change---[employer];  Surgeon: Mick Perez MD;  Location: [facility] INVAS[facility] LOC[facility];  Service: Cardiology;  Laterality: N/A;    MITRAL VALVE REPAIR/REPLACEMENT  2010        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     Allergies:   Allergies   Allergen Reactions    Amiodarone Shortness Of Breath, Nausea Only and Swelling     She reports she experienced significant fluid retention resulting in SOA and inability to walk, which she reports resolved as soon as she self-discontinued this medication.     Vancomycin Shortness Of Breath     Turned blue in face and became tachycardic 160s    Adhesive Tape Other (See Comments)     Thin skin - causes skin to tear and blister     Gabapentin Unknown - High Severity, Other (See Comments) and Rash     Feet peeling    Statins Other (See Comments)     Felt awful     Constitutional:  Denies fever, shaking or chills   Eyes:  Denies change in visual acuity   HENT:  Denies nasal congestion or sore throat   Respiratory:  Denies cough or shortness of breath   Cardiovascular:  Denies chest pain or severe LE edema   GI:  Denies abdominal pain, nausea, vomiting, bloody stools or diarrhea   Musculoskeletal:  Denies numbness tingling or loss of motor function except as outlined above in history  of present illness.  : Denies painful urination or hematuria  Integument:  Denies rash, lesion or ulceration   Neurologic:  Denies headache or focal weakness  Endocrine:  Denies lymphadenopathy  Psych:  Denies confusion or change in mental status   Hem:  Denies active bleeding    Physical Exam: 78 y.o. female  Body mass index is 30.73 kg/m².  There were no vitals filed for this visit.  Vital signs reviewed.   General Appearance:    Alert, cooperative, in no acute distress                  Eyes: conjunctivae clear  ENT: external ears and nose atraumatic  CV: no peripheral edema  Resp: normal respiratory effort  Skin: no rashes or wounds; normal turgor  Psych: mood and affect appropriate  Lymph: no nodes appreciated  Neuro: gross sensation intact  Vascular:  Palpable peripheral pulse in noted extremity  Musculoskeletal Extremities:   Right knee-lymphedema wrapping noted below the level of the knee, pictures were most recent timeframe of beginning of May show good soft tissue edema control. Right knee range of motion is 3 to 120 degrees, 4 out of 5 strength on flexion extension, maximal tenderness palpation medial joint line as well as medial lateral patellar facet. Positive active patella compression test, stable to varus valgus stress at 3 and 30 degrees. Moderate effusion. No hip pain on logroll or Stincfield exam.     Debilities/Disabilities Identified: None      Diagnostic Tests:  Pre-Admission Testing on 07/09/2024   Component Date Value Ref Range Status    PTT 07/09/2024 29.8  24.3 - 38.1 seconds Final    Protime 07/09/2024 15.3 (H)  12.1 - 15.0 Seconds Final    INR 07/09/2024 1.22 (H)  0.90 - 1.10 Final    Glucose 07/09/2024 108 (H)  65 - 99 mg/dL Final    BUN 07/09/2024 13  8 - 23 mg/dL Final    Creatinine 07/09/2024 0.90  0.57 - 1.00 mg/dL Final    Sodium 07/09/2024 140  136 - 145 mmol/L Final    Potassium 07/09/2024 3.6  3.5 - 5.2 mmol/L Final    Chloride 07/09/2024 101  98 - 107 mmol/L Final    CO2  07/09/2024 25.7  22.0 - 29.0 mmol/L Final    Calcium 07/09/2024 9.7  8.6 - 10.5 mg/dL Final    BUN/Creatinine Ratio 07/09/2024 14.4  7.0 - 25.0 Final    Anion Gap 07/09/2024 13.3  5.0 - 15.0 mmol/L Final    eGFR 07/09/2024 64.8  >60.0 mL/min/1.73 Final    MRSA Screen Cx 07/09/2024 No Methicillin Resistant Staphylococcus aureus isolated   Final    Color, UA 07/09/2024 Yellow  Yellow, Straw Final    Appearance, UA 07/09/2024 Clear  Clear Final    pH, UA 07/09/2024 7.0  4.5 - 8.0 Final    Specific Gravity, UA 07/09/2024 1.010  1.003 - 1.030 Final    Glucose, UA 07/09/2024 Negative  Negative Final    Ketones, UA 07/09/2024 Negative  Negative Final    Bilirubin, UA 07/09/2024 Negative  Negative Final    Blood, UA 07/09/2024 Negative  Negative Final    Protein, UA 07/09/2024 Negative  Negative Final    Leuk Esterase, UA 07/09/2024 Moderate (2+) (A)  Negative Final    Nitrite, UA 07/09/2024 Negative  Negative Final    Urobilinogen, UA 07/09/2024 0.2 E.U./dL  0.2 - 1.0 E.U./dL Final    ABO Type 07/09/2024 O   Final    RH type 07/09/2024 Negative   Final    Antibody Screen 07/09/2024 Negative   Final    T&S Expiration Date 07/09/2024 7/23/2024 11:59:00 PM   Final    WBC 07/09/2024 9.05  3.40 - 10.80 10*3/mm3 Final    RBC 07/09/2024 4.46  3.77 - 5.28 10*6/mm3 Final    Hemoglobin 07/09/2024 12.7  12.0 - 15.9 g/dL Final    Hematocrit 07/09/2024 39.4  34.0 - 46.6 % Final    MCV 07/09/2024 88.3  79.0 - 97.0 fL Final    MCH 07/09/2024 28.5  26.6 - 33.0 pg Final    MCHC 07/09/2024 32.2  31.5 - 35.7 g/dL Final    RDW 07/09/2024 14.9  12.3 - 15.4 % Final    RDW-SD 07/09/2024 48.5  37.0 - 54.0 fl Final    MPV 07/09/2024 10.7  6.0 - 12.0 fL Final    Platelets 07/09/2024 185  140 - 450 10*3/mm3 Final    Neutrophil % 07/09/2024 69.4  42.7 - 76.0 % Final    Lymphocyte % 07/09/2024 11.5 (L)  19.6 - 45.3 % Final    Monocyte % 07/09/2024 14.8 (H)  5.0 - 12.0 % Final    Eosinophil % 07/09/2024 2.5  0.3 - 6.2 % Final    Basophil % 07/09/2024  1.0  0.0 - 1.5 % Final    Immature Grans % 07/09/2024 0.8 (H)  0.0 - 0.5 % Final    Neutrophils, Absolute 07/09/2024 6.28  1.70 - 7.00 10*3/mm3 Final    Lymphocytes, Absolute 07/09/2024 1.04  0.70 - 3.10 10*3/mm3 Final    Monocytes, Absolute 07/09/2024 1.34 (H)  0.10 - 0.90 10*3/mm3 Final    Eosinophils, Absolute 07/09/2024 0.23  0.00 - 0.40 10*3/mm3 Final    Basophils, Absolute 07/09/2024 0.09  0.00 - 0.20 10*3/mm3 Final    Immature Grans, Absolute 07/09/2024 0.07 (H)  0.00 - 0.05 10*3/mm3 Final    nRBC 07/09/2024 0.0  0.0 - 0.2 /100 WBC Final    RBC, UA 07/09/2024 None Seen  None Seen, 0-2 /HPF Final    WBC, UA 07/09/2024 6-10 (A)  None Seen, 0-2 /HPF Final    Bacteria, UA 07/09/2024 1+ (A)  None Seen /HPF Final    Squamous Epithelial Cells, UA 07/09/2024 7-12 (A)  None Seen, 0-2 /HPF Final    Hyaline Casts, UA 07/09/2024 None Seen  None Seen /LPF Final    Methodology 07/09/2024 Manual Light Microscopy   Final    Urine Culture 07/09/2024 50,000 CFU/mL Mixed Lilian Isolated   Final    ABO Type 07/09/2024 O   Final    RH type 07/09/2024 Negative   Final     No results found.      Assessment:  Right knee pain, DJD    Plan:  I reviewed anatomy of a total joint arthroplasty in laymen's terms, as well as typical postoperative recovery and possibly 6-12 months for maximal recovery, and possible need for rehabilitation stay after hospitalization. We also discussed risks, benefits, alternatives, and limitations of procedure with risks including but not limited to neurovascular damage, bleeding, infection, malalignment, chronic pain, failure of implants, osteolysis, loosening of implants, loss of motion, weakness, stiffness, instability, DVT, pulmonary embolus, death, stroke, complex regional pain syndrome, myocardial infarction, and need for additional procedures. No guarantees were given regarding results of surgery.      Anna Gilbert was given the opportunity to ask and have all questions answered today.  The patient  voiced understanding of the risks, benefits, and alternative forms of treatment that were discussed and the patient consents to proceed with surgery.     Patient's blood clot history is negative.    Plan for DVT prophylaxis is Coumadin at baseline dose    Patient's MRSA infection history is negative.    Patient's skin infection history is negative.    Discharge Plan: POD 1-2 to home    Date: 7/22/2024    Dictated utilizing Dragon dictation

## 2024-07-22 NOTE — H&P
History & Physical       Patient: Anna Gilbert    YOB: 1945    Medical Record Number: [id number]    Surgeon:  Dr. Danny Azevedo    Chief Complaints:   Chief Complaint   Patient presents with    Right Knee - Follow-up, Pre-op Exam       Subjective:  This problem is not new to this examiner.     History of Present Illness: 78 y.o. female presents with   Chief Complaint   Patient presents with    Right Knee - Follow-up, Pre-op Exam   . Onset of symptoms was years ago and has been progressively worsening despite more conservative treatment measures.  Symptoms are associated with ability to move, exercise, and perform activities of daily living.  Symptoms are aggravated by weight bearing and ROM necessary for activities of daily living.   Symptoms improve with rest, ice and elevation only minimally.      Allergies:   Allergies   Allergen Reactions    Amiodarone Shortness Of Breath, Nausea Only and Swelling     She reports she experienced significant fluid retention resulting in SOA and inability to walk, which she reports resolved as soon as she self-discontinued this medication.     Vancomycin Shortness Of Breath     Turned blue in face and became tachycardic 160s    Adhesive Tape Other (See Comments)     Thin skin - causes skin to tear and blister     Gabapentin Unknown - High Severity, Other (See Comments) and Rash     Feet peeling    Statins Other (See Comments)     Felt awful       Medications:   Home Medications:  Current Outpatient Medications on File Prior to Visit   Medication Sig    levothyroxine (SYNTHROID, LEVOTHROID) 150 MCG tablet Take 1 tablet by mouth Daily.    metoprolol succinate XL (TOPROL-XL) 50 MG 24 hr tablet TAKE ONE TABLET BY MOUTH DAILY (Patient taking differently: Take 1 tablet by mouth Daily.)    pantoprazole (PROTONIX) 40 MG EC tablet TAKE ONE TABLET BY MOUTH EVERY MORNING (Patient taking differently: Take 1 tablet by mouth Every Morning. Havent been taking)    torsemide  (DEMADEX) 20 MG tablet TAKE TWO TABLETS BY MOUTH DAILY    albuterol sulfate  (90 Base) MCG/ACT inhaler Inhale 2 puffs As Needed. (Patient not taking: Reported on 7/22/2024)    APPLE CIDER VINEGAR PO Take 1 tablet by mouth Daily. (Patient not taking: Reported on 7/22/2024)    b complex vitamins capsule Take 1 capsule by mouth Daily. (Patient not taking: Reported on 7/22/2024)    BIOTIN PO Take 1 tablet by mouth Daily. (Patient not taking: Reported on 7/22/2024)    CALCIUM-MAGNESIUM-ZINC PO Take 1 tablet by mouth Daily. (Patient not taking: Reported on 7/22/2024)    celecoxib (CeleBREX) 200 MG capsule Take 1 capsule by mouth Daily. Havent been taking (Patient not taking: Reported on 7/22/2024)    Cholecalciferol 50 MCG (2000 UT) capsule Take  by mouth Daily. (Patient not taking: Reported on 7/22/2024)    cyclobenzaprine (FLEXERIL) 5 MG tablet Take 1 tablet by mouth 3 (Three) Times a Day As Needed. (Patient not taking: Reported on 7/22/2024)    estradiol (ESTRACE) 0.1 MG/GM vaginal cream Insert 1 g into the vagina Daily. (Patient not taking: Reported on 7/22/2024)    HYDROcodone-Acetaminophen (XODOL) 5-300 MG per tablet Take 1 tablet by mouth Every 6 (Six) Hours As Needed for Moderate Pain. (Patient not taking: Reported on 7/22/2024)    Multiple Vitamin (MULTIVITAMIN) capsule Take 1 capsule by mouth Daily. (Patient not taking: Reported on 7/22/2024)    ondansetron ODT (ZOFRAN-ODT) 4 MG disintegrating tablet Place 1 tablet on the tongue Every 8 (Eight) Hours As Needed for Nausea or Vomiting. (Patient not taking: Reported on 7/22/2024)    potassium chloride (KLOR-CON) 20 MEQ CR tablet TAKE ONE TABLET BY MOUTH DAILY (Patient not taking: Reported on 7/22/2024)    Progesterone 40 % cream Use. (Patient not taking: Reported on 7/22/2024)    warfarin (COUMADIN) 5 MG tablet Take 1 tablet by mouth Every Night. TAKE AS DIRECTED (Patient not taking: Reported on 7/22/2024)     Current Facility-Administered Medications on  "File Prior to Visit   Medication    bupivacaine (MARCAINE) 0.125% in 0.9% sodium chloride 400 mL elastomeric pump \"pain ball\"     Current Medications:  Scheduled Meds:  Continuous Infusions:No current facility-administered medications for this visit.    PRN Meds:.    I have reviewed the patient's medical history in detail and updated the computerized patient record.  Review and summarization of old records include:    Past Medical History:   Diagnosis Date    Bradycardia     Chronic atrial fibrillation     Coronary artery disease     Essential (primary) hypertension     H/O mitral valve repair     Health care maintenance     Heart failure, unspecified     Hyperthyroidism     Hypothyroid     Localized edema     Lymphedema 06/02/2012    Mixed hyperlipidemia     NSVT (nonsustained ventricular tachycardia) 09/23/2020    SHEILA (obstructive sleep apnea) 07/20/2016    Pacemaker     PAF (paroxysmal atrial fibrillation)     Permanent atrial fibrillation     Sick sinus syndrome     Venous insufficiency (chronic) (peripheral)     Ventricular tachycardia     with ICD shock        Past Surgical History:   Procedure Laterality Date    AORTIC VALVE REPAIR/REPLACEMENT  2010    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    CARDIAC CATHETERIZATION  01/01/2011    CARDIAC CATHETERIZATION N/A 11/30/2021    Procedure: Left Heart Cath;  Surgeon: Nigel Egan MD;  Location:  [facility];  Service: Cardiovascular;  Laterality: N/A;    CARDIAC CATHETERIZATION N/A 11/30/2021    Procedure: Coronary angiography;  Surgeon: Nigel Egan MD;  Location:  [facility];  Service: Cardiovascular;  Laterality: N/A;    CARDIAC CATHETERIZATION N/A 11/30/2021    Procedure: Resting Full Cycle Ratio;  Surgeon: Nigel Egan MD;  Location:  [facility];  Service: Cardiovascular;  Laterality: N/A;    CARDIAC DEFIBRILLATOR PLACEMENT  2010    CARDIAC ELECTROPHYSIOLOGY PROCEDURE N/A 12/1/2021    " Procedure: ICD DC generator change---Medtronic;  Surgeon: Mick Perez MD;  Location: [facility] INVAS[facility] LOC[facility];  Service: Cardiology;  Laterality: N/A;    MITRAL VALVE REPAIR/REPLACEMENT  2010        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     Allergies:   Allergies   Allergen Reactions    Amiodarone Shortness Of Breath, Nausea Only and Swelling     She reports she experienced significant fluid retention resulting in SOA and inability to walk, which she reports resolved as soon as she self-discontinued this medication.     Vancomycin Shortness Of Breath     Turned blue in face and became tachycardic 160s    Adhesive Tape Other (See Comments)     Thin skin - causes skin to tear and blister     Gabapentin Unknown - High Severity, Other (See Comments) and Rash     Feet peeling    Statins Other (See Comments)     Felt awful     Constitutional:  Denies fever, shaking or chills   Eyes:  Denies change in visual acuity   HENT:  Denies nasal congestion or sore throat   Respiratory:  Denies cough or shortness of breath   Cardiovascular:  Denies chest pain or severe LE edema   GI:  Denies abdominal pain, nausea, vomiting, bloody stools or diarrhea   Musculoskeletal:  Denies numbness tingling or loss of motor function except as outlined above in history  of present illness.  : Denies painful urination or hematuria  Integument:  Denies rash, lesion or ulceration   Neurologic:  Denies headache or focal weakness  Endocrine:  Denies lymphadenopathy  Psych:  Denies confusion or change in mental status   Hem:  Denies active bleeding    Physical Exam: 78 y.o. female  Body mass index is 30.73 kg/m².  There were no vitals filed for this visit.  Vital signs reviewed.   General Appearance:    Alert, cooperative, in no acute distress                  Eyes: conjunctivae clear  ENT: external ears and nose atraumatic  CV: no peripheral edema  Resp: normal respiratory effort  Skin: no rashes or wounds; normal turgor  Psych: mood and affect appropriate  Lymph: no nodes appreciated  Neuro: gross sensation intact  Vascular:  Palpable peripheral pulse in noted extremity  Musculoskeletal Extremities:   Right knee-lymphedema wrapping noted below the level of the knee, pictures were most recent timeframe of beginning of May show good soft tissue edema control. Right knee range of motion is 3 to 120 degrees, 4 out of 5 strength on flexion extension, maximal tenderness palpation medial joint line as well as medial lateral patellar facet. Positive active patella compression test, stable to varus valgus stress at 3 and 30 degrees. Moderate effusion. No hip pain on logroll or Stincfield exam.     Debilities/Disabilities Identified: None      Diagnostic Tests:  Pre-Admission Testing on 07/09/2024   Component Date Value Ref Range Status    PTT 07/09/2024 29.8  24.3 - 38.1 seconds Final    Protime 07/09/2024 15.3 (H)  12.1 - 15.0 Seconds Final    INR 07/09/2024 1.22 (H)  0.90 - 1.10 Final    Glucose 07/09/2024 108 (H)  65 - 99 mg/dL Final    BUN 07/09/2024 13  8 - 23 mg/dL Final    Creatinine 07/09/2024 0.90  0.57 - 1.00 mg/dL Final    Sodium 07/09/2024 140  136 - 145 mmol/L Final    Potassium 07/09/2024 3.6  3.5 - 5.2 mmol/L Final    Chloride 07/09/2024 101  98 - 107 mmol/L Final    CO2  07/09/2024 25.7  22.0 - 29.0 mmol/L Final    Calcium 07/09/2024 9.7  8.6 - 10.5 mg/dL Final    BUN/Creatinine Ratio 07/09/2024 14.4  7.0 - 25.0 Final    Anion Gap 07/09/2024 13.3  5.0 - 15.0 mmol/L Final    eGFR 07/09/2024 64.8  >60.0 mL/min/1.73 Final    MRSA Screen Cx 07/09/2024 No Methicillin Resistant Staphylococcus aureus isolated   Final    Color, UA 07/09/2024 Yellow  Yellow, Straw Final    Appearance, UA 07/09/2024 Clear  Clear Final    pH, UA 07/09/2024 7.0  4.5 - 8.0 Final    Specific Gravity, UA 07/09/2024 1.010  1.003 - 1.030 Final    Glucose, UA 07/09/2024 Negative  Negative Final    Ketones, UA 07/09/2024 Negative  Negative Final    Bilirubin, UA 07/09/2024 Negative  Negative Final    Blood, UA 07/09/2024 Negative  Negative Final    Protein, UA 07/09/2024 Negative  Negative Final    Leuk Esterase, UA 07/09/2024 Moderate (2+) (A)  Negative Final    Nitrite, UA 07/09/2024 Negative  Negative Final    Urobilinogen, UA 07/09/2024 0.2 E.U./dL  0.2 - 1.0 E.U./dL Final    ABO Type 07/09/2024 O   Final    RH type 07/09/2024 Negative   Final    Antibody Screen 07/09/2024 Negative   Final    T&S Expiration Date 07/09/2024 7/23/2024 11:59:00 PM   Final    WBC 07/09/2024 9.05  3.40 - 10.80 10*3/mm3 Final    RBC 07/09/2024 4.46  3.77 - 5.28 10*6/mm3 Final    Hemoglobin 07/09/2024 12.7  12.0 - 15.9 g/dL Final    Hematocrit 07/09/2024 39.4  34.0 - 46.6 % Final    MCV 07/09/2024 88.3  79.0 - 97.0 fL Final    MCH 07/09/2024 28.5  26.6 - 33.0 pg Final    MCHC 07/09/2024 32.2  31.5 - 35.7 g/dL Final    RDW 07/09/2024 14.9  12.3 - 15.4 % Final    RDW-SD 07/09/2024 48.5  37.0 - 54.0 fl Final    MPV 07/09/2024 10.7  6.0 - 12.0 fL Final    Platelets 07/09/2024 185  140 - 450 10*3/mm3 Final    Neutrophil % 07/09/2024 69.4  42.7 - 76.0 % Final    Lymphocyte % 07/09/2024 11.5 (L)  19.6 - 45.3 % Final    Monocyte % 07/09/2024 14.8 (H)  5.0 - 12.0 % Final    Eosinophil % 07/09/2024 2.5  0.3 - 6.2 % Final    Basophil % 07/09/2024  1.0  0.0 - 1.5 % Final    Immature Grans % 07/09/2024 0.8 (H)  0.0 - 0.5 % Final    Neutrophils, Absolute 07/09/2024 6.28  1.70 - 7.00 10*3/mm3 Final    Lymphocytes, Absolute 07/09/2024 1.04  0.70 - 3.10 10*3/mm3 Final    Monocytes, Absolute 07/09/2024 1.34 (H)  0.10 - 0.90 10*3/mm3 Final    Eosinophils, Absolute 07/09/2024 0.23  0.00 - 0.40 10*3/mm3 Final    Basophils, Absolute 07/09/2024 0.09  0.00 - 0.20 10*3/mm3 Final    Immature Grans, Absolute 07/09/2024 0.07 (H)  0.00 - 0.05 10*3/mm3 Final    nRBC 07/09/2024 0.0  0.0 - 0.2 /100 WBC Final    RBC, UA 07/09/2024 None Seen  None Seen, 0-2 /HPF Final    WBC, UA 07/09/2024 6-10 (A)  None Seen, 0-2 /HPF Final    Bacteria, UA 07/09/2024 1+ (A)  None Seen /HPF Final    Squamous Epithelial Cells, UA 07/09/2024 7-12 (A)  None Seen, 0-2 /HPF Final    Hyaline Casts, UA 07/09/2024 None Seen  None Seen /LPF Final    Methodology 07/09/2024 Manual Light Microscopy   Final    Urine Culture 07/09/2024 50,000 CFU/mL Mixed Lilian Isolated   Final    ABO Type 07/09/2024 O   Final    RH type 07/09/2024 Negative   Final     No results found.      Assessment:  Right knee pain, DJD    Plan:  I reviewed anatomy of a total joint arthroplasty in laymen's terms, as well as typical postoperative recovery and possibly 6-12 months for maximal recovery, and possible need for rehabilitation stay after hospitalization. We also discussed risks, benefits, alternatives, and limitations of procedure with risks including but not limited to neurovascular damage, bleeding, infection, malalignment, chronic pain, failure of implants, osteolysis, loosening of implants, loss of motion, weakness, stiffness, instability, DVT, pulmonary embolus, death, stroke, complex regional pain syndrome, myocardial infarction, and need for additional procedures. No guarantees were given regarding results of surgery.      Anna Gilbert was given the opportunity to ask and have all questions answered today.  The patient  voiced understanding of the risks, benefits, and alternative forms of treatment that were discussed and the patient consents to proceed with surgery.     Patient's blood clot history is negative.    Plan for DVT prophylaxis is Coumadin at baseline dose    Patient's MRSA infection history is negative.    Patient's skin infection history is negative.    Discharge Plan: POD 1-2 to home    Date: 7/22/2024    Dictated utilizing Dragon dictation

## 2024-07-23 ENCOUNTER — HOSPITAL ENCOUNTER (OUTPATIENT)
Facility: HOSPITAL | Age: 79
Discharge: HOME | End: 2024-07-24
Attending: ORTHOPAEDIC SURGERY | Admitting: ORTHOPAEDIC SURGERY
Payer: MEDICARE

## 2024-07-23 ENCOUNTER — APPOINTMENT (OUTPATIENT)
Dept: GENERAL RADIOLOGY | Facility: HOSPITAL | Age: 79
End: 2024-07-23
Payer: MEDICARE

## 2024-07-23 ENCOUNTER — ANESTHESIA (OUTPATIENT)
Dept: PERIOP | Facility: HOSPITAL | Age: 79
End: 2024-07-23
Payer: MEDICARE

## 2024-07-23 DIAGNOSIS — Z96.651 STATUS POST TOTAL RIGHT KNEE REPLACEMENT: Primary | ICD-10-CM

## 2024-07-23 DIAGNOSIS — M17.11 PRIMARY OSTEOARTHRITIS OF RIGHT KNEE: ICD-10-CM

## 2024-07-23 LAB
INR PPP: 1.12 (ref 0.9–1.1)
PROTHROMBIN TIME: 14.8 SECONDS (ref 12.1–15)

## 2024-07-23 PROCEDURE — 63710000001 POVIDONE-IODINE 10 % SOLUTION 118 ML BOTTLE: Performed by: ORTHOPAEDIC SURGERY

## 2024-07-23 PROCEDURE — 25010000002 CEFAZOLIN PER 500 MG: Performed by: ORTHOPAEDIC SURGERY

## 2024-07-23 PROCEDURE — 27599 UNLISTED PX FEMUR/KNEE: CPT | Performed by: ORTHOPAEDIC SURGERY

## 2024-07-23 PROCEDURE — C1776 JOINT DEVICE (IMPLANTABLE): HCPCS | Performed by: ORTHOPAEDIC SURGERY

## 2024-07-23 PROCEDURE — A9270 NON-COVERED ITEM OR SERVICE: HCPCS | Performed by: ORTHOPAEDIC SURGERY

## 2024-07-23 PROCEDURE — 94761 N-INVAS EAR/PLS OXIMETRY MLT: CPT

## 2024-07-23 PROCEDURE — 25810000003 SODIUM CHLORIDE 0.9 % SOLUTION: Performed by: NURSE ANESTHETIST, CERTIFIED REGISTERED

## 2024-07-23 PROCEDURE — 25010000002 PROPOFOL 200 MG/20ML EMULSION: Performed by: NURSE ANESTHETIST, CERTIFIED REGISTERED

## 2024-07-23 PROCEDURE — 63710000001 ACETAMINOPHEN 325 MG TABLET: Performed by: ORTHOPAEDIC SURGERY

## 2024-07-23 PROCEDURE — 63710000001 PREGABALIN 75 MG CAPSULE: Performed by: ORTHOPAEDIC SURGERY

## 2024-07-23 PROCEDURE — 25010000002 ONDANSETRON PER 1 MG: Performed by: NURSE ANESTHETIST, CERTIFIED REGISTERED

## 2024-07-23 PROCEDURE — 25010000002 BUPIVACAINE 0.5 % SOLUTION

## 2024-07-23 PROCEDURE — 25810000003 SODIUM CHLORIDE 0.9 % SOLUTION: Performed by: ORTHOPAEDIC SURGERY

## 2024-07-23 PROCEDURE — 25810000003 LACTATED RINGERS PER 1000 ML: Performed by: NURSE ANESTHETIST, CERTIFIED REGISTERED

## 2024-07-23 PROCEDURE — C1713 ANCHOR/SCREW BN/BN,TIS/BN: HCPCS | Performed by: ORTHOPAEDIC SURGERY

## 2024-07-23 PROCEDURE — 25010000002 DEXAMETHASONE PER 1 MG: Performed by: NURSE ANESTHETIST, CERTIFIED REGISTERED

## 2024-07-23 PROCEDURE — 25010000002 BUPIVACAINE (PF) 0.25 % SOLUTION

## 2024-07-23 PROCEDURE — 63710000001 MELOXICAM 7.5 MG TABLET: Performed by: ORTHOPAEDIC SURGERY

## 2024-07-23 PROCEDURE — 27447 TOTAL KNEE ARTHROPLASTY: CPT | Performed by: ORTHOPAEDIC SURGERY

## 2024-07-23 PROCEDURE — G0378 HOSPITAL OBSERVATION PER HR: HCPCS

## 2024-07-23 PROCEDURE — 63710000001 ACETAMINOPHEN EXTRA STRENGTH 500 MG TABLET: Performed by: ORTHOPAEDIC SURGERY

## 2024-07-23 PROCEDURE — L1830 KO IMMOB CANVAS LONG PRE OTS: HCPCS | Performed by: ORTHOPAEDIC SURGERY

## 2024-07-23 PROCEDURE — 25010000002 PHENYLEPHRINE 10 MG/ML SOLUTION: Performed by: NURSE ANESTHETIST, CERTIFIED REGISTERED

## 2024-07-23 PROCEDURE — 73560 X-RAY EXAM OF KNEE 1 OR 2: CPT

## 2024-07-23 PROCEDURE — 27447 TOTAL KNEE ARTHROPLASTY: CPT | Performed by: SPECIALIST/TECHNOLOGIST, OTHER

## 2024-07-23 PROCEDURE — 25010000002 BUPIVACAINE (PF) 0.5 % SOLUTION: Performed by: ORTHOPAEDIC SURGERY

## 2024-07-23 PROCEDURE — 25010000002 MIDAZOLAM PER 1MG: Performed by: NURSE ANESTHETIST, CERTIFIED REGISTERED

## 2024-07-23 PROCEDURE — 25010000002 BUPIVACAINE (PF) 0.5 % SOLUTION: Performed by: NURSE ANESTHETIST, CERTIFIED REGISTERED

## 2024-07-23 PROCEDURE — 85610 PROTHROMBIN TIME: CPT | Performed by: ORTHOPAEDIC SURGERY

## 2024-07-23 DEVICE — CAP EXT STEM KN UPCHRG: Type: IMPLANTABLE DEVICE | Site: KNEE | Status: FUNCTIONAL

## 2024-07-23 DEVICE — IMPLANTABLE DEVICE
Type: IMPLANTABLE DEVICE | Site: KNEE | Status: FUNCTIONAL
Brand: PERSONA® VIVACIT-E®

## 2024-07-23 DEVICE — CAP TOTL KN CMT PRIMARY: Type: IMPLANTABLE DEVICE | Site: KNEE | Status: FUNCTIONAL

## 2024-07-23 DEVICE — IMPLANTABLE DEVICE
Type: IMPLANTABLE DEVICE | Site: KNEE | Status: FUNCTIONAL
Brand: REFOBACIN® BONE CEMENT R

## 2024-07-23 DEVICE — VIOLET ANTIBACTERIAL POLYDIOXANONE, KNOTLESS TISSUE CONTROL DEVICE
Type: IMPLANTABLE DEVICE | Site: KNEE | Status: FUNCTIONAL
Brand: STRATAFIX

## 2024-07-23 DEVICE — IMPLANTABLE DEVICE
Type: IMPLANTABLE DEVICE | Site: KNEE | Status: FUNCTIONAL
Brand: PERSONA®

## 2024-07-23 DEVICE — KNOTLESS TISSUE CONTROL DEVICE, UNDYED UNIDIRECTIONAL (ANTIBACTERIAL) SYNTHETIC ABSORBABLE DEVICE
Type: IMPLANTABLE DEVICE | Site: KNEE | Status: FUNCTIONAL
Brand: STRATAFIX

## 2024-07-23 DEVICE — STEM TIB/KN PERSONA CMT 5D SZD RT: Type: IMPLANTABLE DEVICE | Site: KNEE | Status: FUNCTIONAL

## 2024-07-23 RX ORDER — PANTOPRAZOLE SODIUM 40 MG/1
40 TABLET, DELAYED RELEASE ORAL EVERY MORNING
Status: DISCONTINUED | OUTPATIENT
Start: 2024-07-24 | End: 2024-07-24 | Stop reason: HOSPADM

## 2024-07-23 RX ORDER — BUPIVACAINE HYDROCHLORIDE 5 MG/ML
INJECTION, SOLUTION PERINEURAL
Status: COMPLETED | OUTPATIENT
Start: 2024-07-23 | End: 2024-07-23

## 2024-07-23 RX ORDER — CYCLOBENZAPRINE HCL 10 MG
5 TABLET ORAL 3 TIMES DAILY PRN
Status: DISCONTINUED | OUTPATIENT
Start: 2024-07-23 | End: 2024-07-24 | Stop reason: HOSPADM

## 2024-07-23 RX ORDER — BUPIVACAINE HYDROCHLORIDE 2.5 MG/ML
INJECTION, SOLUTION EPIDURAL; INFILTRATION; INTRACAUDAL
Status: COMPLETED | OUTPATIENT
Start: 2024-07-23 | End: 2024-07-23

## 2024-07-23 RX ORDER — DEXMEDETOMIDINE HYDROCHLORIDE 100 UG/ML
INJECTION, SOLUTION INTRAVENOUS AS NEEDED
Status: DISCONTINUED | OUTPATIENT
Start: 2024-07-23 | End: 2024-07-23 | Stop reason: SURG

## 2024-07-23 RX ORDER — ONDANSETRON 4 MG/1
4 TABLET, ORALLY DISINTEGRATING ORAL EVERY 6 HOURS PRN
Status: DISCONTINUED | OUTPATIENT
Start: 2024-07-23 | End: 2024-07-24 | Stop reason: HOSPADM

## 2024-07-23 RX ORDER — PROPOFOL 10 MG/ML
INJECTION, EMULSION INTRAVENOUS AS NEEDED
Status: DISCONTINUED | OUTPATIENT
Start: 2024-07-23 | End: 2024-07-23 | Stop reason: SURG

## 2024-07-23 RX ORDER — ONDANSETRON 2 MG/ML
4 INJECTION INTRAMUSCULAR; INTRAVENOUS ONCE AS NEEDED
Status: COMPLETED | OUTPATIENT
Start: 2024-07-23 | End: 2024-07-23

## 2024-07-23 RX ORDER — LIDOCAINE HYDROCHLORIDE 20 MG/ML
INJECTION, SOLUTION INFILTRATION; PERINEURAL AS NEEDED
Status: DISCONTINUED | OUTPATIENT
Start: 2024-07-23 | End: 2024-07-23 | Stop reason: SURG

## 2024-07-23 RX ORDER — METOPROLOL SUCCINATE 50 MG/1
50 TABLET, EXTENDED RELEASE ORAL DAILY
Status: DISCONTINUED | OUTPATIENT
Start: 2024-07-24 | End: 2024-07-24 | Stop reason: HOSPADM

## 2024-07-23 RX ORDER — OFLOXACIN 3 MG/ML
2 SOLUTION/ DROPS OPHTHALMIC 4 TIMES DAILY
Status: DISCONTINUED | OUTPATIENT
Start: 2024-07-23 | End: 2024-07-24 | Stop reason: HOSPADM

## 2024-07-23 RX ORDER — ACETAMINOPHEN 325 MG/1
950 TABLET ORAL 3 TIMES DAILY
Status: DISCONTINUED | OUTPATIENT
Start: 2024-07-23 | End: 2024-07-24 | Stop reason: HOSPADM

## 2024-07-23 RX ORDER — PREGABALIN 75 MG/1
150 CAPSULE ORAL ONCE
Status: COMPLETED | OUTPATIENT
Start: 2024-07-23 | End: 2024-07-23

## 2024-07-23 RX ORDER — MAGNESIUM HYDROXIDE 1200 MG/15ML
LIQUID ORAL AS NEEDED
Status: DISCONTINUED | OUTPATIENT
Start: 2024-07-23 | End: 2024-07-23 | Stop reason: HOSPADM

## 2024-07-23 RX ORDER — WARFARIN SODIUM 5 MG/1
5 TABLET ORAL NIGHTLY
Status: DISCONTINUED | OUTPATIENT
Start: 2024-07-23 | End: 2024-07-24 | Stop reason: HOSPADM

## 2024-07-23 RX ORDER — OXYCODONE HYDROCHLORIDE 5 MG/1
10 TABLET ORAL EVERY 4 HOURS PRN
Status: DISCONTINUED | OUTPATIENT
Start: 2024-07-23 | End: 2024-07-24 | Stop reason: HOSPADM

## 2024-07-23 RX ORDER — ONDANSETRON 4 MG/1
4 TABLET, ORALLY DISINTEGRATING ORAL EVERY 8 HOURS PRN
Status: DISCONTINUED | OUTPATIENT
Start: 2024-07-23 | End: 2024-07-24 | Stop reason: HOSPADM

## 2024-07-23 RX ORDER — SODIUM CHLORIDE 9 MG/ML
40 INJECTION, SOLUTION INTRAVENOUS AS NEEDED
Status: DISCONTINUED | OUTPATIENT
Start: 2024-07-23 | End: 2024-07-23 | Stop reason: HOSPADM

## 2024-07-23 RX ORDER — MELOXICAM 7.5 MG/1
15 TABLET ORAL ONCE
Status: COMPLETED | OUTPATIENT
Start: 2024-07-23 | End: 2024-07-23

## 2024-07-23 RX ORDER — PHENYLEPHRINE HYDROCHLORIDE 10 MG/ML
INJECTION INTRAVENOUS AS NEEDED
Status: DISCONTINUED | OUTPATIENT
Start: 2024-07-23 | End: 2024-07-23 | Stop reason: SURG

## 2024-07-23 RX ORDER — SODIUM CHLORIDE 0.9 % (FLUSH) 0.9 %
10 SYRINGE (ML) INJECTION EVERY 12 HOURS SCHEDULED
Status: DISCONTINUED | OUTPATIENT
Start: 2024-07-23 | End: 2024-07-23 | Stop reason: HOSPADM

## 2024-07-23 RX ORDER — LEVOTHYROXINE SODIUM 0.15 MG/1
150 TABLET ORAL DAILY
Status: DISCONTINUED | OUTPATIENT
Start: 2024-07-24 | End: 2024-07-24 | Stop reason: HOSPADM

## 2024-07-23 RX ORDER — ALBUTEROL SULFATE 90 UG/1
2 AEROSOL, METERED RESPIRATORY (INHALATION) EVERY 4 HOURS PRN
Status: DISCONTINUED | OUTPATIENT
Start: 2024-07-23 | End: 2024-07-24 | Stop reason: HOSPADM

## 2024-07-23 RX ORDER — NALOXONE HCL 0.4 MG/ML
0.4 VIAL (ML) INJECTION
Status: DISCONTINUED | OUTPATIENT
Start: 2024-07-23 | End: 2024-07-24 | Stop reason: HOSPADM

## 2024-07-23 RX ORDER — POTASSIUM CHLORIDE 20 MEQ/1
20 TABLET, EXTENDED RELEASE ORAL DAILY
Status: DISCONTINUED | OUTPATIENT
Start: 2024-07-24 | End: 2024-07-24 | Stop reason: HOSPADM

## 2024-07-23 RX ORDER — TETRACAINE HYDROCHLORIDE 5 MG/ML
2 SOLUTION OPHTHALMIC ONCE AS NEEDED
Status: DISCONTINUED | OUTPATIENT
Start: 2024-07-23 | End: 2024-07-24 | Stop reason: HOSPADM

## 2024-07-23 RX ORDER — TRANEXAMIC ACID 10 MG/ML
1000 INJECTION, SOLUTION INTRAVENOUS ONCE
Status: COMPLETED | OUTPATIENT
Start: 2024-07-23 | End: 2024-07-23

## 2024-07-23 RX ORDER — DICLOFENAC SODIUM 1 MG/ML
2 SOLUTION/ DROPS OPHTHALMIC 4 TIMES DAILY
Status: DISCONTINUED | OUTPATIENT
Start: 2024-07-23 | End: 2024-07-24 | Stop reason: HOSPADM

## 2024-07-23 RX ORDER — SODIUM CHLORIDE, SODIUM LACTATE, POTASSIUM CHLORIDE, CALCIUM CHLORIDE 600; 310; 30; 20 MG/100ML; MG/100ML; MG/100ML; MG/100ML
9 INJECTION, SOLUTION INTRAVENOUS CONTINUOUS PRN
Status: DISCONTINUED | OUTPATIENT
Start: 2024-07-23 | End: 2024-07-23 | Stop reason: HOSPADM

## 2024-07-23 RX ORDER — MIDAZOLAM HYDROCHLORIDE 2 MG/2ML
0.5 INJECTION, SOLUTION INTRAMUSCULAR; INTRAVENOUS
Status: COMPLETED | OUTPATIENT
Start: 2024-07-23 | End: 2024-07-23

## 2024-07-23 RX ORDER — ACETAMINOPHEN 500 MG
1000 TABLET ORAL ONCE
Status: COMPLETED | OUTPATIENT
Start: 2024-07-23 | End: 2024-07-23

## 2024-07-23 RX ORDER — FAMOTIDINE 10 MG/ML
20 INJECTION, SOLUTION INTRAVENOUS
Status: COMPLETED | OUTPATIENT
Start: 2024-07-23 | End: 2024-07-23

## 2024-07-23 RX ORDER — DEXAMETHASONE SODIUM PHOSPHATE 4 MG/ML
4 INJECTION, SOLUTION INTRA-ARTICULAR; INTRALESIONAL; INTRAMUSCULAR; INTRAVENOUS; SOFT TISSUE ONCE AS NEEDED
Status: COMPLETED | OUTPATIENT
Start: 2024-07-23 | End: 2024-07-23

## 2024-07-23 RX ORDER — TRANEXAMIC ACID 10 MG/ML
1000 INJECTION, SOLUTION INTRAVENOUS ONCE
Status: DISCONTINUED | OUTPATIENT
Start: 2024-07-23 | End: 2024-07-23 | Stop reason: HOSPADM

## 2024-07-23 RX ORDER — SODIUM CHLORIDE 0.9 % (FLUSH) 0.9 %
10 SYRINGE (ML) INJECTION AS NEEDED
Status: DISCONTINUED | OUTPATIENT
Start: 2024-07-23 | End: 2024-07-23 | Stop reason: HOSPADM

## 2024-07-23 RX ORDER — OXYCODONE HYDROCHLORIDE 5 MG/1
5 TABLET ORAL EVERY 4 HOURS PRN
Status: DISCONTINUED | OUTPATIENT
Start: 2024-07-23 | End: 2024-07-24 | Stop reason: HOSPADM

## 2024-07-23 RX ORDER — TORSEMIDE 20 MG/1
40 TABLET ORAL DAILY
Status: DISCONTINUED | OUTPATIENT
Start: 2024-07-24 | End: 2024-07-24 | Stop reason: HOSPADM

## 2024-07-23 RX ORDER — ONDANSETRON 2 MG/ML
4 INJECTION INTRAMUSCULAR; INTRAVENOUS EVERY 6 HOURS PRN
Status: DISCONTINUED | OUTPATIENT
Start: 2024-07-23 | End: 2024-07-24 | Stop reason: HOSPADM

## 2024-07-23 RX ADMIN — TRANEXAMIC ACID 1000 MG: 10 INJECTION, SOLUTION INTRAVENOUS at 15:50

## 2024-07-23 RX ADMIN — MIDAZOLAM HYDROCHLORIDE 0.5 MG: 1 INJECTION, SOLUTION INTRAMUSCULAR; INTRAVENOUS at 14:36

## 2024-07-23 RX ADMIN — LIDOCAINE HYDROCHLORIDE 50 MG: 20 INJECTION, SOLUTION INFILTRATION; PERINEURAL at 15:34

## 2024-07-23 RX ADMIN — MIDAZOLAM HYDROCHLORIDE 0.5 MG: 1 INJECTION, SOLUTION INTRAMUSCULAR; INTRAVENOUS at 14:48

## 2024-07-23 RX ADMIN — ONDANSETRON 4 MG: 2 INJECTION INTRAMUSCULAR; INTRAVENOUS at 12:50

## 2024-07-23 RX ADMIN — PREGABALIN 150 MG: 75 CAPSULE ORAL at 12:48

## 2024-07-23 RX ADMIN — SODIUM CHLORIDE, POTASSIUM CHLORIDE, SODIUM LACTATE AND CALCIUM CHLORIDE 9 ML/HR: 600; 310; 30; 20 INJECTION, SOLUTION INTRAVENOUS at 12:39

## 2024-07-23 RX ADMIN — CEFAZOLIN 2 G: 2 INJECTION, POWDER, FOR SOLUTION INTRAVENOUS at 15:35

## 2024-07-23 RX ADMIN — BUPIVACAINE HYDROCHLORIDE 20 ML: 2.5 INJECTION, SOLUTION EPIDURAL; INFILTRATION; INTRACAUDAL at 15:05

## 2024-07-23 RX ADMIN — TRANEXAMIC ACID 1000 MG: 10 INJECTION, SOLUTION INTRAVENOUS at 17:37

## 2024-07-23 RX ADMIN — FAMOTIDINE 20 MG: 10 INJECTION, SOLUTION INTRAVENOUS at 12:51

## 2024-07-23 RX ADMIN — DEXAMETHASONE SODIUM PHOSPHATE 4 MG: 4 INJECTION, SOLUTION INTRAMUSCULAR; INTRAVENOUS at 12:51

## 2024-07-23 RX ADMIN — BUPIVACAINE HYDROCHLORIDE 1.8 ML: 5 INJECTION, SOLUTION PERINEURAL at 15:16

## 2024-07-23 RX ADMIN — DEXMEDETOMIDINE 25 MCG: 100 INJECTION, SOLUTION, CONCENTRATE INTRAVENOUS at 14:55

## 2024-07-23 RX ADMIN — ACETAMINOPHEN 1000 MG: 500 TABLET ORAL at 12:48

## 2024-07-23 RX ADMIN — MELOXICAM 15 MG: 7.5 TABLET ORAL at 12:48

## 2024-07-23 RX ADMIN — PROPOFOL 15 MG: 10 INJECTION, EMULSION INTRAVENOUS at 15:35

## 2024-07-23 RX ADMIN — PROPOFOL 50 MCG/KG/MIN: 10 INJECTION, EMULSION INTRAVENOUS at 15:37

## 2024-07-23 RX ADMIN — PHENYLEPHRINE HYDROCHLORIDE 200 MCG: 10 INJECTION INTRAVENOUS at 16:49

## 2024-07-23 RX ADMIN — BUPIVACAINE HYDROCHLORIDE 10 ML: 2.5 INJECTION, SOLUTION EPIDURAL; INFILTRATION; INTRACAUDAL; PERINEURAL at 14:55

## 2024-07-23 RX ADMIN — PHENYLEPHRINE HYDROCHLORIDE 200 MCG: 10 INJECTION INTRAVENOUS at 15:42

## 2024-07-23 RX ADMIN — BUPIVACAINE HYDROCHLORIDE 8 ML/HR: 5 INJECTION, SOLUTION EPIDURAL; INTRACAUDAL; PERINEURAL at 18:00

## 2024-07-23 RX ADMIN — ACETAMINOPHEN 975 MG: 325 TABLET ORAL at 21:03

## 2024-07-23 RX ADMIN — BUPIVACAINE HYDROCHLORIDE 15 ML: 2.5 INJECTION, SOLUTION EPIDURAL; INFILTRATION; INTRACAUDAL at 15:02

## 2024-07-23 RX ADMIN — PHENYLEPHRINE HYDROCHLORIDE 200 MCG: 10 INJECTION INTRAVENOUS at 16:23

## 2024-07-23 RX ADMIN — DEXMEDETOMIDINE 15 MCG: 100 INJECTION, SOLUTION, CONCENTRATE INTRAVENOUS at 15:02

## 2024-07-23 NOTE — ANESTHESIA PREPROCEDURE EVALUATION
" Anesthesia Evaluation     Patient summary reviewed and Nursing notes reviewed   no history of anesthetic complications:   NPO Solid Status: > 8 hours  NPO Liquid Status: > 8 hours           Airway   Mallampati: II  Dental          Pulmonary - normal exam    breath sounds clear to auscultation  (+) a smoker (Quit 1988) Former, cigarettes, COPD (Recently reported by Pulmonologist) mild, asthma (Rescue inhaler x3 today; relates her SOB to anxiety),sleep apnea (Does not have CPAP)  Cardiovascular   Exercise tolerance: good (4-7 METS)    ECG reviewed  PT is on anticoagulation therapy  Patient on routine beta blocker and Beta blocker given within 24 hours of surgery  Rhythm: irregular  Rate: normal    (+) hypertension well controlled less than 2 medications, CAD, dysrhythmias Atrial Fib, angina, CHF Systolic <55%, hyperlipidemia    ROS comment: 24-hour Holter Monitor 6/4/24:  -\"Premature ventricular contractions occured frequently. Ventricular couplets, triplets, bigeminy and trigeminy. 9.4% PVCs noted. There were 5 episodes of ventricular tachycardia. The peak heart rate was 150 beats per minute. 4-5 beat runs of VT, no longer runs of VT.\"    TTE 12/1/23:  -Left ventricular ejection fraction appears to be 51 - 55%.  -Left ventricular wall thickness is consistent with moderate concentric hypertrophy.  -Left ventricular diastolic function was indeterminate.  -The left atrial cavity is severely dilated.  -Left atrial volume is severely increased.  -The right atrial cavity is severely  dilated.  -Aortic valve area is 1.9 cm2.  -Aortic valve maximum pressure gradient is 29 mmHg. Aortic valve mean pressure gradient is 16 mmHg.  -There is a bioprosthetic aortic valve present.  -There is a mitral valve ring present.  -Moderate tricuspid valve regurgitation is present.  -Calculated right ventricular systolic pressure from tricuspid regurgitation is 39 mmHg.    Previous Open AVR 2011    Medtronic PPM/AICD. Has been shocked prior " to pacemaker change 2 years ago.      Neuro/Psych- negative ROS  GI/Hepatic/Renal/Endo    (+) renal disease (Frequent bladder infections; last infection 2 weeks ago now cleared s/p abx)-, thyroid problem hypothyroidism    Musculoskeletal     Abdominal  - normal exam    Abdomen: soft.  Bowel sounds: normal.   Substance History - negative use     OB/GYN negative ob/gyn ROS         Other   arthritis (OA of bilateral knees),                       Anesthesia Plan    ASA 3     MAC, regional, spinal and ERAS Protocol   total IV anesthesia  intravenous induction     Anesthetic plan, risks, benefits, and alternatives have been provided, discussed and informed consent has been obtained with: patient and child.  Pre-procedure education provided  Use of blood products discussed with patient and child  Consented to blood products.    Plan discussed with CRNA.        CODE STATUS:

## 2024-07-23 NOTE — ANESTHESIA POSTPROCEDURE EVALUATION
Patient: Anna Gilbert    Procedure Summary       Date: 07/23/24 Room / Location:  [facility] /  [facility]    Anesthesia Start: 1527 Anesthesia Stop: 1755    Procedure: TOTAL KNEE ARTHROPLASTY AND ALL ASSOCIATED PROCEDURES (Right: Knee) Diagnosis:       Primary osteoarthritis of right knee      (Primary osteoarthritis of right knee [M17.11])    Surgeons: Danny Azevedo MD Provider: Alvina Levy CRNA    Anesthesia Type: MAC, regional, spinal, ERAS Protocol ASA Status: 3            Anesthesia Type: MAC, regional, spinal, ERAS Protocol    Vitals  Vitals Value Taken Time   /70 07/23/24 1835   Temp 97.7 °F (36.5 °C) 07/23/24 1800   Pulse 61 07/23/24 1839   Resp 19 07/23/24 1820   SpO2 98 % 07/23/24 1839   Vitals shown include unfiled device data.        Post Anesthesia Care and Evaluation    Patient location during evaluation: PACU  Patient participation: complete - patient participated  Level of consciousness: awake and alert  Pain score: 0  Pain management: adequate    Airway patency: patent  Anesthetic complications: No anesthetic complications  PONV Status: none  Cardiovascular status: acceptable  Respiratory status: acceptable  Hydration status: acceptable

## 2024-07-23 NOTE — ANESTHESIA PROCEDURE NOTES
Peripheral Block    Pre-sedation assessment completed: 7/23/2024 2:34 PM    Patient reassessed immediately prior to procedure    Patient location during procedure: pre-op  Start time: 7/23/2024 2:47 PM  Stop time: 7/23/2024 2:55 PM  Reason for block: at surgeon's request and post-op pain management  Performed by  CRNA/CAA: Mitesh Amaro CRNASRNA: Kee Harrison SRNA  Preanesthetic Checklist  Completed: patient identified, IV checked, site marked, risks and benefits discussed, surgical consent, monitors and equipment checked, pre-op evaluation and timeout performed  Prep:  Pt Position: supine  Sterile barriers:cap, gloves, sterile barriers, partial drape and mask  Prep: ChloraPrep  Patient monitoring: blood pressure monitoring, continuous pulse oximetry and EKG  Procedure    Sedation: yes  Performed under: local infiltration  Guidance:ultrasound guided    ULTRASOUND INTERPRETATION.  Using ultrasound guidance a 21 G gauge needle was placed in close proximity to the nerve, at which point, under ultrasound guidance anesthetic was injected in the area of the nerve and spread of the anesthesia was seen on ultrasound in close proximity thereto.  There were no abnormalities seen on ultrasound; a digital image was taken; and the patient tolerated the procedure with no complications. Images:still images obtained, printed/placed on chart    Laterality:right  Block Type:adductor canal block  Injection Technique:catheter  Needle Type:echogenic  Needle Gauge:21 G  Resistance on Injection: none  Catheter Size:20 G    Medications Used: bupivacaine PF (MARCAINE) injection 0.25% - Injection   10 mL - 7/23/2024 2:55:00 PM      Medications  Comment:25 mcg dexmedetomidine    Post Assessment  Injection Assessment: negative aspiration for heme, no paresthesia on injection and incremental injection  Patient Tolerance:comfortable throughout block  Complications:no  Performed by: Mitesh Amaro, VALENTÍN            
Peripheral Block    Pre-sedation assessment completed: 7/23/2024 2:34 PM    Patient reassessed immediately prior to procedure    Patient location during procedure: pre-op  Start time: 7/23/2024 3:00 PM  Stop time: 7/23/2024 3:02 PM  Reason for block: at surgeon's request and post-op pain management  Performed by  CRNA/CAA: Mitesh Amaro CRNA  Preanesthetic Checklist  Completed: patient identified, IV checked, site marked, risks and benefits discussed, surgical consent, monitors and equipment checked, pre-op evaluation and timeout performed  Prep:  Pt Position: supine  Sterile barriers:cap, gloves, mask and washed/disinfected hands  Prep: ChloraPrep  Patient monitoring: blood pressure monitoring, continuous pulse oximetry and EKG  Procedure    Sedation: yes  Performed under: local infiltration  Guidance:ultrasound guided    ULTRASOUND INTERPRETATION.  Using ultrasound guidance a 21 G gauge needle was placed in close proximity to the nerve, at which point, under ultrasound guidance anesthetic was injected in the area of the nerve and spread of the anesthesia was seen on ultrasound in close proximity thereto.  There were no abnormalities seen on ultrasound; a digital image was taken; and the patient tolerated the procedure with no complications. Images:still images obtained, printed/placed on chart    Laterality:right  Block Type:iPack  Injection Technique:single-shot  Needle Type:echogenic  Needle Gauge:21 G  Resistance on Injection: none    Medications Used: bupivacaine PF (MARCAINE) injection 0.25% - Injection   15 mL - 7/23/2024 3:02:00 PM      Medications  Comment:15 mcg demedetomidine    Post Assessment  Injection Assessment: negative aspiration for heme, no paresthesia on injection and incremental injection  Patient Tolerance:comfortable throughout block  Complications:no  Performed by: Mitesh Amaro CRNA            
Peripheral Block    Pre-sedation assessment completed: 7/23/2024 2:34 PM    Patient reassessed immediately prior to procedure    Patient location during procedure: pre-op  Start time: 7/23/2024 3:03 PM  Stop time: 7/23/2024 3:05 PM  Reason for block: at surgeon's request and post-op pain management  Performed by  CRNA/CAA: Mitesh Amaro CRNA  Preanesthetic Checklist  Completed: patient identified, IV checked, site marked, risks and benefits discussed, surgical consent, monitors and equipment checked, pre-op evaluation and timeout performed  Prep:  Pt Position: supine  Sterile barriers:cap, gloves, mask and washed/disinfected hands  Prep: ChloraPrep  Patient monitoring: blood pressure monitoring, continuous pulse oximetry and EKG  Procedure    Sedation: yes  Performed under: local infiltration  Guidance:ultrasound guided    ULTRASOUND INTERPRETATION.  Using ultrasound guidance a 21 G gauge needle was placed in close proximity to the nerve, at which point, under ultrasound guidance anesthetic was injected in the area of the nerve and spread of the anesthesia was seen on ultrasound in close proximity thereto.  There were no abnormalities seen on ultrasound; a digital image was taken; and the patient tolerated the procedure with no complications. Images:still images obtained, printed/placed on chart    Laterality:right  Block Type:EL  Injection Technique:single-shot  Needle Type:echogenic  Needle Gauge:21 G  Resistance on Injection: none    Medications Used: bupivacaine PF (MARCAINE) injection 0.25% - Injection   20 mL - 7/23/2024 3:05:00 PM      Post Assessment  Injection Assessment: negative aspiration for heme, no paresthesia on injection and incremental injection  Patient Tolerance:comfortable throughout block  Complications:no  Performed by: Mitesh Amaro CRNA            
Spinal Block    Pre-sedation assessment completed: 7/23/2024 2:34 PM    Patient reassessed immediately prior to procedure    Patient location during procedure: pre-op  Start Time: 7/23/2024 3:13 PM  Stop Time: 7/23/2024 3:16 PM  Indication:at surgeon's request and post-op pain management  Performed By  CRNA/CAA: Mitesh Amaro, CRNA  Preanesthetic Checklist  Completed: patient identified, IV checked, site marked, risks and benefits discussed, surgical consent, monitors and equipment checked, pre-op evaluation and timeout performed  Spinal Block Prep:  Patient Position:sitting  Sterile Tech:cap, gloves, mask and sterile barriers  Prep:Chloraprep  Patient Monitoring:blood pressure monitoring, continuous pulse oximetry and EKG    Spinal Block Procedure  Approach:midline  Guidance:landmark technique and palpation technique  Location:L3-L4  Needle Type:Sprotte  Needle Gauge:25 G  Placement of Spinal needle event:cerebrospinal fluid aspirated  Paresthesia: no  Fluid Appearance:clear  Medications: bupivacaine (MARCAINE) injection 0.5% - Injection   1.8 mL - 7/23/2024 3:16:00 PM   Post Assessment  Patient Tolerance:patient tolerated the procedure well with no apparent complications  Complications no            
done

## 2024-07-23 NOTE — INTERVAL H&P NOTE
H&P reviewed. The patient was examined and there are no changes to the H&P.    Vitals:    07/23/24 1154 07/23/24 1215   BP:  139/75   BP Location:  Right arm   Patient Position:  Lying   Pulse:  77   Resp:  14   Temp: 98.4 °F (36.9 °C)    TempSrc: Oral    SpO2:  95%   Weight: 70.8 kg (156 lb)

## 2024-07-23 NOTE — OP NOTE
Date of Surgery: 7/23/2024    Preoperative diagnosis: right knee osteoarthritis    Postoperative diagnosis: right knee osteoarthritis    Procedure:  1.right total knee arthroplasty  2. Intraoperative use of kinetic knee balance sensor for implant stability during total knee arthroplasty    Surgical Approach: Knee Medial Parapatellar         Surgeon: Roberto Carlos Jama.: Assistant: Leonel Martinez, MARCELLA was responsible for performing the following activities: Retraction, Irrigation, Closing, and Placing Dressing and their skilled assistance was necessary for the success of this case.    Anesthesia: MAC, regional, spinal, ERAS Protocol    Estimated blood loss: <500ml    Fluids: per anesthesia    Specimens: None    Complications: None    Drains: None    Tourniquet time: Tourniquet Times:     Inflated: 7/23/2024  4:09 PM     Deflated: 7/23/2024  5:44 PM    Implants used: Leroy Persona total knee arthroplasty system with a size 32 patella, size D tibia with 30 mm stem extension, size 6 narrow cruciate retaining femoral component , 13 mm highly cross-linked medial congruent polyethylene insert, antibiotic cement    Examination under anesthesia: right knee passive range of motion 3-110°, varus alignment, no open wounds lacerations or abrasions over knee, stable to varus and valgus stress at 3 and 30°, 1+ dorsalis pedis pulse.    Indications for procedure: Patient is a pleasant 78 y.o. female who has had significant pain to right knee over the last several years, has failed conservative treatment with intra-articular injections, bracing, home exercise program, activity modification, anti-inflammatory medications. Has had persistent pain limiting activities of daily living and even has had pain at rest. We discussed treatment options and patient wished to proceed with above-mentioned surgery. Was explained details of procedure as well as risks benefits and alternatives as documented on history and physical and had all  questions answered. No guarantees were given in regards to results of the surgery.    Details of procedure: Patient was seen, evaluated, and cleared for surgery by anesthesia. Had been medically optimized by primary care physician. Patient was met in the preoperative hold area, operative site was marked, consent was reviewed, history and physical was updated, and preoperative labs were reviewed. Regional block and spinal were placed per anesthesia and patient was taken to the operating room and placed in supine position on a regular OR table. Examination under anesthesia was carried out this time. Nonsterile tourniquet was then applied to right lower extremity. Patient was secured to the table with a waist strap and all bony prominences were well-padded. right lower extremity was then sterilely prepped and draped in standard fashion.  Formal timeout was completed including confirmation of history and physical, operative consent, operative site, patient identification number, and preoperative antibiotics administration. right leg was then exsanguinated and tourniquet inflated to 250 mmHg. Procedure was then begun with longitudinal incision over the anterior aspect of the right knee through skin and subcutaneous tissue with 15 blade. Minimal subcutaneous flaps were developed at this point in time, and standard medial parapatellar arthrotomy was then created at this point. Portion of suprapatellar and infrapatellar fat pad were resected this point in time. Medial tibial peel was carried out in order to allow for further exposure the knee. Medial and lateral meniscus were resected this time and ACL was transected.  Patella was then everted and measured with a caliper. Patellar reamer was then used to create initial retropatellar cut followed by completion of cut with a oscillating saw in standard fashion and use of rongeur. Patella was sized as a 32 and patella guard was placed at this time.  Attention was then turned  to the distal femur with the knee being brought into a flexed position.  Reference pin for I-Assist navigational system was placed in the distal femur in-line with Whitesides line in retrograde fashion.  Navigational distal femoral cutting block was positioned at this time as well and calibrated with multiple planes of knee and hip motion carried out to set reference positions for navigation device.  Navigational device was then adjusted to 0 degrees valgus cut on distal femur and 3 degrees flexion cut on distal femur.  Distal femoral cutting block was pinned into position at this site, navigational device removed. Depth of the resection was checked with a sickle and noted to be appropriate. Additional pin was placed for security of the cutting block and oscillating saw was then used to create a distal femoral cut in standard fashion while collateral ligaments were protected with Z retractors. Bone was removed and measured at this time.  Cut validation was completed with navigational device at this point time as well confirming appropriate cut consistent with set parameters as noted above. Pins and cutting block were removed as well. Contents of the notch were then resected including the ACL, PCL, and posterior horns of medial and lateral meniscus. Posterior retractor was then placed and tibia was subluxated anteriorly.   Attention was then turned to the proximal tibia. Additional release of lateral tissues was completed at this time to allow for appropriate visualization of the proximal tibia articular surface. I-Assist navigational device was then pinned in position over the tibial tubercle with 3 pins at this time, external guide was placed in line with the tibial crest and center of the ankle joint and clamped into position over the ankle while proximal guide pins were impacted into the top of the tibia.  Navigational pods were then calibrated with range of motion of the hip and once noted to be appropriate,  external guide from tibia was removed.  Adjustments were made to the navigational device to place the cut in neutral varus and 4 degrees posterior slope.  Tibial cutting block resection depth was set with a sickle, block was pinned into position at this time, and alignment assessed with the drop real noted to be in line with tibial crest, medial third of tibial tubercle, and center of the ankle joint.  Oscillating saw was then used to complete the proximal tibial cut while collateral ligaments were protected with Z retractors. Bone fragments were removed and measured.  Tibial cut was then validated with validation device from navigational system and confirmed to be within reasonable position of above-mentioned set parameters for the proximal tibia cut. Knee was brought into full extension with extension gap being checked with spacer block at this time and noted be acceptable with knee brought into full extension, stable medial and lateral collateral stability at full extension.  Navigational guide was then removed at this point time.   Attention was then returned to the distal femur with a femoral sizing guide being placed, transverse epicondylar axis and Whitesides line being assessed and used to determine appropriate external rotation of 3 degrees.  Femoral sizing guide was secured to the distal femur with 2 screws, sizing caliper was adjusted to the anterior femur and femur was sized at a 6.  2 drill holes were made through the sizing guide which was then removed including 2 screws. Size 6 femoral cutting block was then impacted onto the distal femoral cut surface and pinned into position. Sickle was used to check the anterior cut and there was no evidence of anticipated notching. Collateral ligaments were protected with Z retractors while anterior, posterior, and chamfer cuts were created in standard fashion with oscillating saw. Femoral cutting block was removed at this time as well as bone fragments. Posterior  capsule was stripped at this time. Size 6 femoral trial was placed. Size D tibial baseplate trial with 13 mm polyethylene trial was inserted. Knee was then ranged from 0-130°, good varus and valgus stability at full extension and 30° as well as throughout mid flexion with good AP translation at 90° of flexion noted.  Patella was everted at this point in time, 32 mm patella reaming guide was placed and lug holes were drilled for patella at this point. Patella trial was placed and patella was noted to track in midline with no evidence of subluxation. Knee was ranged from full extension to full flexion and tibial rotation was noted with midline of tibial trial being marked with Bovie electrocautery at the proximal tibia. Femoral and patellar and tibial trials were removed at this point in time and tibia was subluxated anteriorly with posterior retractor. Tibial trial baseplate was placed once again, position confirmed with drop real as well as with previous marking for tibial rotation and assessing for bony coverage of implant. Once position of tibial baseplate was noted to be appropriate, 2 pins were placed at this time, and reamer and punch were then used through the tibial baseplate.  All trial components were once again removed at this time and pulsatile lavage was used to thoroughly clean all bony cut surfaces as well as subcutaneous tissue. Final implants were opened on the back table this time. Cement was prepared on the back table and was applied to the cut surfaces of the distal femur, proximal tibia, and patella as well as to the backside of the implants. Tibial component was placed first followed by distal femur followed by patella. Extraneous cement was removed with freer at this time. Once all implants were in position knee was brought into full extension with axial compression to allow for cement to cure fully.  Once cement was completely hardened, trial polyethylene insert was assessed, range of motion of  knee from 0-130° was achieved with good varus and valgus stability throughout range of motion and good AP translation at 90° of flexion. Thus a 13 mm polyethylene insert was opened on the back table, inserted and locked in appropriately into the tibial baseplate. Knee was thoroughly irrigated with a second evaluation for any additional bone fragments or cement particles. Knee was then thoroughly irrigated with Betadine solution followed by pulsatile lavage.   Attention was then turned to closure of the wound with running self locking #2 suture ×1, 2-0 Vicryls in inverted fashion for deep subcutaneous closure, 3-0 running self locking strata-fix suture, and glue and mesh for skin. Wound was dressed with Telfa, 4 x 4 gauze, web roll, ABD pad, Ace wrap, and patient was placed in knee immobilizer and given ice pack. At the end of procedure all lap, needle and sponge counts were correct ×2. Patient had brisk cap refill all digits right lower extremity, compartments are soft and easily compressible at the end of the procedure.    Disposition: Patient was taken to recovery room in stable condition. Will be admitted for pain control and initiation of therapy, weight-bear as tolerated right lower extremity, DVT prophylaxis will be started on postoperative day #1 with baseline Coumadin. Results of the procedure were discussed immediately postoperatively with patient's family and they had all questions answered at that time.

## 2024-07-23 NOTE — DISCHARGE INSTRUCTIONS
Total Knee Replacement Discharge Instructions:    I. ACTIVITIES:  1. Exercises:  Complete exercise program as taught by the hospital physical therapist 2 times per day  Exercise program will be advanced by the physical therapist  During the day be up ambulating every 2 hours (while awake) for short distances  Complete the ankle pump exercises at least 10 times per hour (while awake)  Elevate legs most of the day the first week post operatively and thereafter elevate legs when in bed and for at least 30 minutes during the day. Caution must be taken to avoid pillow placement under the bend of the knee as this can lead to flexion contractures of the knee.  Use cold packs 20-30 minutes approximately 5 times per day. This should be done before and after completing your exercises and at any time you are experiencing pain/ stiffness in your operative extremity.  Apply 6 inch ace wraps to operative extremity from ankle to groin. Please keep in place at all times except when bathing.      2. Activities of Daily Living:  No tub baths, hot tubs, or swimming pools for 4 weeks  May shower on post-operative day #3- Do not scrub or rub around the incision or mesh. No soap or alcohol to incision, just let water run over wound.         II. RESTRICTIONS  Do not cross legs or kneel  Your surgeon will discuss with you when you will be able to drive again.  Weight bearing as tolerated  First week stay inside on even terrain. May go up and down stairs one stair at a time utilizing the hand rail  After one week, you may venture outside.     III. PRECAUTIONS:  Everyone that comes near you should wash their hands  No elective dental, genital-urinary, or colon procedures or surgical procedures for 12 weeks after surgery unless absolutely necessary.   If dental work or surgical procedure is deemed absolutely necessary, you will need to contact your surgeon as you will need to take antibiotics 1 hour prior to any dental work (including teeth  cleanings).  Please discuss with your surgeon prophylactic antibiotics as the length of time this intervention will be necessary for you varies with each patient’s health history and situation.  Avoid sick people. If you must be around someone who is ill, they should wear a mask.  Avoid visits to the Emergency Room or Urgent Care unless you are having a life              threatening event.     IV. INCISION CARE:  Wash your hands prior to dressing changes  Incision and knee should be covered with an ACE wrap daily for compression. No creams or ointments to the incision  Do not touch or pick at the incision  Check incision every day and notify surgeon immediately if any of the following signs or symptoms are noted:  Increase in redness  Increase in swelling around the incision and of the entire extremity  Increase in pain  Drainage oozing from the incision  Pulling apart of the edges of the incision  Increase in overall body temperature (greater than 100.5 degrees)  You will be given further instructions on removal of the glue and mesh over your incision at your first follow up visit at the office.     V. MEDICATIONS:   1. Anticoagulants: You will be discharged on an anticoagulant, typically either Aspirin or Eliquis. This is a prophylactic medication that helps prevent blood clots during your post-operative period. The type and length of dosage varies based on your individual needs, procedure performed, and surgeon’s preference.  While taking the anticoagulant, you should avoid taking any additional aspirin, ibuprofen (Advil or Motrin), Aleve (Naprosyn) or other non-steroidal anti-inflammatory medications.   Notify surgeon immediately if any verena bleeding is noted in the urine, stool, emesis, or from the nose or the incision. Blood in the stool will often appear as black rather than red. Blood in urine may appear as pink. Blood in emesis may appear as brown/black like coffee grounds.  You will need to apply pressure  for longer periods of time to any cuts or abrasions to stop bleeding  Avoid alcohol while taking anticoagulants    2. Stool Softeners: You will be at greater risk of constipation after surgery due to being less mobile and the pain medications.   Take stool softeners as instructed by your surgeon while on pain medications. Over the counter Colace 100 mg 1-2 capsules twice daily.   If stools become too loose or too frequent, please decreases the dosage or stop the stool softener.  If constipation occurs despite use of stool softeners, you are to continue the stool softeners and add a laxative (Milk of Magnesia 1 ounce daily as needed)  Drink plenty of fluids, and eat fruits and vegetables during your recovery time    3. Pain Medications utilized after surgery are narcotics and the law requires that the following information be given to all patients that are prescribed narcotics:  CLASSIFICATION: Pain medications are called Opioids and are narcotics  LEGALITIES: It is illegal to share narcotics with others and to drive within 24 hours of taking narcotics  POTENTIAL SIDE EFFECTS: Potential side effects of opioids include: nausea, vomiting, itching, dizziness, drowsiness, dry mouth, constipation, and difficulty urinating.  POTENTIAL ADVERSE EFFECTS:   Opioid tolerance can develop with use of pain medications and this simply means that it requires more and more of the medication to control pain; however, this is seen more in patients that use opioids for longer periods of time.  Opioid dependence can develop with use of Opioids and this simply means that to stop the medication can cause withdrawal symptoms; however, this is seen with patients that use Opioids for longer periods of time.  Opioid addiction can develop with use of Opioids and the incidence of this is very unlikely in patients who take the medications as ordered and stop the medications as instructed.  Opioid overdose can be dangerous, but is unlikely when  the medication is taken as ordered and stopped when ordered. It is important not to mix opioids with alcohol or with and type of sedative such as Benadryl as this can lead to over sedation and respiratory difficulty.  DOSAGE:   Pain medications will need to be taken consistently for the first week to decrease pain and promote adequate pain relief and participation in physical therapy.  After the initial surgical pain begins to resolve, you may begin to decrease the pain medication. By the end of 6 weeks, you should be off of pain medications.  Refills will not be given by the office during evening hours, on weekends, or after 12 weeks post-op.  To seek refills on pain medications during the initial 6 week post-operative period, you must call the office 48 hours in advance to request the refill. The office will then notify you when to  the prescription. DO NOT wait until you are out of the medication to request a refill.    VI. FOLLOW-UP VISITS:  You will need to follow up in the office with Juliette Arechiga Nurse Practitioner in 2 weeks. Please call  [phone]  to schedule this appointment.  You will need to follow up with your primary care physician within 4 weeks.  If you have any concerns or suspected complications prior to your follow up visit, please call your surgeons office. Do not wait until your appointment time if you suspect complications. These will need to be addressed in the office promptly.

## 2024-07-24 ENCOUNTER — HOSPITAL ENCOUNTER (OUTPATIENT)
Facility: HOSPITAL | Age: 79
Setting detail: OBSERVATION
LOS: 1 days | Discharge: NURSING, REHAB OR LONG-TERM CARE | End: 2024-07-27
Attending: EMERGENCY MEDICINE | Admitting: HOSPITALIST
Payer: MEDICARE

## 2024-07-24 ENCOUNTER — APPOINTMENT (OUTPATIENT)
Dept: GENERAL RADIOLOGY | Facility: HOSPITAL | Age: 79
End: 2024-07-24
Payer: MEDICARE

## 2024-07-24 VITALS
OXYGEN SATURATION: 94 % | SYSTOLIC BLOOD PRESSURE: 104 MMHG | RESPIRATION RATE: 16 BRPM | BODY MASS INDEX: 28.53 KG/M2 | TEMPERATURE: 97.5 F | WEIGHT: 156 LBS | DIASTOLIC BLOOD PRESSURE: 58 MMHG | HEART RATE: 60 BPM

## 2024-07-24 DIAGNOSIS — R53.1 WEAKNESS: Primary | ICD-10-CM

## 2024-07-24 DIAGNOSIS — N28.9 RENAL INSUFFICIENCY: ICD-10-CM

## 2024-07-24 DIAGNOSIS — I48.91 ATRIAL FIBRILLATION, UNSPECIFIED TYPE: ICD-10-CM

## 2024-07-24 DIAGNOSIS — J81.0 ACUTE PULMONARY EDEMA: ICD-10-CM

## 2024-07-24 DIAGNOSIS — Z96.652 STATUS POST TOTAL LEFT KNEE REPLACEMENT: ICD-10-CM

## 2024-07-24 LAB
ALBUMIN SERPL-MCNC: 4 G/DL (ref 3.5–5.2)
ALBUMIN/GLOB SERPL: 1.1 G/DL
ALP SERPL-CCNC: 65 U/L (ref 39–117)
ALT SERPL W P-5'-P-CCNC: <5 U/L (ref 1–33)
ANION GAP SERPL CALCULATED.3IONS-SCNC: 10.6 MMOL/L (ref 5–15)
ANION GAP SERPL CALCULATED.3IONS-SCNC: 13.2 MMOL/L (ref 5–15)
AST SERPL-CCNC: 24 U/L (ref 1–32)
BASOPHILS # BLD AUTO: 0.03 10*3/MM3 (ref 0–0.2)
BASOPHILS # BLD AUTO: 0.09 10*3/MM3 (ref 0–0.2)
BASOPHILS NFR BLD AUTO: 0.3 % (ref 0–1.5)
BASOPHILS NFR BLD AUTO: 0.6 % (ref 0–1.5)
BILIRUB SERPL-MCNC: 0.6 MG/DL (ref 0–1.2)
BUN SERPL-MCNC: 28 MG/DL (ref 8–23)
BUN SERPL-MCNC: 35 MG/DL (ref 8–23)
BUN/CREAT SERPL: 21.1 (ref 7–25)
BUN/CREAT SERPL: 21.5 (ref 7–25)
CALCIUM SPEC-SCNC: 9 MG/DL (ref 8.6–10.5)
CALCIUM SPEC-SCNC: 9 MG/DL (ref 8.6–10.5)
CHLORIDE SERPL-SCNC: 95 MMOL/L (ref 98–107)
CHLORIDE SERPL-SCNC: 98 MMOL/L (ref 98–107)
CO2 SERPL-SCNC: 23.8 MMOL/L (ref 22–29)
CO2 SERPL-SCNC: 26.4 MMOL/L (ref 22–29)
CREAT SERPL-MCNC: 1.3 MG/DL (ref 0.57–1)
CREAT SERPL-MCNC: 1.66 MG/DL (ref 0.57–1)
DEPRECATED RDW RBC AUTO: 46.8 FL (ref 37–54)
DEPRECATED RDW RBC AUTO: 47.1 FL (ref 37–54)
EGFRCR SERPLBLD CKD-EPI 2021: 31.1 ML/MIN/1.73
EGFRCR SERPLBLD CKD-EPI 2021: 41.7 ML/MIN/1.73
EOSINOPHIL # BLD AUTO: 0 10*3/MM3 (ref 0–0.4)
EOSINOPHIL # BLD AUTO: 0.06 10*3/MM3 (ref 0–0.4)
EOSINOPHIL NFR BLD AUTO: 0 % (ref 0.3–6.2)
EOSINOPHIL NFR BLD AUTO: 0.4 % (ref 0.3–6.2)
ERYTHROCYTE [DISTWIDTH] IN BLOOD BY AUTOMATED COUNT: 14.6 % (ref 12.3–15.4)
ERYTHROCYTE [DISTWIDTH] IN BLOOD BY AUTOMATED COUNT: 14.6 % (ref 12.3–15.4)
GLOBULIN UR ELPH-MCNC: 3.5 GM/DL
GLUCOSE SERPL-MCNC: 124 MG/DL (ref 65–99)
GLUCOSE SERPL-MCNC: 126 MG/DL (ref 65–99)
HCT VFR BLD AUTO: 37.8 % (ref 34–46.6)
HCT VFR BLD AUTO: 40.8 % (ref 34–46.6)
HGB BLD-MCNC: 12.1 G/DL (ref 12–15.9)
HGB BLD-MCNC: 13.3 G/DL (ref 12–15.9)
IMM GRANULOCYTES # BLD AUTO: 0.04 10*3/MM3 (ref 0–0.05)
IMM GRANULOCYTES # BLD AUTO: 0.13 10*3/MM3 (ref 0–0.05)
IMM GRANULOCYTES NFR BLD AUTO: 0.5 % (ref 0–0.5)
IMM GRANULOCYTES NFR BLD AUTO: 0.8 % (ref 0–0.5)
INR PPP: 1.15 (ref 0.9–1.1)
INR PPP: 1.15 (ref 0.9–1.1)
LYMPHOCYTES # BLD AUTO: 0.74 10*3/MM3 (ref 0.7–3.1)
LYMPHOCYTES # BLD AUTO: 1.07 10*3/MM3 (ref 0.7–3.1)
LYMPHOCYTES NFR BLD AUTO: 6.8 % (ref 19.6–45.3)
LYMPHOCYTES NFR BLD AUTO: 8.5 % (ref 19.6–45.3)
MCH RBC QN AUTO: 28.3 PG (ref 26.6–33)
MCH RBC QN AUTO: 28.5 PG (ref 26.6–33)
MCHC RBC AUTO-ENTMCNC: 32 G/DL (ref 31.5–35.7)
MCHC RBC AUTO-ENTMCNC: 32.6 G/DL (ref 31.5–35.7)
MCV RBC AUTO: 87.6 FL (ref 79–97)
MCV RBC AUTO: 88.3 FL (ref 79–97)
MONOCYTES # BLD AUTO: 1.49 10*3/MM3 (ref 0.1–0.9)
MONOCYTES # BLD AUTO: 2.79 10*3/MM3 (ref 0.1–0.9)
MONOCYTES NFR BLD AUTO: 17 % (ref 5–12)
MONOCYTES NFR BLD AUTO: 17.6 % (ref 5–12)
NEUTROPHILS NFR BLD AUTO: 11.7 10*3/MM3 (ref 1.7–7)
NEUTROPHILS NFR BLD AUTO: 6.44 10*3/MM3 (ref 1.7–7)
NEUTROPHILS NFR BLD AUTO: 73.7 % (ref 42.7–76)
NEUTROPHILS NFR BLD AUTO: 73.8 % (ref 42.7–76)
NRBC BLD AUTO-RTO: 0 /100 WBC (ref 0–0.2)
NRBC BLD AUTO-RTO: 0 /100 WBC (ref 0–0.2)
PLATELET # BLD AUTO: 171 10*3/MM3 (ref 140–450)
PLATELET # BLD AUTO: 182 10*3/MM3 (ref 140–450)
PMV BLD AUTO: 10.9 FL (ref 6–12)
PMV BLD AUTO: 11.3 FL (ref 6–12)
POTASSIUM SERPL-SCNC: 3.8 MMOL/L (ref 3.5–5.2)
POTASSIUM SERPL-SCNC: 4.2 MMOL/L (ref 3.5–5.2)
PROT SERPL-MCNC: 7.5 G/DL (ref 6–8.5)
PROTHROMBIN TIME: 15.1 SECONDS (ref 12.1–15)
PROTHROMBIN TIME: 15.1 SECONDS (ref 12.1–15)
RBC # BLD AUTO: 4.28 10*6/MM3 (ref 3.77–5.28)
RBC # BLD AUTO: 4.66 10*6/MM3 (ref 3.77–5.28)
SODIUM SERPL-SCNC: 132 MMOL/L (ref 136–145)
SODIUM SERPL-SCNC: 135 MMOL/L (ref 136–145)
TROPONIN T SERPL HS-MCNC: 33 NG/L
WBC NRBC COR # BLD AUTO: 15.84 10*3/MM3 (ref 3.4–10.8)
WBC NRBC COR # BLD AUTO: 8.74 10*3/MM3 (ref 3.4–10.8)

## 2024-07-24 PROCEDURE — 84443 ASSAY THYROID STIM HORMONE: CPT | Performed by: HOSPITALIST

## 2024-07-24 PROCEDURE — 97161 PT EVAL LOW COMPLEX 20 MIN: CPT

## 2024-07-24 PROCEDURE — 87102 FUNGUS ISOLATION CULTURE: CPT | Performed by: ORTHOPAEDIC SURGERY

## 2024-07-24 PROCEDURE — A9270 NON-COVERED ITEM OR SERVICE: HCPCS | Performed by: ORTHOPAEDIC SURGERY

## 2024-07-24 PROCEDURE — 63710000001 POTASSIUM CHLORIDE 20 MEQ TABLET CONTROLLED-RELEASE: Performed by: ORTHOPAEDIC SURGERY

## 2024-07-24 PROCEDURE — A9270 NON-COVERED ITEM OR SERVICE: HCPCS | Performed by: NURSE ANESTHETIST, CERTIFIED REGISTERED

## 2024-07-24 PROCEDURE — 85610 PROTHROMBIN TIME: CPT | Performed by: EMERGENCY MEDICINE

## 2024-07-24 PROCEDURE — 84484 ASSAY OF TROPONIN QUANT: CPT | Performed by: EMERGENCY MEDICINE

## 2024-07-24 PROCEDURE — 97165 OT EVAL LOW COMPLEX 30 MIN: CPT

## 2024-07-24 PROCEDURE — 63710000001 TORSEMIDE 20 MG TABLET: Performed by: ORTHOPAEDIC SURGERY

## 2024-07-24 PROCEDURE — 25010000002 CEFAZOLIN PER 500 MG: Performed by: ORTHOPAEDIC SURGERY

## 2024-07-24 PROCEDURE — 63710000001 METOPROLOL SUCCINATE XL 50 MG TABLET SUSTAINED-RELEASE 24 HOUR: Performed by: ORTHOPAEDIC SURGERY

## 2024-07-24 PROCEDURE — 85025 COMPLETE CBC W/AUTO DIFF WBC: CPT | Performed by: ORTHOPAEDIC SURGERY

## 2024-07-24 PROCEDURE — 63710000001 ACETAMINOPHEN 325 MG TABLET: Performed by: ORTHOPAEDIC SURGERY

## 2024-07-24 PROCEDURE — 99285 EMERGENCY DEPT VISIT HI MDM: CPT

## 2024-07-24 PROCEDURE — 71045 X-RAY EXAM CHEST 1 VIEW: CPT

## 2024-07-24 PROCEDURE — 93005 ELECTROCARDIOGRAM TRACING: CPT | Performed by: EMERGENCY MEDICINE

## 2024-07-24 PROCEDURE — 83880 ASSAY OF NATRIURETIC PEPTIDE: CPT | Performed by: EMERGENCY MEDICINE

## 2024-07-24 PROCEDURE — 85025 COMPLETE CBC W/AUTO DIFF WBC: CPT | Performed by: EMERGENCY MEDICINE

## 2024-07-24 PROCEDURE — 85610 PROTHROMBIN TIME: CPT | Performed by: ORTHOPAEDIC SURGERY

## 2024-07-24 PROCEDURE — 63710000001 PANTOPRAZOLE 40 MG TABLET DELAYED-RELEASE: Performed by: ORTHOPAEDIC SURGERY

## 2024-07-24 PROCEDURE — 63710000001: Performed by: NURSE ANESTHETIST, CERTIFIED REGISTERED

## 2024-07-24 PROCEDURE — 63710000001 OXYCODONE 5 MG TABLET: Performed by: ORTHOPAEDIC SURGERY

## 2024-07-24 PROCEDURE — 80053 COMPREHEN METABOLIC PANEL: CPT | Performed by: ORTHOPAEDIC SURGERY

## 2024-07-24 PROCEDURE — G0378 HOSPITAL OBSERVATION PER HR: HCPCS

## 2024-07-24 PROCEDURE — 63710000001 OFLOXACIN 0.3 % SOLUTION 5 ML BOTTLE: Performed by: NURSE ANESTHETIST, CERTIFIED REGISTERED

## 2024-07-24 PROCEDURE — 63710000001 LEVOTHYROXINE 150 MCG TABLET: Performed by: ORTHOPAEDIC SURGERY

## 2024-07-24 RX ORDER — OXYCODONE HYDROCHLORIDE AND ACETAMINOPHEN 5; 325 MG/1; MG/1
1 TABLET ORAL EVERY 4 HOURS PRN
Qty: 42 TABLET | Refills: 0 | Status: SHIPPED | OUTPATIENT
Start: 2024-07-24 | End: 2024-07-27 | Stop reason: HOSPADM

## 2024-07-24 RX ORDER — DICLOFENAC SODIUM 1 MG/ML
2 SOLUTION/ DROPS OPHTHALMIC 4 TIMES DAILY
Qty: 2.5 ML | Refills: 0 | Status: SHIPPED | OUTPATIENT
Start: 2024-07-24 | End: 2024-07-31

## 2024-07-24 RX ORDER — DOCUSATE SODIUM 100 MG/1
100 CAPSULE, LIQUID FILLED ORAL 2 TIMES DAILY PRN
Qty: 60 CAPSULE | Refills: 0 | Status: SHIPPED | OUTPATIENT
Start: 2024-07-24

## 2024-07-24 RX ORDER — OFLOXACIN 3 MG/ML
2 SOLUTION/ DROPS OPHTHALMIC 4 TIMES DAILY
Qty: 5 ML | Refills: 0 | Status: SHIPPED | OUTPATIENT
Start: 2024-07-24 | End: 2024-08-06

## 2024-07-24 RX ORDER — DICLOFENAC SODIUM 1 MG/ML
2 SOLUTION/ DROPS OPHTHALMIC 4 TIMES DAILY
Qty: 2.5 ML | Refills: 0 | Status: SHIPPED | OUTPATIENT
Start: 2024-07-24 | End: 2024-07-24

## 2024-07-24 RX ORDER — ONDANSETRON 4 MG/1
4 TABLET, ORALLY DISINTEGRATING ORAL EVERY 8 HOURS PRN
Qty: 20 TABLET | Refills: 0 | Status: SHIPPED | OUTPATIENT
Start: 2024-07-24

## 2024-07-24 RX ADMIN — METOPROLOL SUCCINATE 50 MG: 50 TABLET, EXTENDED RELEASE ORAL at 08:13

## 2024-07-24 RX ADMIN — OFLOXACIN 2 DROP: 3 SOLUTION OPHTHALMIC at 11:23

## 2024-07-24 RX ADMIN — ACETAMINOPHEN 975 MG: 325 TABLET ORAL at 08:13

## 2024-07-24 RX ADMIN — CEFAZOLIN 2000 MG: 2 INJECTION, POWDER, FOR SOLUTION INTRAMUSCULAR; INTRAVENOUS at 08:13

## 2024-07-24 RX ADMIN — DICLOFENAC SODIUM 2 DROP: 1 SOLUTION OPHTHALMIC at 08:13

## 2024-07-24 RX ADMIN — OFLOXACIN 2 DROP: 3 SOLUTION OPHTHALMIC at 08:13

## 2024-07-24 RX ADMIN — OXYCODONE HYDROCHLORIDE 5 MG: 5 TABLET ORAL at 15:27

## 2024-07-24 RX ADMIN — PANTOPRAZOLE SODIUM 40 MG: 40 TABLET, DELAYED RELEASE ORAL at 06:23

## 2024-07-24 RX ADMIN — TORSEMIDE 40 MG: 20 TABLET ORAL at 08:13

## 2024-07-24 RX ADMIN — POTASSIUM CHLORIDE 20 MEQ: 1500 TABLET, EXTENDED RELEASE ORAL at 08:13

## 2024-07-24 RX ADMIN — OXYCODONE HYDROCHLORIDE 5 MG: 5 TABLET ORAL at 01:16

## 2024-07-24 RX ADMIN — OXYCODONE HYDROCHLORIDE 5 MG: 5 TABLET ORAL at 06:20

## 2024-07-24 RX ADMIN — DICLOFENAC SODIUM 2 DROP: 1 SOLUTION OPHTHALMIC at 11:23

## 2024-07-24 RX ADMIN — CEFAZOLIN 2000 MG: 2 INJECTION, POWDER, FOR SOLUTION INTRAMUSCULAR; INTRAVENOUS at 00:30

## 2024-07-24 RX ADMIN — OXYCODONE HYDROCHLORIDE 5 MG: 5 TABLET ORAL at 11:21

## 2024-07-24 RX ADMIN — LEVOTHYROXINE SODIUM 150 MCG: 150 TABLET ORAL at 08:13

## 2024-07-24 NOTE — THERAPY EVALUATION
Acute Care - Occupational Therapy Initial Evaluation   Julia Cobb     Patient Name: Anna Gilbert  : 1945  MRN: [id number]  Today's Date: 2024  Onset of Illness/Injury or Date of Surgery: 24  Date of Referral to OT: 24  Referring Physician: Dr Azevedo    Admit Date: 2024       ICD-10-CM ICD-9-CM   1. Primary osteoarthritis of right knee  M17.11 715.16     Patient Active Problem List   Diagnosis    Hyperlipidemia    SHEILA (obstructive sleep apnea)    Aortic valve replaced    H/O mitral valve repair    Paroxysmal atrial fibrillation    Flu vaccine need    Primary osteoarthritis of left knee    Tendinopathy of right rotator cuff    Angina pectoris    NSVT (nonsustained ventricular tachycardia)    Primary osteoarthritis of right knee    Ventricular tachycardia    VT (ventricular tachycardia)    Nausea & vomiting    Acute constipation    ICD (implantable cardioverter-defibrillator) in place    Permanent atrial fibrillation    Lymphedema    Long COVID    S/P total knee arthroplasty     Past Medical History:   Diagnosis Date    Bradycardia     Chronic atrial fibrillation     Coronary artery disease     Essential (primary) hypertension     H/O mitral valve repair     Health care maintenance     Heart failure, unspecified     Hyperthyroidism     Hypothyroid     Localized edema     Lymphedema 2012    Mixed hyperlipidemia     NSVT (nonsustained ventricular tachycardia) 2020    SHEILA (obstructive sleep apnea) 2016    Pacemaker     PAF (paroxysmal atrial fibrillation)     Permanent atrial fibrillation     Sick sinus syndrome     Venous insufficiency (chronic) (peripheral)     Ventricular tachycardia     with ICD shock     Past Surgical History:   Procedure Laterality Date    AORTIC VALVE REPAIR/REPLACEMENT      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    CARDIAC CATHETERIZATION  2011    CARDIAC CATHETERIZATION N/A 2021    Procedure: Left Heart Cath;  Surgeon: Nigel Egan  MD;  Location:  [facility];  Service: Cardiovascular;  Laterality: N/A;    CARDIAC CATHETERIZATION N/A 11/30/2021    Procedure: Coronary angiography;  Surgeon: Nigel Egan MD;  Location:  [facility];  Service: Cardiovascular;  Laterality: N/A;    CARDIAC CATHETERIZATION N/A 11/30/2021    Procedure: Resting Full Cycle Ratio;  Surgeon: Nigel Egan MD;  Location:  [facility];  Service: Cardiovascular;  Laterality: N/A;    CARDIAC DEFIBRILLATOR PLACEMENT  2010    CARDIAC ELECTROPHYSIOLOGY PROCEDURE N/A 12/1/2021    Procedure: ICD DC generator change---[employer];  Surgeon: Mick Perez MD;  Location:  [facility];  Service: Cardiology;  Laterality: N/A;    MITRAL VALVE REPAIR/REPLACEMENT  2010         OT ASSESSMENT FLOWSHEET (Last 12 Hours)       OT Evaluation and Treatment       Row Name 07/24/24 0910                   OT Time and Intention    Subjective Information no complaints  -JJ        Document Type discharge evaluation/summary  -JJ        Mode of Treatment occupational therapy  -JJ        Patient Effort good  -JJ           General Information    Patient Profile Reviewed yes  -JJ        Onset of Illness/Injury or Date of Surgery 07/23/24  -JJ        Referring Physician Dr Azevedo  -JJ        General Observations of Patient pt supine in bed, agreed to evaluation  -JJ        Prior Level of Function independent:;all household mobility;transfer;ADL's;bed mobility  -JJ        Equipment Currently Used at Home walker, rolling  -JJ        Existing Precautions/Restrictions fall  -JJ        Risks Reviewed patient:;increased discomfort  -JJ        Benefits Reviewed patient:;improve function;increase independence  -JJ        Barriers to Rehab none identified  -JJ           Living Environment    Current Living Arrangements home  -JJ        People in Home child(vinicius), adult  lives with son  -JJ           Home Main Entrance    Number of  Stairs, Main Entrance four  -JJ        Stair Railings, Main Entrance railing on right side (ascending)  -           Stairs Within Home, Primary    Stairs, Within Home, Primary 1 story home  -           Pain Assessment    Pretreatment Pain Rating 0/10 - no pain  -J        Posttreatment Pain Rating 0/10 - no pain  -J           Cognition    Orientation Status (Cognition) oriented x 4  -JJ        Follows Commands (Cognition) [facility]  -        Personal Safety Interventions gait belt;nonskid shoes/slippers when out of bed  -           Range of Motion Comprehensive    General Range of Motion bilateral upper extremity ROM [facility]  -           Strength (Manual Muscle Testing)    Strength (Manual Muscle Testing) bilateral upper extremities;strength is [facility]  -           Sensory Assessment (Somatosensory)    Sensory Assessment (Somatosensory) sensation intact  -           Activities of Daily Living    BADL Assessment/Intervention lower body dressing  -           Lower Body Dressing Assessment/Training    Comment, (Lower Body Dressing) anticipate pt will require min assist for lb adls  -           Bed Mobility    Bed Mobility supine-sit  -        Supine-Sit Williams (Bed Mobility) standby assist  -        Assistive Device (Bed Mobility) bed rails;head of bed elevated  -           Functional Mobility    Functional Mobility- Ind. Level standby assist  -        Functional Mobility- Device walker, front-wheeled  -        Functional Mobility-Distance (Feet) 150  -           Transfer Assessment/Treatment    Comment, (Transfers) cues for hand placement  -           Sit-Stand Transfer    Sit-Stand Williams (Transfers) supervision  -        Assistive Device (Sit-Stand Transfers) walker, front-wheeled  -           Balance    Comment, Balance SBA for standing balance with RW  -           Wound 07/23/24 1716 Right anterior knee Incision    Wound - Properties Group Placement Date: 07/23/24  -TG  Placement Time: 1716 -TG Side: Right  -TG Orientation: anterior  -TG Location: knee  -TG Primary Wound Type: Incision  -TG Additional Comments: incision covered with exofin, telfa, ABDs, webril, ace wraps, immobilizer, ice pack  -TG    Retired Wound - Properties Group Placement Date: 07/23/24 -TG Placement Time: 1716 -TG Side: Right  -TG Orientation: anterior  -TG Location: knee  -TG Primary Wound Type: Incision  -TG Additional Comments: incision covered with exofin, telfa, ABDs, webril, ace wraps, immobilizer, ice pack  -TG    Retired Wound - Properties Group Date first assessed: 07/23/24 -TG Time first assessed: 1716 -TG Side: Right  -TG Location: knee  -TG Primary Wound Type: Incision  -TG Additional Comments: incision covered with exofin, telfa, ABDs, webril, ace wraps, immobilizer, ice pack  -TG       Plan of Care Review    Plan of Care Reviewed With patient  -JJ        Outcome Evaluation OT evaluation completed. pt required supervision for supine to sit transfer to EOB. pt required SBA for functional transfers and mobility x 150 feet with RW. anticipate pt will require min assist for lb adls. pt provided with long handled ae for performing lb adls at home. pt states son able to assist as needed. no further OT rec at this time. plan to discharge home with assist from son  -JJ           Positioning and Restraints    Pre-Treatment Position in bed  -JJ        Post Treatment Position chair  -JJ        In Chair notified nsg;reclined;call light within reach;with PT  -JJ           Therapy Assessment/Plan (OT)    Date of Referral to OT 07/24/24  -JJ        Patient/Family Therapy Goal Statement (OT) decrease pain  -JJ        OT Diagnosis decreased adls sp knee sx  -JJ        Criteria for Skilled Therapeutic Interventions Met (OT) no;no problems identified which require skilled intervention  -JJ        Therapy Frequency (OT) evaluation only  -JJ           Evaluation Complexity (OT)    Overall Complexity of  Evaluation (OT) low complexity  -           Therapy Plan Review/Discharge Plan (OT)    Anticipated Discharge Disposition (OT) home with assist;home with home health  -                  User Key  (r) = Recorded By, (t) = Taken By, (c) = Cosigned By      Initials Name Effective Dates    Meena Mendosa OTR 06/16/21 -     TG Eulalia Verdugo RN 08/16/21 -                      Occupational Therapy Education       Title: PT OT SLP Therapies (Resolved)       Topic: Occupational Therapy (Resolved)       Point: ADL training (Resolved)       Description:   Instruct learner(s) on proper safety adaptation and remediation techniques during self care or transfers.   Instruct in proper use of assistive devices.                  Learning Progress Summary             Patient Acceptance, E,TB, VU by TONYA at 7/24/2024 1158    Comment: pt educated on adls and safety with functional transfers and mobility                                         User Key       Initials Effective Dates Name Provider Type Discipline     06/16/21 -  Meena Hay, OTR Occupational Therapist OT                      OT Recommendation and Plan  Therapy Frequency (OT): evaluation only  Plan of Care Review  Plan of Care Reviewed With: patient  Outcome Evaluation: OT evaluation completed. pt required supervision for supine to sit transfer to EOB. pt required SBA for functional transfers and mobility x 150 feet with RW. anticipate pt will require min assist for lb adls. pt provided with long handled ae for performing lb adls at home. pt states son able to assist as needed. no further OT rec at this time. plan to discharge home with assist from son  Plan of Care Reviewed With: patient  Outcome Evaluation: OT evaluation completed. pt required supervision for supine to sit transfer to EOB. pt required SBA for functional transfers and mobility x 150 feet with RW. anticipate pt will require min assist for lb adls. pt provided with long handled  ae for performing lb adls at home. pt states son able to assist as needed. no further OT rec at this time. plan to discharge home with assist from son     Outcome Measures       Row Name 07/24/24 0910             How much help from another is currently needed...    Putting on and taking off regular lower body clothing? 3  -JJ      Bathing (including washing, rinsing, and drying) 3  -JJ      Toileting (which includes using toilet bed pan or urinal) 4  -JJ      Putting on and taking off regular upper body clothing 4  -JJ      Taking care of personal grooming (such as brushing teeth) 4  -JJ      Eating meals 4  -JJ      AM-PAC 6 Clicks Score (OT) 22  -JJ         Functional Assessment    Outcome Measure Options AM-PAC 6 Clicks Daily Activity (OT)  -                User Key  (r) = Recorded By, (t) = Taken By, (c) = Cosigned By      Initials Name Provider Type    Meena Mendosa OTR Occupational Therapist                    Time Calculation:    Time Calculation- OT       Row Name 07/24/24 1200             Time Calculation- OT    OT Start Time 0904  -J      OT Stop Time 0925  -J      OT Time Calculation (min) 21 min  -                User Key  (r) = Recorded By, (t) = Taken By, (c) = Cosigned By      Initials Name Provider Type    Meena Mendosa OTR Occupational Therapist                  Therapy Charges for Today       Code Description Service Date Service Provider Modifiers Qty    [id number]  OT EVAL LOW COMPLEXITY 2 7/24/2024 Meena Hay OTR GO 1                 JANI Amaya  7/24/2024

## 2024-07-24 NOTE — PROGRESS NOTES
POD# 1 s/p right TKA    Subjective:Patient states pain controlled overnight, denies fevers chills or sweats overnight, denies any residual numbness or tingling to operative extremity.  No calf pain or swelling.    Objective:  Vitals:    07/23/24 2110 07/23/24 2322 07/24/24 0300 07/24/24 0813   BP:  111/62 109/58 121/56   BP Location:  Right arm Right arm    Patient Position:  Lying Lying    Pulse:  83 74 76   Resp:  16 18    Temp:  96.8 °F (36 °C) 97.6 °F (36.4 °C)    TempSrc:  Axillary Oral    SpO2: 100% 100% 91%    Weight:           Results from last 7 days   Lab Units 07/24/24  0346   WBC 10*3/mm3 8.74   HEMOGLOBIN g/dL 12.1   HEMATOCRIT % 37.8   PLATELETS 10*3/mm3 171       Results from last 7 days   Lab Units 07/24/24  0346   SODIUM mmol/L 135*   POTASSIUM mmol/L 3.8   CHLORIDE mmol/L 98   CO2 mmol/L 26.4   BUN mg/dL 28*   CREATININE mg/dL 1.30*   GLUCOSE mg/dL 126*   CALCIUM mg/dL 9.0       Exam:    Right knee- dressing clean, dry, intact   Flex and extend toes and ankle   No calf pain, negative Homans sign   Positive sensation light touch right foot   Brisk cap refill all digits, palpable dorsalis pedis pulse   Staples along the medial aspect distal tib    Impression: s/p right TKA    Plan:  1. PT/OT- weight-bear as tolerated, ambulation, range of motion  2. Pain control-  oxycodone, Tylenol, Lyrica  3. DVT prophylaxis-Coumadin home dose  4. Wound care-leave mesh and glue in place until follow-up visit  5. Disposition- home with home health once medically stable and cleared by PT

## 2024-07-24 NOTE — PLAN OF CARE
Goal Outcome Evaluation:  Plan of Care Reviewed With: patient           Outcome Evaluation: Physical therapy evaluation complete.  Patient performs supine to sit with SBA and sit to stand with SBA.  Patient performs gait with rolling walker x150 feet with SBA.  Patient manages direction turns without difficulty.  Patient educated regarding ascending/descending steps.  Patient performs 10 reps of LE HEP, requires assistance for SLR.  Patient provided with written handout.  Patient reports no concerns regarding return home, no further PT needs in acute care setting.      Anticipated Discharge Disposition (PT): home with home health

## 2024-07-24 NOTE — PROGRESS NOTES
Patient: Anna Gilbert  Procedure(s):  TOTAL KNEE ARTHROPLASTY AND ALL ASSOCIATED PROCEDURES  Anesthesia type: MAC, regional, spinal, ERAS Protocol    Patient location: [facility] Surgical Floor  Last vitals:   Vitals:    07/24/24 0813   BP: 121/56   Pulse: 76   Resp: 16   Temp: 97.9 °F (36.6 °C)   SpO2: 94%     Level of consciousness: awake, alert, and oriented    Post-anesthesia pain: adequate analgesia  Airway patency: patent  Respiratory: unassisted  Cardiovascular: stable and blood pressure at baseline  Hydration: euvolemic    Anesthetic complications: yes: possible eye injury, patient states eye pain is improved from yesterday.

## 2024-07-24 NOTE — CASE MANAGEMENT/SOCIAL WORK
Continued Stay Note  KATERINA Celeste     Patient Name: Anna Gilbert  MRN: [id number]  Today's Date: 7/24/2024    Admit Date: 7/23/2024    Plan: Plan home with family/Caren    Discharge Plan       Row Name 07/24/24 8894       Plan    Plan Plan home with family/Caren     Patient/Family in Agreement with Plan yes    Plan Comments Spoke with patient at bedside, she is sitting up in recliner. Face sheet verified. Patient plans to return home and states her son and daughter will help her a needed. Caren CRUZ will follow for home PT and BSC has been delivered to bedside. Jaylin updated of anticipated dc today. No additional needs noted. Bridget LOYA updated. CM will continue to follow.                   Discharge Codes    No documentation.                       Bruno Woodruff RN

## 2024-07-24 NOTE — PLAN OF CARE
Goal Outcome Evaluation:                 Pt ambulated with PT, sat up in the chair most of the day. Admitted eye pain was improving, and tolerated her meal.

## 2024-07-24 NOTE — THERAPY DISCHARGE NOTE
Patient Name: Anna Gilbert  : 1945    MRN: [id number]                              Today's Date: 2024       Admit Date: 2024    Visit Dx:     ICD-10-CM ICD-9-CM   1. Primary osteoarthritis of right knee  M17.11 715.16     Patient Active Problem List   Diagnosis    Hyperlipidemia    SHEILA (obstructive sleep apnea)    Aortic valve replaced    H/O mitral valve repair    Paroxysmal atrial fibrillation    Flu vaccine need    Primary osteoarthritis of left knee    Tendinopathy of right rotator cuff    Angina pectoris    NSVT (nonsustained ventricular tachycardia)    Primary osteoarthritis of right knee    Ventricular tachycardia    VT (ventricular tachycardia)    Nausea & vomiting    Acute constipation    ICD (implantable cardioverter-defibrillator) in place    Permanent atrial fibrillation    Lymphedema    Long COVID    S/P total knee arthroplasty     Past Medical History:   Diagnosis Date    Bradycardia     Chronic atrial fibrillation     Coronary artery disease     Essential (primary) hypertension     H/O mitral valve repair     Health care maintenance     Heart failure, unspecified     Hyperthyroidism     Hypothyroid     Localized edema     Lymphedema 2012    Mixed hyperlipidemia     NSVT (nonsustained ventricular tachycardia) 2020    SHEILA (obstructive sleep apnea) 2016    Pacemaker     PAF (paroxysmal atrial fibrillation)     Permanent atrial fibrillation     Sick sinus syndrome     Venous insufficiency (chronic) (peripheral)     Ventricular tachycardia     with ICD shock     Past Surgical History:   Procedure Laterality Date    AORTIC VALVE REPAIR/REPLACEMENT      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    CARDIAC CATHETERIZATION  2011    CARDIAC CATHETERIZATION N/A 2021    Procedure: Left Heart Cath;  Surgeon: Nigel Egan MD;  Location: [facility] CATH INVASIVE LOCATION;  Service: Cardiovascular;  Laterality: N/A;    CARDIAC CATHETERIZATION N/A 2021    Procedure:  Coronary angiography;  Surgeon: Nigel Egan MD;  Location:  [facility] CATH INVASIVE LOCATION;  Service: Cardiovascular;  Laterality: N/A;    CARDIAC CATHETERIZATION N/A 11/30/2021    Procedure: Resting Full Cycle Ratio;  Surgeon: Nigel Egan MD;  Location:  [facility] CATH INVASIVE LOCATION;  Service: Cardiovascular;  Laterality: N/A;    CARDIAC DEFIBRILLATOR PLACEMENT  2010    CARDIAC ELECTROPHYSIOLOGY PROCEDURE N/A 12/1/2021    Procedure: ICD DC generator change---[employer];  Surgeon: Mick Perez MD;  Location:  [facility] CATH INVASIVE LOCATION;  Service: Cardiology;  Laterality: N/A;    MITRAL VALVE REPAIR/REPLACEMENT  2010      General Information       Row Name 07/24/24 0904          Physical Therapy Time and Intention    Document Type discharge evaluation/summary  -     Mode of Treatment physical therapy  -       Row Name 07/24/24 0904          General Information    Patient Profile Reviewed yes  pt s/p right TKA, WBAT  -     Prior Level of Function independent:;all household mobility;community mobility  -     Existing Precautions/Restrictions fall  -     Barriers to Rehab none identified  -       Row Name 07/24/24 0904          Living Environment    People in Home child(vinicius), adult  pt reports her adult son lives with her  -       Row Name 07/24/24 0904          Home Main Entrance    Number of Stairs, Main Entrance four  -     Stair Railings, Main Entrance railing on right side (ascending)  -       Row Name 07/24/24 0904          Stairs Within Home, Primary    Stairs, Within Home, Primary single story house  -       Row Name 07/24/24 0904          Cognition    Orientation Status (Cognition) oriented x 4  -               User Key  (r) = Recorded By, (t) = Taken By, (c) = Cosigned By      Initials Name Provider Type     Arti Hilton, PT Physical Therapist                   Mobility       Row Name 07/24/24 0904          Bed Mobility    Bed Mobility supine-sit  -      "Supine-Sit [place] (Bed Mobility) standby assist  -     Assistive Device (Bed Mobility) bed rails;head of bed elevated  -[facility] Name 07/24/24 0904          Transfers    Comment, (Transfers) cues for hand placement and controlled descent into sitting  -JW       Row Name 07/24/24 0904          Sit-Stand Transfer    Sit-Stand [place] (Transfers) supervision  -     Assistive Device (Sit-Stand Transfers) walker, front-wheeled  -JW       Row Name 07/24/24 0904          Gait/Stairs (Locomotion)    [place] Level (Gait) supervision  -     Assistive Device (Gait) walker, front-wheeled  -     Distance in Feet (Gait) 150  -     Bilateral Gait Deviations forward flexed posture  -     Comment, (Gait/Stairs) pt with no balance loss, manages direction changes without difficulty.  Education provided on ascending/descending steps.  patient declines need for formal training stating \"my son will be there and that is the way I have been doing it for a long time\"  -JW       Row Name 07/24/24 0904          Mobility    Extremity Weight-bearing Status right lower extremity  -     Right Lower Extremity (Weight-bearing Status) weight-bearing as tolerated (WBAT)  -               User Key  (r) = Recorded By, (t) = Taken By, (c) = Cosigned By      Initials Name Provider Type     Arti Hilton, PT Physical Therapist                   Obj/Interventions       Row Name 07/24/24 0904          Range of Motion Comprehensive    Comment, General Range of Motion left LE WFL, right LE not formally tested  -[facility] 07/24/24 0904          Strength Comprehensive (MMT)    Comment, General Manual Muscle Testing (MMT) Assessment left LE functional, right LE not formally tested  -[facility] 07/24/24 0904          Motor Skills    Therapeutic Exercise --  pt peforms 10 reps of LE exercises, requires assistance for SLR.  written handout provided  -JW       Row Name 07/24/24 0904          Balance    Comment, " "Balance SBA for standing balance with walker  -[facility] Name 07/24/24 0904          Sensory Assessment (Somatosensory)    Sensory Assessment (Somatosensory) sensation intact  -               User Key  (r) = Recorded By, (t) = Taken By, (c) = Cosigned By      Initials Name Provider Type    Atri Brewster, PT Physical Therapist                   Goals/Plan    No documentation.                  Clinical Impression       Row Name 07/24/24 0904          Pain    Pretreatment Pain Rating 0/10 - no pain  -     Posttreatment Pain Rating 0/10 - no pain  -JW       Row Name 07/24/24 0904          Plan of Care Review    Plan of Care Reviewed With patient  -     Outcome Evaluation Physical therapy evaluation complete.  Patient performs supine to sit with SBA and sit to stand with SBA.  Patient performs gait with rolling walker x150 feet with SBA.  Patient manages direction turns without difficulty.  Patient educated regarding ascending/descending steps.  Patient performs 10 reps of LE HEP, requires assistance for SLR.  Patient provided with written handout.  Patient reports no concerns regarding return home, no further PT needs in acute care setting.  -JW       Row Name 07/24/24 0904          Therapy Assessment/Plan (PT)    Patient/Family Therapy Goals Statement (PT) \"go home\"  -     Criteria for Skilled Interventions Met (PT) no problems identified which require skilled intervention  -     Therapy Frequency (PT) evaluation only  -JW       Row Name 07/24/24 0904          Positioning and Restraints    Pre-Treatment Position in bed  -     Post Treatment Position chair  -JW     In Chair reclined;notified nsg;call light within reach;encouraged to call for assist  -               User Key  (r) = Recorded By, (t) = Taken By, (c) = Cosigned By      Initials Name Provider Type    Arti Brewster, PT Physical Therapist                   Outcome Measures       Row Name 07/24/24 0904 07/23/24 5130       How much " help from another person do you currently need...    Turning from your back to your side while in flat bed without using bedrails? 4  -JW 4  -DM    Moving from lying on back to sitting on the side of a flat bed without bedrails? 3  -JW 3  -DM    Moving to and from a bed to a chair (including a wheelchair)? 3  -JW 2  -DM    Standing up from a chair using your arms (e.g., wheelchair, bedside chair)? 3  -JW 2  -DM    Climbing 3-5 steps with a railing? 3  -JW 2  -DM    To walk in hospital room? 3  -JW 2  -DM    AM-PAC 6 Clicks Score (PT) 19  -JW 15  -DM    Highest Level of Mobility Goal 6 --> Walk 10 steps or more  - 4 --> Transfer to chair/commode  -      Row Name 07/24/24 0904          PADD    Diagnosis 1  -     Gender 1  -     Age Group 0  -     Gait Distance 1  -     Assist Level 2  -     Home Support 3  -     PADD Score 8  -     Patient Preference home with home health  -     Prediction by PADD Score directly home (with home health or out-patient rehab)  -       Row Name 07/24/24 0904          Functional Assessment    Outcome Measure Options AM-PAC 6 Clicks Basic Mobility (PT);PADD  -               User Key  (r) = Recorded By, (t) = Taken By, (c) = Cosigned By      Initials Name Provider Type     Arti Hilton, PT Physical Therapist    Carly Trejo, RN Registered Nurse                  Physical Therapy Education       Title: PT OT SLP Therapies (Resolved)       Topic: Physical Therapy (Resolved)       Point: Mobility training (Resolved)       Learning Progress Summary             Patient Acceptance, E,TB, VU by  at 7/24/2024 1121                         Point: Home exercise program (Resolved)       Learning Progress Summary             Patient Acceptance, E,TB, VU by  at 7/24/2024 1121                                         User Key       Initials Effective Dates Name Provider Type [facility] 06/16/21 -  Arti Hilton, PT Physical Therapist PT                  PT  Recommendation and Plan     Plan of Care Reviewed With: patient  Outcome Evaluation: Physical therapy evaluation complete.  Patient performs supine to sit with SBA and sit to stand with SBA.  Patient performs gait with rolling walker x150 feet with SBA.  Patient manages direction turns without difficulty.  Patient educated regarding ascending/descending steps.  Patient performs 10 reps of LE HEP, requires assistance for SLR.  Patient provided with written handout.  Patient reports no concerns regarding return home, no further PT needs in acute care setting.     Time Calculation:   PT Evaluation Complexity  History, PT Evaluation Complexity: 1-2 personal factors and/or comorbidities  Examination of Body Systems (PT Eval Complexity): 1-2 elements  Clinical Presentation (PT Evaluation Complexity): stable  Clinical Decision Making (PT Evaluation Complexity): low complexity  Overall Complexity (PT Evaluation Complexity): low complexity     PT Charges       Row Name 07/24/24 1122             Time Calculation    Start Time 0904  -      Stop Time 0934  -      Time Calculation (min) 30 min  -      PT Received On 07/24/24  -                User Key  (r) = Recorded By, (t) = Taken By, (c) = Cosigned By      Initials Name Provider Type    Arti Brewster, CHRISTIANO Physical Therapist                  Therapy Charges for Today       Code Description Service Date Service Provider Modifiers Qty    [id number] HC PT EVAL LOW COMPLEXITY 2 7/24/2024 Arti Hilton, PT GP 1            PT G-Codes  Outcome Measure Options: AM-PAC 6 Clicks Basic Mobility (PT), PADD  AM-PAC 6 Clicks Score (PT): 19    PT Discharge Summary  Anticipated Discharge Disposition (PT): home with home health    Arti Hilton PT  7/24/2024        The patient is a 73y Female complaining of weakness.

## 2024-07-24 NOTE — PLAN OF CARE
"Goal Outcome Evaluation:              Outcome Evaluation: VSS. POD # 1 of total right knee arthroplasty. Refused to ambulate but did get ujp to [facility]. Medicated for pain x1. She stated pain medication made her feel \"woozy\" and \"funny\". Swab sent for Shantelle Aurus since patient states she had iut 20 yrs ago. Placed in contact isolation. Eye patch in place to right eye due to eye pain. Dressing with ice in place to right knee. Dressing with ace wrap in place to right lower leg. Voided without problems. Tolerating diet.                               "

## 2024-07-24 NOTE — PLAN OF CARE
Goal Outcome Evaluation:      PT arrived from PACU, c/o R eye pain, CRNA present. Pt alert and hungry. Son at bedside, denies pain.

## 2024-07-25 ENCOUNTER — READMISSION MANAGEMENT (OUTPATIENT)
Dept: CALL CENTER | Facility: HOSPITAL | Age: 79
End: 2024-07-25
Payer: MEDICARE

## 2024-07-25 ENCOUNTER — APPOINTMENT (OUTPATIENT)
Dept: CT IMAGING | Facility: HOSPITAL | Age: 79
End: 2024-07-25
Payer: MEDICARE

## 2024-07-25 PROBLEM — R53.1 WEAKNESS: Status: ACTIVE | Noted: 2024-07-25

## 2024-07-25 LAB
ALBUMIN SERPL-MCNC: 3.6 G/DL (ref 3.5–5.2)
ANION GAP SERPL CALCULATED.3IONS-SCNC: 12.7 MMOL/L (ref 5–15)
B PARAPERT DNA SPEC QL NAA+PROBE: NOT DETECTED
B PERT DNA SPEC QL NAA+PROBE: NOT DETECTED
BACTERIA UR QL AUTO: ABNORMAL /HPF
BASOPHILS # BLD AUTO: 0.1 10*3/MM3 (ref 0–0.2)
BASOPHILS NFR BLD AUTO: 0.6 % (ref 0–1.5)
BILIRUB UR QL STRIP: NEGATIVE
BUN SERPL-MCNC: 35 MG/DL (ref 8–23)
BUN/CREAT SERPL: 22.9 (ref 7–25)
C PNEUM DNA NPH QL NAA+NON-PROBE: NOT DETECTED
CALCIUM SPEC-SCNC: 8.8 MG/DL (ref 8.6–10.5)
CHLORIDE SERPL-SCNC: 100 MMOL/L (ref 98–107)
CLARITY UR: CLEAR
CO2 SERPL-SCNC: 20.3 MMOL/L (ref 22–29)
COLOR UR: YELLOW
CREAT SERPL-MCNC: 1.53 MG/DL (ref 0.57–1)
D-LACTATE SERPL-SCNC: 2 MMOL/L (ref 0.5–2)
DEPRECATED RDW RBC AUTO: 49.3 FL (ref 37–54)
EGFRCR SERPLBLD CKD-EPI 2021: 34.3 ML/MIN/1.73
EOSINOPHIL # BLD AUTO: 0.03 10*3/MM3 (ref 0–0.4)
EOSINOPHIL NFR BLD AUTO: 0.2 % (ref 0.3–6.2)
ERYTHROCYTE [DISTWIDTH] IN BLOOD BY AUTOMATED COUNT: 14.8 % (ref 12.3–15.4)
FINE GRAN CASTS URNS QL MICRO: ABNORMAL /LPF
FLUAV SUBTYP SPEC NAA+PROBE: NOT DETECTED
FLUBV RNA ISLT QL NAA+PROBE: NOT DETECTED
GEN 5 2HR TROPONIN T REFLEX: 34 NG/L
GLUCOSE SERPL-MCNC: 121 MG/DL (ref 65–99)
GLUCOSE UR STRIP-MCNC: NEGATIVE MG/DL
HADV DNA SPEC NAA+PROBE: NOT DETECTED
HCOV 229E RNA SPEC QL NAA+PROBE: NOT DETECTED
HCOV HKU1 RNA SPEC QL NAA+PROBE: NOT DETECTED
HCOV NL63 RNA SPEC QL NAA+PROBE: NOT DETECTED
HCOV OC43 RNA SPEC QL NAA+PROBE: NOT DETECTED
HCT VFR BLD AUTO: 40.3 % (ref 34–46.6)
HGB BLD-MCNC: 12.9 G/DL (ref 12–15.9)
HGB UR QL STRIP.AUTO: NEGATIVE
HMPV RNA NPH QL NAA+NON-PROBE: NOT DETECTED
HPIV1 RNA ISLT QL NAA+PROBE: NOT DETECTED
HPIV2 RNA SPEC QL NAA+PROBE: NOT DETECTED
HPIV3 RNA NPH QL NAA+PROBE: NOT DETECTED
HPIV4 P GENE NPH QL NAA+PROBE: NOT DETECTED
HYALINE CASTS UR QL AUTO: ABNORMAL /LPF
IMM GRANULOCYTES # BLD AUTO: 0.15 10*3/MM3 (ref 0–0.05)
IMM GRANULOCYTES NFR BLD AUTO: 0.9 % (ref 0–0.5)
KETONES UR QL STRIP: ABNORMAL
LEUKOCYTE ESTERASE UR QL STRIP.AUTO: ABNORMAL
LYMPHOCYTES # BLD AUTO: 1.16 10*3/MM3 (ref 0.7–3.1)
LYMPHOCYTES NFR BLD AUTO: 6.7 % (ref 19.6–45.3)
M PNEUMO IGG SER IA-ACNC: NOT DETECTED
MCH RBC QN AUTO: 29.1 PG (ref 26.6–33)
MCHC RBC AUTO-ENTMCNC: 32 G/DL (ref 31.5–35.7)
MCV RBC AUTO: 90.8 FL (ref 79–97)
MONOCYTES # BLD AUTO: 3.37 10*3/MM3 (ref 0.1–0.9)
MONOCYTES NFR BLD AUTO: 19.4 % (ref 5–12)
NEUTROPHILS NFR BLD AUTO: 12.57 10*3/MM3 (ref 1.7–7)
NEUTROPHILS NFR BLD AUTO: 72.2 % (ref 42.7–76)
NITRITE UR QL STRIP: NEGATIVE
NRBC BLD AUTO-RTO: 0 /100 WBC (ref 0–0.2)
NT-PROBNP SERPL-MCNC: 3175 PG/ML (ref 0–1800)
PH UR STRIP.AUTO: <=5 [PH] (ref 4.5–8)
PHOSPHATE SERPL-MCNC: 3.3 MG/DL (ref 2.5–4.5)
PLATELET # BLD AUTO: 162 10*3/MM3 (ref 140–450)
PMV BLD AUTO: 11.5 FL (ref 6–12)
POTASSIUM SERPL-SCNC: 4.8 MMOL/L (ref 3.5–5.2)
PROCALCITONIN SERPL-MCNC: 0.34 NG/ML (ref 0–0.25)
PROT UR QL STRIP: NEGATIVE
QT INTERVAL: 367 MS
QTC INTERVAL: 456 MS
RBC # BLD AUTO: 4.44 10*6/MM3 (ref 3.77–5.28)
RBC # UR STRIP: ABNORMAL /HPF
REF LAB TEST METHOD: ABNORMAL
RHINOVIRUS RNA SPEC NAA+PROBE: NOT DETECTED
RSV RNA NPH QL NAA+NON-PROBE: NOT DETECTED
SARS-COV-2 RNA NPH QL NAA+NON-PROBE: NOT DETECTED
SODIUM SERPL-SCNC: 133 MMOL/L (ref 136–145)
SP GR UR STRIP: 1.01 (ref 1–1.03)
SQUAMOUS #/AREA URNS HPF: ABNORMAL /HPF
TRANS CELLS #/AREA URNS HPF: ABNORMAL /HPF
TROPONIN T DELTA: 1 NG/L
TROPONIN T SERPL HS-MCNC: 33 NG/L
TSH SERPL DL<=0.05 MIU/L-ACNC: 1.73 UIU/ML (ref 0.27–4.2)
UROBILINOGEN UR QL STRIP: ABNORMAL
WBC # UR STRIP: ABNORMAL /HPF
WBC NRBC COR # BLD AUTO: 17.38 10*3/MM3 (ref 3.4–10.8)

## 2024-07-25 PROCEDURE — 87086 URINE CULTURE/COLONY COUNT: CPT | Performed by: INTERNAL MEDICINE

## 2024-07-25 PROCEDURE — A9270 NON-COVERED ITEM OR SERVICE: HCPCS | Performed by: HOSPITALIST

## 2024-07-25 PROCEDURE — 99222 1ST HOSP IP/OBS MODERATE 55: CPT | Performed by: HOSPITALIST

## 2024-07-25 PROCEDURE — 25010000002 CEFTRIAXONE PER 250 MG: Performed by: EMERGENCY MEDICINE

## 2024-07-25 PROCEDURE — 25010000002 AZITHROMYCIN PER 500 MG: Performed by: EMERGENCY MEDICINE

## 2024-07-25 PROCEDURE — 25510000001 IOPAMIDOL PER 1 ML: Performed by: EMERGENCY MEDICINE

## 2024-07-25 PROCEDURE — 0202U NFCT DS 22 TRGT SARS-COV-2: CPT | Performed by: INTERNAL MEDICINE

## 2024-07-25 PROCEDURE — 84484 ASSAY OF TROPONIN QUANT: CPT | Performed by: HOSPITALIST

## 2024-07-25 PROCEDURE — G0378 HOSPITAL OBSERVATION PER HR: HCPCS

## 2024-07-25 PROCEDURE — 63710000001 LEVOTHYROXINE 150 MCG TABLET: Performed by: HOSPITALIST

## 2024-07-25 PROCEDURE — 25810000003 SODIUM CHLORIDE 0.9 % SOLUTION 250 ML FLEX CONT: Performed by: EMERGENCY MEDICINE

## 2024-07-25 PROCEDURE — 96365 THER/PROPH/DIAG IV INF INIT: CPT

## 2024-07-25 PROCEDURE — 94761 N-INVAS EAR/PLS OXIMETRY MLT: CPT

## 2024-07-25 PROCEDURE — 93010 ELECTROCARDIOGRAM REPORT: CPT | Performed by: INTERNAL MEDICINE

## 2024-07-25 PROCEDURE — 94799 UNLISTED PULMONARY SVC/PX: CPT

## 2024-07-25 PROCEDURE — 25010000002 ENOXAPARIN PER 10 MG: Performed by: HOSPITALIST

## 2024-07-25 PROCEDURE — 71260 CT THORAX DX C+: CPT

## 2024-07-25 PROCEDURE — 36415 COLL VENOUS BLD VENIPUNCTURE: CPT

## 2024-07-25 PROCEDURE — 70450 CT HEAD/BRAIN W/O DYE: CPT

## 2024-07-25 PROCEDURE — 25810000003 SODIUM CHLORIDE 0.9 % SOLUTION: Performed by: HOSPITALIST

## 2024-07-25 PROCEDURE — 63710000001: Performed by: HOSPITALIST

## 2024-07-25 PROCEDURE — 85025 COMPLETE CBC W/AUTO DIFF WBC: CPT | Performed by: HOSPITALIST

## 2024-07-25 PROCEDURE — 63710000001 WARFARIN 5 MG TABLET: Performed by: HOSPITALIST

## 2024-07-25 PROCEDURE — 63710000001 METOPROLOL SUCCINATE XL 50 MG TABLET SUSTAINED-RELEASE 24 HOUR: Performed by: HOSPITALIST

## 2024-07-25 PROCEDURE — 25010000002 BUPIVACAINE (PF) 0.5 % SOLUTION: Performed by: HOSPITALIST

## 2024-07-25 PROCEDURE — 25010000002 ONDANSETRON PER 1 MG

## 2024-07-25 PROCEDURE — 96367 TX/PROPH/DG ADDL SEQ IV INF: CPT

## 2024-07-25 PROCEDURE — 63710000001 OXYCODONE-ACETAMINOPHEN 5-325 MG TABLET: Performed by: HOSPITALIST

## 2024-07-25 PROCEDURE — 63710000001 OFLOXACIN 0.3 % SOLUTION 5 ML BOTTLE: Performed by: HOSPITALIST

## 2024-07-25 PROCEDURE — 25010000002 FUROSEMIDE PER 20 MG: Performed by: HOSPITALIST

## 2024-07-25 PROCEDURE — 83605 ASSAY OF LACTIC ACID: CPT | Performed by: EMERGENCY MEDICINE

## 2024-07-25 PROCEDURE — 80069 RENAL FUNCTION PANEL: CPT | Performed by: HOSPITALIST

## 2024-07-25 PROCEDURE — 97161 PT EVAL LOW COMPLEX 20 MIN: CPT

## 2024-07-25 PROCEDURE — 81001 URINALYSIS AUTO W/SCOPE: CPT | Performed by: EMERGENCY MEDICINE

## 2024-07-25 PROCEDURE — 84145 PROCALCITONIN (PCT): CPT | Performed by: HOSPITALIST

## 2024-07-25 PROCEDURE — 87040 BLOOD CULTURE FOR BACTERIA: CPT | Performed by: EMERGENCY MEDICINE

## 2024-07-25 PROCEDURE — 97165 OT EVAL LOW COMPLEX 30 MIN: CPT

## 2024-07-25 RX ORDER — ONDANSETRON 2 MG/ML
4 INJECTION INTRAMUSCULAR; INTRAVENOUS EVERY 4 HOURS PRN
Status: DISCONTINUED | OUTPATIENT
Start: 2024-07-25 | End: 2024-07-27 | Stop reason: HOSPADM

## 2024-07-25 RX ORDER — FUROSEMIDE 10 MG/ML
40 INJECTION INTRAMUSCULAR; INTRAVENOUS ONCE
Status: COMPLETED | OUTPATIENT
Start: 2024-07-25 | End: 2024-07-25

## 2024-07-25 RX ORDER — LEVOTHYROXINE SODIUM 0.15 MG/1
150 TABLET ORAL
Status: DISCONTINUED | OUTPATIENT
Start: 2024-07-25 | End: 2024-07-27 | Stop reason: HOSPADM

## 2024-07-25 RX ORDER — OXYCODONE HYDROCHLORIDE AND ACETAMINOPHEN 5; 325 MG/1; MG/1
1 TABLET ORAL EVERY 4 HOURS PRN
Status: DISCONTINUED | OUTPATIENT
Start: 2024-07-25 | End: 2024-07-26

## 2024-07-25 RX ORDER — DOCUSATE SODIUM 100 MG/1
100 CAPSULE, LIQUID FILLED ORAL 2 TIMES DAILY PRN
Status: DISCONTINUED | OUTPATIENT
Start: 2024-07-25 | End: 2024-07-27 | Stop reason: HOSPADM

## 2024-07-25 RX ORDER — WARFARIN SODIUM 5 MG/1
5 TABLET ORAL NIGHTLY
Status: DISCONTINUED | OUTPATIENT
Start: 2024-07-25 | End: 2024-07-27 | Stop reason: HOSPADM

## 2024-07-25 RX ORDER — METOPROLOL SUCCINATE 50 MG/1
50 TABLET, EXTENDED RELEASE ORAL DAILY
Status: DISCONTINUED | OUTPATIENT
Start: 2024-07-25 | End: 2024-07-27 | Stop reason: HOSPADM

## 2024-07-25 RX ORDER — OFLOXACIN 3 MG/ML
2 SOLUTION/ DROPS OPHTHALMIC 4 TIMES DAILY
Status: DISCONTINUED | OUTPATIENT
Start: 2024-07-25 | End: 2024-07-27 | Stop reason: HOSPADM

## 2024-07-25 RX ORDER — DICLOFENAC SODIUM 1 MG/ML
2 SOLUTION/ DROPS OPHTHALMIC 4 TIMES DAILY
Status: DISCONTINUED | OUTPATIENT
Start: 2024-07-25 | End: 2024-07-27 | Stop reason: HOSPADM

## 2024-07-25 RX ORDER — ONDANSETRON 2 MG/ML
INJECTION INTRAMUSCULAR; INTRAVENOUS
Status: COMPLETED
Start: 2024-07-25 | End: 2024-07-25

## 2024-07-25 RX ORDER — CYCLOBENZAPRINE HCL 10 MG
5 TABLET ORAL 3 TIMES DAILY PRN
Status: DISCONTINUED | OUTPATIENT
Start: 2024-07-25 | End: 2024-07-27 | Stop reason: HOSPADM

## 2024-07-25 RX ORDER — ENOXAPARIN SODIUM 100 MG/ML
1 INJECTION SUBCUTANEOUS EVERY 24 HOURS
Status: DISCONTINUED | OUTPATIENT
Start: 2024-07-25 | End: 2024-07-26

## 2024-07-25 RX ADMIN — LEVOTHYROXINE SODIUM 150 MCG: 150 TABLET ORAL at 06:40

## 2024-07-25 RX ADMIN — FUROSEMIDE 40 MG: 10 INJECTION, SOLUTION INTRAVENOUS at 03:40

## 2024-07-25 RX ADMIN — CEFTRIAXONE SODIUM 2000 MG: 2 INJECTION, POWDER, FOR SOLUTION INTRAMUSCULAR; INTRAVENOUS at 00:18

## 2024-07-25 RX ADMIN — ENOXAPARIN SODIUM 70 MG: 80 INJECTION SUBCUTANEOUS at 09:18

## 2024-07-25 RX ADMIN — OFLOXACIN 2 DROP: 3 SOLUTION OPHTHALMIC at 22:15

## 2024-07-25 RX ADMIN — OFLOXACIN 2 DROP: 3 SOLUTION OPHTHALMIC at 11:24

## 2024-07-25 RX ADMIN — AZITHROMYCIN DIHYDRATE 500 MG: 500 INJECTION, POWDER, LYOPHILIZED, FOR SOLUTION INTRAVENOUS at 01:31

## 2024-07-25 RX ADMIN — ONDANSETRON 4 MG: 2 INJECTION INTRAMUSCULAR; INTRAVENOUS at 03:39

## 2024-07-25 RX ADMIN — DICLOFENAC SODIUM 2 DROP: 1 SOLUTION OPHTHALMIC at 22:15

## 2024-07-25 RX ADMIN — OXYCODONE HYDROCHLORIDE AND ACETAMINOPHEN 1 TABLET: 5; 325 TABLET ORAL at 11:30

## 2024-07-25 RX ADMIN — DICLOFENAC SODIUM 2 DROP: 1 SOLUTION OPHTHALMIC at 09:19

## 2024-07-25 RX ADMIN — IOPAMIDOL 100 ML: 755 INJECTION, SOLUTION INTRAVENOUS at 01:11

## 2024-07-25 RX ADMIN — BUPIVACAINE HYDROCHLORIDE 8 ML/HR: 5 INJECTION, SOLUTION EPIDURAL; INTRACAUDAL; PERINEURAL at 17:47

## 2024-07-25 RX ADMIN — METOPROLOL SUCCINATE 50 MG: 50 TABLET, EXTENDED RELEASE ORAL at 09:30

## 2024-07-25 RX ADMIN — WARFARIN SODIUM 5 MG: 5 TABLET ORAL at 22:14

## 2024-07-25 RX ADMIN — OFLOXACIN 2 DROP: 3 SOLUTION OPHTHALMIC at 17:47

## 2024-07-25 RX ADMIN — OFLOXACIN 2 DROP: 3 SOLUTION OPHTHALMIC at 09:19

## 2024-07-25 RX ADMIN — DICLOFENAC SODIUM 2 DROP: 1 SOLUTION OPHTHALMIC at 17:46

## 2024-07-25 RX ADMIN — OXYCODONE HYDROCHLORIDE AND ACETAMINOPHEN 1 TABLET: 5; 325 TABLET ORAL at 20:12

## 2024-07-25 RX ADMIN — BUPIVACAINE HYDROCHLORIDE 8 ML/HR: 5 INJECTION, SOLUTION EPIDURAL; INTRACAUDAL; PERINEURAL at 03:45

## 2024-07-25 RX ADMIN — DICLOFENAC SODIUM 2 DROP: 1 SOLUTION OPHTHALMIC at 11:24

## 2024-07-25 NOTE — CASE MANAGEMENT/SOCIAL WORK
Case Management Discharge Note      Final Note: [facility]         Selected Continued Care - Discharged on 7/24/2024 Admission date: 7/23/2024 - Discharge disposition: Home or Self Care      Destination    No services have been selected for the patient.                Durable Medical Equipment       Service Provider Selected Services Address Phone Fax Patient Preferred    [facility] [address] [place] [address] [phone] --              Dialysis/Infusion    No services have been selected for the patient.                Home Medical Care       Service Provider Selected Services Address Phone Fax Patient Preferred    [facility] [address] [address] [phone] --              Therapy    No services have been selected for the patient.                Community Resources    No services have been selected for the patient.                Community & DME    No services have been selected for the patient.                         Final Discharge Disposition Code: 06 - home with home health care

## 2024-07-25 NOTE — PLAN OF CARE
Goal Outcome Evaluation:  Plan of Care Reviewed With: patient           Outcome Evaluation: Physical therapy evaluation complete.  Patient recently underwent right total knee replacement on 7/23 and discharged home on 7/24 before returning to hospital due to lethargy and inability to ambulate.   Patient currently oriented x1, unable to report current location or year.  Patient is not able to recall recent hospital admission for knee replacement.  Patient performs supine to sit with mod assist and sit to/from stand with mod assist and rolling walker.  Patient completes gait x5 feet with rolling walker, min/mod assist x2.  Patient requires frequent cues for sequencing for each step forward and requires cues for use of walker.  Patient displays difficulty during direction changes and requires physical assistance to safely sit in recliner.  Patient incontinent of urine during mobility and displays significantly increased confusion and difficulty with mobility compared to previous therapy evaluation.  Will continue to follow for therapy needs as further medical work up is completed.      Anticipated Discharge Disposition (PT): other (see comments) (will continue to assess)

## 2024-07-25 NOTE — PROGRESS NOTES
PT and OT have evaluated patient this morning, informed me of urinary incontinence and profound confusion, different from yesterday.  I have reviewed patient's chart and my colleagues note from this morning.  Patient has elevated procalcitonin increasing leukocytosis.  She has improved with her oxygenation with diuresis therefore suspect fluid overload, do not suspect pneumonia, however symptoms do appear consistent with UTI and she has small leuk esterase, and trace bacteria 3-5 WBCs, therefore will initiate Rocephin.  Do not suspect patient to be septic.  Out of abundance of caution blood cultures were obtained in ED. I have added a urine culture.

## 2024-07-25 NOTE — PLAN OF CARE
Goal Outcome Evaluation:  Plan of Care Reviewed With: patient           Outcome Evaluation: VSS, room air, alert and oriented to self and time, up with x2 assist , walker and gait belt, IV lasix given, NPO, vomited x1, zofran given. CT head ordered, bed alarm

## 2024-07-25 NOTE — THERAPY EVALUATION
Patient Name: Anna Gilbert  : 1945    MRN: [id number]                              Today's Date: 2024       Admit Date: 2024    Visit Dx:     ICD-10-CM ICD-9-CM   1. Weakness  R53.1 780.79   2. Acute pulmonary edema  J81.0 518.4   3. Atrial fibrillation, unspecified type  I48.91 427.31   4. Renal insufficiency  N28.9 593.9     Patient Active Problem List   Diagnosis    Hyperlipidemia    SHEILA (obstructive sleep apnea)    Aortic valve replaced    H/O mitral valve repair    Paroxysmal atrial fibrillation    Flu vaccine need    Primary osteoarthritis of left knee    Tendinopathy of right rotator cuff    Angina pectoris    NSVT (nonsustained ventricular tachycardia)    Primary osteoarthritis of right knee    Ventricular tachycardia    VT (ventricular tachycardia)    Nausea & vomiting    Acute constipation    ICD (implantable cardioverter-defibrillator) in place    Permanent atrial fibrillation    Lymphedema    Long COVID    S/P total knee arthroplasty    Weakness     Past Medical History:   Diagnosis Date    Bradycardia     Chronic atrial fibrillation     Coronary artery disease     Essential (primary) hypertension     H/O mitral valve repair     Health care maintenance     Heart failure, unspecified     Hyperthyroidism     Hypothyroid     Localized edema     Lymphedema 2012    Mixed hyperlipidemia     NSVT (nonsustained ventricular tachycardia) 2020    SHEILA (obstructive sleep apnea) 2016    Pacemaker     PAF (paroxysmal atrial fibrillation)     Permanent atrial fibrillation     Sick sinus syndrome     Venous insufficiency (chronic) (peripheral)     Ventricular tachycardia     with ICD shock     Past Surgical History:   Procedure Laterality Date    AORTIC VALVE REPAIR/REPLACEMENT      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    CARDIAC CATHETERIZATION  2011    CARDIAC CATHETERIZATION N/A 2021    Procedure: Left Heart Cath;  Surgeon: Nigel Egan MD;  Location: [facility]  INVASIVE LOCATION;  Service: Cardiovascular;  Laterality: N/A;    CARDIAC CATHETERIZATION N/A 11/30/2021    Procedure: Coronary angiography;  Surgeon: Nigel Egan MD;  Location:  [facility] INVASIVE LOCATION;  Service: Cardiovascular;  Laterality: N/A;    CARDIAC CATHETERIZATION N/A 11/30/2021    Procedure: Resting Full Cycle Ratio;  Surgeon: Nigel Egan MD;  Location:  [facility] INVASIVE LOCATION;  Service: Cardiovascular;  Laterality: N/A;    CARDIAC DEFIBRILLATOR PLACEMENT  2010    CARDIAC ELECTROPHYSIOLOGY PROCEDURE N/A 12/1/2021    Procedure: ICD DC generator change---[employer];  Surgeon: Mick Perez MD;  Location:  [facility] INVASIVE LOCATION;  Service: Cardiology;  Laterality: N/A;    MITRAL VALVE REPAIR/REPLACEMENT  2010      General Information       Row Name 07/25/24 0915          Physical Therapy Time and Intention    Document Type evaluation  -     Mode of Treatment physical therapy  -       Row Name 07/25/24 0915          General Information    Patient Profile Reviewed yes  pt with recent total knee replacement, returned to hospital due to weakness and lethargy  -     Prior Level of Function independent:;all household mobility;community mobility  -     Existing Precautions/Restrictions fall  -     Barriers to Rehab none identified  -       Row Name 07/25/24 0915          Living Environment    People in Home child(vinicius), adult  lives with son  -       Row Name 07/25/24 0915          Home Main Entrance    Number of Stairs, Main Entrance four  -     Stair Railings, Main Entrance railing on right side (ascending)  -       Row Name 07/25/24 0915          Stairs Within Home, Primary    Stairs, Within Home, Primary 1 story house  -       Row Name 07/25/24 0915          Cognition    Orientation Status (Cognition) oriented to;person  pt unable to report current location, does not know current year  -       Row Name 07/25/24 0915          Safety Issues, Functional  Mobility    Safety Issues Affecting Function (Mobility) insight into deficits/self-awareness;judgment;positioning of assistive device;problem-solving;safety precaution awareness  -               User Key  (r) = Recorded By, (t) = Taken By, (c) = Cosigned By      Initials Name Provider Type    Arti Brewster, PT Physical Therapist                   Mobility       Row Name 07/25/24 0915          Bed Mobility    Bed Mobility supine-sit  -     Supine-Sit [place] (Bed Mobility) moderate assist (50% patient effort)  -     Assistive Device (Bed Mobility) bed rails;head of bed elevated  -     Comment, (Bed Mobility) significantly increased time to complete.  pt requires repeated cues for sequencing  -       Row Name 07/25/24 0915          Transfers    Comment, (Transfers) verbal cues for hand placement and controlled descent into sitting  -       Row Name 07/25/24 0915          Sit-Stand Transfer    Sit-Stand [place] (Transfers) minimum assist (75% patient effort);moderate assist (50% patient effort)  -     Assistive Device (Sit-Stand Transfers) walker, front-wheeled  -     Comment, (Sit-Stand Transfer) cues for hand placement  -       Row Name 07/25/24 0915          Gait/Stairs (Locomotion)    [place] Level (Gait) minimum assist (75% patient effort);moderate assist (50% patient effort);2 person assist  -     Assistive Device (Gait) walker, front-wheeled  -     Distance in Feet (Gait) 5  -     Comment, (Gait/Stairs) pt with significant difficulty sequencing functional mobility, requiring cues for each step forward.  pt with decreased safety during direction changes and displays difficulty following one step commands  -       Row Name 07/25/24 0915          Mobility    Extremity Weight-bearing Status right lower extremity  -     Right Lower Extremity (Weight-bearing Status) weight-bearing as tolerated (WBAT)  -               User Key  (r) = Recorded By, (t) = Taken By, (c) =  Cosigned By      Initials Name Provider Type    TABITHA Arti Hilton, PT Physical Therapist                   Obj/Interventions       Row Name 07/25/24 0910          Range of Motion Comprehensive    Comment, General Range of Motion difficult to formally assess due to cognition  -[facility] Name 07/25/24 0910          Strength Comprehensive (MMT)    Comment, General Manual Muscle Testing (MMT) Assessment difficult to formally assess due to cognition  -       Row Name 07/25/24 0910          Balance    Comment, Balance min/mod assist for dynamic balance with walker  -               User Key  (r) = Recorded By, (t) = Taken By, (c) = Cosigned By      Initials Name Provider Type     Arti Hilton, PT Physical Therapist                   Goals/Plan       Row Name 07/25/24 0915          Bed Mobility Goal 1 (PT)    Activity/Assistive Device (Bed Mobility Goal 1, PT) bed mobility activities, all  -JW     [place] Level/Cues Needed (Bed Mobility Goal 1, PT) supervision required  -JW     Time Frame (Bed Mobility Goal 1, PT) 3 days  -JW     Progress/Outcomes (Bed Mobility Goal 1, PT) new goal  -[facility] Name 07/25/24 0915          Transfer Goal 1 (PT)    Activity/Assistive Device (Transfer Goal 1, PT) transfers, all  -JW     [place] Level/Cues Needed (Transfer Goal 1, PT) supervision required  -JW     Time Frame (Transfer Goal 1, PT) 3 days  -JW     Progress/Outcome (Transfer Goal 1, PT) new goal  -[facility] Name 07/25/24 0915          Gait Training Goal 1 (PT)    Activity/Assistive Device (Gait Training Goal 1, PT) gait (walking locomotion);assistive device use  -JW     [place] Level (Gait Training Goal 1, PT) supervision required  -JW     Distance (Gait Training Goal 1, PT) 100  -JW     Time Frame (Gait Training Goal 1, PT) 3 days  -JW     Progress/Outcome (Gait Training Goal 1, PT) new goal  -       Row Name 07/25/24 0915          Therapy Assessment/Plan (PT)    Planned Therapy Interventions  (PT) balance training;bed mobility training;gait training;home exercise program;patient/family education;strengthening;transfer training  -               User Key  (r) = Recorded By, (t) = Taken By, (c) = Cosigned By      Initials Name Provider Type    Arti Brewster, PT Physical Therapist                   Clinical Impression       Row Name 07/25/24 0915          Pain    Pretreatment Pain Rating 0/10 - no pain  -     Posttreatment Pain Rating 0/10 - no pain  -       Row Name 07/25/24 0915          Plan of Care Review    Plan of Care Reviewed With patient  -     Outcome Evaluation Physical therapy evaluation complete.  Patient recently underwent right total knee replacement on 7/23 and discharged home on 7/24 before returning to hospital due to lethargy and inability to ambulate.   Patient currently oriented x1, unable to report current location or year.  Patient is not able to recall recent hospital admission for knee replacement.  Patient performs supine to sit with mod assist and sit to/from stand with mod assist and rolling walker.  Patient completes gait x5 feet with rolling walker, min/mod assist x2.  Patient requires frequent cues for sequencing for each step forward and requires cues for use of walker.  Patient displays difficulty during direction changes and requires physical assistance to safely sit in recliner.  Patient incontinent of urine during mobility and displays significantly increased confusion and difficulty with mobility compared to previous therapy evaluation.  Will continue to follow for therapy needs as further medical work up is completed.  -       Row Name 07/25/24 0915          Therapy Assessment/Plan (PT)    Patient/Family Therapy Goals Statement (PT) pt does not state goals  -     Rehab Potential (PT) fair, will monitor progress closely  -     Criteria for Skilled Interventions Met (PT) yes;meets criteria  -     Therapy Frequency (PT) daily  -     Predicted Duration  of Therapy Intervention (PT) 3 days  -       Row Name 07/25/24 0915          Positioning and Restraints    Pre-Treatment Position in bed  -JW     Post Treatment Position chair  -JW     In Chair notified nsg;reclined;call light within reach;encouraged to call for assist;exit alarm on  -JW               User Key  (r) = Recorded By, (t) = Taken By, (c) = Cosigned By      Initials Name Provider Type    Arti Brewster, PT Physical Therapist                   Outcome Measures       Row Name 07/25/24 0915 07/25/24 0800       How much help from another person do you currently need...    Turning from your back to your side while in flat bed without using bedrails? 2  -JW 2  -LB    Moving from lying on back to sitting on the side of a flat bed without bedrails? 2  -JW 2  -LB    Moving to and from a bed to a chair (including a wheelchair)? 2  -JW 2  -LB    Standing up from a chair using your arms (e.g., wheelchair, bedside chair)? 2  -JW 2  -LB    Climbing 3-5 steps with a railing? 1  -JW 2  -LB    To walk in hospital room? 2  -JW 2  -LB    AM-PAC 6 Clicks Score (PT) 11  -JW 12  -LB    Highest Level of Mobility Goal 4 --> Transfer to chair/commode  -JW 4 --> Transfer to chair/commode  -LB      Row Name 07/25/24 0413          How much help from another person do you currently need...    Turning from your back to your side while in flat bed without using bedrails? 2  -LP     Moving from lying on back to sitting on the side of a flat bed without bedrails? 2  -LP     Moving to and from a bed to a chair (including a wheelchair)? 2  -LP     Standing up from a chair using your arms (e.g., wheelchair, bedside chair)? 2  -LP     Climbing 3-5 steps with a railing? 2  -LP     To walk in hospital room? 2  -LP     AM-PAC 6 Clicks Score (PT) 12  -LP     Highest Level of Mobility Goal 4 --> Transfer to chair/commode  -LP       Row Name 07/25/24 0915          Functional Assessment    Outcome Measure Options AM-PAC 6 Clicks Basic  Mobility (PT)  -               User Key  (r) = Recorded By, (t) = Taken By, (c) = Cosigned By      Initials Name Provider Type    LP Briana Ugalde, RN Registered Nurse    Briana Lanza RN Registered Nurse    Arti Brewster PT Physical Therapist                                 Physical Therapy Education       Title: PT OT SLP Therapies (In Progress)       Topic: Physical Therapy (In Progress)       Point: Mobility training (In Progress)       Learning Progress Summary             Patient Acceptance, E,TB, VU by  at 7/25/2024 1313    Acceptance, E,TB, NR by  at 7/25/2024 1313                         Point: Home exercise program (Not Started)       Learner Progress:  Not documented in this visit.                              User Key       Initials Effective Dates Name Provider Type Discipline    TABITHA 06/16/21 -  Arti Hilton PT Physical Therapist PT                  PT Recommendation and Plan  Planned Therapy Interventions (PT): balance training, bed mobility training, gait training, home exercise program, patient/family education, strengthening, transfer training  Plan of Care Reviewed With: patient  Outcome Evaluation: Physical therapy evaluation complete.  Patient recently underwent right total knee replacement on 7/23 and discharged home on 7/24 before returning to hospital due to lethargy and inability to ambulate.   Patient currently oriented x1, unable to report current location or year.  Patient is not able to recall recent hospital admission for knee replacement.  Patient performs supine to sit with mod assist and sit to/from stand with mod assist and rolling walker.  Patient completes gait x5 feet with rolling walker, min/mod assist x2.  Patient requires frequent cues for sequencing for each step forward and requires cues for use of walker.  Patient displays difficulty during direction changes and requires physical assistance to safely sit in recliner.  Patient incontinent of urine during  mobility and displays significantly increased confusion and difficulty with mobility compared to previous therapy evaluation.  Will continue to follow for therapy needs as further medical work up is completed.     Time Calculation:   PT Evaluation Complexity  History, PT Evaluation Complexity: 3 or more personal factors and/or comorbidities  Examination of Body Systems (PT Eval Complexity): 1-2 elements  Clinical Presentation (PT Evaluation Complexity): stable  Clinical Decision Making (PT Evaluation Complexity): low complexity  Overall Complexity (PT Evaluation Complexity): low complexity     PT Charges       Row Name 07/25/24 1313             Time Calculation    Start Time 0915  -      Stop Time 0955  -      Time Calculation (min) 40 min  -TABITHA      PT Received On 07/25/24  -TABITHA      PT - Next Appointment 07/26/24  -TABITHA                User Key  (r) = Recorded By, (t) = Taken By, (c) = Cosigned By      Initials Name Provider Type    Arti Brewster, PT Physical Therapist                  Therapy Charges for Today       Code Description Service Date Service Provider Modifiers Qty    [id number]  PT EVAL LOW COMPLEXITY 3 7/25/2024 Arti Hilton, PT GP 1            PT G-Codes  Outcome Measure Options: AM-PAC 6 Clicks Basic Mobility (PT)  AM-PAC 6 Clicks Score (PT): 11  PT Discharge Summary  Anticipated Discharge Disposition (PT): other (see comments) (will continue to assess)    Arti Hilton PT  7/25/2024

## 2024-07-25 NOTE — ED NOTES
Nursing report ED to floor  Anna Gilbert  78 y.o.  female    HPI :   Chief Complaint   Patient presents with    Fall     Son assisted to the floor, skin tear to rt wrist, had knee surgery yesterday, pain pump in place. Per EMS, pt mid 80% sats on RA. Placed on 2L. Normally not on 02       Admitting doctor:   Giuseppe Brown MD    Admitting diagnosis:   The primary encounter diagnosis was Weakness. Diagnoses of Acute pulmonary edema, Atrial fibrillation, unspecified type, and Renal insufficiency were also pertinent to this visit.    Code status:   Current Code Status       Date Active Code Status Order ID Comments User Context       Prior            Allergies:   Amiodarone, Vancomycin, Adhesive tape, Gabapentin, and Statins    Isolation:   No active isolations    Intake and Output    Intake/Output Summary (Last 24 hours) at 7/25/2024 0140  Last data filed at 7/25/2024 0100  Gross per 24 hour   Intake 100 ml   Output --   Net 100 ml       Weight:       07/24/24  2048   Weight: 71.7 kg (158 lb)       Most recent vitals:   Vitals:    07/24/24 2231 07/24/24 2301 07/24/24 2331 07/25/24 0031   BP: 108/60 121/87 134/72 96/82   BP Location:       Patient Position:       Pulse: 82 65 76 95   Resp: 16 16 18 18   Temp:       TempSrc:       SpO2: 97% 97% 99% 92%   Weight:           Active LDAs/IV Access:   Lines, Drains & Airways       Active LDAs       Name Placement date Placement time Site Days    Peripheral IV 07/24/24 2209 Right Antecubital 07/24/24 2209  Antecubital  less than 1    Peripheral IV 07/25/24 0018 Left Antecubital 07/25/24  0018  Antecubital  less than 1    Nerve Block Catheter 07/23/24 1546 Mitesh Amaro CRNA right adductor canal block 07/23/24  1546  created via procedure documentation  -- 1                    Labs (abnormal labs have a star):   Labs Reviewed   COMPREHENSIVE METABOLIC PANEL - Abnormal; Notable for the following components:       Result Value    Glucose 124 (*)     BUN 35 (*)     Creatinine  1.66 (*)     Sodium 132 (*)     Chloride 95 (*)     eGFR 31.1 (*)     All other components within normal limits    Narrative:     GFR Normal >60  Chronic Kidney Disease <60  Kidney Failure <15    The GFR formula is only valid for adults with stable renal function between ages 18 and 70.   SINGLE HS TROPONIN T - Abnormal; Notable for the following components:    HS Troponin T 33 (*)     All other components within normal limits    Narrative:     High Sensitive Troponin T Reference Range:  <14.0 ng/L- Negative Female for AMI  <22.0 ng/L- Negative Male for AMI  >=14 - Abnormal Female indicating possible myocardial injury.  >=22 - Abnormal Male indicating possible myocardial injury.   Clinicians would have to utilize clinical acumen, EKG, Troponin, and serial changes to determine if it is an Acute Myocardial Infarction or myocardial injury due to an underlying chronic condition.        PROTIME-INR - Abnormal; Notable for the following components:    Protime 15.1 (*)     INR 1.15 (*)     All other components within normal limits    Narrative:     Therapeutic Ranges for INR: 2.0-3.0 (PT 20-30)                              2.5-3.5 (PT 25-34)   CBC WITH AUTO DIFFERENTIAL - Abnormal; Notable for the following components:    WBC 15.84 (*)     Lymphocyte % 6.8 (*)     Monocyte % 17.6 (*)     Immature Grans % 0.8 (*)     Neutrophils, Absolute 11.70 (*)     Monocytes, Absolute 2.79 (*)     Immature Grans, Absolute 0.13 (*)     All other components within normal limits   BNP (IN-HOUSE) - Abnormal; Notable for the following components:    proBNP 3,175.0 (*)     All other components within normal limits    Narrative:     This assay is used as an aid in the diagnosis of individuals suspected of having heart failure. It can be used as an aid in the diagnosis of acute decompensated heart failure (ADHF) in patients presenting with signs and symptoms of ADHF to the emergency department (ED). In addition, NT-proBNP of <300 pg/mL  indicates ADHF is not likely.    Age Range Result Interpretation  NT-proBNP Concentration (pg/mL:      <50             Positive            >450                   Gray                 300-450                    Negative             <300    50-75           Positive            >900                  Gray                300-900                  Negative            <300      >75             Positive            >1800                  Gray                300-1800                  Negative            <300   LACTIC ACID, PLASMA - Normal   BLOOD CULTURE   BLOOD CULTURE   URINALYSIS W/ MICROSCOPIC IF INDICATED (NO CULTURE)   CBC AND DIFFERENTIAL    Narrative:     The following orders were created for panel order CBC & Differential.  Procedure                               Abnormality         Status                     ---------                               -----------         ------                     CBC Auto Differential[[id number]]        Abnormal            Final result                 Please view results for these tests on the individual orders.       Results       Procedure Component Value Ref Range Date/Time    Lactic Acid, Plasma [[id number]]  (Normal) Collected: 07/25/24 0002    Order Status: Completed Specimen: Blood Updated: 07/25/24 0032     Lactate 2.0 0.5 - 2.0 mmol/L     Blood Culture - Blood, Arm, Left [[id number]] Collected: 07/25/24 0002    Order Status: Sent Specimen: Blood from Arm, Left Updated: 07/25/24 0014    Blood Culture - Blood, Arm, Right [[id number]] Collected: 07/25/24 0009    Order Status: Sent Specimen: Blood from Arm, Right Updated: 07/25/24 0014    BNP [[id number]]  (Abnormal) Collected: 07/24/24 2208    Order Status: Completed Specimen: Blood Updated: 07/25/24 0012     proBNP 3,175.0 0.0 - 1,800.0 pg/mL     Narrative:      This assay is used as an aid in the diagnosis of individuals suspected of having heart failure. It can be used as an aid in the diagnosis of acute decompensated heart failure  (ADHF) in patients presenting with signs and symptoms of ADHF to the emergency department (ED). In addition, NT-proBNP of <300 pg/mL indicates ADHF is not likely.    Age Range Result Interpretation  NT-proBNP Concentration (pg/mL:      <50             Positive            >450                   Gray                 300-450                    Negative             <300    50-75           Positive            >900                  Gray                300-900                  Negative            <300      >75             Positive            >1800                  Gray                300-1800                  Negative            <300    Comprehensive Metabolic Panel [[id number]]  (Abnormal) Collected: 07/24/24 2208    Order Status: Completed Specimen: Blood Updated: 07/24/24 2243     Glucose 124 65 - 99 mg/dL      BUN 35 8 - 23 mg/dL      Creatinine 1.66 0.57 - 1.00 mg/dL      Sodium 132 136 - 145 mmol/L      Potassium 4.2 3.5 - 5.2 mmol/L      Chloride 95 98 - 107 mmol/L      CO2 23.8 22.0 - 29.0 mmol/L      Calcium 9.0 8.6 - 10.5 mg/dL      Total Protein 7.5 6.0 - 8.5 g/dL      Albumin 4.0 3.5 - 5.2 g/dL      ALT (SGPT) <5 1 - 33 U/L      AST (SGOT) 24 1 - 32 U/L      Alkaline Phosphatase 65 39 - 117 U/L      Total Bilirubin 0.6 0.0 - 1.2 mg/dL      Globulin 3.5 gm/dL      A/G Ratio 1.1 g/dL      BUN/Creatinine Ratio 21.1 7.0 - 25.0      Anion Gap 13.2 5.0 - 15.0 mmol/L      eGFR 31.1 >60.0 mL/min/1.73     Narrative:      GFR Normal >60  Chronic Kidney Disease <60  Kidney Failure <15    The GFR formula is only valid for adults with stable renal function between ages 18 and 70.    Single High Sensitivity Troponin T [[id number]]  (Abnormal) Collected: 07/24/24 2208    Order Status: Completed Specimen: Blood Updated: 07/24/24 2236     HS Troponin T 33 <14 ng/L     Narrative:      High Sensitive Troponin T Reference Range:  <14.0 ng/L- Negative Female for AMI  <22.0 ng/L- Negative Male for AMI  >=14 - Abnormal Female  indicating possible myocardial injury.  >=22 - Abnormal Male indicating possible myocardial injury.   Clinicians would have to utilize clinical acumen, EKG, Troponin, and serial changes to determine if it is an Acute Myocardial Infarction or myocardial injury due to an underlying chronic condition.         Protime-INR [[id number]]  (Abnormal) Collected: 07/24/24 2208    Order Status: Completed Specimen: Blood Updated: 07/24/24 2228     Protime 15.1 12.1 - 15.0 Seconds      INR 1.15 0.90 - 1.10     Narrative:      Therapeutic Ranges for INR: 2.0-3.0 (PT 20-30)                              2.5-3.5 (PT 25-34)    CBC Auto Differential [[id number]]  (Abnormal) Collected: 07/24/24 2208    Order Status: Completed Specimen: Blood Updated: 07/24/24 2222     WBC 15.84 3.40 - 10.80 10*3/mm3      RBC 4.66 3.77 - 5.28 10*6/mm3      Hemoglobin 13.3 12.0 - 15.9 g/dL      Hematocrit 40.8 34.0 - 46.6 %      MCV 87.6 79.0 - 97.0 fL      MCH 28.5 26.6 - 33.0 pg      MCHC 32.6 31.5 - 35.7 g/dL      RDW 14.6 12.3 - 15.4 %      RDW-SD 46.8 37.0 - 54.0 fl      MPV 11.3 6.0 - 12.0 fL      Platelets 182 140 - 450 10*3/mm3      Neutrophil % 73.8 42.7 - 76.0 %      Lymphocyte % 6.8 19.6 - 45.3 %      Monocyte % 17.6 5.0 - 12.0 %      Eosinophil % 0.4 0.3 - 6.2 %      Basophil % 0.6 0.0 - 1.5 %      Immature Grans % 0.8 0.0 - 0.5 %      Neutrophils, Absolute 11.70 1.70 - 7.00 10*3/mm3      Lymphocytes, Absolute 1.07 0.70 - 3.10 10*3/mm3      Monocytes, Absolute 2.79 0.10 - 0.90 10*3/mm3      Eosinophils, Absolute 0.06 0.00 - 0.40 10*3/mm3      Basophils, Absolute 0.09 0.00 - 0.20 10*3/mm3      Immature Grans, Absolute 0.13 0.00 - 0.05 10*3/mm3      nRBC 0.0 0.0 - 0.2 /100 WBC     Urinalysis With Microscopic If Indicated (No Culture) - Urine, Clean Catch [[id number]]     Order Status: Sent Specimen: Urine, Clean Catch              EKG:   ECG 12 Lead   ED Interpretation   Yuriy Sharpe DO     7/25/2024  1:32 AM   ECG 12 Lead         Date/Time:  7/25/2024 1:06 AM      Performed by: Yuriy Sharpe DO   Authorized by: Yuriy Sharpe DO  Rhythm: atrial fibrillation   Rate: normal   QRS axis: normal   ST Segments: ST segments normal   Clinical impression: dysrhythmia - atrial      ECG 12 Lead Dyspnea   Preliminary Result   HEART RATE=93  bpm   RR Aqlpclrh=398  ms   WV Interval=  ms   P Horizontal Axis=  deg   P Front Axis=  deg   QRSD Interval=96  ms   QT Ixopeakt=980  ms   WYsY=158  ms   QRS Axis=-74  deg   T Wave Axis=93  deg   - ABNORMAL ECG -   Atrial fibrillation   Paired ventricular premature complexes   Anterolateral infarct, old   Date and Time of Study:2024-07-25 00:32:18          Meds given in ED:   Medications   azithromycin (ZITHROMAX) 500 mg in sodium chloride 0.9 % 250 mL IVPB-VTB (500 mg Intravenous New Bag 7/25/24 0131)   cefTRIAXone (ROCEPHIN) 2,000 mg in sodium chloride 0.9 % 100 mL IVPB-VTB (0 mg Intravenous Stopped 7/25/24 0100)   iopamidol (ISOVUE-370) 76 % injection 100 mL (100 mL Intravenous Given 7/25/24 0111)       Imaging results:  CT Chest With Contrast Diagnostic    Result Date: 7/25/2024  Impression: 1.No evidence of pulmonary embolism. 2.Small bilateral pleural effusions greater on the right than the left. 3.Mild pulmonary edema. 4.Cardiomegaly. Electronically Signed: Jamal Johnson MD  7/25/2024 1:20 AM EDT  Workstation ID: [id number]    XR Chest 1 View    Result Date: 7/24/2024  Impression: Cardiomegaly with pulmonary vascular congestion and increased perihilar and interstitial opacities suggesting congestive failure. Findings are accentuated by low lung volumes. Superimposed pneumonia is not excluded Electronically Signed: Mariusz Fields MD  7/24/2024 10:37 PM EDT  Workstation ID: [id number]    XR Knee 1 or 2 View Right    Result Date: 7/23/2024  Impression: 1. Expected immediate postoperative appearance status post right total knee arthroplasty. 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

## 2024-07-25 NOTE — PLAN OF CARE
Goal Outcome Evaluation:  Plan of Care Reviewed With: patient        Progress: no change  Outcome Evaluation: Patient had a fair day. She has been up with PT walked in the room, with mod assist/walker. C/o pain to her right knee, Oxy given x1 with relief, and Pain ball cont @ 8ml infusing with Marcaine 0.125%. Blood cultures pending, RVP pending. Isolation initiated. Room air o2 sat 94%. Dressing to left knee D & I. Remains incontinent of urine culture pending. Breathing better RA. More awake, alert to name/place,

## 2024-07-25 NOTE — H&P
"[facility] HOSPITALIST     Max Carreno MD    CHIEF COMPLAINT: Weakness    HISTORY OF PRESENT ILLNESS:    Ms. Gilbert is unable to give me any reliable history, and no family is at the bedside.  The entire HPI is taken from the ER physician's note.  It reads as follows:    \"Patient was just discharged this afternoon after an elective right knee replacement. Son states she was very lethargic at home and had generalized weakness and trouble ambulating at home. He states she almost fell and had to grab her in order to prevent her from falling causing a small skin tear on her right wrist. She states she did not want to come back to the hospital and otherwise feels fine denies any new, fever, UTI symptoms nausea vomiting headaches numbness weakness or slurred speech. Son states she was acting funny at home and was concerned because she had inability to mobilize by herself at home. Patient denies any new cough but son reports that she has been having increased cough recently and shortness of breath. Patient is not usually on oxygen at home and required 2 L nasal cannula for oxygen sat on room air of 90%\"       Past Medical History:   Diagnosis Date    Bradycardia     Chronic atrial fibrillation     Coronary artery disease     Essential (primary) hypertension     H/O mitral valve repair     Health care maintenance     Heart failure, unspecified     Hyperthyroidism     Hypothyroid     Localized edema     Lymphedema 06/02/2012    Mixed hyperlipidemia     NSVT (nonsustained ventricular tachycardia) 09/23/2020    SHEILA (obstructive sleep apnea) 07/20/2016    Pacemaker     PAF (paroxysmal atrial fibrillation)     Permanent atrial fibrillation     Sick sinus syndrome     Venous insufficiency (chronic) (peripheral)     Ventricular tachycardia     with ICD shock     Past Surgical History:   Procedure Laterality Date    AORTIC VALVE REPAIR/REPLACEMENT  2010    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    CARDIAC CATHETERIZATION " " 01/01/2011    CARDIAC CATHETERIZATION N/A 11/30/2021    Procedure: Left Heart Cath;  Surgeon: Nigel Egan MD;  Location:  [facility];  Service: Cardiovascular;  Laterality: N/A;    CARDIAC CATHETERIZATION N/A 11/30/2021    Procedure: Coronary angiography;  Surgeon: Nigel Egan MD;  Location:  [facility];  Service: Cardiovascular;  Laterality: N/A;    CARDIAC CATHETERIZATION N/A 11/30/2021    Procedure: Resting Full Cycle Ratio;  Surgeon: Nigel Egan MD;  Location:  [facility];  Service: Cardiovascular;  Laterality: N/A;    CARDIAC DEFIBRILLATOR PLACEMENT  2010    CARDIAC ELECTROPHYSIOLOGY PROCEDURE N/A 12/1/2021    Procedure: ICD DC generator change---[employer];  Surgeon: Mick Perez MD;  Location:  [facility];  Service: Cardiology;  Laterality: N/A;    MITRAL VALVE REPAIR/REPLACEMENT  2010     Family History   Problem Relation Age of Onset    Lung cancer Mother     Coronary artery disease Father     Heart attack Father     Stroke Other      Social History     Tobacco Use    Smoking status: Former     Passive exposure: Past    Smokeless tobacco: Never    Tobacco comments:     caffeine use   Vaping Use    Vaping status: Never Used   Substance Use Topics    Alcohol use: Not Currently     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       APPLE CIDER VINEGAR PO Take 1 tablet by mouth Daily.       b complex vitamins capsule Take 1 capsule by mouth Daily. (Patient not taking: Reported on 7/22/2024)       BIOTIN PO Take 1 tablet by mouth Daily. (Patient not taking: Reported on 7/22/2024)       BUPivacaine HCl (BUPIVACAINE 0.125% IN 0.9% SODIUM CHLORIDE 400 ML ELASTOMERIC PUMP \"PAIN BALL\") Inject 8 mL/hr into the appropriate area of the skin as directed " by provider Continuous for 2 days.       CALCIUM-MAGNESIUM-ZINC PO Take 1 tablet by mouth Daily.       Cholecalciferol 50 MCG (2000 UT) capsule Take  by mouth Daily. (Patient not taking: Reported on 7/22/2024)       cyclobenzaprine (FLEXERIL) 5 MG tablet Take 1 tablet by mouth 3 (Three) Times a Day As Needed.       diclofenac (VOLTAREN) 0.1 % ophthalmic solution Administer 2 drops to the right eye 4 (Four) Times a Day for 25 doses. 2.5 mL 0     docusate sodium (Colace) 100 MG capsule Take 1 capsule by mouth 2 (Two) Times a Day As Needed for Constipation. 60 capsule 0     levothyroxine (SYNTHROID, LEVOTHROID) 150 MCG tablet Take 1 tablet by mouth Daily.       metoprolol succinate XL (TOPROL-XL) 50 MG 24 hr tablet TAKE ONE TABLET BY MOUTH DAILY (Patient taking differently: Take 1 tablet by mouth Daily.) 90 tablet 3     ofloxacin (OCUFLOX) 0.3 % ophthalmic solution Administer 2 drops to the right eye 4 (Four) Times a Day for 2 doses. 5 mL 0     ondansetron ODT (ZOFRAN-ODT) 4 MG disintegrating tablet Place 1 tablet on the tongue Every 8 (Eight) Hours As Needed for Nausea or Vomiting. 20 tablet 0     oxyCODONE-acetaminophen (PERCOCET) 5-325 MG per tablet Take 1 tablet by mouth Every 4 (Four) Hours As Needed for Moderate Pain or Severe Pain. 42 tablet 0     pantoprazole (PROTONIX) 40 MG EC tablet TAKE ONE TABLET BY MOUTH EVERY MORNING (Patient taking differently: Take 1 tablet by mouth Every Morning. Havent been taking) 90 tablet 2     potassium chloride (KLOR-CON) 20 MEQ CR tablet TAKE ONE TABLET BY MOUTH DAILY 90 tablet 3     torsemide (DEMADEX) 20 MG tablet TAKE TWO TABLETS BY MOUTH DAILY 180 tablet 1     warfarin (COUMADIN) 5 MG tablet Take 1 tablet by mouth Every Night. TAKE AS DIRECTED        Allergies:  Amiodarone, Vancomycin, Adhesive tape, Gabapentin, and Statins    REVIEW OF SYSTEMS:  Please see the above history of present illness for pertinent positives and negatives.  The remainder of the patient's systems  "have been reviewed and are negative.    Vital Signs  Temp:  [97.5 °F (36.4 °C)-99.9 °F (37.7 °C)] 98.3 °F (36.8 °C)  Heart Rate:  [60-95] 71  Resp:  [16-20] 20  BP: ()/(56-97) 96/82  Oxygen Therapy  SpO2: 91 %  Pulse Oximetry Type: Continuous  Device (Oxygen Therapy): room air  Device (Oxygen Therapy) (Infant): room air  Flow (L/min): 2}  Body mass index is 29.48 kg/m².    Flowsheet Rows      Flowsheet Row First Filed Value   Admission Height 157.5 cm (62\") Documented at 07/25/2024 0250   Admission Weight 71.7 kg (158 lb) Documented at 07/24/2024 2048               Physical Exam:  Physical Exam   Constitutional: Patient appears well developed and well nourished and in no acute distress.  Appears stated age.  HEENT:   Head: Normocephalic and atraumatic.   Eyes:  Pupils are equal, round, and reactive to light. EOM are intact. Sclerae are anicteric and noninjected.  Neck: Neck supple. No JVD present. No thyromegaly present. No lymphadenopathy present.  Cardiovascular: Irregular rate and rhythm, S1 normal and S2 normal.  No murmur heard  Radial pulses are 2+ and symmetric.  Pulmonary/Chest: Crackles noted at the right base.  Otherwise, clear.  Respirations are non-labored.  Abdominal: Soft. Bowel sounds are normal. There is no tenderness.   Musculoskeletal: Normal muscle tone  Extremities: No edema.   Neurological: Cranial nerves II-XII are grossly intact with no focal deficits.  Skin: Skin is warm. RLE is wrapped.    Emotional Behavior:    Judgment and Insight: Unknown   Mental Status:  Alertness  Normal   Memory:  Unknown   Mood and Affect:         Depression  None               Anxiety  None    Debilities:   Physical Weakness  As per HPI   Handicaps  None   Disabilities  None   Agitation  None     Results Review:    I reviewed the patient's new clinical results.  Lab Results (most recent)       Procedure Component Value Units Date/Time    Urinalysis, Microscopic Only - Urine, Clean Catch [[id number]]  (Abnormal) " Collected: 07/25/24 0145    Specimen: Urine, Clean Catch Updated: 07/25/24 0244     RBC, UA None Seen /HPF      WBC, UA 3-5 /HPF      Bacteria, UA Trace /HPF      Squamous Epithelial Cells, UA 0-2 /HPF      Transitional Epithelial Cells, UA 0-2 /HPF      Hyaline Casts, UA None Seen /LPF      Fine Granular Casts, UA 0-2 /LPF      Methodology Manual Light Microscopy    Urinalysis With Microscopic If Indicated (No Culture) - Urine, Clean Catch [[id number]]  (Abnormal) Collected: 07/25/24 0145    Specimen: Urine, Clean Catch Updated: 07/25/24 0205     Color, UA Yellow     Appearance, UA Clear     pH, UA <=5.0     Specific Gravity, UA 1.010     Glucose, UA Negative     Ketones, UA Trace     Bilirubin, UA Negative     Blood, UA Negative     Protein, UA Negative     Leuk Esterase, UA Small (1+)     Nitrite, UA Negative     Urobilinogen, UA 0.2 E.U./dL    Lactic Acid, Plasma [[id number]]  (Normal) Collected: 07/25/24 0002    Specimen: Blood Updated: 07/25/24 0032     Lactate 2.0 mmol/L     Blood Culture - Blood, Arm, Left [[id number]] Collected: 07/25/24 0002    Specimen: Blood from Arm, Left Updated: 07/25/24 0014    Blood Culture - Blood, Arm, Right [[id number]] Collected: 07/25/24 0009    Specimen: Blood from Arm, Right Updated: 07/25/24 0014    BNP [[id number]]  (Abnormal) Collected: 07/24/24 2208    Specimen: Blood Updated: 07/25/24 0012     proBNP 3,175.0 pg/mL     Narrative:      This assay is used as an aid in the diagnosis of individuals suspected of having heart failure. It can be used as an aid in the diagnosis of acute decompensated heart failure (ADHF) in patients presenting with signs and symptoms of ADHF to the emergency department (ED). In addition, NT-proBNP of <300 pg/mL indicates ADHF is not likely.    Age Range Result Interpretation  NT-proBNP Concentration (pg/mL:      <50             Positive            >450                   Gray                 300-450                    Negative             <300    50-75            Positive            >900                  Gray                300-900                  Negative            <300      >75             Positive            >1800                  Gray                300-1800                  Negative            <300    Comprehensive Metabolic Panel [[id number]]  (Abnormal) Collected: 07/24/24 2208    Specimen: Blood Updated: 07/24/24 2243     Glucose 124 mg/dL      BUN 35 mg/dL      Creatinine 1.66 mg/dL      Sodium 132 mmol/L      Potassium 4.2 mmol/L      Chloride 95 mmol/L      CO2 23.8 mmol/L      Calcium 9.0 mg/dL      Total Protein 7.5 g/dL      Albumin 4.0 g/dL      ALT (SGPT) <5 U/L      AST (SGOT) 24 U/L      Alkaline Phosphatase 65 U/L      Total Bilirubin 0.6 mg/dL      Globulin 3.5 gm/dL      A/G Ratio 1.1 g/dL      BUN/Creatinine Ratio 21.1     Anion Gap 13.2 mmol/L      eGFR 31.1 mL/min/1.73     Narrative:      GFR Normal >60  Chronic Kidney Disease <60  Kidney Failure <15    The GFR formula is only valid for adults with stable renal function between ages 18 and 70.    Single High Sensitivity Troponin T [[id number]]  (Abnormal) Collected: 07/24/24 2208    Specimen: Blood Updated: 07/24/24 2236     HS Troponin T 33 ng/L     Narrative:      High Sensitive Troponin T Reference Range:  <14.0 ng/L- Negative Female for AMI  <22.0 ng/L- Negative Male for AMI  >=14 - Abnormal Female indicating possible myocardial injury.  >=22 - Abnormal Male indicating possible myocardial injury.   Clinicians would have to utilize clinical acumen, EKG, Troponin, and serial changes to determine if it is an Acute Myocardial Infarction or myocardial injury due to an underlying chronic condition.         Protime-INR [[id number]]  (Abnormal) Collected: 07/24/24 2208    Specimen: Blood Updated: 07/24/24 2228     Protime 15.1 Seconds      INR 1.15    Narrative:      Therapeutic Ranges for INR: 2.0-3.0 (PT 20-30)                              2.5-3.5 (PT 25-34)    CBC & Differential [[id number]]   (Abnormal) Collected: 07/24/24 2208    Specimen: Blood Updated: 07/24/24 2222    Narrative:      The following orders were created for panel order CBC & Differential.  Procedure                               Abnormality         Status                     ---------                               -----------         ------                     CBC Auto Differential[[id number]]        Abnormal            Final result                 Please view results for these tests on the individual orders.    CBC Auto Differential [[id number]]  (Abnormal) Collected: 07/24/24 2208    Specimen: Blood Updated: 07/24/24 2222     WBC 15.84 10*3/mm3      RBC 4.66 10*6/mm3      Hemoglobin 13.3 g/dL      Hematocrit 40.8 %      MCV 87.6 fL      MCH 28.5 pg      MCHC 32.6 g/dL      RDW 14.6 %      RDW-SD 46.8 fl      MPV 11.3 fL      Platelets 182 10*3/mm3      Neutrophil % 73.8 %      Lymphocyte % 6.8 %      Monocyte % 17.6 %      Eosinophil % 0.4 %      Basophil % 0.6 %      Immature Grans % 0.8 %      Neutrophils, Absolute 11.70 10*3/mm3      Lymphocytes, Absolute 1.07 10*3/mm3      Monocytes, Absolute 2.79 10*3/mm3      Eosinophils, Absolute 0.06 10*3/mm3      Basophils, Absolute 0.09 10*3/mm3      Immature Grans, Absolute 0.13 10*3/mm3      nRBC 0.0 /100 WBC             Imaging Results (Most Recent)       Procedure Component Value Units Date/Time    CT Chest With Contrast Diagnostic [[id number]] Collected: 07/25/24 0116     Updated: 07/25/24 0123    Narrative:      CT CHEST W CONTRAST DIAGNOSTIC    Date of Exam: 7/25/2024 12:56 AM EDT    Indication: rule out PE.    Comparison: None available.    Technique: Axial CT images were obtained of the chest after the uneventful intravenous administration of iodinated contrast.  Sagittal and coronal reconstructions were performed.  Automated exposure control and iterative reconstruction methods were used.      Findings:    Pulmonary arteries: Adequate opacification of the pulmonary arteries. No  evidence of acute pulmonary embolism.    Lungs and Pleura: There are small bilateral pleural effusions greater on the right than the left. There is mild bilateral basilar atelectasis and lingular atelectasis. There are few areas of groundglass attenuation and interlobular septal thickening   suggestive of mild edema. There is soft tissue in the right infrahilar area somewhat oval in shape measuring 1.8 x 6.1 cm best seen on axial image #23 of series 2. This is favored to represent reactive lymph node prominence. There are no suspicious   pulmonary nodules.    Mediastinum/[place]: No mediastinal or hilar lymphadenopathy.    Lymph nodes: No axillary or supraclavicular adenopathy.    Cardiovascular: The heart is enlarged. There are changes of aortic valve replacement, midline sternotomy and there is calcific atherosclerosis of the coronary arteries..       Upper Abdomen: The upper abdominal contents are unremarkable.          Bones and Soft Tissue: No suspicious osseous lesion.        Impression:      Impression:  1.No evidence of pulmonary embolism.  2.Small bilateral pleural effusions greater on the right than the left.  3.Mild pulmonary edema.  4.Cardiomegaly.        Electronically Signed: Jamal Johnson MD    7/25/2024 1:20 AM EDT    Workstation ID: [id number]    XR Chest 1 View [[id number]] Collected: 07/24/24 2234     Updated: 07/24/24 2240    Narrative:      XR CHEST 1 VW    Date of Exam: 7/24/2024 10:13 PM EDT    Indication: lethargy    Comparison: 7/17/2023 and prior    Findings:  Lung volumes are low. Study limited by overlying support monitoring apparatus.    Patient is status post median sternotomy. Heart size is enlarged and the pulmonary vascularity is congested and indistinct. Diffuse increased groundglass and interstitial opacities noted with a perihilar and bibasilar predominance. These findings are   accentuated by low lung volumes. Left subclavian approach pacemaker is in place. Osseous structures  demonstrate no acute abnormality      Impression:      Impression:  Cardiomegaly with pulmonary vascular congestion and increased perihilar and interstitial opacities suggesting congestive failure. Findings are accentuated by low lung volumes. Superimposed pneumonia is not excluded      Electronically Signed: Mariusz Fields MD    7/24/2024 10:37 PM EDT    Workstation ID: [id number]          reviewed    ECG/EMG Results (most recent)       Procedure Component Value Units Date/Time    ECG 12 Lead Dyspnea [[id number]] Collected: 07/25/24 0032     Updated: 07/25/24 0033     QT Interval 367 ms      QTC Interval 456 ms     Narrative:      HEART RATE=93  bpm  RR Duxzpgob=138  ms  KS Interval=  ms  P Horizontal Axis=  deg  P Front Axis=  deg  QRSD Interval=96  ms  QT Vxtgeukm=849  ms  GByW=997  ms  QRS Axis=-74  deg  T Wave Axis=93  deg  - ABNORMAL ECG -  Atrial fibrillation  Paired ventricular premature complexes  Anterolateral infarct, old  Date and Time of Study:2024-07-25 00:32:18    ECG 12 Lead [[id number]] Resulted: 07/25/24 0132     Updated: 07/25/24 0132          reviewed    Assessment & Plan     Generalized Weakness: I reviewed the physical therapy notes from yesterday prior to discharge, and there does not appear to be any issues with Ms. Gilbert mobility.  Heart failure?  Appears to have a history of preserved EF by echo of December of last year.  She does have signs of overload on chest CT and chest x-ray.  I do not believe that this is infectious etiology given the CT findings.  There is no evidence of UTI.  Given her inability this evening to give me a clear history, I am going to check a CT of the head.  TSH is also pending.  Etiology is unclear at this point but will continue workup.  Acute kidney injury: Greater than 0.3 change over 24 hours.  CHF?  I am going to give her a one-time dose of Lasix, and hold her Demadex.  Repeat chemistry panel in the morning.  Permanent atrial fibrillation/history of VTE: No  reported discharges of her device.  Continue Toprol.  INR is subtherapeutic, and plan on adding Lovenox bridge once CT of head is clear.  Essential hypertension: BP is low goal on exam.  I am going to hold her regimen for now.  Will have staff check orthostatics.  Hypothyroidism: TSH is pending.  Otherwise continue home replacement dose.  CAD: No reported chest pain.  I see no dynamic EKG changes.  Repeat troponin in a.m.  Recent right total knee arthroplasty: Continue routine postoperative care.  Will also asked PT to reevaluate.    I discussed the patient's findings and my recommendations with staff.     Giuseppe Brown MD  07/25/24  03:39 EDT

## 2024-07-25 NOTE — THERAPY EVALUATION
Patient Name: Anna Gilbert  : 1945    MRN: [id number]                              Today's Date: 2024       Admit Date: 2024    Visit Dx:     ICD-10-CM ICD-9-CM   1. Weakness  R53.1 780.79   2. Acute pulmonary edema  J81.0 518.4   3. Atrial fibrillation, unspecified type  I48.91 427.31   4. Renal insufficiency  N28.9 593.9     Patient Active Problem List   Diagnosis    Hyperlipidemia    SHEILA (obstructive sleep apnea)    Aortic valve replaced    H/O mitral valve repair    Paroxysmal atrial fibrillation    Flu vaccine need    Primary osteoarthritis of left knee    Tendinopathy of right rotator cuff    Angina pectoris    NSVT (nonsustained ventricular tachycardia)    Primary osteoarthritis of right knee    Ventricular tachycardia    VT (ventricular tachycardia)    Nausea & vomiting    Acute constipation    ICD (implantable cardioverter-defibrillator) in place    Permanent atrial fibrillation    Lymphedema    Long COVID    S/P total knee arthroplasty    Weakness     Past Medical History:   Diagnosis Date    Bradycardia     Chronic atrial fibrillation     Coronary artery disease     Essential (primary) hypertension     H/O mitral valve repair     Health care maintenance     Heart failure, unspecified     Hyperthyroidism     Hypothyroid     Localized edema     Lymphedema 2012    Mixed hyperlipidemia     NSVT (nonsustained ventricular tachycardia) 2020    SHEILA (obstructive sleep apnea) 2016    Pacemaker     PAF (paroxysmal atrial fibrillation)     Permanent atrial fibrillation     Sick sinus syndrome     Venous insufficiency (chronic) (peripheral)     Ventricular tachycardia     with ICD shock     Past Surgical History:   Procedure Laterality Date    AORTIC VALVE REPAIR/REPLACEMENT      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    CARDIAC CATHETERIZATION  2011    CARDIAC CATHETERIZATION N/A 2021    Procedure: Left Heart Cath;  Surgeon: Nigel Egan MD;  Location: [facility]  INVASIVE LOCATION;  Service: Cardiovascular;  Laterality: N/A;    CARDIAC CATHETERIZATION N/A 11/30/2021    Procedure: Coronary angiography;  Surgeon: Nigel Egan MD;  Location:  [facility] INVASIVE LOCATION;  Service: Cardiovascular;  Laterality: N/A;    CARDIAC CATHETERIZATION N/A 11/30/2021    Procedure: Resting Full Cycle Ratio;  Surgeon: Nigel Egan MD;  Location:  [facility] INVASIVE LOCATION;  Service: Cardiovascular;  Laterality: N/A;    CARDIAC DEFIBRILLATOR PLACEMENT  2010    CARDIAC ELECTROPHYSIOLOGY PROCEDURE N/A 12/1/2021    Procedure: ICD DC generator change---[employer];  Surgeon: Mick Perez MD;  Location:  [facility] INVASIVE LOCATION;  Service: Cardiology;  Laterality: N/A;    MITRAL VALVE REPAIR/REPLACEMENT  2010      General Information       Row Name 07/25/24 8718          General Information    Patient Profile Reviewed yes  pt with recent total knee replacement, returned to hospital due to weakness and lethargy .  -SD     Prior Level of Function independent:;ADL's;all household mobility  -SD     Existing Precautions/Restrictions fall  -SD     Barriers to Rehab --  confusion this am impacted safety with transfers/mobility  -SD       Row Name 07/25/24 0137          Occupational Profile    Reason for Services/Referral (Occupational Profile) decreased adl function  -SD     Successful Occupations (Occupational Profile) pt reported I with adl's and mobility prior to knee surgery (pt reported increasing difficulty due to knee pain)  -SD     Occupational History/Life Experiences (Occupational Profile) right TKA on 7-23-24. Pt discharged home on 7-24-24, yet she returned to [facility] due to lethargy/weakness  -SD     Environmental Supports and Barriers (Occupational Profile) son in home  -SD     Patient Goals (Occupational Profile) none stated  -SD       Row Name 07/25/24 0968          Living Environment    People in Home child(vinicius), adult  son  -SD       Row Name 07/25/24 7548           Home Main Entrance    Number of Stairs, Main Entrance four  -SD     Stair Railings, Main Entrance railing on right side (ascending)  -SD       Row Name 07/25/24 0955          Stairs Within Home, Primary    Stairs, Within Home, Primary 1 story house  -SD       Row Name 07/25/24 0955          Cognition    Orientation Status (Cognition) oriented to;person;other (see comments)  pt stated year, but unsure of month. Pt stated in a hospital, but did not know location. Pt unsure of reason for admittance, and she did not recall having knee surgery this week.  -SD       Row Name 07/25/24 0955          Safety Issues, Functional Mobility    Safety Issues Affecting Function (Mobility) insight into deficits/self-awareness;positioning of assistive device;at risk behavior observed;problem-solving  -SD     Impairments Affecting Function (Mobility) cognition  -SD     Cognitive Impairments, Mobility Safety/Performance insight into deficits/self-awareness;judgment;problem-solving/reasoning;sequencing abilities  -SD     Comment, Safety Issues/Impairments (Mobility) Pt was confused and had difficulty following simple commands and sequencing to use an assistive device with mobility.  -SD               User Key  (r) = Recorded By, (t) = Taken By, (c) = Cosigned By      Initials Name Provider Type    SD Yuriy Hall, OTR Occupational Therapist                     Mobility/ADL's       Row Name 07/25/24 0955          Bed Mobility    Bed Mobility supine-sit  -SD     Supine-Sit [place] (Bed Mobility) moderate assist (50% patient effort)  -SD     Assistive Device (Bed Mobility) bed rails;head of bed elevated  -SD     Comment, (Bed Mobility) pt required cues to initiate and complete tasks.  -SD       Row Name 07/25/24 0955          Transfers    Transfers sit-stand transfer;stand-sit transfer;toilet transfer  -SD     Comment, (Transfers) pt manged transfers to/from recliner -BSC with CGA with verbal cues. BSC positioned next to  recliner  -SD       Row Name 07/25/24 0955          Sit-Stand Transfer    Sit-Stand [place] (Transfers) minimum assist (75% patient effort);moderate assist (50% patient effort)  -SD     Assistive Device (Sit-Stand Transfers) walker, front-wheeled  -SD     Comment, (Sit-Stand Transfer) for for hand placement for safety during transfers  -SD       Row Name 07/25/24 0955          Stand-Sit Transfer    Stand-Sit [place] (Transfers) moderate assist (50% patient effort)  -SD     Assistive Device (Stand-Sit Transfers) walker, front-wheeled  -SD       Row Name 07/25/24 0955          Toilet Transfer    [place] Level (Toilet Transfer) contact guard;verbal cues;nonverbal cues (demo/gesture)  -SD     Assistive Device (Toilet Transfer) commode, 3-in-1  -SD     Comment, (Toilet Transfer) BSC positioned next to recliner  -SD       Row Name 07/25/24 0955          Functional Mobility    Functional Mobility- Ind. Level contact guard assist;minimum assist (75% patient effort)  -SD     Functional Mobility- Device walker, front-wheeled  -SD     Functional Mobility-Distance (Feet) 5  from bed to recliner  -SD     Functional Mobility- Comment Pt required extended time to manage functional mobility x 5 ft. she needed frequent cues and assistance to manuever the walker.  -SD       Row Name 07/25/24 0955          Activities of Daily Living    BADL Assessment/Intervention lower body dressing;toileting  -SD       Row Name 07/25/24 0955          Mobility    Extremity Weight-bearing Status right lower extremity  -SD     Right Lower Extremity (Weight-bearing Status) weight-bearing as tolerated (WBAT)  -SD       Row Name 07/25/24 0955          Lower Body Dressing Assessment/Training    [place] Level (Lower Body Dressing) lower body dressing skills;moderate assist (50% patient effort);maximum assist (25% patient effort)  -SD     Comment, (Lower Body Dressing) pt needs sigificant assistance with adl's at this time due to  confusion along with limited RLE rom/strength from recent TKA.  -SD       Row Name 07/25/24 0955          Toileting Assessment/Training    [place] Level (Toileting) toileting skills;adjust/manage clothing;change pad/brief;dependent (less than 25% patient effort);perform perineal hygiene  -SD     Position (Toileting) supported standing  -SD     Comment, (Toileting) pt was incontinent of urine. She was dependent to remove incontinent brief.  -SD               User Key  (r) = Recorded By, (t) = Taken By, (c) = Cosigned By      Initials Name Provider Type    Yuriy Ruano OTLIDIA Occupational Therapist                   Obj/Interventions       Row Name 07/25/24 0955          Sensory Assessment (Somatosensory)    Sensory Assessment (Somatosensory) sensation intact  -[facility] Name 07/25/24 0955          Range of Motion Comprehensive    Comment, General Range of Motion did not formally assess due to cognition. BUE rom appeared functional for tasks during the evaluation.  -[facility] Name 07/25/24 0955          Strength Comprehensive (MMT)    Comment, General Manual Muscle Testing (MMT) Assessment did not formally assess due to cognition. BUE strength appeared functional for tasks during the evaluation.  -[facility] Name 07/25/24 0955          Balance    Comment, Balance CGA /min assist for sitting balance and min/mod assist for standing balance using a rolling walker for support  -SD               User Key  (r) = Recorded By, (t) = Taken By, (c) = Cosigned By      Initials Name Provider Type    Yuriy Ruano OTR Occupational Therapist                   Goals/Plan       Row Name 07/25/24 1325          Transfer Goal 1 (OT)    Activity/Assistive Device (Transfer Goal 1, OT) sit-to-stand/stand-to-sit;commode, 3-in-1;walker, rolling  -SD     [place] Level/Cues Needed (Transfer Goal 1, OT) contact guard required  -SD     Time Frame (Transfer Goal 1, OT) by discharge  -SD     Progress/Outcome  (Transfer Goal 1, OT) new goal  -SD       Row Name 07/25/24 1325          Dressing Goal 1 (OT)    Activity/Device (Dressing Goal 1, OT) lower body dressing  -SD     [place]/Cues Needed (Dressing Goal 1, OT) minimum assist (75% or more patient effort)  -SD     Time Frame (Dressing Goal 1, OT) by discharge  -SD     Strategies/Barriers (Dressing Goal 1, OT) Education regarding compensatory strategies for adls s/p TKA  -SD     Progress/Outcome (Dressing Goal 1, OT) new goal  -SD               User Key  (r) = Recorded By, (t) = Taken By, (c) = Cosigned By      Initials Name Provider Type    SD Yuriy Hall, OTR Occupational Therapist                   Clinical Impression       Row Name 07/25/24 0906          Pain Assessment    Pre/Posttreatment Pain Comment pt did not c/o pain  -SD     Pain Intervention(s) Repositioned  -SD       Row Name 07/25/24 0947          Plan of Care Review    Plan of Care Reviewed With patient  -SD     Outcome Evaluation Occupational therapy evaluation completed. Pt with right TKA this week and was discharged home on 7-24-24. Pt with increased lethargy, weakness and difficulty with mobilty and returned to ER. Pt was confused this am. She was oriented to name.  Pt correctly stated the year, but she did not recall the month. Pt reported being in a hospital, but she did not know the location. Pt was unaware of reason for hospitalization and did not recall that she had knee sx this week. Pt had difficulty following simple directions and required frequent redirection during activity. Pt required mod assistance for bed mobility, min/mod assistance for transfers and CGA /min assist for mobility using a rolling walker for support. Pt required assistance to manuever the walker and verbal cues for safety during functional tasks. Pt currently needs mod assistance for adl tasks for safety. Will continue to assess for discharge dispostion.  -SD       Row Name 07/25/24 6723          Therapy  Assessment/Plan (OT)    Criteria for Skilled Therapeutic Interventions Met (OT) yes;skilled treatment is necessary  -SD     Therapy Frequency (OT) 5 times/wk  -SD     Predicted Duration of Therapy Intervention (OT) 1 week  -SD       Row Name 07/25/24 0955          Therapy Plan Review/Discharge Plan (OT)    Anticipated Discharge Disposition (OT) other (see comments)  will continue to asses for appropriate discharge dispostion.  -SD       Row Name 07/25/24 0955          Positioning and Restraints    Pre-Treatment Position in bed  -SD     Post Treatment Position chair  -SD     In Chair notified nsg;reclined;call light within reach;encouraged to call for assist;exit alarm on  -SD               User Key  (r) = Recorded By, (t) = Taken By, (c) = Cosigned By      Initials Name Provider Type    Yuriy Ruano, OTR Occupational Therapist                   Outcome Measures       Row Name 07/25/24 0915 07/25/24 0800       How much help from another person do you currently need...    Turning from your back to your side while in flat bed without using bedrails? 2  -JW 2  -LB    Moving from lying on back to sitting on the side of a flat bed without bedrails? 2  -JW 2  -LB    Moving to and from a bed to a chair (including a wheelchair)? 2  -JW 2  -LB    Standing up from a chair using your arms (e.g., wheelchair, bedside chair)? 2  -JW 2  -LB    Climbing 3-5 steps with a railing? 1  -JW 2  -LB    To walk in hospital room? 2  -JW 2  -LB    AM-PAC 6 Clicks Score (PT) 11  -JW 12  -LB    Highest Level of Mobility Goal 4 --> Transfer to chair/commode  -JW 4 --> Transfer to chair/commode  -LB      Row Name 07/25/24 0413          How much help from another person do you currently need...    Turning from your back to your side while in flat bed without using bedrails? 2  -LP     Moving from lying on back to sitting on the side of a flat bed without bedrails? 2  -LP     Moving to and from a bed to a chair (including a wheelchair)? 2   -LP     Standing up from a chair using your arms (e.g., wheelchair, bedside chair)? 2  -LP     Climbing 3-5 steps with a railing? 2  -LP     To walk in hospital room? 2  -LP     AM-PAC 6 Clicks Score (PT) 12  -LP     Highest Level of Mobility Goal 4 --> Transfer to chair/commode  -LP       Row Name 07/25/24 0915          Functional Assessment    Outcome Measure Options AM-PAC 6 Clicks Basic Mobility (PT)  -TABITHA               User Key  (r) = Recorded By, (t) = Taken By, (c) = Cosigned By      Initials Name Provider Type    LP Briana Ugalde, RN Registered Nurse    Briana Lanza, RN Registered Nurse    Arti Brewster, PT Physical Therapist                    Occupational Therapy Education       Title: PT OT SLP Therapies (In Progress)       Topic: Occupational Therapy (In Progress)       Point: ADL training (In Progress)       Description:   Instruct learner(s) on proper safety adaptation and remediation techniques during self care or transfers.   Instruct in proper use of assistive devices.                  Learning Progress Summary             Patient Acceptance, E, NR by SD at 7/25/2024 1402    Comment: Education regarding safety with bed mobility, transfers and functional mobility using a rolling walker for support. Pt is confused and needs verbal cues for safety.                                         User Key       Initials Effective Dates Name Provider Type Discipline    SD 06/16/21 -  Yuriy Hall, OTR Occupational Therapist OT                  OT Recommendation and Plan  Therapy Frequency (OT): 5 times/wk  Plan of Care Review  Plan of Care Reviewed With: patient  Outcome Evaluation: Occupational therapy evaluation completed. Pt with right TKA this week and was discharged home on 7-24-24. Pt with increased lethargy, weakness and difficulty with mobilty and returned to ER. Pt was confused this am. She was oriented to name.  Pt correctly stated the year, but she did not recall the month. Pt reported  being in a hospital, but she did not know the location. Pt was unaware of reason for hospitalization and did not recall that she had knee sx this week. Pt had difficulty following simple directions and required frequent redirection during activity. Pt required mod assistance for bed mobility, min/mod assistance for transfers and CGA /min assist for mobility using a rolling walker for support. Pt required assistance to manuever the walker and verbal cues for safety during functional tasks. Pt currently needs mod assistance for adl tasks for safety. Will continue to assess for discharge dispostion.     Time Calculation:   Evaluation Complexity (OT)  Review Occupational Profile/Medical/Therapy History Complexity: expanded/moderate complexity  Assessment, Occupational Performance/Identification of Deficit Complexity: 5 or more performance deficits (new onset confusion along with physical impact from recent TKA.)  Clinical Decision Making Complexity (OT): problem focused assessment/low complexity  Overall Complexity of Evaluation (OT): low complexity     Time Calculation- OT       Row Name 07/25/24 1405             Time Calculation- OT    OT Start Time 0917  -SD      OT Stop Time 0955  -SD      OT Time Calculation (min) 38 min  -SD         Untimed Charges    OT Eval/Re-eval Minutes 38  -SD         Total Minutes    Untimed Charges Total Minutes 38  -SD       Total Minutes 38  -SD                User Key  (r) = Recorded By, (t) = Taken By, (c) = Cosigned By      Initials Name Provider Type    SD Yuriy Hall OTR Occupational Therapist                  Therapy Charges for Today       Code Description Service Date Service Provider Modifiers Qty    56202624600 HC OT EVAL LOW COMPLEXITY 3 7/25/2024 Yuriy Hall OTR GO 1                 JANI Koehler  7/25/2024

## 2024-07-25 NOTE — OUTREACH NOTE
Prep Survey      Flowsheet Row Responses   [facility] facility patient discharged from? [place]   Is LACE score < 7 ? No   Eligibility Not Eligible   What are the reasons patient is not eligible? Readmitted   Does the patient have one of the following disease processes/diagnoses(primary or secondary)? Other   Prep survey completed? Yes            Magy FORD - Registered Nurse

## 2024-07-25 NOTE — PLAN OF CARE
Problem: Adult Inpatient Plan of Care  Goal: Plan of Care Review  Recent Flowsheet Documentation  Taken 7/25/2024 0955 by Yuriy Hall OTR  Plan of Care Reviewed With: patient  Outcome Evaluation: Occupational therapy evaluation completed. Pt with right TKA this week and was discharged home on 7-24-24. Pt with increased lethargy, weakness and difficulty with mobilty and returned to ER. Pt was confused this am. She was oriented to name.  Pt correctly stated the year, but she did not recall the month. Pt reported being in a hospital, but she did not know the location. Pt was unaware of reason for hospitalization and did not recall that she had knee sx this week. Pt had difficulty following simple directions and required frequent redirection during activity. Pt required mod assistance for bed mobility, min/mod assistance for transfers and CGA /min assist for mobility using a rolling walker for support. Pt required assistance to manuever the walker and verbal cues for safety during functional tasks. Pt currently needs mod assistance for adl tasks for safety. Will continue to assess for discharge dispostion.

## 2024-07-25 NOTE — ED PROVIDER NOTES
Subjective   History of Present Illness  Patient was just discharged this afternoon after an elective right knee replacement.  Son states she was very lethargic at home and had generalized weakness and trouble ambulating at home.  He states she almost fell and had to grab her in order to prevent her from falling causing a small skin tear on her right wrist.  She states she did not want to come back to the hospital and otherwise feels fine denies any new, fever, UTI symptoms nausea vomiting headaches numbness weakness or slurred speech.  Son states she was acting funny at home and was concerned because she had inability to mobilize by herself at home.  Patient denies any new cough but son reports that she has been having increased cough recently and shortness of breath.  Patient is not usually on oxygen at home and required 2 L nasal cannula for oxygen sat on room air of 90%      Review of Systems   Constitutional:  Negative for chills, diaphoresis and fever.   HENT:  Negative for congestion, sore throat and trouble swallowing.    Eyes:  Negative for visual disturbance.   Respiratory:  Negative for cough and shortness of breath.    Cardiovascular:  Negative for chest pain and palpitations.   Gastrointestinal:  Negative for abdominal pain, constipation, diarrhea, nausea and vomiting.   Genitourinary:  Negative for dysuria, frequency, hematuria and urgency.   Skin:  Negative for rash.   Neurological:  Positive for weakness and light-headedness. Negative for numbness and headaches.   Psychiatric/Behavioral:  Positive for confusion.        Past Medical History:   Diagnosis Date    Bradycardia     Chronic atrial fibrillation     Coronary artery disease     Essential (primary) hypertension     H/O mitral valve repair     Health care maintenance     Heart failure, unspecified     Hyperthyroidism     Hypothyroid     Localized edema     Lymphedema 06/02/2012    Mixed hyperlipidemia     NSVT (nonsustained ventricular  tachycardia) 09/23/2020    SHEILA (obstructive sleep apnea) 07/20/2016    Pacemaker     PAF (paroxysmal atrial fibrillation)     Permanent atrial fibrillation     Sick sinus syndrome     Venous insufficiency (chronic) (peripheral)     Ventricular tachycardia     with ICD shock       Allergies   Allergen Reactions    Amiodarone Shortness Of Breath, Nausea Only and Swelling     She reports she experienced significant fluid retention resulting in SOA and inability to walk, which she reports resolved as soon as she self-discontinued this medication.     Vancomycin Shortness Of Breath     Turned blue in face and became tachycardic 160s    Adhesive Tape Other (See Comments)     Thin skin - causes skin to tear and blister     Gabapentin Unknown - High Severity, Other (See Comments) and Rash     Feet peeling    Statins Other (See Comments)     Felt awful       Past Surgical History:   Procedure Laterality Date    AORTIC VALVE REPAIR/REPLACEMENT  2010    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    CARDIAC CATHETERIZATION  01/01/2011    CARDIAC CATHETERIZATION N/A 11/30/2021    Procedure: Left Heart Cath;  Surgeon: Nigel Egan MD;  Location: [facility] CATH INVASIVE LOCATION;  Service: Cardiovascular;  Laterality: N/A;    CARDIAC CATHETERIZATION N/A 11/30/2021    Procedure: Coronary angiography;  Surgeon: Nigel Egan MD;  Location: [facility] CATH INVASIVE LOCATION;  Service: Cardiovascular;  Laterality: N/A;    CARDIAC CATHETERIZATION N/A 11/30/2021    Procedure: Resting Full Cycle Ratio;  Surgeon: Nigel Egan MD;  Location: [facility] CATH INVASIVE LOCATION;  Service: Cardiovascular;  Laterality: N/A;    CARDIAC DEFIBRILLATOR PLACEMENT  2010    CARDIAC ELECTROPHYSIOLOGY PROCEDURE N/A 12/1/2021    Procedure: ICD DC generator change---[employer];  Surgeon: Mick Perez MD;  Location: [facility] CATH INVASIVE LOCATION;  Service: Cardiology;  Laterality: N/A;    MITRAL VALVE REPAIR/REPLACEMENT  2010       Family History    Problem Relation Age of Onset    Lung cancer Mother     Coronary artery disease Father     Heart attack Father     Stroke Other        Social History     Socioeconomic History    Marital status: Single   Tobacco Use    Smoking status: Former     Passive exposure: Past    Smokeless tobacco: Never    Tobacco comments:     caffeine use   Vaping Use    Vaping status: Never Used   Substance and Sexual Activity    Alcohol use: Not Currently     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     Appearance: She is well-developed. She is not diaphoretic.   HENT:      Head: Normocephalic and atraumatic.      Nose: Nose normal.   Eyes:      Conjunctiva/sclera: Conjunctivae normal.      Pupils: Pupils are equal, round, and reactive to light.   Neck:      Thyroid: No thyromegaly.      Vascular: No JVD.      Trachea: No tracheal deviation.   Cardiovascular:      Rate and Rhythm: Normal rate and regular rhythm.      Heart sounds: Normal heart sounds. No murmur heard.     No friction rub. No gallop.   Pulmonary:      Effort: Pulmonary effort is normal. No respiratory distress.      Breath sounds: Normal breath sounds. No stridor. No wheezing or rales.   Abdominal:      General: Bowel sounds are normal. There is no distension.      Palpations: Abdomen is soft. There is no mass.      Tenderness: There is no abdominal tenderness. There is no guarding or rebound.      Hernia: No hernia is present.   Musculoskeletal:         General: Tenderness present. No deformity. Normal range of motion.      Cervical back: Normal range of motion and neck supple.      Comments: Right lower leg has dressing in place, no appreciated signs of postoperative infection.  Right wrist has a superficial skin tear with no underlying tenderness or deformity   Skin:     General: Skin is warm and dry.      Capillary Refill: Capillary  refill takes less than 2 seconds.      Coloration: Skin is not pale.      Findings: No erythema or rash.   Neurological:      Mental Status: She is alert and oriented to person, place, and time.      Cranial Nerves: No cranial nerve deficit.      Sensory: No sensory deficit.      Motor: No abnormal muscle tone.      Coordination: Coordination normal.   Psychiatric:         Behavior: Behavior normal.         Thought Content: Thought content normal.         Judgment: Judgment normal.         ECG 12 Lead      Date/Time: 7/25/2024 1:06 AM    Performed by: Yuriy Sharpe DO  Authorized by: Yuriy Sharpe DO  Rhythm: atrial fibrillation  Rate: normal  QRS axis: normal  ST Segments: ST segments normal  Clinical impression: dysrhythmia - atrial               ED Course                                             Medical Decision Making  Patient was not given typical fluid bolus given her BNP and possibility of congestive heart failure.  CT was requested by Dr. Bro the admitting physician who wanted the CT to rule out a PE before she was admitted.    Problems Addressed:  Acute pulmonary edema: complicated acute illness or injury  Weakness: complicated acute illness or injury    Amount and/or Complexity of Data Reviewed  Labs: ordered.  Radiology: ordered.  ECG/medicine tests: ordered and independent interpretation performed.    Risk  Prescription drug management.  Decision regarding hospitalization.        Final diagnoses:   Weakness   Acute pulmonary edema   Atrial fibrillation, unspecified type   Renal insufficiency       ED Disposition  ED Disposition       ED Disposition   Decision to Admit    Condition   --    Comment   Level of Care: Med/Surg [1]   Diagnosis: Weakness [679789]   Admitting Physician: CAMILA BRO [[id number]]   Certification: I Certify That Inpatient Hospital Services Are Medically Necessary For Greater Than 2 Midnights                 No follow-up provider specified.       Medication List      No  changes were made to your prescriptions during this visit.            Yuriy Sharpe DO  07/25/24 0132

## 2024-07-26 ENCOUNTER — TELEPHONE (OUTPATIENT)
Dept: CARDIOLOGY | Facility: CLINIC | Age: 79
End: 2024-07-26
Payer: MEDICARE

## 2024-07-26 ENCOUNTER — APPOINTMENT (OUTPATIENT)
Dept: ULTRASOUND IMAGING | Facility: HOSPITAL | Age: 79
End: 2024-07-26
Payer: MEDICARE

## 2024-07-26 ENCOUNTER — APPOINTMENT (OUTPATIENT)
Dept: CT IMAGING | Facility: HOSPITAL | Age: 79
End: 2024-07-26
Payer: MEDICARE

## 2024-07-26 ENCOUNTER — APPOINTMENT (OUTPATIENT)
Dept: GENERAL RADIOLOGY | Facility: HOSPITAL | Age: 79
End: 2024-07-26
Payer: MEDICARE

## 2024-07-26 LAB
ANION GAP SERPL CALCULATED.3IONS-SCNC: 11 MMOL/L (ref 5–15)
ANISOCYTOSIS BLD QL: NORMAL
BASOPHILS # BLD AUTO: 0.11 10*3/MM3 (ref 0–0.2)
BASOPHILS NFR BLD AUTO: 0.8 % (ref 0–1.5)
BUN SERPL-MCNC: 35 MG/DL (ref 8–23)
BUN/CREAT SERPL: 29.7 (ref 7–25)
CALCIUM SPEC-SCNC: 9 MG/DL (ref 8.6–10.5)
CHLORIDE SERPL-SCNC: 96 MMOL/L (ref 98–107)
CO2 SERPL-SCNC: 24 MMOL/L (ref 22–29)
CREAT SERPL-MCNC: 1.18 MG/DL (ref 0.57–1)
DEPRECATED RDW RBC AUTO: 47.8 FL (ref 37–54)
EGFRCR SERPLBLD CKD-EPI 2021: 46.8 ML/MIN/1.73
EOSINOPHIL # BLD AUTO: 0.2 10*3/MM3 (ref 0–0.4)
EOSINOPHIL NFR BLD AUTO: 1.5 % (ref 0.3–6.2)
ERYTHROCYTE [DISTWIDTH] IN BLOOD BY AUTOMATED COUNT: 14.9 % (ref 12.3–15.4)
GLUCOSE SERPL-MCNC: 93 MG/DL (ref 65–99)
HCT VFR BLD AUTO: 37.3 % (ref 34–46.6)
HGB BLD-MCNC: 12.1 G/DL (ref 12–15.9)
IMM GRANULOCYTES # BLD AUTO: 0.15 10*3/MM3 (ref 0–0.05)
IMM GRANULOCYTES NFR BLD AUTO: 1.1 % (ref 0–0.5)
INR PPP: 1.3 (ref 0.9–1.1)
LARGE PLATELETS: NORMAL
LYMPHOCYTES # BLD AUTO: 1.38 10*3/MM3 (ref 0.7–3.1)
LYMPHOCYTES NFR BLD AUTO: 10.1 % (ref 19.6–45.3)
MCH RBC QN AUTO: 28.4 PG (ref 26.6–33)
MCHC RBC AUTO-ENTMCNC: 32.4 G/DL (ref 31.5–35.7)
MCV RBC AUTO: 87.6 FL (ref 79–97)
MONOCYTES # BLD AUTO: 2.74 10*3/MM3 (ref 0.1–0.9)
MONOCYTES NFR BLD AUTO: 20.1 % (ref 5–12)
NEUTROPHILS NFR BLD AUTO: 66.4 % (ref 42.7–76)
NEUTROPHILS NFR BLD AUTO: 9.02 10*3/MM3 (ref 1.7–7)
NRBC BLD AUTO-RTO: 0 /100 WBC (ref 0–0.2)
PLATELET # BLD AUTO: 162 10*3/MM3 (ref 140–450)
PMV BLD AUTO: 11.3 FL (ref 6–12)
POLYCHROMASIA BLD QL SMEAR: NORMAL
POTASSIUM SERPL-SCNC: 3.9 MMOL/L (ref 3.5–5.2)
PROCALCITONIN SERPL-MCNC: 0.48 NG/ML (ref 0–0.25)
PROTHROMBIN TIME: 16.7 SECONDS (ref 12.1–15)
RBC # BLD AUTO: 4.26 10*6/MM3 (ref 3.77–5.28)
SODIUM SERPL-SCNC: 131 MMOL/L (ref 136–145)
WBC MORPH BLD: NORMAL
WBC NRBC COR # BLD AUTO: 13.6 10*3/MM3 (ref 3.4–10.8)

## 2024-07-26 PROCEDURE — 97110 THERAPEUTIC EXERCISES: CPT

## 2024-07-26 PROCEDURE — 97530 THERAPEUTIC ACTIVITIES: CPT

## 2024-07-26 PROCEDURE — 25010000002 ENOXAPARIN PER 10 MG: Performed by: HOSPITALIST

## 2024-07-26 PROCEDURE — 93880 EXTRACRANIAL BILAT STUDY: CPT

## 2024-07-26 PROCEDURE — 63710000001 TRAMADOL 50 MG TABLET: Performed by: INTERNAL MEDICINE

## 2024-07-26 PROCEDURE — 85025 COMPLETE CBC W/AUTO DIFF WBC: CPT | Performed by: INTERNAL MEDICINE

## 2024-07-26 PROCEDURE — G0378 HOSPITAL OBSERVATION PER HR: HCPCS

## 2024-07-26 PROCEDURE — 63710000001: Performed by: HOSPITALIST

## 2024-07-26 PROCEDURE — 99232 SBSQ HOSP IP/OBS MODERATE 35: CPT | Performed by: INTERNAL MEDICINE

## 2024-07-26 PROCEDURE — A9270 NON-COVERED ITEM OR SERVICE: HCPCS | Performed by: FAMILY MEDICINE

## 2024-07-26 PROCEDURE — A9270 NON-COVERED ITEM OR SERVICE: HCPCS | Performed by: INTERNAL MEDICINE

## 2024-07-26 PROCEDURE — 80048 BASIC METABOLIC PNL TOTAL CA: CPT | Performed by: INTERNAL MEDICINE

## 2024-07-26 PROCEDURE — 84145 PROCALCITONIN (PCT): CPT | Performed by: INTERNAL MEDICINE

## 2024-07-26 PROCEDURE — 85007 BL SMEAR W/DIFF WBC COUNT: CPT | Performed by: INTERNAL MEDICINE

## 2024-07-26 PROCEDURE — A9270 NON-COVERED ITEM OR SERVICE: HCPCS | Performed by: HOSPITALIST

## 2024-07-26 PROCEDURE — 25010000002 ONDANSETRON PER 1 MG: Performed by: HOSPITALIST

## 2024-07-26 PROCEDURE — 63710000001 METOPROLOL SUCCINATE XL 50 MG TABLET SUSTAINED-RELEASE 24 HOUR: Performed by: HOSPITALIST

## 2024-07-26 PROCEDURE — 94799 UNLISTED PULMONARY SVC/PX: CPT

## 2024-07-26 PROCEDURE — 94761 N-INVAS EAR/PLS OXIMETRY MLT: CPT

## 2024-07-26 PROCEDURE — 63710000001 OXYCODONE-ACETAMINOPHEN 5-325 MG TABLET: Performed by: HOSPITALIST

## 2024-07-26 PROCEDURE — 97116 GAIT TRAINING THERAPY: CPT

## 2024-07-26 PROCEDURE — 85610 PROTHROMBIN TIME: CPT | Performed by: INTERNAL MEDICINE

## 2024-07-26 PROCEDURE — 63710000001 BISACODYL 10 MG SUPPOSITORY: Performed by: FAMILY MEDICINE

## 2024-07-26 PROCEDURE — 25010000002 CEFTRIAXONE PER 250 MG: Performed by: INTERNAL MEDICINE

## 2024-07-26 PROCEDURE — 70450 CT HEAD/BRAIN W/O DYE: CPT

## 2024-07-26 PROCEDURE — 63710000001 LEVOTHYROXINE 150 MCG TABLET: Performed by: HOSPITALIST

## 2024-07-26 PROCEDURE — 63710000001 TORSEMIDE 20 MG TABLET: Performed by: INTERNAL MEDICINE

## 2024-07-26 PROCEDURE — 63710000001 DOCUSATE SODIUM 100 MG CAPSULE: Performed by: HOSPITALIST

## 2024-07-26 PROCEDURE — 74018 RADEX ABDOMEN 1 VIEW: CPT

## 2024-07-26 PROCEDURE — 63710000001 WARFARIN 5 MG TABLET: Performed by: HOSPITALIST

## 2024-07-26 RX ORDER — TORSEMIDE 20 MG/1
40 TABLET ORAL DAILY
Status: DISCONTINUED | OUTPATIENT
Start: 2024-07-26 | End: 2024-07-27 | Stop reason: HOSPADM

## 2024-07-26 RX ORDER — ENEMA 19; 7 G/133ML; G/133ML
1 ENEMA RECTAL ONCE
Status: COMPLETED | OUTPATIENT
Start: 2024-07-26 | End: 2024-07-27

## 2024-07-26 RX ORDER — BISACODYL 10 MG
10 SUPPOSITORY, RECTAL RECTAL ONCE
Status: COMPLETED | OUTPATIENT
Start: 2024-07-26 | End: 2024-07-26

## 2024-07-26 RX ORDER — ENOXAPARIN SODIUM 100 MG/ML
1 INJECTION SUBCUTANEOUS EVERY 12 HOURS
Status: DISCONTINUED | OUTPATIENT
Start: 2024-07-26 | End: 2024-07-27 | Stop reason: HOSPADM

## 2024-07-26 RX ORDER — TRAMADOL HYDROCHLORIDE 50 MG/1
50 TABLET ORAL EVERY 6 HOURS PRN
Status: DISCONTINUED | OUTPATIENT
Start: 2024-07-26 | End: 2024-07-27 | Stop reason: HOSPADM

## 2024-07-26 RX ADMIN — OFLOXACIN 2 DROP: 3 SOLUTION OPHTHALMIC at 08:55

## 2024-07-26 RX ADMIN — WARFARIN SODIUM 5 MG: 5 TABLET ORAL at 20:12

## 2024-07-26 RX ADMIN — TRAMADOL HYDROCHLORIDE 50 MG: 50 TABLET ORAL at 20:12

## 2024-07-26 RX ADMIN — ENOXAPARIN SODIUM 70 MG: 80 INJECTION SUBCUTANEOUS at 20:12

## 2024-07-26 RX ADMIN — OFLOXACIN 2 DROP: 3 SOLUTION OPHTHALMIC at 20:18

## 2024-07-26 RX ADMIN — BISACODYL 10 MG: 10 SUPPOSITORY RECTAL at 23:04

## 2024-07-26 RX ADMIN — TORSEMIDE 40 MG: 20 TABLET ORAL at 09:21

## 2024-07-26 RX ADMIN — DOCUSATE SODIUM 100 MG: 100 CAPSULE, LIQUID FILLED ORAL at 07:35

## 2024-07-26 RX ADMIN — LEVOTHYROXINE SODIUM 150 MCG: 150 TABLET ORAL at 07:35

## 2024-07-26 RX ADMIN — SODIUM CHLORIDE 1000 MG: 9 INJECTION, SOLUTION INTRAVENOUS at 01:02

## 2024-07-26 RX ADMIN — DICLOFENAC SODIUM 2 DROP: 1 SOLUTION OPHTHALMIC at 08:55

## 2024-07-26 RX ADMIN — METOPROLOL SUCCINATE 50 MG: 50 TABLET, EXTENDED RELEASE ORAL at 09:21

## 2024-07-26 RX ADMIN — OXYCODONE HYDROCHLORIDE AND ACETAMINOPHEN 1 TABLET: 5; 325 TABLET ORAL at 07:35

## 2024-07-26 RX ADMIN — OXYCODONE HYDROCHLORIDE AND ACETAMINOPHEN 1 TABLET: 5; 325 TABLET ORAL at 01:05

## 2024-07-26 RX ADMIN — ENOXAPARIN SODIUM 70 MG: 80 INJECTION SUBCUTANEOUS at 08:55

## 2024-07-26 RX ADMIN — ONDANSETRON 4 MG: 2 INJECTION INTRAMUSCULAR; INTRAVENOUS at 22:03

## 2024-07-26 NOTE — CASE MANAGEMENT/SOCIAL WORK
Continued Stay Note  KATERINA Celeste     Patient Name: Anna Gilbert  MRN: [id number]  Today's Date: 7/26/2024    Admit Date: 7/24/2024    Plan: dc to [facility] - await precert   Discharge Plan       Row Name 07/26/24 1506       Plan    Plan dc to [facility] - await precert    Patient/Family in Agreement with Plan yes    Plan Comments Rec'd call from Dr Diaz stating patients family is now requesting patient go to [facility]. Spoke with Carmen to notify family request. Spoke with Angeline/[facility] she states they have beds available. Notified Dustin/Martine to switch facility for precert. CM will follow up in am regarding precert. Mark LOYA updated, she will update family.                   Discharge Codes    No documentation.                 Expected Discharge Date and Time       Expected Discharge Date Expected Discharge Time    Jul 26, 2024               Bruno Woodruff RN

## 2024-07-26 NOTE — PLAN OF CARE
Goal Outcome Evaluation:  Plan of Care Reviewed With: patient           Outcome Evaluation: PT: Patient oriented x3 today, however displays confusion at times during conversation or answering questions.  Patient performs supine to sit with SBA, requiring extended time to complete.  Patient performs sit to stand from elevated bed surface with CGA/min assist and requires max assist x2 for sit to stand from commode surface.  Patient performs gait x10 feet with rolling walker, CGA with repeated cues for safety and sequencing.  Patient requires extended time to complete basic functional tasks and continues to display poor safety awareness and insight into deficits.  At this time would recommend short term rehab at discharge.      Anticipated Discharge Disposition (PT): skilled nursing facility

## 2024-07-26 NOTE — PLAN OF CARE
Goal Outcome Evaluation:  Plan of Care Reviewed With: patient        Progress: improving  Outcome Evaluation: VSS, tolerating diet, no nausea or vomiting noted, pain medication given x1 this shift, dressing on right great toe clean, dry and intact, possible discharge today.

## 2024-07-26 NOTE — THERAPY TREATMENT NOTE
Patient Name: Anna Gilbert  : 1945    MRN: [id number]                              Today's Date: 2024       Admit Date: 2024    Visit Dx:     ICD-10-CM ICD-9-CM   1. Weakness  R53.1 780.79   2. Acute pulmonary edema  J81.0 518.4   3. Atrial fibrillation, unspecified type  I48.91 427.31   4. Renal insufficiency  N28.9 593.9     Patient Active Problem List   Diagnosis    Hyperlipidemia    SHEILA (obstructive sleep apnea)    Aortic valve replaced    H/O mitral valve repair    Paroxysmal atrial fibrillation    Flu vaccine need    Primary osteoarthritis of left knee    Tendinopathy of right rotator cuff    Angina pectoris    NSVT (nonsustained ventricular tachycardia)    Primary osteoarthritis of right knee    Ventricular tachycardia    VT (ventricular tachycardia)    Nausea & vomiting    Acute constipation    ICD (implantable cardioverter-defibrillator) in place    Permanent atrial fibrillation    Lymphedema    Long COVID    S/P total knee arthroplasty    Weakness     Past Medical History:   Diagnosis Date    Bradycardia     Chronic atrial fibrillation     Coronary artery disease     Essential (primary) hypertension     H/O mitral valve repair     Health care maintenance     Heart failure, unspecified     Hyperthyroidism     Hypothyroid     Localized edema     Lymphedema 2012    Mixed hyperlipidemia     NSVT (nonsustained ventricular tachycardia) 2020    SHEILA (obstructive sleep apnea) 2016    Pacemaker     PAF (paroxysmal atrial fibrillation)     Permanent atrial fibrillation     Sick sinus syndrome     Venous insufficiency (chronic) (peripheral)     Ventricular tachycardia     with ICD shock     Past Surgical History:   Procedure Laterality Date    AORTIC VALVE REPAIR/REPLACEMENT      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    CARDIAC CATHETERIZATION  2011    CARDIAC CATHETERIZATION N/A 2021    Procedure: Left Heart Cath;  Surgeon: Nigel Egan MD;  Location: [facility]  INVASIVE LOCATION;  Service: Cardiovascular;  Laterality: N/A;    CARDIAC CATHETERIZATION N/A 11/30/2021    Procedure: Coronary angiography;  Surgeon: Nigel Egan MD;  Location:  [facility] INVASIVE LOCATION;  Service: Cardiovascular;  Laterality: N/A;    CARDIAC CATHETERIZATION N/A 11/30/2021    Procedure: Resting Full Cycle Ratio;  Surgeon: Nigel Egan MD;  Location:  [facility] INVASIVE LOCATION;  Service: Cardiovascular;  Laterality: N/A;    CARDIAC DEFIBRILLATOR PLACEMENT  2010    CARDIAC ELECTROPHYSIOLOGY PROCEDURE N/A 12/1/2021    Procedure: ICD DC generator change---[employer];  Surgeon: Mick Perez MD;  Location:  [facility] INVASIVE LOCATION;  Service: Cardiology;  Laterality: N/A;    MITRAL VALVE REPAIR/REPLACEMENT  2010    TOTAL KNEE ARTHROPLASTY Right 7/23/2024    Procedure: TOTAL KNEE ARTHROPLASTY AND ALL ASSOCIATED PROCEDURES;  Surgeon: Danny Azevedo MD;  Location:  [facility];  Service: Orthopedics;  Laterality: Right;      General Information       Row Name 07/26/24 1238          OT Time and Intention    Document Type therapy note (daily note)  -SD     Mode of Treatment occupational therapy  -SD       Row Name 07/26/24 1238          General Information    Existing Precautions/Restrictions fall;other (see comments)  confusion  -SD       Row Name 07/26/24 1238          Cognition    Orientation Status (Cognition) oriented x 3  -SD       Row Name 07/26/24 1238          Safety Issues, Functional Mobility    Impairments Affecting Function (Mobility) cognition  -SD     Comment, Safety Issues/Impairments (Mobility) pt less confused today, yet she still had difficulty following simple commands, sequencing and problem solving. Pt not safe to manage transfer, mobility or daily tasks independently  -SD               User Key  (r) = Recorded By, (t) = Taken By, (c) = Cosigned By      Initials Name Provider Type    SD Yuriy Hall OTR Occupational Therapist                      Mobility/ADL's       Row Name 07/26/24 1306          Bed Mobility    Bed Mobility supine-sit  -SD     Supine-Sit [place] (Bed Mobility) standby assist  -SD     Assistive Device (Bed Mobility) bed rails;head of bed elevated  -SD       Row Name 07/26/24 1306          Transfers    Transfers sit-stand transfer;stand-sit transfer;toilet transfer  -SD     Comment, (Transfers) CGA/min assist for sit to stand transfer from EOB and max assist to stand from raised commode  -SD       Row Name 07/26/24 1306          Sit-Stand Transfer    Sit-Stand [place] (Transfers) contact guard;minimum assist (75% patient effort);verbal cues  -SD     Assistive Device (Sit-Stand Transfers) walker, front-wheeled  -SD       Row Name 07/26/24 1306          Stand-Sit Transfer    Stand-Sit [place] (Transfers) moderate assist (50% patient effort);verbal cues  -SD     Assistive Device (Stand-Sit Transfers) walker, front-wheeled  -SD       Row Name 07/26/24 1306          Toilet Transfer    Type (Toilet Transfer) sit-stand;stand-sit  -SD     [place] Level (Toilet Transfer) maximum assist (25% patient effort);2 person assist;verbal cues  -SD     Assistive Device (Toilet Transfer) raised toilet seat;walker, front-wheeled  -SD       Row Name 07/26/24 1306          Functional Mobility    Functional Mobility- Ind. Level contact guard assist;verbal cues required  -SD     Functional Mobility- Device walker, front-wheeled  -SD     Functional Mobility-Distance (Feet) 10  -SD     Functional Mobility- Comment Pt required extended time for transfers/mobility. Pt needed cues for sequencing and problem solving  -SD       Row Name 07/26/24 1306          Mobility    Extremity Weight-bearing Status right lower extremity  -SD     Right Lower Extremity (Weight-bearing Status) weight-bearing as tolerated (WBAT)  -SD       Row Name 07/26/24 1306          Lower Body Dressing Assessment/Training    Comment, (Lower Body Dressing) Attempted to review  compensatory strategeis/use of AE for LB adl's, yet pt was drowsy, falling asleep.  -SD       Row Name 07/26/24 1306          Toileting Assessment/Training    [place] Level (Toileting) toileting skills;adjust/manage clothing;perform perineal hygiene;supervision  -SD     Position (Toileting) supported sitting  -SD               User Key  (r) = Recorded By, (t) = Taken By, (c) = Cosigned By      Initials Name Provider Type    Yuriy Ruano OTR Occupational Therapist                   Obj/Interventions       Row Name 07/26/24 1329          Balance    Comment, Balance SBA for sitting balance and CGA for standing balance using rolling walker for support.  -SD               User Key  (r) = Recorded By, (t) = Taken By, (c) = Cosigned By      Initials Name Provider Type    Yuriy Ruano OTR Occupational Therapist                   Goals/Plan       Row Name 07/26/24 1333          Transfer Goal 1 (OT)    Activity/Assistive Device (Transfer Goal 1, OT) sit-to-stand/stand-to-sit;commode, 3-in-1;walker, rolling  -SD     [place] Level/Cues Needed (Transfer Goal 1, OT) contact guard required  -SD     Time Frame (Transfer Goal 1, OT) by discharge  -SD     Progress/Outcome (Transfer Goal 1, OT) goal ongoing  -SD       Row Name 07/26/24 1333          Dressing Goal 1 (OT)    Activity/Device (Dressing Goal 1, OT) lower body dressing  -SD     [place]/Cues Needed (Dressing Goal 1, OT) minimum assist (75% or more patient effort)  -SD     Time Frame (Dressing Goal 1, OT) by discharge  -SD     Strategies/Barriers (Dressing Goal 1, OT) Education regarding compensatory strategies for adls s/p TKA  -SD     Progress/Outcome (Dressing Goal 1, OT) goal ongoing  -SD       Row Name 07/26/24 1333          Therapy Assessment/Plan (OT)    Planned Therapy Interventions (OT) patient/caregiver education/training;transfer/mobility retraining;functional balance retraining;BADL retraining  -SD               User Key  (r) =  Recorded By, (t) = Taken By, (c) = Cosigned By      Initials Name Provider Type    Yuriy Ruano, OTR Occupational Therapist                   Clinical Impression       Row Name 07/26/24 1330          Pain Assessment    Pre/Posttreatment Pain Comment pt did not c/o pain  -SD     Pain Intervention(s) Repositioned;Ambulation/increased activity  -SD       Row Name 07/26/24 1330          Plan of Care Review    Plan of Care Reviewed With patient  -SD     Outcome Evaluation OT: Pt less confused today. She was oriented x 3, yet she still had difficulty following simple commands, sequencing and problem solving. Pt managed bed mobility with SBA and sit to stand transfer from EOB with CGA/min assit. Pt required max assist x 2 to stand from a raised commode. Pt managed functional mobility x 10 ft using a rolling walker with CGA and frequent cues for safety. Pt not is safe to manage transfer, mobility or daily tasks independently. Rec STR upon discharge.  -SD       Row Name 07/26/24 1330          Therapy Plan Review/Discharge Plan (OT)    Anticipated Discharge Disposition (OT) skilled nursing facility  -SD       Row Name 07/26/24 1330          Positioning and Restraints    Pre-Treatment Position in bed  -SD     Post Treatment Position chair  -SD     In Chair reclined;call light within reach;encouraged to call for assist;notified nsg;exit alarm on  -SD               User Key  (r) = Recorded By, (t) = Taken By, (c) = Cosigned By      Initials Name Provider Type    Yuriy Ruano, OTR Occupational Therapist                   Outcome Measures       Row Name 07/26/24 1360          How much help from another is currently needed...    Putting on and taking off regular lower body clothing? 2  -SD     Bathing (including washing, rinsing, and drying) 2  -SD     Toileting (which includes using toilet bed pan or urinal) 3  -SD     Putting on and taking off regular upper body clothing 4  -SD     Taking care of personal grooming  (such as brushing teeth) 4  -SD     Eating meals 4  -SD     AM-PAC 6 Clicks Score (OT) 19  -SD       Row Name 07/26/24 1014 07/26/24 0900       How much help from another person do you currently need...    Turning from your back to your side while in flat bed without using bedrails? 3  -JW 2  -AS    Moving from lying on back to sitting on the side of a flat bed without bedrails? 3  -JW 2  -AS    Moving to and from a bed to a chair (including a wheelchair)? 3  -JW 2  -AS    Standing up from a chair using your arms (e.g., wheelchair, bedside chair)? 3  -JW 2  -AS    Climbing 3-5 steps with a railing? 1  -JW 1  -AS    To walk in hospital room? 2  -JW 1  -AS    AM-PAC 6 Clicks Score (PT) 15  -JW 10  -AS    Highest Level of Mobility Goal 4 --> Transfer to chair/commode  -JW 4 --> Transfer to chair/commode  -AS      Row Name 07/26/24 1333 07/26/24 1014       Functional Assessment    Outcome Measure Options AM-PAC 6 Clicks Daily Activity (OT)  -SD AM-PAC 6 Clicks Basic Mobility (PT)  -JW              User Key  (r) = Recorded By, (t) = Taken By, (c) = Cosigned By      Initials Name Provider Type    AS Angeline Myrick, RN Registered Nurse    Yuriy Ruano, OTR Occupational Therapist    Arti Brewster, PT Physical Therapist                    Occupational Therapy Education       Title: PT OT SLP Therapies (In Progress)       Topic: Occupational Therapy (In Progress)       Point: ADL training (In Progress)       Description:   Instruct learner(s) on proper safety adaptation and remediation techniques during self care or transfers.   Instruct in proper use of assistive devices.                  Learning Progress Summary             Patient Acceptance, E, NR by SD at 7/26/2024 1334    Comment: Education regarding safety with adl's, transfers and mobility.    Acceptance, E, NR by SD at 7/25/2024 1402    Comment: Education regarding safety with bed mobility, transfers and functional mobility using a rolling walker for  support. Pt is confused and needs verbal cues for safety.                                         User Key       Initials Effective Dates Name Provider Type Discipline    SD 06/16/21 -  Yuriy Hall, OTR Occupational Therapist OT                  OT Recommendation and Plan  Planned Therapy Interventions (OT): patient/caregiver education/training, transfer/mobility retraining, functional balance retraining, BADL retraining  Therapy Frequency (OT): 5 times/wk  Plan of Care Review  Plan of Care Reviewed With: patient  Outcome Evaluation: OT: Pt less confused today. She was oriented x 3, yet she still had difficulty following simple commands, sequencing and problem solving. Pt managed bed mobility with SBA and sit to stand transfer from EOB with CGA/min assit. Pt required max assist x 2 to stand from a raised commode. Pt managed functional mobility x 10 ft using a rolling walker with CGA and frequent cues for safety. Pt not is safe to manage transfer, mobility or daily tasks independently. Rec STR upon discharge.     Time Calculation:   Evaluation Complexity (OT)  Review Occupational Profile/Medical/Therapy History Complexity: expanded/moderate complexity  Assessment, Occupational Performance/Identification of Deficit Complexity: 5 or more performance deficits (new onset confusion along with physical impact from recent TKA.)  Clinical Decision Making Complexity (OT): problem focused assessment/low complexity  Overall Complexity of Evaluation (OT): low complexity     Time Calculation- OT       Row Name 07/26/24 1326 07/26/24 1304          Time Calculation- OT    OT Start Time 1020  -SD --     OT Stop Time 1100  -SD --     OT Time Calculation (min) 40 min  -SD --        Timed Charges    06724 - Gait Training Minutes  -- 25  -JW     27273 - OT Therapeutic Activity Minutes 40  -SD --        Total Minutes    Timed Charges Total Minutes 40  -SD 25  -JW      Total Minutes 40  -SD 25  -JW               User Key  (r) =  Recorded By, (t) = Taken By, (c) = Cosigned By      Initials Name Provider Type    Yuriy Ruano, OTR Occupational Therapist    Arti Brewster, CHRISTIANO Physical Therapist                  Therapy Charges for Today       Code Description Service Date Service Provider Modifiers Qty    [id number]  OT EVAL LOW COMPLEXITY 3 7/25/2024 Yuriy Hall OTR GO 1    [id number]  OT THERAPEUTIC ACT EA 15 MIN 7/26/2024 Yuriy Hall OTR GO 3                 JANI Koehler  7/26/2024

## 2024-07-26 NOTE — CASE MANAGEMENT/SOCIAL WORK
Continued Stay Note  KATERINA Celeste     Patient Name: Anna Gilbert  MRN: [id number]  Today's Date: 7/26/2024    Admit Date: 7/24/2024    Plan: [facility] - await precert   Discharge Plan       Row Name 07/26/24 1138       Plan    Plan [facility] - await precert    Patient/Family in Agreement with Plan yes    Plan Comments Per PT, patient will need SNF at [place]. Patient is status post total knee replacement 7/23. Dc'd home with Caren  on 7/24/24. Patient returned to [facility] on 7/25. Spoke with patient at bedside and she is agreeable to SNF, states she wants 'somewhere local'. She is agreeable to referral to Yanira and Orquidea at [facility]. Referrals placed in [facility] and spoke with liaison for each facility, await return call for ability to accept.  Christiane/[facility] returns call and states can accept patient. Dustin/[facility] notified to initiate precert. Angeline/[facility] returns call and states can accept, informed precert has been started for [facility]. Noy/[facility]  updated.  will contine to follow.                   Discharge Codes    No documentation.                 Expected Discharge Date and Time       Expected Discharge Date Expected Discharge Time    Jul 26, 2024               Bruno Woodruff RN

## 2024-07-26 NOTE — TELEPHONE ENCOUNTER
Patients son called stating his mother is in the hospital and is not doing well. She is currently in [facility]. He would like a call from Leopoldo.  
[phone]

## 2024-07-26 NOTE — PLAN OF CARE
Goal Outcome Evaluation:  Plan of Care Reviewed With: patient, daughter, family        Progress: no change  Outcome Evaluation: Patient pod #4 for rt total knee, patient was discharged home then fell that night at home, patient then came back to hospital & admitted with weakness. Patient gets up to BSC with assist X2 but has trouble with commands & executing them properly & timely. Neurology consulted & ordered CT of head & carotoid US, both were negative & MD aware, he will see the patient tomorrow. Pain medications have been changed & patient has not had any since 0730 this am. Family now looking for STR @ [facility].

## 2024-07-26 NOTE — THERAPY TREATMENT NOTE
Acute Care - Physical Therapy Treatment Note  KATERINA Celeste     Patient Name: Anna Gilbert  : 1945  MRN: [id number]  Today's Date: 2024      Visit Dx:     ICD-10-CM ICD-9-CM   1. Weakness  R53.1 780.79   2. Acute pulmonary edema  J81.0 518.4   3. Atrial fibrillation, unspecified type  I48.91 427.31   4. Renal insufficiency  N28.9 593.9     Patient Active Problem List   Diagnosis    Hyperlipidemia    SHEILA (obstructive sleep apnea)    Aortic valve replaced    H/O mitral valve repair    Paroxysmal atrial fibrillation    Flu vaccine need    Primary osteoarthritis of left knee    Tendinopathy of right rotator cuff    Angina pectoris    NSVT (nonsustained ventricular tachycardia)    Primary osteoarthritis of right knee    Ventricular tachycardia    VT (ventricular tachycardia)    Nausea & vomiting    Acute constipation    ICD (implantable cardioverter-defibrillator) in place    Permanent atrial fibrillation    Lymphedema    Long COVID    S/P total knee arthroplasty    Weakness     Past Medical History:   Diagnosis Date    Bradycardia     Chronic atrial fibrillation     Coronary artery disease     Essential (primary) hypertension     H/O mitral valve repair     Health care maintenance     Heart failure, unspecified     Hyperthyroidism     Hypothyroid     Localized edema     Lymphedema 2012    Mixed hyperlipidemia     NSVT (nonsustained ventricular tachycardia) 2020    SHEILA (obstructive sleep apnea) 2016    Pacemaker     PAF (paroxysmal atrial fibrillation)     Permanent atrial fibrillation     Sick sinus syndrome     Venous insufficiency (chronic) (peripheral)     Ventricular tachycardia     with ICD shock     Past Surgical History:   Procedure Laterality Date    AORTIC VALVE REPAIR/REPLACEMENT      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    CARDIAC CATHETERIZATION  2011    CARDIAC CATHETERIZATION N/A 2021    Procedure: Left Heart Cath;  Surgeon: Nigel Egan MD;  Location: [facility]  CATH INVASIVE LOCATION;  Service: Cardiovascular;  Laterality: N/A;    CARDIAC CATHETERIZATION N/A 11/30/2021    Procedure: Coronary angiography;  Surgeon: Nigel Egan MD;  Location:  [facility] CATH INVASIVE LOCATION;  Service: Cardiovascular;  Laterality: N/A;    CARDIAC CATHETERIZATION N/A 11/30/2021    Procedure: Resting Full Cycle Ratio;  Surgeon: Nigel Egan MD;  Location:  [facility] CATH INVASIVE LOCATION;  Service: Cardiovascular;  Laterality: N/A;    CARDIAC DEFIBRILLATOR PLACEMENT  2010    CARDIAC ELECTROPHYSIOLOGY PROCEDURE N/A 12/1/2021    Procedure: ICD DC generator change---[employer];  Surgeon: Mick Perez MD;  Location:  [facility] CATH INVASIVE LOCATION;  Service: Cardiology;  Laterality: N/A;    MITRAL VALVE REPAIR/REPLACEMENT  2010    TOTAL KNEE ARTHROPLASTY Right 7/23/2024    Procedure: TOTAL KNEE ARTHROPLASTY AND ALL ASSOCIATED PROCEDURES;  Surgeon: Danny Azevedo MD;  Location: [facility];  Service: Orthopedics;  Laterality: Right;     PT Assessment (Last 12 Hours)       PT Evaluation and Treatment       Row Name 07/26/24 1014          Physical Therapy Time and Intention    Subjective Information complains of;fatigue  -JW     Document Type therapy note (daily note)  -JW     Mode of Treatment physical therapy  -JW     Patient Effort good  -JW       Row Name 07/26/24 1014          General Information    Patient Observations alert;cooperative;agree to therapy  -JW     Patient/Family/Caregiver Comments/Observations pt supine, agrees to therapy  -JW     Existing Precautions/Restrictions fall  -JW     Risks Reviewed patient:;increased discomfort  -JW     Benefits Reviewed patient:;improve function;increase independence;increase strength;increase balance  -JW     Barriers to Rehab none identified  -JW       Row Name 07/26/24 1014          Pain    Pre/Posttreatment Pain Comment no c/o pain  -JW       Row Name 07/26/24 1014          Bed Mobility    Bed Mobility supine-sit  -JW      Supine-Sit Greene (Bed Mobility) standby assist  -     Assistive Device (Bed Mobility) bed rails;head of bed elevated  -     Comment, (Bed Mobility) pt requires extended time to complete transfer to EOB  -       Row Name 07/26/24 1014          Transfers    Transfers sit-stand transfer;stand-sit transfer;toilet transfer  -     Comment, (Transfers) cues for hand placement  -       Row Name 07/26/24 1014          Sit-Stand Transfer    Sit-Stand Greene (Transfers) contact guard;minimum assist (75% patient effort);verbal cues  -     Assistive Device (Sit-Stand Transfers) walker, front-wheeled  -     Comment, (Sit-Stand Transfer) cues for hand placement  -       Row Name 07/26/24 1014          Stand-Sit Transfer    Stand-Sit Greene (Transfers) moderate assist (50% patient effort);verbal cues  -     Assistive Device (Stand-Sit Transfers) walker, front-wheeled  -     Comment, (Stand-Sit Transfer) cues for descent into sitting  -       Row Name 07/26/24 1014          Toilet Transfer    Type (Toilet Transfer) sit-stand;stand-sit  -     Greene Level (Toilet Transfer) maximum assist (25% patient effort);2 person assist;verbal cues  -     Assistive Device (Toilet Transfer) raised toilet seat  -       Row Name 07/26/24 1014          Gait/Stairs (Locomotion)    Greene Level (Gait) contact guard;verbal cues  -     Assistive Device (Gait) walker, front-wheeled  -     Distance in Feet (Gait) 10  -JW     Pattern (Gait) step-to  -     Bilateral Gait Deviations forward flexed posture  -     Comment, (Gait/Stairs) pt with significantly decreased gait speed, requires cues for safety and sequencing throughout mobility activity  -       Row Name 07/26/24 1014          Safety Issues, Functional Mobility    Safety Issues Affecting Function (Mobility) insight into deficits/self-awareness;judgment;positioning of assistive device  -       Row Name             Wound 07/23/24  1716 Right anterior knee Incision    Wound - Properties Group Placement Date: 07/23/24  -TG Placement Time: 1716  -TG Side: Right  -TG Orientation: anterior  -TG Location: knee  -TG Primary Wound Type: Incision  -TG Additional Comments: incision covered with exofin, telfa, ABDs, webril, ace wraps, immobilizer, ice pack  -TG    Retired Wound - Properties Group Placement Date: 07/23/24  -TG Placement Time: 1716  -TG Side: Right  -TG Orientation: anterior  -TG Location: knee  -TG Primary Wound Type: Incision  -TG Additional Comments: incision covered with exofin, telfa, ABDs, webril, ace wraps, immobilizer, ice pack  -TG    Retired Wound - Properties Group Date first assessed: 07/23/24  -TG Time first assessed: 1716 -TG Side: Right  -TG Location: knee  -TG Primary Wound Type: Incision  -TG Additional Comments: incision covered with exofin, telfa, ABDs, webril, ace wraps, immobilizer, ice pack  -TG      Row Name             Wound 07/25/24 0410 Right posterior hand Skin Tear    Wound - Properties Group Placement Date: 07/25/24  -LP Placement Time: 0410  -LP Present on Original Admission: Y  -LP Side: Right  -LP Orientation: posterior  -LP Location: hand  -LP Primary Wound Type: Skin tear  -LP    Retired Wound - Properties Group Placement Date: 07/25/24  -LP Placement Time: 0410  -LP Present on Original Admission: Y  -LP Side: Right  -LP Orientation: posterior  -LP Location: hand  -LP Primary Wound Type: Skin tear  -LP    Retired Wound - Properties Group Date first assessed: 07/25/24  -LP Time first assessed: 0410  -LP Present on Original Admission: Y  -LP Side: Right  -LP Location: hand  -LP Primary Wound Type: Skin tear  -LP      Row Name 07/26/24 1014          Plan of Care Review    Outcome Evaluation PT: Patient oriented x3 today, however displays confusion at times during conversation or answering questions.  Patient performs supine to sit with SBA, requiring extended time to complete.  Patient performs sit to stand  from elevated bed surface with CGA/min assist and requires max assist x2 for sit to stand from commode surface.  Patient performs gait x10 feet with rolling walker, CGA with repeated cues for safety and sequencing.  Patient requires extended time to complete basic functional tasks and continues to display poor safety awareness and insight into deficits.  At this time would recommend short term rehab at discharge.  -       Row Name 07/26/24 1014          Positioning and Restraints    Pre-Treatment Position in bed  -     Post Treatment Position chair  -     In Chair notified nsg;reclined;call light within reach;encouraged to call for assist;exit alarm on  -       Row Name 07/26/24 1014          Progress Summary (PT)    Progress Toward Functional Goals (PT) progress toward functional goals is good  -               User Key  (r) = Recorded By, (t) = Taken By, (c) = Cosigned By      Initials Name Provider Type    Briana Andrew, RN Registered Nurse    Arti Brewster, PT Physical Therapist    Eulalia Blount RN Registered Nurse                    Physical Therapy Education       Title: PT OT SLP Therapies (In Progress)       Topic: Physical Therapy (In Progress)       Point: Mobility training (In Progress)       Learning Progress Summary             Patient Acceptance, E,TB, NR by  at 7/26/2024 1303    Acceptance, E,TB, VU by  at 7/25/2024 1313    Acceptance, E,TB, NR by  at 7/25/2024 1313                         Point: Home exercise program (In Progress)       Learning Progress Summary             Patient Acceptance, E,TB, NR by  at 7/26/2024 1303                                         User Key       Initials Effective Dates Name Provider Type Discipline     06/16/21 -  Arti Hilton, CHRISTIANO Physical Therapist PT                  PT Recommendation and Plan  Anticipated Discharge Disposition (PT): skilled nursing facility  Planned Therapy Interventions (PT): balance training, bed mobility  training, gait training, home exercise program, patient/family education, strengthening, transfer training  Therapy Frequency (PT): daily  Progress Summary (PT)  Progress Toward Functional Goals (PT): progress toward functional goals is good  Plan of Care Reviewed With: patient  Outcome Evaluation: PT: Patient oriented x3 today, however displays confusion at times during conversation or answering questions.  Patient performs supine to sit with SBA, requiring extended time to complete.  Patient performs sit to stand from elevated bed surface with CGA/min assist and requires max assist x2 for sit to stand from commode surface.  Patient performs gait x10 feet with rolling walker, CGA with repeated cues for safety and sequencing.  Patient requires extended time to complete basic functional tasks and continues to display poor safety awareness and insight into deficits.  At this time would recommend short term rehab at discharge.   Outcome Measures       Row Name 07/26/24 1014             How much help from another person do you currently need...    Turning from your back to your side while in flat bed without using bedrails? 3  -JW      Moving from lying on back to sitting on the side of a flat bed without bedrails? 3  -JW      Moving to and from a bed to a chair (including a wheelchair)? 3  -JW      Standing up from a chair using your arms (e.g., wheelchair, bedside chair)? 3  -JW      Climbing 3-5 steps with a railing? 1  -JW      To walk in hospital room? 2  -JW      AM-PAC 6 Clicks Score (PT) 15  -      Highest Level of Mobility Goal 4 --> Transfer to chair/commode  -         Functional Assessment    Outcome Measure Options AM-PAC 6 Clicks Basic Mobility (PT)  -                User Key  (r) = Recorded By, (t) = Taken By, (c) = Cosigned By      Initials Name Provider Type    Arti Brewster, PT Physical Therapist                     Time Calculation:    PT Charges       Row Name 07/26/24 1304             Time  Calculation    Start Time 1014  -JW      Stop Time 1102  -JW      Time Calculation (min) 48 min  -JW      PT Received On 07/26/24  -JW         Timed Charges    31604 - PT Therapeutic Exercise Minutes 23  -JW      71881 - Gait Training Minutes  25  -JW         Total Minutes    Timed Charges Total Minutes 48  -JW       Total Minutes 48  -JW                User Key  (r) = Recorded By, (t) = Taken By, (c) = Cosigned By      Initials Name Provider Type    Arti Brewster, PT Physical Therapist                  Therapy Charges for Today       Code Description Service Date Service Provider Modifiers Qty    08755570271 HC PT EVAL LOW COMPLEXITY 3 7/25/2024 Arti Hilton, PT GP 1    43478536604 HC PT THER PROC EA 15 MIN 7/26/2024 Arti Hilton, PT GP 1    97154255835 HC GAIT TRAINING EA 15 MIN 7/26/2024 Arti Hilton, PT GP 2            PT G-Codes  Outcome Measure Options: AM-PAC 6 Clicks Basic Mobility (PT)  AM-PAC 6 Clicks Score (PT): 15    Arti Hilton PT  7/26/2024

## 2024-07-26 NOTE — PLAN OF CARE
Problem: Adult Inpatient Plan of Care  Goal: Plan of Care Review  Recent Flowsheet Documentation  Taken 7/26/2024 1330 by Yuriy Hall OTR  Plan of Care Reviewed With: patient  Outcome Evaluation: OT: Pt less confused today. She was oriented x 3, yet she still had difficulty following simple commands, sequencing and problem solving. Pt managed bed mobility with SBA and sit to stand transfer from EOB with CGA/min assit. Pt required max assist x 2 to stand from a raised commode. Pt managed functional mobility x 10 ft using a rolling walker with CGA and frequent cues for safety. Pt not is safe to manage transfer, mobility or daily tasks independently. Rec STR upon discharge.

## 2024-07-26 NOTE — PROGRESS NOTES
Adult Nutrition  Assessment/PES    Patient Name:  Anna Gilbert  YOB: 1945  MRN: [id number]  Admit Date:  7/24/2024    Assessment Date:  7/26/2024    Comments:  Po intake 58% ave of meals so far.     Edu on coumadin/vitamin K.    Will remain available and follow for LOS     Reason for Assessment       Row Name 07/26/24 1032          Reason for Assessment    Reason For Assessment per organizational policy  warafin     Diagnosis surgery/postoperative complications;infection/sepsis  Weakness, recent knee surgery, ? UTI                    Nutrition/Diet History       Row Name 07/26/24 1035          Nutrition/Diet History    Typical Intake (Food/Fluid/EN/PN) Spoke w pt , RN at bedside. Shellfish allergy. Ate some this am, reports was morris out of it yesterday. NO needs voiced. Pt been on coumadin and declines edu needs.                    Labs/Tests/Procedures/Meds       Row Name 07/26/24 1036          Labs/Procedures/Meds    Lab Results Reviewed reviewed     Lab Results Comments Na 131 L, glu 121 , BUn 35/creat 1.1        Diagnostic Tests/Procedures    Diagnostic Test/Procedure Reviewed reviewed        Medications    Pertinent Medications Reviewed reviewed     Pertinent Medications Comments coumadin                    Physical Findings       Row Name 07/26/24 1036          Physical Findings    Overall Physical Appearance knee incision, arm tear                      Nutrition Prescription Ordered       Row Name 07/26/24 1037          Nutrition Prescription PO    Current PO Diet Regular                    Evaluation of Received Nutrient/Fluid Intake       Row Name 07/26/24 1037          Fluid Intake Evaluation    Oral Fluid (mL) 240  insufficient data        PO Evaluation    Number of Meals 3     % PO Intake 58                       Problem/Interventions:   Problem 1       Row Name 07/26/24 1037          Nutrition Diagnoses Problem 1    Problem 1 Other (comment)  No nutrition dx at this time                           Intervention Goal       Row Name 07/26/24 1038          Intervention Goal    General Meet nutritional needs for age/condition     PO Continue positive trend;PO intake (%)     PO Intake % 75 %                    Nutrition Intervention       Row Name 07/26/24 1038          Nutrition Intervention    RD/Tech Action Encourage intake;Follow Tx progress;Advise alternate selection                      Education/Evaluation       Row Name 07/26/24 1038          Education    Education Education topics;Education offered and refused;Provided education regarding  edu on reg diet order. edu on food/beverages that affect coumadin & how works. goal consistency of vitamin K intake.     Education Topics Vitamin K  provided with contact        Monitor/Evaluation    Monitor Per protocol     Education Follow-up Other (comment)  pt reports has been on it a long time, doesnt need handout/edu                     Electronically signed by:  Michelle Manley RD  07/26/24 10:41 EDT

## 2024-07-26 NOTE — PROGRESS NOTES
"SERVICE: [facility] HOSPITALIST    CONSULTANTS: None    CHIEF COMPLAINT: Weakness    SUBJECTIVE: Patient seen and examined at bedside. Patient reports feeling better today and more herself, continues to have knee pain.  Family member at bedside reports improvement as well.  We discussed suspicion that UTI created her confusion issues, she reports she has been having issues with UTI for quite some time.  We discussed referral to urology for this and they are agreeable.  No other acute issues or complaints    OBJECTIVE:    Physical exam is largely unchanged from previous exam, except where documented below, examination is accurate as of 7/26/2024    Physical Exam:  General: Patient is awake and alert oriented x 3 person place and time.  Elderly female. No acute distress noted.   HENT: Head is atraumatic, normocephalic. Hearing is grossly intact. Nose is without obvious congestion and appears patent. Neck is supple and trachea is midline.   Eyes: Vision is grossly intact. Pupils appear equal and round.   Cardiovascular: Heart has regular rate and rhythm with no murmurs, rubs or gallops noted.   Respiratory: Lungs are clear to ausculation without wheezes, rhonchi or rales.   Abdominal/GI: Soft, nontender, bowel sounds present. No rebound or guarding present.   Extremities: No peripheral edema noted.   Musculoskeletal: Spontaneous movement of bilateral upper and lower extremities against gravity noted. No signs of injury or deformity noted.   Skin: Warm and dry.   Psych: Mood and affect are appropriate. Cooperative with exam.   Neuro: No facial asymmetry noted. No focal deficits noted, hearing and vision are grossly intact.     /60 (BP Location: Right arm, Patient Position: Lying)   Pulse 81   Temp 97.5 °F (36.4 °C) (Oral)   Resp 16   Ht 157.5 cm (62\")   Wt 73.1 kg (161 lb 2.5 oz)   SpO2 93%   BMI 29.48 kg/m²     MEDS/LABS REVIEWED AND ORDERED    cefTRIAXone, 1,000 mg, Intravenous, " Q24H  diclofenac, 2 drop, Right Eye, 4x Daily  enoxaparin, 1 mg/kg, Subcutaneous, Q24H  levothyroxine, 150 mcg, Oral, Q AM  metoprolol succinate XL, 50 mg, Oral, Daily  ofloxacin, 2 drop, Right Eye, 4x Daily  warfarin, 5 mg, Oral, Nightly          LAB/DIAGNOSTICS:    Lab Results (last 24 hours)       Procedure Component Value Units Date/Time    Blood Culture - Blood, Arm, Left [[id number]]  (Normal) Collected: 07/25/24 0002    Specimen: Blood from Arm, Left Updated: 07/26/24 0015     Blood Culture No growth at 24 hours    Blood Culture - Blood, Arm, Right [[id number]]  (Normal) Collected: 07/25/24 0009    Specimen: Blood from Arm, Right Updated: 07/26/24 0015     Blood Culture No growth at 24 hours    Urine Culture - Urine, Urine, Clean Catch [[id number]] Collected: 07/25/24 0145    Specimen: Urine, Clean Catch Updated: 07/25/24 1928    Respiratory Panel PCR w/COVID-19(SARS-CoV-2) GORDY/GUSTAVO/DAVID/PAD/COR/EVERT In-House, NP Swab in UTM/VTM, 2 HR TAT - Swab, Nasopharynx [[id number]]  (Normal) Collected: 07/25/24 1135    Specimen: Swab from Nasopharynx Updated: 07/25/24 1717     ADENOVIRUS, PCR Not Detected     Coronavirus 229E Not Detected     Coronavirus HKU1 Not Detected     Coronavirus NL63 Not Detected     Coronavirus OC43 Not Detected     COVID19 Not Detected     Human Metapneumovirus Not Detected     Human Rhinovirus/Enterovirus Not Detected     Influenza A PCR Not Detected     Influenza B PCR Not Detected     Parainfluenza Virus 1 Not Detected     Parainfluenza Virus 2 Not Detected     Parainfluenza Virus 3 Not Detected     Parainfluenza Virus 4 Not Detected     RSV, PCR Not Detected     Bordetella pertussis pcr Not Detected     Bordetella parapertussis PCR Not Detected     Chlamydophila pneumoniae PCR Not Detected     Mycoplasma pneumo by PCR Not Detected    Narrative:      In the setting of a positive respiratory panel with a viral infection PLUS a negative procalcitonin without other underlying concern for bacterial  infection, consider observing off antibiotics or discontinuation of antibiotics and continue supportive care. If the respiratory panel is positive for atypical bacterial infection (Bordetella pertussis, Chlamydophila pneumoniae, or Mycoplasma pneumoniae), consider antibiotic de-escalation to target atypical bacterial infection.          ECG 12 Lead   ED Interpretation   Yuriy Sharpe DO     7/25/2024  1:32 AM   ECG 12 Lead         Date/Time: 7/25/2024 1:06 AM      Performed by: Yuriy Sharpe DO   Authorized by: Yuriy Sharpe DO  Rhythm: atrial fibrillation   Rate: normal   QRS axis: normal   ST Segments: ST segments normal   Clinical impression: dysrhythmia - atrial      ECG 12 Lead Dyspnea   Final Result   HEART RATE=93  bpm   RR Tevpdcnj=205  ms   WI Interval=  ms   P Horizontal Axis=  deg   P Front Axis=  deg   QRSD Interval=96  ms   QT Jfnfgnvu=024  ms   FZbP=797  ms   QRS Axis=-74  deg   T Wave Axis=93  deg   - ABNORMAL ECG -   Atrial fibrillation   Ventricular premature complex- new   POOR R WAVE PROGRESSION   Electronically Signed By: Adeel Mina ([facility]) 2024-07-25 10:27:43   Date and Time of Study:2024-07-25 00:32:18        Results for orders placed during the hospital encounter of 12/12/23    Adult Transthoracic Echo Complete W/ Cont if Necessary Per Protocol    Interpretation Summary    Left ventricular ejection fraction appears to be 51 - 55%.    Left ventricular wall thickness is consistent with moderate concentric hypertrophy.    Left ventricular diastolic function was indeterminate.    The left atrial cavity is severely dilated.    Left atrial volume is severely increased.    The right atrial cavity is severely  dilated.    Aortic valve area is 1.9 cm2.    Aortic valve maximum pressure gradient is 29 mmHg. Aortic valve mean pressure gradient is 16 mmHg.    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    There is a mitral valve ring present.    Moderate tricuspid valve regurgitation is  present.    Calculated right ventricular systolic pressure from tricuspid regurgitation is 39 mmHg.    CT Head Without Contrast    Result Date: 7/25/2024  Impression: No acute intracranial abnormality. Electronically Signed: Jamal Johnson MD  7/25/2024 5:06 AM EDT  Workstation ID: [id number]    CT Chest With Contrast Diagnostic    Result Date: 7/25/2024  Impression: 1.No evidence of pulmonary embolism. 2.Small bilateral pleural effusions greater on the right than the left. 3.Mild pulmonary edema. 4.Cardiomegaly. Electronically Signed: Jamal Johnson MD  7/25/2024 1:20 AM EDT  Workstation ID: [id number]    XR Chest 1 View    Result Date: 7/24/2024  Impression: Cardiomegaly with pulmonary vascular congestion and increased perihilar and interstitial opacities suggesting congestive failure. Findings are accentuated by low lung volumes. Superimposed pneumonia is not excluded Electronically Signed: Mariusz Fields MD  7/24/2024 10:37 PM EDT  Workstation ID: [id number]       ASSESSMENT/PLAN:  Please note portions of this assessment/plan may have been copied and pasted, but I have personally seen this patient and reviewed each line of this assessment and plan for accuracy and made updates to reflect my necessary changes on 7/26/2024    Generalized weakness secondary to recurrence of UTI  -Patient with urinary incontinence and infectious encephalopathy on arrival  -Patient improving on IV Rocephin, await repeat CBC and procalcitonin level, initial procalcitonin was elevated  -CT head was negative  -TSH within normal limits  -PT OT working with patient  -Plan referral to urology for multiple recurrences of UTI    Chronic diastolic congestive heart failure  -Patient received one-time dose of IV Lasix and was liberated quickly from oxygen  -Resume torsemide    Permanent A-fib/history of VTE  -Continue Toprol, INR subtherapeutic INR, continue Coumadin, also continue full dose Lovenox for bridging    Essential hypertension  -Blood  "pressure near goal, continue to hold antihypertensives    Hypothyroidism-TSH within normal limits, continue home replacement dose    CAD-no reported chest pain    Recent right total knee arthroplasty  -Continue routine postop care, PT OT following as above  -Will change Percocet to tramadol due to confusion deviously    PLAN FOR DISPOSITION: GERALD Diaz DO  07/26/24  08:21 EDT    At [facility], we believe that sharing information builds trust and better relationships. You are receiving this note because you recently visited [facility]. It is possible you will see health information before a provider has talked with you about it. This kind of information can be easy to misunderstand. To help you fully understand what it means for your health, we urge you to discuss this note with your provider.    \"Dictated utilizing Dragon dictation\"    "

## 2024-07-26 NOTE — DISCHARGE SUMMARY
" Orthopedic Discharge Summary      Patient: Anna Gilbert      YOB: 1945    Medical Record Number: [id number]    Attending Physician: Dr Danny Azevedo  Consulting Physician(s):   Date of Admission: 7/23/2024 11:44 AM  Date of Discharge: 7/24/2024        Primary osteoarthritis of right knee    S/P total knee arthroplasty     Status Post: [place] ARTHRP KNE CONDYLE&PLATU MEDIAL&LAT COMPARTMENTS [03172] (TOTAL KNEE ARTHROPLASTY AND ALL ASSOCIATED PROCEDURES)      Allergies   Allergen Reactions    Amiodarone Shortness Of Breath, Nausea Only and Swelling     She reports she experienced significant fluid retention resulting in SOA and inability to walk, which she reports resolved as soon as she self-discontinued this medication.     Vancomycin Shortness Of Breath     Turned blue in face and became tachycardic 160s    Adhesive Tape Other (See Comments)     Thin skin - causes skin to tear and blister     Gabapentin Unknown - High Severity, Other (See Comments) and Rash     Feet peeling    Statins Other (See Comments)     Felt awful       Current Medications:     Discharge Medications        New Medications        Instructions Start Date   BUPIVACAINE 0.125% IN 0.9% SODIUM CHLORIDE 400 ML ELASTOMERIC PUMP \"PAIN BALL\"   8 mL/hr (8 mL/hr), Intradermal, Continuous      diclofenac 0.1 % ophthalmic solution  Commonly known as: VOLTAREN   2 drops, Right Eye, 4 Times Daily      docusate sodium 100 MG capsule  Commonly known as: Colace   100 mg, Oral, 2 Times Daily PRN      ofloxacin 0.3 % ophthalmic solution  Commonly known as: OCUFLOX   2 drops, Right Eye, 4 Times Daily      oxyCODONE-acetaminophen 5-325 MG per tablet  Commonly known as: PERCOCET   1 tablet, Oral, Every 4 Hours PRN      warfarin 5 MG tablet  Commonly known as: COUMADIN   5 mg, Oral, Nightly, TAKE AS DIRECTED             Continue These Medications        Instructions Start Date   albuterol sulfate  (90 Base) MCG/ACT inhaler  Commonly known as: " PROVENTIL HFA;VENTOLIN HFA;PROAIR HFA   2 puffs, Inhalation, As Needed      APPLE CIDER VINEGAR PO   1 tablet, Oral, Daily      BIOTIN PO   1 tablet, Daily      CALCIUM-MAGNESIUM-ZINC PO   1 tablet, Oral, Daily      cyclobenzaprine 5 MG tablet  Commonly known as: FLEXERIL   5 mg, Oral, 3 Times Daily PRN      Klor-Con M20 20 MEQ CR tablet  Generic drug: potassium chloride   20 mEq, Oral, Daily      levothyroxine 150 MCG tablet  Commonly known as: SYNTHROID, LEVOTHROID   1 tablet, Oral, Daily      metoprolol succinate XL 50 MG 24 hr tablet  Commonly known as: TOPROL-XL   TAKE ONE TABLET BY MOUTH DAILY      ondansetron ODT 4 MG disintegrating tablet  Commonly known as: ZOFRAN-ODT   4 mg, Translingual, Every 8 Hours PRN      pantoprazole 40 MG EC tablet  Commonly known as: PROTONIX   40 mg, Oral, Every Morning      torsemide 20 MG tablet  Commonly known as: DEMADEX   40 mg, Oral, Daily             Stop These Medications      celecoxib 200 MG capsule  Commonly known as: CeleBREX     HYDROcodone-Acetaminophen 5-300 MG per tablet  Commonly known as: XODOL     multivitamin capsule     Progesterone 40 % cream            ASK your doctor about these medications        Instructions Start Date   b complex vitamins capsule   1 capsule, Daily      Cholecalciferol 50 MCG (2000 UT) capsule   Daily                   Past Medical History:   Diagnosis Date    Bradycardia     Chronic atrial fibrillation     Coronary artery disease     Essential (primary) hypertension     H/O mitral valve repair     Health care maintenance     Heart failure, unspecified     Hyperthyroidism     Hypothyroid     Localized edema     Lymphedema 06/02/2012    Mixed hyperlipidemia     NSVT (nonsustained ventricular tachycardia) 09/23/2020    SHEILA (obstructive sleep apnea) 07/20/2016    Pacemaker     PAF (paroxysmal atrial fibrillation)     Permanent atrial fibrillation     Sick sinus syndrome     Venous insufficiency (chronic) (peripheral)     Ventricular  tachycardia     with ICD shock     Past Surgical History:   Procedure Laterality Date    AORTIC VALVE REPAIR/REPLACEMENT  2010    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    CARDIAC CATHETERIZATION  01/01/2011    CARDIAC CATHETERIZATION N/A 11/30/2021    Procedure: Left Heart Cath;  Surgeon: Nigel Egan MD;  Location:  [facility];  Service: Cardiovascular;  Laterality: N/A;    CARDIAC CATHETERIZATION N/A 11/30/2021    Procedure: Coronary angiography;  Surgeon: Nigel Egan MD;  Location:  [facility];  Service: Cardiovascular;  Laterality: N/A;    CARDIAC CATHETERIZATION N/A 11/30/2021    Procedure: Resting Full Cycle Ratio;  Surgeon: Nigel Egan MD;  Location:  [facility];  Service: Cardiovascular;  Laterality: N/A;    CARDIAC DEFIBRILLATOR PLACEMENT  2010    CARDIAC ELECTROPHYSIOLOGY PROCEDURE N/A 12/1/2021    Procedure: ICD DC generator change---[employer];  Surgeon: Mick Perez MD;  Location:  [facility];  Service: Cardiology;  Laterality: N/A;    MITRAL VALVE REPAIR/REPLACEMENT  2010     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 female  General Appearance:    Alert, cooperative, in no acute distress                      Vitals:    07/23/24 2322 07/24/24 0300 07/24/24 0813 07/24/24 1154   BP: 111/62 109/58 121/56 104/58   BP Location: Right arm Right arm Left  arm Right arm   Patient Position: Lying Lying Lying Sitting   Pulse: 83 74 76 60   Resp: 16 18 16 16   Temp: 96.8 °F (36 °C) 97.6 °F (36.4 °C) 97.9 °F (36.6 °C) 97.5 °F (36.4 °C)   TempSrc: Axillary Oral Oral Oral   SpO2: 100% 91% 94% 94%   Weight:            Head:    Normocephalic, without obvious abnormality, atraumatic   Eyes:            Lids and lashes normal, conjunctivae and sclerae normal, no   icterus, no pallor, corneas clear, PERRLA   Ears:    Ears appear intact with no abnormalities noted   Throat:   No oral lesions, no thrush, oral mucosa moist   Neck:   No adenopathy, supple, trachea midline, no thyromegaly, no    carotid bruit, no JVD   Back:     No kyphosis present, no scoliosis present, no skin lesions,       erythema or scars, no tenderness to percussion or                   palpation,   range of motion normal   Lungs:     Clear to auscultation,respirations regular, even and                   unlabored    Heart:    Regular rhythm and normal rate, normal S1 and S2, no            murmur, no gallop, no rub, no click   Chest Wall:    No abnormalities observed   Abdomen:     Normal bowel sounds, no masses, no organomegaly, soft        non-tender, non-distended, no guarding, no rebound                 tenderness   Rectal:     Deferred   Extremities:   Incision intact without signs or symptoms of infection.               Neurovascular status remains intact to operative extremity.      Moves all extremities well, no edema, no cyanosis, no              redness   Pulses:   Pulses palpable and equal bilaterally   Skin:   No bleeding, bruising or rash   Lymph nodes:   No palpable adenopathy   Neurologic:   Cranial nerves 2 - 12 grossly intact, sensation intact, DTR        present and equal bilaterally           Hospital Course:  78 y.o. female admitted to [facility] to services of Danny Azevedo MD with Primary osteoarthritis of right knee [M17.11]  S/P total knee arthroplasty [Z96.659] on 7/23/2024  and underwent [place] ARTHRP KNE CONDYLE&PLATU MEDIAL&LAT COMPARTMENTS [84533] (TOTAL KNEE ARTHROPLASTY AND ALL ASSOCIATED PROCEDURES)  Per Danny Azevedo MD. Antibiotic and VTE prophylaxis were per SCIP protocols. Post-operatively the patient transferred to the post-operative floor where the patient underwent mobilization therapy that included active as well as passive ROM exercises. Opioids were titrated to achieve appropriate pain management to allow for participation in mobilization exercises. Vital signs are now stable. The incision is intact without signs or symptoms of infection. Operative extremity neurovascular status remains intact.  No calf swelling, negative Homans sign, no signs or symptoms consistent with DVT.   Appropriate education re: incision care, activity levels, medications, and follow up visits was completed and all questions were answered. The patient is now deemed stable for discharge to Home.      DIAGNOSTIC TESTS:     Admission on 07/24/2024   Component Date Value Ref Range Status    Glucose 07/24/2024 124 (H)  65 - 99 mg/dL Final    BUN 07/24/2024 35 (H)  8 - 23 mg/dL Final    Creatinine 07/24/2024 1.66 (H)  0.57 - 1.00 mg/dL Final    Sodium 07/24/2024 132 (L)  136 - 145 mmol/L Final    Potassium 07/24/2024 4.2  3.5 - 5.2 mmol/L Final    Chloride 07/24/2024 95 (L)  98 - 107 mmol/L Final    CO2 07/24/2024 23.8  22.0 - 29.0 mmol/L Final    Calcium 07/24/2024 9.0  8.6 - 10.5 mg/dL Final    Total Protein 07/24/2024 7.5  6.0 - 8.5 g/dL Final    Albumin 07/24/2024 4.0  3.5 - 5.2 g/dL Final    ALT (SGPT) 07/24/2024 <5  1 - 33 U/L Final    AST (SGOT) 07/24/2024 24  1 - 32 U/L Final    Alkaline Phosphatase 07/24/2024 65  39 - 117 U/L Final    Total Bilirubin 07/24/2024 0.6  0.0 - 1.2 mg/dL Final    Globulin 07/24/2024 3.5  gm/dL Final    A/G Ratio 07/24/2024 1.1  g/dL Final    BUN/Creatinine Ratio 07/24/2024 21.1  7.0 - 25.0 Final    Anion Gap 07/24/2024 13.2  5.0 - 15.0 mmol/L Final    eGFR  07/24/2024 31.1 (L)  >60.0 mL/min/1.73 Final    Color, UA 07/25/2024 Yellow  Yellow, Straw Final    Appearance, UA 07/25/2024 Clear  Clear Final    pH, UA 07/25/2024 <=5.0  4.5 - 8.0 Final    Specific Gravity, UA 07/25/2024 1.010  1.003 - 1.030 Final    Glucose, UA 07/25/2024 Negative  Negative Final    Ketones, UA 07/25/2024 Trace (A)  Negative Final    Bilirubin, UA 07/25/2024 Negative  Negative Final    Blood, UA 07/25/2024 Negative  Negative Final    Protein, UA 07/25/2024 Negative  Negative Final    Leuk Esterase, UA 07/25/2024 Small (1+) (A)  Negative Final    Nitrite, UA 07/25/2024 Negative  Negative Final    Urobilinogen, UA 07/25/2024 0.2 E.U./dL  0.2 - 1.0 E.U./dL Final    HS Troponin T 07/24/2024 33 (H)  <14 ng/L Final    Protime 07/24/2024 15.1 (H)  12.1 - 15.0 Seconds Final    INR 07/24/2024 1.15 (H)  0.90 - 1.10 Final    WBC 07/24/2024 15.84 (H)  3.40 - 10.80 10*3/mm3 Final    RBC 07/24/2024 4.66  3.77 - 5.28 10*6/mm3 Final    Hemoglobin 07/24/2024 13.3  12.0 - 15.9 g/dL Final    Hematocrit 07/24/2024 40.8  34.0 - 46.6 % Final    MCV 07/24/2024 87.6  79.0 - 97.0 fL Final    MCH 07/24/2024 28.5  26.6 - 33.0 pg Final    MCHC 07/24/2024 32.6  31.5 - 35.7 g/dL Final    RDW 07/24/2024 14.6  12.3 - 15.4 % Final    RDW-SD 07/24/2024 46.8  37.0 - 54.0 fl Final    MPV 07/24/2024 11.3  6.0 - 12.0 fL Final    Platelets 07/24/2024 182  140 - 450 10*3/mm3 Final    Neutrophil % 07/24/2024 73.8  42.7 - 76.0 % Final    Lymphocyte % 07/24/2024 6.8 (L)  19.6 - 45.3 % Final    Monocyte % 07/24/2024 17.6 (H)  5.0 - 12.0 % Final    Eosinophil % 07/24/2024 0.4  0.3 - 6.2 % Final    Basophil % 07/24/2024 0.6  0.0 - 1.5 % Final    Immature Grans % 07/24/2024 0.8 (H)  0.0 - 0.5 % Final    Neutrophils, Absolute 07/24/2024 11.70 (H)  1.70 - 7.00 10*3/mm3 Final    Lymphocytes, Absolute 07/24/2024 1.07  0.70 - 3.10 10*3/mm3 Final    Monocytes, Absolute 07/24/2024 2.79 (H)  0.10 - 0.90 10*3/mm3 Final    Eosinophils, Absolute  07/24/2024 0.06  0.00 - 0.40 10*3/mm3 Final    Basophils, Absolute 07/24/2024 0.09  0.00 - 0.20 10*3/mm3 Final    Immature Grans, Absolute 07/24/2024 0.13 (H)  0.00 - 0.05 10*3/mm3 Final    nRBC 07/24/2024 0.0  0.0 - 0.2 /100 WBC Final    Blood Culture 07/25/2024 No growth at 24 hours   Preliminary    Blood Culture 07/25/2024 No growth at 24 hours   Preliminary    Lactate 07/25/2024 2.0  0.5 - 2.0 mmol/L Final    proBNP 07/24/2024 3,175.0 (H)  0.0 - 1,800.0 pg/mL Final    QT Interval 07/25/2024 367  ms Final    QTC Interval 07/25/2024 456  ms Final    RBC, UA 07/25/2024 None Seen  None Seen, 0-2 /HPF Final    WBC, UA 07/25/2024 3-5 (A)  None Seen, 0-2 /HPF Final    Bacteria, UA 07/25/2024 Trace (A)  None Seen /HPF Final    Squamous Epithelial Cells, UA 07/25/2024 0-2  None Seen, 0-2 /HPF Final    Transitional Epithelial Cells, UA 07/25/2024 0-2  0 - 2 /HPF Final    Hyaline Casts, UA 07/25/2024 None Seen  None Seen /LPF Final    Fine Granular Casts, UA 07/25/2024 0-2  None Seen /LPF Final    Methodology 07/25/2024 Manual Light Microscopy   Final    TSH 07/24/2024 1.730  0.270 - 4.200 uIU/mL Final    Glucose 07/25/2024 121 (H)  65 - 99 mg/dL Final    BUN 07/25/2024 35 (H)  8 - 23 mg/dL Final    Creatinine 07/25/2024 1.53 (H)  0.57 - 1.00 mg/dL Final    Sodium 07/25/2024 133 (L)  136 - 145 mmol/L Final    Potassium 07/25/2024 4.8  3.5 - 5.2 mmol/L Final    Specimen hemolyzed.  Result may be falsely elevated.    Chloride 07/25/2024 100  98 - 107 mmol/L Final    CO2 07/25/2024 20.3 (L)  22.0 - 29.0 mmol/L Final    Calcium 07/25/2024 8.8  8.6 - 10.5 mg/dL Final    Albumin 07/25/2024 3.6  3.5 - 5.2 g/dL Final    Phosphorus 07/25/2024 3.3  2.5 - 4.5 mg/dL Final    Anion Gap 07/25/2024 12.7  5.0 - 15.0 mmol/L Final    BUN/Creatinine Ratio 07/25/2024 22.9  7.0 - 25.0 Final    eGFR 07/25/2024 34.3 (L)  >60.0 mL/min/1.73 Final    HS Troponin T 07/25/2024 33 (H)  <14 ng/L Final    WBC 07/25/2024 17.38 (H)  3.40 - 10.80 10*3/mm3  Final    RBC 07/25/2024 4.44  3.77 - 5.28 10*6/mm3 Final    Hemoglobin 07/25/2024 12.9  12.0 - 15.9 g/dL Final    Hematocrit 07/25/2024 40.3  34.0 - 46.6 % Final    MCV 07/25/2024 90.8  79.0 - 97.0 fL Final    MCH 07/25/2024 29.1  26.6 - 33.0 pg Final    MCHC 07/25/2024 32.0  31.5 - 35.7 g/dL Final    RDW 07/25/2024 14.8  12.3 - 15.4 % Final    RDW-SD 07/25/2024 49.3  37.0 - 54.0 fl Final    MPV 07/25/2024 11.5  6.0 - 12.0 fL Final    Platelets 07/25/2024 162  140 - 450 10*3/mm3 Final    Neutrophil % 07/25/2024 72.2  42.7 - 76.0 % Final    Lymphocyte % 07/25/2024 6.7 (L)  19.6 - 45.3 % Final    Monocyte % 07/25/2024 19.4 (H)  5.0 - 12.0 % Final    Eosinophil % 07/25/2024 0.2 (L)  0.3 - 6.2 % Final    Basophil % 07/25/2024 0.6  0.0 - 1.5 % Final    Immature Grans % 07/25/2024 0.9 (H)  0.0 - 0.5 % Final    Neutrophils, Absolute 07/25/2024 12.57 (H)  1.70 - 7.00 10*3/mm3 Final    Lymphocytes, Absolute 07/25/2024 1.16  0.70 - 3.10 10*3/mm3 Final    Monocytes, Absolute 07/25/2024 3.37 (H)  0.10 - 0.90 10*3/mm3 Final    Eosinophils, Absolute 07/25/2024 0.03  0.00 - 0.40 10*3/mm3 Final    Basophils, Absolute 07/25/2024 0.10  0.00 - 0.20 10*3/mm3 Final    Immature Grans, Absolute 07/25/2024 0.15 (H)  0.00 - 0.05 10*3/mm3 Final    nRBC 07/25/2024 0.0  0.0 - 0.2 /100 WBC Final    Procalcitonin 07/25/2024 0.34 (H)  0.00 - 0.25 ng/mL Final    HS Troponin T 07/25/2024 34 (H)  <14 ng/L Final    Troponin T Delta 07/25/2024 1  >=-4 - <+4 ng/L Final    ADENOVIRUS, PCR 07/25/2024 Not Detected  Not Detected Final    Coronavirus 229E 07/25/2024 Not Detected  Not Detected Final    Coronavirus HKU1 07/25/2024 Not Detected  Not Detected Final    Coronavirus NL63 07/25/2024 Not Detected  Not Detected Final    Coronavirus OC43 07/25/2024 Not Detected  Not Detected Final    COVID19 07/25/2024 Not Detected  Not Detected - Ref. Range Final    Human Metapneumovirus 07/25/2024 Not Detected  Not Detected Final    Human Rhinovirus/Enterovirus  07/25/2024 Not Detected  Not Detected Final    Influenza A PCR 07/25/2024 Not Detected  Not Detected Final    Influenza B PCR 07/25/2024 Not Detected  Not Detected Final    Parainfluenza Virus 1 07/25/2024 Not Detected  Not Detected Final    Parainfluenza Virus 2 07/25/2024 Not Detected  Not Detected Final    Parainfluenza Virus 3 07/25/2024 Not Detected  Not Detected Final    Parainfluenza Virus 4 07/25/2024 Not Detected  Not Detected Final    RSV, PCR 07/25/2024 Not Detected  Not Detected Final    Bordetella pertussis pcr 07/25/2024 Not Detected  Not Detected Final    Bordetella parapertussis PCR 07/25/2024 Not Detected  Not Detected Final    Chlamydophila pneumoniae PCR 07/25/2024 Not Detected  Not Detected Final    Mycoplasma pneumo by PCR 07/25/2024 Not Detected  Not Detected Final    Glucose 07/26/2024 93  65 - 99 mg/dL Final    BUN 07/26/2024 35 (H)  8 - 23 mg/dL Final    Creatinine 07/26/2024 1.18 (H)  0.57 - 1.00 mg/dL Final    Sodium 07/26/2024 131 (L)  136 - 145 mmol/L Final    Potassium 07/26/2024 3.9  3.5 - 5.2 mmol/L Final    Chloride 07/26/2024 96 (L)  98 - 107 mmol/L Final    CO2 07/26/2024 24.0  22.0 - 29.0 mmol/L Final    Calcium 07/26/2024 9.0  8.6 - 10.5 mg/dL Final    BUN/Creatinine Ratio 07/26/2024 29.7 (H)  7.0 - 25.0 Final    Anion Gap 07/26/2024 11.0  5.0 - 15.0 mmol/L Final    eGFR 07/26/2024 46.8 (L)  >60.0 mL/min/1.73 Final    WBC 07/26/2024 13.60 (H)  3.40 - 10.80 10*3/mm3 Final    RBC 07/26/2024 4.26  3.77 - 5.28 10*6/mm3 Final    Hemoglobin 07/26/2024 12.1  12.0 - 15.9 g/dL Final    Hematocrit 07/26/2024 37.3  34.0 - 46.6 % Final    MCV 07/26/2024 87.6  79.0 - 97.0 fL Final    MCH 07/26/2024 28.4  26.6 - 33.0 pg Final    MCHC 07/26/2024 32.4  31.5 - 35.7 g/dL Final    RDW 07/26/2024 14.9  12.3 - 15.4 % Final    RDW-SD 07/26/2024 47.8  37.0 - 54.0 fl Final    MPV 07/26/2024 11.3  6.0 - 12.0 fL Final    Platelets 07/26/2024 162  140 - 450 10*3/mm3 Final    Neutrophil % 07/26/2024 66.4   42.7 - 76.0 % Final    Lymphocyte % 07/26/2024 10.1 (L)  19.6 - 45.3 % Final    Monocyte % 07/26/2024 20.1 (H)  5.0 - 12.0 % Final    Eosinophil % 07/26/2024 1.5  0.3 - 6.2 % Final    Basophil % 07/26/2024 0.8  0.0 - 1.5 % Final    Immature Grans % 07/26/2024 1.1 (H)  0.0 - 0.5 % Final    Neutrophils, Absolute 07/26/2024 9.02 (H)  1.70 - 7.00 10*3/mm3 Final    Lymphocytes, Absolute 07/26/2024 1.38  0.70 - 3.10 10*3/mm3 Final    Monocytes, Absolute 07/26/2024 2.74 (H)  0.10 - 0.90 10*3/mm3 Final    Eosinophils, Absolute 07/26/2024 0.20  0.00 - 0.40 10*3/mm3 Final    Basophils, Absolute 07/26/2024 0.11  0.00 - 0.20 10*3/mm3 Final    Immature Grans, Absolute 07/26/2024 0.15 (H)  0.00 - 0.05 10*3/mm3 Final    nRBC 07/26/2024 0.0  0.0 - 0.2 /100 WBC Final    Procalcitonin 07/26/2024 0.48 (H)  0.00 - 0.25 ng/mL Final    Protime 07/26/2024 16.7 (H)  12.1 - 15.0 Seconds Final    INR 07/26/2024 1.30 (H)  0.90 - 1.10 Final    Anisocytosis 07/26/2024 Slight/1+  None Seen Final    Polychromasia 07/26/2024 Slight/1+  None Seen Final    WBC Morphology 07/26/2024 Normal  Normal Final    Large Platelets 07/26/2024 Slight/1+  None Seen Final   Admission on 07/23/2024, Discharged on 07/24/2024   Component Date Value Ref Range Status    Protime 07/23/2024 14.8  12.1 - 15.0 Seconds Final    INR 07/23/2024 1.12 (H)  0.90 - 1.10 Final    Protime 07/24/2024 15.1 (H)  12.1 - 15.0 Seconds Final    INR 07/24/2024 1.15 (H)  0.90 - 1.10 Final    Glucose 07/24/2024 126 (H)  65 - 99 mg/dL Final    BUN 07/24/2024 28 (H)  8 - 23 mg/dL Final    Creatinine 07/24/2024 1.30 (H)  0.57 - 1.00 mg/dL Final    Sodium 07/24/2024 135 (L)  136 - 145 mmol/L Final    Potassium 07/24/2024 3.8  3.5 - 5.2 mmol/L Final    Chloride 07/24/2024 98  98 - 107 mmol/L Final    CO2 07/24/2024 26.4  22.0 - 29.0 mmol/L Final    Calcium 07/24/2024 9.0  8.6 - 10.5 mg/dL Final    BUN/Creatinine Ratio 07/24/2024 21.5  7.0 - 25.0 Final    Anion Gap 07/24/2024 10.6  5.0 - 15.0  mmol/L Final    eGFR 07/24/2024 41.7 (L)  >60.0 mL/min/1.73 Final    WBC 07/24/2024 8.74  3.40 - 10.80 10*3/mm3 Final    RBC 07/24/2024 4.28  3.77 - 5.28 10*6/mm3 Final    Hemoglobin 07/24/2024 12.1  12.0 - 15.9 g/dL Final    Hematocrit 07/24/2024 37.8  34.0 - 46.6 % Final    MCV 07/24/2024 88.3  79.0 - 97.0 fL Final    MCH 07/24/2024 28.3  26.6 - 33.0 pg Final    MCHC 07/24/2024 32.0  31.5 - 35.7 g/dL Final    RDW 07/24/2024 14.6  12.3 - 15.4 % Final    RDW-SD 07/24/2024 47.1  37.0 - 54.0 fl Final    MPV 07/24/2024 10.9  6.0 - 12.0 fL Final    Platelets 07/24/2024 171  140 - 450 10*3/mm3 Final    Neutrophil % 07/24/2024 73.7  42.7 - 76.0 % Final    Lymphocyte % 07/24/2024 8.5 (L)  19.6 - 45.3 % Final    Monocyte % 07/24/2024 17.0 (H)  5.0 - 12.0 % Final    Eosinophil % 07/24/2024 0.0 (L)  0.3 - 6.2 % Final    Basophil % 07/24/2024 0.3  0.0 - 1.5 % Final    Immature Grans % 07/24/2024 0.5  0.0 - 0.5 % Final    Neutrophils, Absolute 07/24/2024 6.44  1.70 - 7.00 10*3/mm3 Final    Lymphocytes, Absolute 07/24/2024 0.74  0.70 - 3.10 10*3/mm3 Final    Monocytes, Absolute 07/24/2024 1.49 (H)  0.10 - 0.90 10*3/mm3 Final    Eosinophils, Absolute 07/24/2024 0.00  0.00 - 0.40 10*3/mm3 Final    Basophils, Absolute 07/24/2024 0.03  0.00 - 0.20 10*3/mm3 Final    Immature Grans, Absolute 07/24/2024 0.04  0.00 - 0.05 10*3/mm3 Final    nRBC 07/24/2024 0.0  0.0 - 0.2 /100 WBC Final    Shantelle Auris Screen Culture 07/24/2024 No Candida auris isolated at 2 days   Preliminary       No results found.    Discharge and Follow up Instructions:   See discharge instructions for details  Follow-up in office in 2 weeks with Juliette Arechiga nurse practitioner  Take medication daily for DVT prophylaxis- see discharge medications      Date: 7/26/2024    Danny Azevedo MD

## 2024-07-26 NOTE — PROGRESS NOTES
Pharmacy Dosing Service  Automatic Renal Adjustment  Enoxaparin      Subjective:  Anna Gilbert is a 78 y.o. female who meets the following criteria for renal adjustment per P&T guidance       Assessment/Action/Plan:  Patient meets criteria and enoxaparin 70 mg (1 mg/kg) subcutaneous every 24 hours has been changed to enoxaparin 70 mg subcutaneous every 12 hours for estCrCl >30 mL/min and BMI >18; <40.     Patient also on warfarin 5 mg daily restarted yesterday (7/25), INR today (7/26) subtherapeutic at 1.30.       Objective:  Body mass index is 29.48 kg/m².  Lab Results   Component Value Date    WBC 13.60 (H) 07/26/2024    HGB 12.1 07/26/2024    HCT 37.3 07/26/2024    MCV 87.6 07/26/2024     07/26/2024     Lab Results   Component Value Date    GLUCOSE 93 07/26/2024    CALCIUM 9.0 07/26/2024     (L) 07/26/2024    K 3.9 07/26/2024    CO2 24.0 07/26/2024    CL 96 (L) 07/26/2024    BUN 35 (H) 07/26/2024    CREATININE 1.18 (H) 07/26/2024    EGFRIFAFRI >60 03/14/2023    EGFRIFNONA 56 (L) 12/03/2021    BCR 29.7 (H) 07/26/2024    ANIONGAP 11.0 07/26/2024         Lab 07/26/24  0832 07/25/24  0437 07/24/24  2208 07/24/24  0346 07/23/24 2208   HEMOGLOBIN 12.1 12.9 13.3 12.1  --    HEMATOCRIT 37.3 40.3 40.8 37.8  --    PLATELETS 162 162 182 171  --    PROTIME 16.7*  --  15.1* 15.1* 14.8   CREATININE 1.18* 1.53* 1.66* 1.30*  --    EGFR 46.8* 34.3* 31.1* 41.7*  --      Recent Warfarin Administrations       The 5 most recent administrations since 07/19/2024 are shown below each listed medication.    Warfarin Sodium         Order Dose Date Given     warfarin (COUMADIN) tablet 5 mg 5 mg 07/25/2024                    Lab Results   Component Value Date    INR 1.30 (H) 07/26/2024    INR 1.15 (H) 07/24/2024    INR 1.15 (H) 07/24/2024    PROTIME 16.7 (H) 07/26/2024    PROTIME 15.1 (H) 07/24/2024    PROTIME 15.1 (H) 07/24/2024       Estimated Creatinine Clearance: 35.6 mL/min (A) (by C-G formula based on SCr of 1.18  mg/dL (H)).    Mar FORD PharmD, 7/26/202413:00 EDT

## 2024-07-27 ENCOUNTER — APPOINTMENT (OUTPATIENT)
Dept: GENERAL RADIOLOGY | Facility: HOSPITAL | Age: 79
End: 2024-07-27
Payer: MEDICARE

## 2024-07-27 VITALS
SYSTOLIC BLOOD PRESSURE: 111 MMHG | TEMPERATURE: 97.4 F | RESPIRATION RATE: 18 BRPM | HEART RATE: 57 BPM | OXYGEN SATURATION: 95 % | HEIGHT: 62 IN | WEIGHT: 161.16 LBS | BODY MASS INDEX: 29.66 KG/M2 | DIASTOLIC BLOOD PRESSURE: 68 MMHG

## 2024-07-27 PROBLEM — R53.1 WEAKNESS: Status: RESOLVED | Noted: 2024-07-25 | Resolved: 2024-07-27

## 2024-07-27 PROBLEM — F23 BRIEF PSYCHOTIC DISORDER: Status: ACTIVE | Noted: 2024-07-27

## 2024-07-27 PROBLEM — G93.41 METABOLIC ENCEPHALOPATHY: Status: RESOLVED | Noted: 2024-07-27 | Resolved: 2024-07-27

## 2024-07-27 PROBLEM — G93.41 METABOLIC ENCEPHALOPATHY: Status: ACTIVE | Noted: 2024-07-27

## 2024-07-27 PROBLEM — F23 BRIEF PSYCHOTIC DISORDER: Status: RESOLVED | Noted: 2024-07-27 | Resolved: 2024-07-27

## 2024-07-27 LAB
INR PPP: 1.63 (ref 0.9–1.1)
PROCALCITONIN SERPL-MCNC: 0.79 NG/ML (ref 0–0.25)
PROTHROMBIN TIME: 19.8 SECONDS (ref 12.1–15)

## 2024-07-27 PROCEDURE — 63710000001 DOCUSATE SODIUM 100 MG CAPSULE: Performed by: HOSPITALIST

## 2024-07-27 PROCEDURE — 63710000001 MAGNESIUM HYDROXIDE 2400 MG/10ML SUSPENSION: Performed by: INTERNAL MEDICINE

## 2024-07-27 PROCEDURE — A9270 NON-COVERED ITEM OR SERVICE: HCPCS | Performed by: HOSPITALIST

## 2024-07-27 PROCEDURE — 71045 X-RAY EXAM CHEST 1 VIEW: CPT

## 2024-07-27 PROCEDURE — 63710000001 POLYETHYLENE GLYCOL 17 G PACK: Performed by: INTERNAL MEDICINE

## 2024-07-27 PROCEDURE — 85610 PROTHROMBIN TIME: CPT | Performed by: INTERNAL MEDICINE

## 2024-07-27 PROCEDURE — 84145 PROCALCITONIN (PCT): CPT | Performed by: INTERNAL MEDICINE

## 2024-07-27 PROCEDURE — 94799 UNLISTED PULMONARY SVC/PX: CPT

## 2024-07-27 PROCEDURE — A9270 NON-COVERED ITEM OR SERVICE: HCPCS | Performed by: INTERNAL MEDICINE

## 2024-07-27 PROCEDURE — 99239 HOSP IP/OBS DSCHRG MGMT >30: CPT | Performed by: INTERNAL MEDICINE

## 2024-07-27 PROCEDURE — 63710000001 MINERAL OIL OIL: Performed by: INTERNAL MEDICINE

## 2024-07-27 PROCEDURE — 99203 OFFICE O/P NEW LOW 30 MIN: CPT | Performed by: PSYCHIATRY & NEUROLOGY

## 2024-07-27 PROCEDURE — A9270 NON-COVERED ITEM OR SERVICE: HCPCS | Performed by: FAMILY MEDICINE

## 2024-07-27 PROCEDURE — G0378 HOSPITAL OBSERVATION PER HR: HCPCS

## 2024-07-27 PROCEDURE — 63710000001 CYCLOBENZAPRINE 10 MG TABLET: Performed by: HOSPITALIST

## 2024-07-27 PROCEDURE — 63710000001 ACETAMINOPHEN 325 MG TABLET: Performed by: INTERNAL MEDICINE

## 2024-07-27 PROCEDURE — 63710000001 METOPROLOL SUCCINATE XL 50 MG TABLET SUSTAINED-RELEASE 24 HOUR: Performed by: HOSPITALIST

## 2024-07-27 PROCEDURE — 94761 N-INVAS EAR/PLS OXIMETRY MLT: CPT

## 2024-07-27 PROCEDURE — 25010000002 ENOXAPARIN PER 10 MG: Performed by: HOSPITALIST

## 2024-07-27 PROCEDURE — 63710000001: Performed by: FAMILY MEDICINE

## 2024-07-27 PROCEDURE — 63710000001 TRAMADOL 50 MG TABLET: Performed by: INTERNAL MEDICINE

## 2024-07-27 PROCEDURE — 63710000001 LEVOTHYROXINE 150 MCG TABLET: Performed by: HOSPITALIST

## 2024-07-27 PROCEDURE — 63710000001 TORSEMIDE 20 MG TABLET: Performed by: INTERNAL MEDICINE

## 2024-07-27 PROCEDURE — 25010000002 CEFTRIAXONE PER 250 MG: Performed by: INTERNAL MEDICINE

## 2024-07-27 PROCEDURE — 63710000001: Performed by: HOSPITALIST

## 2024-07-27 RX ORDER — ENOXAPARIN SODIUM 100 MG/ML
1 INJECTION SUBCUTANEOUS EVERY 12 HOURS
Start: 2024-07-27 | End: 2024-07-30

## 2024-07-27 RX ORDER — POLYETHYLENE GLYCOL 3350 17 G/17G
17 POWDER, FOR SOLUTION ORAL DAILY
Start: 2024-07-27

## 2024-07-27 RX ORDER — POLYETHYLENE GLYCOL 3350 17 G/17G
0.4 POWDER, FOR SOLUTION ORAL DAILY
Status: DISCONTINUED | OUTPATIENT
Start: 2024-07-27 | End: 2024-07-27 | Stop reason: HOSPADM

## 2024-07-27 RX ORDER — MINERAL OIL 100 G/100G
1 OIL RECTAL DAILY PRN
Start: 2024-07-27

## 2024-07-27 RX ORDER — TRAMADOL HYDROCHLORIDE 50 MG/1
50 TABLET ORAL EVERY 6 HOURS PRN
Qty: 8 TABLET | Refills: 0 | Status: SHIPPED | OUTPATIENT
Start: 2024-07-27 | End: 2024-07-30

## 2024-07-27 RX ORDER — ACETAMINOPHEN 325 MG/1
650 TABLET ORAL EVERY 6 HOURS PRN
Status: DISCONTINUED | OUTPATIENT
Start: 2024-07-27 | End: 2024-07-27 | Stop reason: HOSPADM

## 2024-07-27 RX ORDER — DOXYCYCLINE HYCLATE 100 MG/1
100 CAPSULE ORAL 2 TIMES DAILY
Start: 2024-07-27 | End: 2024-08-01

## 2024-07-27 RX ORDER — TORSEMIDE 20 MG/1
10 TABLET ORAL ONCE
Status: COMPLETED | OUTPATIENT
Start: 2024-07-27 | End: 2024-07-27

## 2024-07-27 RX ORDER — CEFDINIR 300 MG/1
300 CAPSULE ORAL 2 TIMES DAILY
Start: 2024-07-27 | End: 2024-07-30

## 2024-07-27 RX ORDER — MAGNESIUM CARB/ALUMINUM HYDROX 105-160MG
30 TABLET,CHEWABLE ORAL ONCE
Status: COMPLETED | OUTPATIENT
Start: 2024-07-27 | End: 2024-07-27

## 2024-07-27 RX ORDER — ACETAMINOPHEN 325 MG/1
650 TABLET ORAL EVERY 6 HOURS PRN
Start: 2024-07-27

## 2024-07-27 RX ADMIN — TRAMADOL HYDROCHLORIDE 50 MG: 50 TABLET ORAL at 12:57

## 2024-07-27 RX ADMIN — ENOXAPARIN SODIUM 70 MG: 80 INJECTION SUBCUTANEOUS at 08:39

## 2024-07-27 RX ADMIN — TORSEMIDE 40 MG: 20 TABLET ORAL at 08:39

## 2024-07-27 RX ADMIN — OFLOXACIN 2 DROP: 3 SOLUTION OPHTHALMIC at 12:59

## 2024-07-27 RX ADMIN — MINERAL OIL 30 ML: 1000 SOLUTION ORAL at 14:28

## 2024-07-27 RX ADMIN — SODIUM PHOSPHATE 1 ENEMA: 7; 19 ENEMA RECTAL at 00:43

## 2024-07-27 RX ADMIN — OFLOXACIN 2 DROP: 3 SOLUTION OPHTHALMIC at 08:40

## 2024-07-27 RX ADMIN — TRAMADOL HYDROCHLORIDE 50 MG: 50 TABLET ORAL at 06:45

## 2024-07-27 RX ADMIN — SODIUM CHLORIDE 1000 MG: 9 INJECTION, SOLUTION INTRAVENOUS at 00:44

## 2024-07-27 RX ADMIN — DICLOFENAC SODIUM 2 DROP: 1 SOLUTION OPHTHALMIC at 08:40

## 2024-07-27 RX ADMIN — MAGNESIUM HYDROXIDE 10 ML: 2400 SUSPENSION ORAL at 13:24

## 2024-07-27 RX ADMIN — CYCLOBENZAPRINE 5 MG: 10 TABLET, FILM COATED ORAL at 00:50

## 2024-07-27 RX ADMIN — DOCUSATE SODIUM 100 MG: 100 CAPSULE, LIQUID FILLED ORAL at 06:45

## 2024-07-27 RX ADMIN — LEVOTHYROXINE SODIUM 150 MCG: 150 TABLET ORAL at 06:45

## 2024-07-27 RX ADMIN — ACETAMINOPHEN 650 MG: 325 TABLET ORAL at 09:53

## 2024-07-27 RX ADMIN — DOXYCYCLINE 100 MG: 100 INJECTION, POWDER, LYOPHILIZED, FOR SOLUTION INTRAVENOUS at 08:39

## 2024-07-27 RX ADMIN — POLYETHYLENE GLYCOL 3350 29 G: 17 POWDER, FOR SOLUTION ORAL at 08:39

## 2024-07-27 RX ADMIN — TORSEMIDE 10 MG: 20 TABLET ORAL at 09:53

## 2024-07-27 RX ADMIN — DICLOFENAC SODIUM 2 DROP: 1 SOLUTION OPHTHALMIC at 12:59

## 2024-07-27 RX ADMIN — METOPROLOL SUCCINATE 50 MG: 50 TABLET, EXTENDED RELEASE ORAL at 08:39

## 2024-07-27 RX ADMIN — CYCLOBENZAPRINE 5 MG: 10 TABLET, FILM COATED ORAL at 09:29

## 2024-07-27 NOTE — CASE MANAGEMENT/SOCIAL WORK
Post-Acute Authorization Submission      Post Acute Pre-Cert Documentation  Request Submitted by Facility - Type:: Hospital  Post-Acute Authorization Type Submitted:: SNF  Date Post Acute Pre-Cert Inititated per Facility: 07/26/24  Date Post Acute Pre-Cert Completed: 07/26/24  Accepting Facility: [facility] Discharge Date Requested: 07/27/24  Had Accepting Facility at Time of Submission: Yes  Response Received from Insurance?: Approval  Response Communicated to:: , Accepting Facility Liaison, Accepting Facility Auth Department  Authorization Number:: [id number]  [id number]  Post Acute Pre-Cert Initiated Comment: Elizabeth Hernandez RN

## 2024-07-27 NOTE — CONSULTS
"  Patient Identification:  NAME:  Anna Gilbert  Age:  78 y.o.   Sex:  female   :  1945   MRN:  [id number]       Chief complaint: She does not have one, reason for consult confusion    History of present illness: Patient is a 78-year-old right-handed white female with a history of hypertension, congestive heart failure, A-fib for which she has been on Coumadin and has had arthritis in her right knee.  She had a right knee replacement a couple of days ago and did very well surgically and postsurgically.  Nonetheless she went home and relatives that came in from out of town found that she was quite confused.  She appeared to be somewhat paranoid and was staring at a nail in the wall that was completely bothering the patient and she went on and on about it.  According to the daughter.  Definitely may have been seeing some things that were not really there.  She was context patient who is receiving Percocet for her pain in the right knee which has been significant.  Locations not pertinent duration the last couple of days.  The patient states \"I do not even remember going home from the hospital\" for her knee surgery.  She did have some sort of fall apparently but without trauma.  Today she was confused.  We held the Percocet.  She has received some Toradol today.  Overnight she is absolutely awake alert oriented and her daughter says she is \"100% better\".  She appears to be back to her baseline.  CT of the head by my independent eyeball review is normal.  Carotid Dopplers are normal      Past medical history:  Past Medical History:   Diagnosis Date    Bradycardia     Chronic atrial fibrillation     Coronary artery disease     Essential (primary) hypertension     H/O mitral valve repair     Health care maintenance     Heart failure, unspecified     Hyperthyroidism     Hypothyroid     Localized edema     Lymphedema 2012    Mixed hyperlipidemia     NSVT (nonsustained ventricular tachycardia) 2020    " SHEILA (obstructive sleep apnea) 2016    Pacemaker     PAF (paroxysmal atrial fibrillation)     Permanent atrial fibrillation     Sick sinus syndrome     Venous insufficiency (chronic) (peripheral)     Ventricular tachycardia     with ICD shock       Past surgical history:  Past Surgical History:   Procedure Laterality Date    AORTIC VALVE REPAIR/REPLACEMENT      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    CARDIAC CATHETERIZATION  2011    CARDIAC CATHETERIZATION N/A 2021    Procedure: Left Heart Cath;  Surgeon: Nigel Egan MD;  [facility];  Service: Cardiovascular;  Laterality: N/A;    CARDIAC CATHETERIZATION N/A 2021    Procedure: Coronary angiography;  Surgeon: Nigel Egan MD;  Location:  [facility] CATH INVASIVE LOCATION;  Service: Cardiovascular;  Laterality: N/A;    CARDIAC CATHETERIZATION N/A 2021    Procedure: Resting Full Cycle Ratio;  Surgeon: Nigel Egan MD;  Location:  [facility] CATH INVASIVE LOCATION;  Service: Cardiovascular;  Laterality: N/A;    CARDIAC DEFIBRILLATOR PLACEMENT      CARDIAC ELECTROPHYSIOLOGY PROCEDURE N/A 2021    Procedure: ICD DC generator change---[employer];  Surgeon: Mick Perez MD;  Location:  [facility] CATH INVASIVE LOCATION;  Service: Cardiology;  Laterality: N/A;    MITRAL VALVE REPAIR/REPLACEMENT      TOTAL KNEE ARTHROPLASTY Right 2024    Procedure: TOTAL KNEE ARTHROPLASTY AND ALL ASSOCIATED PROCEDURES;  Surgeon: Danny Azevedo MD;  Location: [facility];  Service: Orthopedics;  Laterality: Right;       Allergies:  Amiodarone, Vancomycin, Adhesive tape, Gabapentin, and Statins    Home medications:  Medications Prior to Admission   Medication Sig Dispense Refill Last Dose    albuterol sulfate  (90 Base) MCG/ACT inhaler Inhale 2 puffs As Needed.       APPLE CIDER VINEGAR PO Take 1 tablet by mouth Daily.       [] BUPivacaine HCl (BUPIVACAINE 0.125% IN 0.9% SODIUM CHLORIDE 400 ML  "ELASTOMERIC PUMP \"PAIN BALL\") Inject 8 mL/hr into the appropriate area of the skin as directed by provider Continuous for 2 days.       cyclobenzaprine (FLEXERIL) 5 MG tablet Take 1 tablet by mouth 3 (Three) Times a Day As Needed.       diclofenac (VOLTAREN) 0.1 % ophthalmic solution Administer 2 drops to the right eye 4 (Four) Times a Day for 25 doses. 2.5 mL 0     docusate sodium (Colace) 100 MG capsule Take 1 capsule by mouth 2 (Two) Times a Day As Needed for Constipation. 60 capsule 0     levothyroxine (SYNTHROID, LEVOTHROID) 150 MCG tablet Take 1 tablet by mouth Daily.       metoprolol succinate XL (TOPROL-XL) 50 MG 24 hr tablet TAKE ONE TABLET BY MOUTH DAILY (Patient taking differently: Take 1 tablet by mouth Daily.) 90 tablet 3     ofloxacin (OCUFLOX) 0.3 % ophthalmic solution Administer 2 drops to the right eye 4 (Four) Times a Day for 2 doses. 5 mL 0     ondansetron ODT (ZOFRAN-ODT) 4 MG disintegrating tablet Place 1 tablet on the tongue Every 8 (Eight) Hours As Needed for Nausea or Vomiting. 20 tablet 0     oxyCODONE-acetaminophen (PERCOCET) 5-325 MG per tablet Take 1 tablet by mouth Every 4 (Four) Hours As Needed for Moderate Pain or Severe Pain. 42 tablet 0     pantoprazole (PROTONIX) 40 MG EC tablet TAKE ONE TABLET BY MOUTH EVERY MORNING (Patient taking differently: Take 1 tablet by mouth Every Morning. Havent been taking) 90 tablet 2     potassium chloride (KLOR-CON) 20 MEQ CR tablet TAKE ONE TABLET BY MOUTH DAILY 90 tablet 3     torsemide (DEMADEX) 20 MG tablet TAKE TWO TABLETS BY MOUTH DAILY 180 tablet 1     warfarin (COUMADIN) 5 MG tablet Take 1 tablet by mouth Every Night. TAKE AS DIRECTED       b complex vitamins capsule Take 1 capsule by mouth Daily. (Patient not taking: Reported on 7/22/2024)       BIOTIN PO Take 1 tablet by mouth Daily. (Patient not taking: Reported on 7/22/2024)       CALCIUM-MAGNESIUM-ZINC PO Take 1 tablet by mouth Daily.       Cholecalciferol 50 MCG (2000 UT) capsule Take  " by mouth Daily. (Patient not taking: Reported on 7/22/2024)           Hospital medications:  cefTRIAXone, 1,000 mg, Intravenous, Q24H  diclofenac, 2 drop, Right Eye, 4x Daily  doxycycline, 100 mg, Intravenous, Q12H  enoxaparin, 1 mg/kg, Subcutaneous, Q12H  levothyroxine, 150 mcg, Oral, Q AM  mineral oil, 30 mL, Oral, Once   And  magnesium hydroxide, 10 mL, Oral, Once  metoprolol succinate XL, 50 mg, Oral, Daily  ofloxacin, 2 drop, Right Eye, 4x Daily  polyethylene glycol, 0.4 g/kg, Oral, Daily  torsemide, 40 mg, Oral, Daily  warfarin, 5 mg, Oral, Nightly           acetaminophen    cyclobenzaprine    docusate sodium    ondansetron    traMADol    Family history:  Family History   Problem Relation Age of Onset    Lung cancer Mother     Coronary artery disease Father     Heart attack Father     Stroke Other        Social history:  Social History     Tobacco Use    Smoking status: Former     Passive exposure: Past    Smokeless tobacco: Never    Tobacco comments:     caffeine use   Vaping Use    Vaping status: Never Used   Substance Use Topics    Alcohol use: Not Currently     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  No hair eyes ears nose throat skin  lymphatic hematologic or oncologic complaints no chest pain abdominal pain bowel bladder incontinence fever chills or rash  She has had some coronary artery disease and is in A-fib for which she has been on Coumadin now Lovenox/Coumadin.  Some constipation but no history of stroke or seizures    Objective:  Vitals Ranges:   Temp:  [97.4 °F (36.3 °C)-99.8 °F (37.7 °C)] 97.4 °F (36.3 °C)  Heart Rate:  [] 57  Resp:  [16-18] 18  BP: ()/(68-99) 111/68      Physical Exam:  Awake alert oriented x 3 in no distress fund of knowledge fair attention span concentration good recent remote memory good but she is amnestic for some of what happened over the last couple of days in  "no distress pupils 3 and half constricting to 3 extraocular movements full without nystagmus nasolabial folds palate tongue symmetrical face, palate and tongue are symmetrical.  Normal head turning and shoulder shrug.  Unable to visualize discs or retinas.  Motor 5 out of 5 x 3 extremities right leg somewhat limited because she has had right knee replacement good toe wiggle reflexes trace throughout symmetrical toes downgoing.  No atrophy or fasciculations.  Sensation normal light touch face both arms both legs coordination normal in the upper extremities Station and gait not tested because she has a new right knee replacement.  Heart is regular without murmur neck supple without bruits extremities no clubbing cyanosis edema visual acuity normal centrally and peripherally    Results review:   I reviewed the patient's new clinical results.    Data review:  Lab Results (last 24 hours)       Procedure Component Value Units Date/Time    Procalcitonin [[id number]]  (Abnormal) Collected: 07/27/24 0342    Specimen: Blood Updated: 07/27/24 0432     Procalcitonin 0.79 ng/mL     Narrative:      As a Marker for Sepsis (Non-Neonates):    1. <0.5 ng/mL represents a low risk of severe sepsis and/or septic shock.  2. >2 ng/mL represents a high risk of severe sepsis and/or septic shock.    As a Marker for Lower Respiratory Tract Infections that require antibiotic therapy:    PCT on Admission    Antibiotic Therapy       6-12 Hrs later    >0.5                Strongly Recommended  >0.25 - <0.5        Recommended   0.1 - 0.25          Discouraged              Remeasure/reassess PCT  <0.1                Strongly Discouraged     Remeasure/reassess PCT    As 28 day mortality risk marker: \"Change in Procalcitonin Result\" (>80% or <=80%) if Day 0 (or Day 1) and Day 4 values are available. Refer to http://www.bras-pct-calculator.com    Change in PCT <=80%  A decrease of PCT levels below or equal to 80% defines a positive change in PCT test " result representing a higher risk for 28-day all-cause mortality of patients diagnosed with severe sepsis for septic shock.    Change in PCT >80%  A decrease of PCT levels of more than 80% defines a negative change in PCT result representing a lower risk for 28-day all-cause mortality of patients diagnosed with severe sepsis or septic shock.       Protime-INR [[id number]]  (Abnormal) Collected: 07/27/24 0342    Specimen: Blood Updated: 07/27/24 0423     Protime 19.8 Seconds      INR 1.63    Narrative:      Therapeutic Ranges for INR: 2.0-3.0 (PT 20-30)                              2.5-3.5 (PT 25-34)    Blood Culture - Blood, Arm, Left [[id number]]  (Normal) Collected: 07/25/24 0002    Specimen: Blood from Arm, Left Updated: 07/27/24 0015     Blood Culture No growth at 2 days    Blood Culture - Blood, Arm, Right [[id number]]  (Normal) Collected: 07/25/24 0009    Specimen: Blood from Arm, Right Updated: 07/27/24 0015     Blood Culture No growth at 2 days             Imaging:  Imaging Results (Last 24 Hours)       Procedure Component Value Units Date/Time    XR Chest 1 View [[id number]] Collected: 07/27/24 0847     Updated: 07/27/24 0852    Narrative:      XR CHEST 1 VW    Date of Exam: 7/27/2024 8:25 AM EDT    Indication: Hypoxia, pneumonia    Comparison: 7/24/2024.    Findings:  The heart is enlarged. The patient's had a previous median sternotomy. There is a left-sided transvenous pacemaker in place. The pulmonary vascular markings are mildly increased indicating vascular congestion. There is no overt pulmonary edema. There is   no pneumothorax or pleural effusion. There are chronic age-related changes involving the bony thorax and thoracic aorta.      Impression:      Impression:  Cardiomegaly with mild pulmonary vascular congestion. There is no overt pulmonary edema.      Electronically Signed: Gerard Longoria MD    7/27/2024 8:49 AM EDT    Workstation ID: [id number]    XR Abdomen KUB [[id number]] Collected: 07/26/24 9749      Updated: 07/26/24 2213    Narrative:      XR ABDOMEN KUB    Date of Exam: 7/26/2024 9:33 PM EDT    Indication: Abdominal pain    Comparison: None available    Findings:  There is a left chest wall ICD with lead in expected position. There is cardiomegaly with central vascular congestion and likely early interstitial edema pattern. There is no focal airspace consolidation or pleural effusion. There is no evidence of   pneumothorax. There are degenerative changes of the thoracic spine. There is a nonspecific nonobstructive bowel gas pattern. There is a moderate colonic stool burden most notable within the cecum and ascending colon. There is a partially calcified mass   within the pelvis likely representing a uterine fibroid.      Impression:      Impression:  1. Cardiomegaly with central vascular congestion and interstitial edema pattern.  2. Moderate colonic stool burden most notable within the cecum and ascending colon.  3. Nonobstructive small bowel gas pattern.      Electronically Signed: Dav Ruffin    7/26/2024 10:10 PM EDT    Workstation ID: [id number]    CT Head Without Contrast [[id number]] Collected: 07/26/24 1525     Updated: 07/26/24 1534    Narrative:      CT HEAD WO CONTRAST    Date of Exam: 7/26/2024 3:03 PM EDT    Indication: confusion.    Comparison: 7/25/2024    Technique: Axial CT images were obtained of the head without contrast administration.  Coronal reconstructions were performed.  Automated exposure control and iterative reconstruction methods were used.      Findings:  There is mild generalized parenchymal volume loss. There is no evidence of acute infarct or hemorrhage. No abnormal extra-axial fluid collections are seen. No mass effect or hydrocephalus.    Globes and orbits are within normal limits. Visualized paranasal sinuses and mastoid air cells are clear. No acute skull fracture.      Impression:      Impression:    1. No acute intracranial abnormality.      Electronically Signed:  Yuriy Blackwell MD    7/26/2024 3:31 PM EDT    Workstation ID: [id number]    US Carotid Bilateral [[id number]] Collected: 07/26/24 1522     Updated: 07/26/24 1528    Narrative:      US CAROTID BILATERAL    Date of Exam: 7/26/2024 2:36 PM EDT    Indication: confusion.    Comparison: No comparisons available.    Technique: Grayscale, color-flow, and spectral imaging was obtained of the bilateral carotid and vertebral arteries.      Findings:  There is a large amount of echogenic plaque of the left carotid bifurcation. Peak systolic velocity within the left internal carotid artery is 100 cm/s with peak end-diastolic velocity of 42 cm/s.    There is mild echogenic plaque within the proximal right internal carotid artery. Peak systolic velocity within the right internal carotid artery is 72 cm/s with the peak end-diastolic velocity of 30 cm/s.    Vertebral arteries are patent with antegrade flow bilaterally.    Doppler waveforms demonstrate an irregularly irregular heart rate which may be related to atrial fibrillation. Clinical correlation recommended.    Impression:      Impression:    1. No hemodynamically significant stenosis of the carotid arteries.  2. Patent vertebral arteries with antegrade flow bilaterally.  3. Irregular heart beat which may be secondary to atrial fibrillation. Clinical correlation recommended.        Electronically Signed: Yuriy Blackwell MD    7/26/2024 3:25 PM EDT    Workstation ID: [id number]               Assessment and Plan:     This patient has had a classic metabolic encephalopathy with confusion and some degree of psychosis.  All of this appears to have resolved nicely as the Percocet has been held and we have been using Toradol.  She is awake alert oriented x 3 and CT of the head by my independent eyeball review is normal and carotid Dopplers are normal  In short, this patient has not suffered a stroke or TIA syndrome nor a seizure.  She is back to her baseline now and I would not recommend  further neurologic workup.  Thank you      Mick Marques MD  07/27/24  12:15 EDT

## 2024-07-27 NOTE — PLAN OF CARE
Goal Outcome Evaluation:  Plan of Care Reviewed With: patient        Progress: improving  Outcome Evaluation: VSS, O2 @ 2Liters, IV rocephin, dulcolax suppository and fleets enema given with no bowel movement, nausea x1, prn zofrran given, patient encouraged to eat; eating and driinking very little, right knee dressing clean, dry and intact, ice to right knee, bed alarm, daughter at bedside,

## 2024-07-27 NOTE — PROGRESS NOTES
SERVICE: [facility] HOSPITALIST    CONSULTANTS: None    CHIEF COMPLAINT: Weakness    SUBJECTIVE: Patient seen and examined at bedside. Patient reports again feeling better today and more like herself yet again, however reports she has not had a bowel movement and this is making her feel unwell.  I discussed using laxatives today and she is agreeable to this.  We also discussed possible pneumonia and adding an antibiotic to her treatment, we will wean oxygen as tolerated.  She expresses that family tends to over worry about her.  I expressed to her we are still taking this seriously and therefore have a neurology consult pending but her CT head scans have been negative x 2 which is encouraging, and I see vast improvement in her mentation.  She expresses appreciation.     OBJECTIVE:    Physical exam is largely unchanged from previous exam, except where documented below, examination is accurate as of 7/27/2024    Physical Exam:  General: Patient is awake and alert oriented x 3 person place and time, easily carries on coherent conversation.  Elderly female. No acute distress noted.   HENT: Head is atraumatic, normocephalic. Hearing is grossly intact. Nose is without obvious congestion and appears patent. Neck is supple and trachea is midline.   Eyes: Vision is grossly intact. Pupils appear equal and round.   Cardiovascular: Heart has regular rate and rhythm with no murmurs, rubs or gallops noted.   Respiratory: Lungs are clear to ausculation without wheezes, rhonchi or rales.   Abdominal/GI: Soft, minimally tender diffusely, bowel sounds present. No rebound or guarding present.   Extremities: No peripheral edema noted.   Musculoskeletal: Spontaneous movement of bilateral upper and lower extremities against gravity noted. No signs of injury or deformity noted.   Skin: Warm and dry.   Psych: Mood and affect are appropriate. Cooperative with exam.   Neuro: No facial asymmetry noted. No focal deficits  "noted, hearing and vision are grossly intact.     /80 (BP Location: Right arm, Patient Position: Lying)   Pulse 85   Temp 97.4 °F (36.3 °C) (Axillary)   Resp 18   Ht 157.5 cm (62\")   Wt 73.1 kg (161 lb 2.5 oz)   SpO2 94%   BMI 29.48 kg/m²     MEDS/LABS REVIEWED AND ORDERED    cefTRIAXone, 1,000 mg, Intravenous, Q24H  diclofenac, 2 drop, Right Eye, 4x Daily  doxycycline, 100 mg, Intravenous, Q12H  enoxaparin, 1 mg/kg, Subcutaneous, Q12H  levothyroxine, 150 mcg, Oral, Q AM  metoprolol succinate XL, 50 mg, Oral, Daily  ofloxacin, 2 drop, Right Eye, 4x Daily  polyethylene glycol, 0.4 g/kg, Oral, Daily  torsemide, 40 mg, Oral, Daily  warfarin, 5 mg, Oral, Nightly          LAB/DIAGNOSTICS:    Lab Results (last 24 hours)       Procedure Component Value Units Date/Time    Procalcitonin [[id number]]  (Abnormal) Collected: 07/27/24 0342    Specimen: Blood Updated: 07/27/24 0432     Procalcitonin 0.79 ng/mL     Narrative:      As a Marker for Sepsis (Non-Neonates):    1. <0.5 ng/mL represents a low risk of severe sepsis and/or septic shock.  2. >2 ng/mL represents a high risk of severe sepsis and/or septic shock.    As a Marker for Lower Respiratory Tract Infections that require antibiotic therapy:    PCT on Admission    Antibiotic Therapy       6-12 Hrs later    >0.5                Strongly Recommended  >0.25 - <0.5        Recommended   0.1 - 0.25          Discouraged              Remeasure/reassess PCT  <0.1                Strongly Discouraged     Remeasure/reassess PCT    As 28 day mortality risk marker: \"Change in Procalcitonin Result\" (>80% or <=80%) if Day 0 (or Day 1) and Day 4 values are available. Refer to http://www.[employer]-pct-calculator.com    Change in PCT <=80%  A decrease of PCT levels below or equal to 80% defines a positive change in PCT test result representing a higher risk for 28-day all-cause mortality of patients diagnosed with severe sepsis for septic shock.    Change in PCT >80%  A decrease " of PCT levels of more than 80% defines a negative change in PCT result representing a lower risk for 28-day all-cause mortality of patients diagnosed with severe sepsis or septic shock.       Protime-INR [[id number]]  (Abnormal) Collected: 07/27/24 0342    Specimen: Blood Updated: 07/27/24 0423     Protime 19.8 Seconds      INR 1.63    Narrative:      Therapeutic Ranges for INR: 2.0-3.0 (PT 20-30)                              2.5-3.5 (PT 25-34)    Blood Culture - Blood, Arm, Left [[id number]]  (Normal) Collected: 07/25/24 0002    Specimen: Blood from Arm, Left Updated: 07/27/24 0015     Blood Culture No growth at 2 days    Blood Culture - Blood, Arm, Right [[id number]]  (Normal) Collected: 07/25/24 0009    Specimen: Blood from Arm, Right Updated: 07/27/24 0015     Blood Culture No growth at 2 days    CBC & Differential [[id number]]  (Abnormal) Collected: 07/26/24 0832    Specimen: Blood Updated: 07/26/24 0934    Narrative:      The following orders were created for panel order CBC & Differential.  Procedure                               Abnormality         Status                     ---------                               -----------         ------                     CBC Auto Differential[[id number]]        Abnormal            Final result               Scan Slide[[id number]]                                       Final result                 Please view results for these tests on the individual orders.    Scan Slide [[id number]] Collected: 07/26/24 0832    Specimen: Blood Updated: 07/26/24 0934     Anisocytosis Slight/1+     Polychromasia Slight/1+     WBC Morphology Normal     Large Platelets Slight/1+    Procalcitonin [[id number]]  (Abnormal) Collected: 07/26/24 0832    Specimen: Blood Updated: 07/26/24 0909     Procalcitonin 0.48 ng/mL     Narrative:      As a Marker for Sepsis (Non-Neonates):    1. <0.5 ng/mL represents a low risk of severe sepsis and/or septic shock.  2. >2 ng/mL represents a high risk of severe  "sepsis and/or septic shock.    As a Marker for Lower Respiratory Tract Infections that require antibiotic therapy:    PCT on Admission    Antibiotic Therapy       6-12 Hrs later    >0.5                Strongly Recommended  >0.25 - <0.5        Recommended   0.1 - 0.25          Discouraged              Remeasure/reassess PCT  <0.1                Strongly Discouraged     Remeasure/reassess PCT    As 28 day mortality risk marker: \"Change in Procalcitonin Result\" (>80% or <=80%) if Day 0 (or Day 1) and Day 4 values are available. Refer to http://www.[employer] [email or web address]-pct-calculator.com    Change in PCT <=80%  A decrease of PCT levels below or equal to 80% defines a positive change in PCT test result representing a higher risk for 28-day all-cause mortality of patients diagnosed with severe sepsis for septic shock.    Change in PCT >80%  A decrease of PCT levels of more than 80% defines a negative change in PCT result representing a lower risk for 28-day all-cause mortality of patients diagnosed with severe sepsis or septic shock.       Basic Metabolic Panel [[id number]]  (Abnormal) Collected: 07/26/24 0832    Specimen: Blood Updated: 07/26/24 0903     Glucose 93 mg/dL      BUN 35 mg/dL      Creatinine 1.18 mg/dL      Sodium 131 mmol/L      Potassium 3.9 mmol/L      Chloride 96 mmol/L      CO2 24.0 mmol/L      Calcium 9.0 mg/dL      BUN/Creatinine Ratio 29.7     Anion Gap 11.0 mmol/L      eGFR 46.8 mL/min/1.73     Narrative:      GFR Normal >60  Chronic Kidney Disease <60  Kidney Failure <15    The GFR formula is only valid for adults with stable renal function between ages 18 and 70.    Protime-INR [[id number]]  (Abnormal) Collected: 07/26/24 0832    Specimen: Blood Updated: 07/26/24 0851     Protime 16.7 Seconds      INR 1.30    Narrative:      Therapeutic Ranges for INR: 2.0-3.0 (PT 20-30)                              2.5-3.5 (PT 25-34)    CBC Auto Differential [[id number]]  (Abnormal) Collected: 07/26/24 0832    Specimen: Blood " Updated: 07/26/24 0850     WBC 13.60 10*3/mm3      RBC 4.26 10*6/mm3      Hemoglobin 12.1 g/dL      Hematocrit 37.3 %      MCV 87.6 fL      MCH 28.4 pg      MCHC 32.4 g/dL      RDW 14.9 %      RDW-SD 47.8 fl      MPV 11.3 fL      Platelets 162 10*3/mm3      Neutrophil % 66.4 %      Lymphocyte % 10.1 %      Monocyte % 20.1 %      Eosinophil % 1.5 %      Basophil % 0.8 %      Immature Grans % 1.1 %      Neutrophils, Absolute 9.02 10*3/mm3      Lymphocytes, Absolute 1.38 10*3/mm3      Monocytes, Absolute 2.74 10*3/mm3      Eosinophils, Absolute 0.20 10*3/mm3      Basophils, Absolute 0.11 10*3/mm3      Immature Grans, Absolute 0.15 10*3/mm3      nRBC 0.0 /100 WBC           ECG 12 Lead   ED Interpretation   Yuriy Sharpe DO     7/25/2024  1:32 AM   ECG 12 Lead         Date/Time: 7/25/2024 1:06 AM      Performed by: Yuriy Sharpe DO   Authorized by: Yuriy Sharpe DO  Rhythm: atrial fibrillation   Rate: normal   QRS axis: normal   ST Segments: ST segments normal   Clinical impression: dysrhythmia - atrial      ECG 12 Lead Dyspnea   Final Result   HEART RATE=93  bpm   RR Wvfypqmz=635  ms   AL Interval=  ms   P Horizontal Axis=  deg   P Front Axis=  deg   QRSD Interval=96  ms   QT Hubsgcnq=466  ms   NFeG=258  ms   QRS Axis=-74  deg   T Wave Axis=93  deg   - ABNORMAL ECG -   Atrial fibrillation   Ventricular premature complex- new   POOR R WAVE PROGRESSION   Electronically Signed By: Adeel Mina ([facility]) 2024-07-25 10:27:43   Date and Time of Study:2024-07-25 00:32:18        Results for orders placed during the hospital encounter of 12/12/23    Adult Transthoracic Echo Complete W/ Cont if Necessary Per Protocol    Interpretation Summary    Left ventricular ejection fraction appears to be 51 - 55%.    Left ventricular wall thickness is consistent with moderate concentric hypertrophy.    Left ventricular diastolic function was indeterminate.    The left atrial cavity is severely dilated.    Left atrial volume is severely  increased.    The right atrial cavity is severely  dilated.    Aortic valve area is 1.9 cm2.    Aortic valve maximum pressure gradient is 29 mmHg. Aortic valve mean pressure gradient is 16 mmHg.    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    There is a mitral valve ring present.    Moderate tricuspid valve regurgitation is present.    Calculated right ventricular systolic pressure from tricuspid regurgitation is 39 mmHg.    XR Abdomen KUB    Result Date: 7/26/2024  Impression: 1. Cardiomegaly with central vascular congestion and interstitial edema pattern. 2. Moderate colonic stool burden most notable within the cecum and ascending colon. 3. Nonobstructive small bowel gas pattern. Electronically Signed: Dav Ruffin  7/26/2024 10:10 PM EDT  Workstation ID: [id number]    CT Head Without Contrast    Result Date: 7/26/2024  Impression: 1. No acute intracranial abnormality. Electronically Signed: Yuriy Blackwell MD  7/26/2024 3:31 PM EDT  Workstation ID: [id number]    US Carotid Bilateral    Result Date: 7/26/2024  Impression: 1. No hemodynamically significant stenosis of the carotid arteries. 2. Patent vertebral arteries with antegrade flow bilaterally. 3. Irregular heart beat which may be secondary to atrial fibrillation. Clinical correlation recommended. Electronically Signed: Yuriy Blackwell MD  7/26/2024 3:25 PM EDT  Workstation ID: [id number]       ASSESSMENT/PLAN:  Please note portions of this assessment/plan may have been copied and pasted, but I have personally seen this patient and reviewed each line of this assessment and plan for accuracy and made updates to reflect my necessary changes on 7/27/2024    Generalized weakness, encephalopathy-presumed to be infectious in nature  -UTI ruled out on culture, possible pneumonia, will repeat CXR, add doxycycline to rocephin for now. Requiring 2L of oxygen at present, wean as tolerated.   Elevated white blood cell level, procalcitonin elevated as well  -CT head was  "negative x 2  -TSH within normal limits  -PT OT working with patient  -Neurology consulted for assistance, although patient and myself agree her mentation has vastly improved, appears at baseline per previous documentation.     Acute constipation  -As seen on KUB from 7/25/2024  -Will add daily MiraLAX  -Also provide one-time dose of mineral oil and milk of magnesia combination  -If no improvement anticipate use of enema/suppository.     Chronic diastolic congestive heart failure  -Patient received one-time dose of IV Lasix and was liberated quickly from oxygen, however as above now on oxygen again, therefore treating for possible pneumonia.   -Continue torsemide    Permanent A-fib/history of VTE  -Continue Toprol, INR subtherapeutic INR, continue Coumadin, also continue full dose Lovenox for bridging.  INR slowly improving.     Essential hypertension  -Blood pressure near goal, continue to hold antihypertensives    Hypothyroidism-TSH within normal limits, continue home replacement dose    CAD-no reported chest pain    Recent right total knee arthroplasty  -Continue routine postop care, PT OT following as above  -Will change Percocet to tramadol due to confusion deviously    PLAN FOR DISPOSITION: Short-term rehab as able    Henrry Diaz DO  07/27/24  08:21 EDT    At [facility], we believe that sharing information builds trust and better relationships. You are receiving this note because you recently visited [facility]. It is possible you will see health information before a provider has talked with you about it. This kind of information can be easy to misunderstand. To help you fully understand what it means for your health, we urge you to discuss this note with your provider.    \"Dictated utilizing Dragon dictation\"    "

## 2024-07-27 NOTE — CASE MANAGEMENT/SOCIAL WORK
Case Management Discharge Note      Final Note: dc to SNF         Selected Continued Care - Admitted Since 7/24/2024       Destination    No services have been selected for the patient.                Durable Medical Equipment    No services have been selected for the patient.                Dialysis/Infusion    No services have been selected for the patient.                Home Medical Care    No services have been selected for the patient.                Therapy    No services have been selected for the patient.                Community Resources    No services have been selected for the patient.                Community & DME    No services have been selected for the patient.                    Selected Continued Care - Prior Encounters Includes continued care and service providers with selected services from prior encounters from 4/25/2024 to 7/27/2024      Discharged on 7/24/2024 Admission date: 7/23/2024 - Discharge disposition: Home or Self Care      Durable Medical Equipment       Service Provider Selected Services Address Phone Fax Patient Preferred    [facility] [address] [place] [address] [phone] --              Home Medical Care       Service Provider Selected Services Address Phone Fax Patient Preferred    [facility] [address] [address] [phone] --                               Final Discharge Disposition Code: 03 - skilled nursing facility (SNF)

## 2024-07-27 NOTE — NURSING NOTE
Nursing IP/EMS Transfer  Anna Gilbert  78 y.o.  female    HPI :   Chief Complaint   Patient presents with    Fall     Son assisted to the floor, skin tear to rt wrist, had knee surgery yesterday, pain pump in place. Per EMS, pt mid 80% sats on RA. Placed on 2L. Normally not on 02       Admitting doctor:   Giuseppe Brown MD    Admitting diagnosis:   The primary encounter diagnosis was Weakness. Diagnoses of Acute pulmonary edema, Atrial fibrillation, unspecified type, Renal insufficiency, and Status post total left knee replacement were also pertinent to this visit.    Code status:   Current Code Status       Date Active Code Status Order ID Comments User Context       Prior            Allergies:   Amiodarone, Vancomycin, Adhesive tape, Gabapentin, and Statins    Isolation:   Contact    Intake and Output    Intake/Output Summary (Last 24 hours) at 7/27/2024 1625  Last data filed at 7/27/2024 1556  Gross per 24 hour   Intake 720 ml   Output 1050 ml   Net -330 ml       Weight:       07/25/24  0250   Weight: 73.1 kg (161 lb 2.5 oz)       Most recent vitals:   Vitals:    07/27/24 0730 07/27/24 0737 07/27/24 1152 07/27/24 1332   BP:  118/81 111/68    BP Location:  Right arm Right arm    Patient Position:  Lying Lying    Pulse:  96 57    Resp:  16 18    Temp:  97.4 °F (36.3 °C) 97.4 °F (36.3 °C)    TempSrc:  Oral Oral    SpO2: 90% 96% 96% 95%   Weight:       Height:           Active LDAs/IV Access:   Lines, Drains & Airways       Active LDAs       None                    Labs (abnormal labs have a star):   Lab Results (last 24 hours)       Procedure Component Value Units Date/Time    Urine Culture - Urine, Urine, Clean Catch [[id number]]  (Normal) Collected: 07/25/24 0145    Specimen: Urine, Clean Catch Updated: 07/27/24 1309     Urine Culture No growth    Procalcitonin [[id number]]  (Abnormal) Collected: 07/27/24 0342    Specimen: Blood Updated: 07/27/24 0432     Procalcitonin 0.79 ng/mL     Narrative:      As a Marker  "for Sepsis (Non-Neonates):    1. <0.5 ng/mL represents a low risk of severe sepsis and/or septic shock.  2. >2 ng/mL represents a high risk of severe sepsis and/or septic shock.    As a Marker for Lower Respiratory Tract Infections that require antibiotic therapy:    PCT on Admission    Antibiotic Therapy       6-12 Hrs later    >0.5                Strongly Recommended  >0.25 - <0.5        Recommended   0.1 - 0.25          Discouraged              Remeasure/reassess PCT  <0.1                Strongly Discouraged     Remeasure/reassess PCT    As 28 day mortality risk marker: \"Change in Procalcitonin Result\" (>80% or <=80%) if Day 0 (or Day 1) and Day 4 values are available. Refer to [email or web address]    Change in PCT <=80%  A decrease of PCT levels below or equal to 80% defines a positive change in PCT test result representing a higher risk for 28-day all-cause mortality of patients diagnosed with severe sepsis for septic shock.    Change in PCT >80%  A decrease of PCT levels of more than 80% defines a negative change in PCT result representing a lower risk for 28-day all-cause mortality of patients diagnosed with severe sepsis or septic shock.       Protime-INR [[id number]]  (Abnormal) Collected: 07/27/24 0342    Specimen: Blood Updated: 07/27/24 0423     Protime 19.8 Seconds      INR 1.63    Narrative:      Therapeutic Ranges for INR: 2.0-3.0 (PT 20-30)                              2.5-3.5 (PT 25-34)    Blood Culture - Blood, Arm, Left [[id number]]  (Normal) Collected: 07/25/24 0002    Specimen: Blood from Arm, Left Updated: 07/27/24 0015     Blood Culture No growth at 2 days    Blood Culture - Blood, Arm, Right [[id number]]  (Normal) Collected: 07/25/24 0009    Specimen: Blood from Arm, Right Updated: 07/27/24 0015     Blood Culture No growth at 2 days             EKG:   ECG 12 Lead   ED Interpretation   Yuriy Sharpe DO     7/25/2024  1:32 AM   ECG 12 Lead         Date/Time: 7/25/2024 1:06 AM    "   Performed by: Yuriy Sharpe DO   Authorized by: Yuriy Sharpe DO  Rhythm: atrial fibrillation   Rate: normal   QRS axis: normal   ST Segments: ST segments normal   Clinical impression: dysrhythmia - atrial      ECG 12 Lead Dyspnea   Final Result   HEART RATE=93  bpm   RR Giwlilhs=148  ms   MO Interval=  ms   P Horizontal Axis=  deg   P Front Axis=  deg   QRSD Interval=96  ms   QT Lmuhmyft=699  ms   BWaR=666  ms   QRS Axis=-74  deg   T Wave Axis=93  deg   - ABNORMAL ECG -   Atrial fibrillation   Ventricular premature complex- new   POOR R WAVE PROGRESSION   Electronically Signed By: Adeel Mina ([facility]) 2024-07-25 10:27:43   Date and Time of Study:2024-07-25 00:32:18          Current Medications:     Current Facility-Administered Medications:     acetaminophen (TYLENOL) tablet 650 mg, 650 mg, Oral, Q6H PRN, Henrry Diaz DO, 650 mg at 07/27/24 0953    cefTRIAXone (ROCEPHIN) 1,000 mg in sodium chloride 0.9 % 100 mL IVPB-VTB, 1,000 mg, Intravenous, Q24H, Henrry Diaz DO, Stopped at 07/27/24 0120    cyclobenzaprine (FLEXERIL) tablet 5 mg, 5 mg, Oral, TID PRN, Giuseppe Brown MD, 5 mg at 07/27/24 0929    diclofenac (VOLTAREN) 0.1 % ophthalmic solution 2 drop, 2 drop, Right Eye, 4x Daily, Giuseppe Brown MD, 2 drop at 07/27/24 1259    docusate sodium (COLACE) capsule 100 mg, 100 mg, Oral, BID PRN, Giuseppe Brown MD, 100 mg at 07/27/24 0645    doxycycline (VIBRAMYCIN) 100 mg in sodium chloride 0.9 % 100 mL IVPB-VTB, 100 mg, Intravenous, Q12H, Henrry Diaz DO, Stopped at 07/27/24 0945    Enoxaparin Sodium (LOVENOX) syringe 70 mg, 1 mg/kg, Subcutaneous, Q12H, Giuseppe Brown MD, 70 mg at 07/27/24 0839    levothyroxine (SYNTHROID, LEVOTHROID) tablet 150 mcg, 150 mcg, Oral, Q AM, Giuseppe Brown MD, 150 mcg at 07/27/24 0645    metoprolol succinate XL (TOPROL-XL) 24 hr tablet 50 mg, 50 mg, Oral, Daily, Giuseppe Brown MD, 50 mg at 07/27/24 0839    ofloxacin (OCUFLOX) 0.3 % ophthalmic solution 2  drop, 2 drop, Right Eye, 4x Daily, Giuseppe Brown MD, 2 drop at 07/27/24 1259    ondansetron (ZOFRAN) injection 4 mg, 4 mg, Intravenous, Q4H PRN, Giuseppe Brown MD, 4 mg at 07/26/24 2203    polyethylene glycol (MIRALAX) packet 29 g, 0.4 g/kg, Oral, Daily, Henrry Diaz DO, 29 g at 07/27/24 0839    torsemide (DEMADEX) tablet 40 mg, 40 mg, Oral, Daily, Henrry Diaz DO, 40 mg at 07/27/24 0839    traMADol (ULTRAM) tablet 50 mg, 50 mg, Oral, Q6H PRN, Henrry Diaz DO, 50 mg at 07/27/24 1257    warfarin (COUMADIN) tablet 5 mg, 5 mg, Oral, Nightly, Giuseppe Brown MD, 5 mg at 07/26/24 2012     Imaging results:  Imaging Results (Last 72 Hours)       Procedure Component Value Units Date/Time    XR Chest 1 View [[id number]] Collected: 07/27/24 0847     Updated: 07/27/24 0852    Narrative:      XR CHEST 1 VW    Date of Exam: 7/27/2024 8:25 AM EDT    Indication: Hypoxia, pneumonia    Comparison: 7/24/2024.    Findings:  The heart is enlarged. The patient's had a previous median sternotomy. There is a left-sided transvenous pacemaker in place. The pulmonary vascular markings are mildly increased indicating vascular congestion. There is no overt pulmonary edema. There is   no pneumothorax or pleural effusion. There are chronic age-related changes involving the bony thorax and thoracic aorta.      Impression:      Impression:  Cardiomegaly with mild pulmonary vascular congestion. There is no overt pulmonary edema.      Electronically Signed: Gerard Longoria MD    7/27/2024 8:49 AM EDT    Workstation ID: [id number]    XR Abdomen KUB [[id number]] Collected: 07/26/24 2208     Updated: 07/26/24 2213    Narrative:      XR ABDOMEN KUB    Date of Exam: 7/26/2024 9:33 PM EDT    Indication: Abdominal pain    Comparison: None available    Findings:  There is a left chest wall ICD with lead in expected position. There is cardiomegaly with central vascular congestion and likely early interstitial edema pattern. There is no  focal airspace consolidation or pleural effusion. There is no evidence of   pneumothorax. There are degenerative changes of the thoracic spine. There is a nonspecific nonobstructive bowel gas pattern. There is a moderate colonic stool burden most notable within the cecum and ascending colon. There is a partially calcified mass   within the pelvis likely representing a uterine fibroid.      Impression:      Impression:  1. Cardiomegaly with central vascular congestion and interstitial edema pattern.  2. Moderate colonic stool burden most notable within the cecum and ascending colon.  3. Nonobstructive small bowel gas pattern.      Electronically Signed: Dav Ruffin    7/26/2024 10:10 PM EDT    Workstation ID: [id number]    CT Head Without Contrast [[id number]] Collected: 07/26/24 1525     Updated: 07/26/24 1534    Narrative:      CT HEAD WO CONTRAST    Date of Exam: 7/26/2024 3:03 PM EDT    Indication: confusion.    Comparison: 7/25/2024    Technique: Axial CT images were obtained of the head without contrast administration.  Coronal reconstructions were performed.  Automated exposure control and iterative reconstruction methods were used.      Findings:  There is mild generalized parenchymal volume loss. There is no evidence of acute infarct or hemorrhage. No abnormal extra-axial fluid collections are seen. No mass effect or hydrocephalus.    Globes and orbits are within normal limits. Visualized paranasal sinuses and mastoid air cells are clear. No acute skull fracture.      Impression:      Impression:    1. No acute intracranial abnormality.      Electronically Signed: Yuriy Blackwell MD    7/26/2024 3:31 PM EDT    Workstation ID: [id number]    US Carotid Bilateral [[id number]] Collected: 07/26/24 1522     Updated: 07/26/24 1528    Narrative:      US CAROTID BILATERAL    Date of Exam: 7/26/2024 2:36 PM EDT    Indication: confusion.    Comparison: No comparisons available.    Technique: Grayscale, color-flow, and  spectral imaging was obtained of the bilateral carotid and vertebral arteries.      Findings:  There is a large amount of echogenic plaque of the left carotid bifurcation. Peak systolic velocity within the left internal carotid artery is 100 cm/s with peak end-diastolic velocity of 42 cm/s.    There is mild echogenic plaque within the proximal right internal carotid artery. Peak systolic velocity within the right internal carotid artery is 72 cm/s with the peak end-diastolic velocity of 30 cm/s.    Vertebral arteries are patent with antegrade flow bilaterally.    Doppler waveforms demonstrate an irregularly irregular heart rate which may be related to atrial fibrillation. Clinical correlation recommended.    Impression:      Impression:    1. No hemodynamically significant stenosis of the carotid arteries.  2. Patent vertebral arteries with antegrade flow bilaterally.  3. Irregular heart beat which may be secondary to atrial fibrillation. Clinical correlation recommended.        Electronically Signed: Yuriy Blackwell MD    7/26/2024 3:25 PM EDT    Workstation ID: [id number]    CT Head Without Contrast [[id number]] Collected: 07/25/24 0500     Updated: 07/25/24 0509    Narrative:      CT HEAD WO CONTRAST    Date of Exam: 7/25/2024 4:37 AM EDT    Indication: confusion after fall.  Subtherapeutic INR..    Comparison: CT head January 21, 2020    Technique: Axial CT images were obtained of the head without contrast administration.  Coronal reconstructions were performed.  Automated exposure control and iterative reconstruction methods were used.      Findings:  There is no evidence of hemorrhage. There is no mass effect or midline shift.    There is no extracerebral collection.    Ventricles are normal in size and configuration for patient's stated age.      Posterior fossa is within normal limits.    Calvarium and skull base appear intact.   Visualized sinuses show no air fluid levels. Visualized orbits are unremarkable.       Impression:      Impression:  No acute intracranial abnormality.        Electronically Signed: Jamal Johnson MD    7/25/2024 5:06 AM EDT    Workstation ID: [id number]    CT Chest With Contrast Diagnostic [[id number]] Collected: 07/25/24 0116     Updated: 07/25/24 0123    Narrative:      CT CHEST W CONTRAST DIAGNOSTIC    Date of Exam: 7/25/2024 12:56 AM EDT    Indication: rule out PE.    Comparison: None available.    Technique: Axial CT images were obtained of the chest after the uneventful intravenous administration of iodinated contrast.  Sagittal and coronal reconstructions were performed.  Automated exposure control and iterative reconstruction methods were used.      Findings:    Pulmonary arteries: Adequate opacification of the pulmonary arteries. No evidence of acute pulmonary embolism.    Lungs and Pleura: There are small bilateral pleural effusions greater on the right than the left. There is mild bilateral basilar atelectasis and lingular atelectasis. There are few areas of groundglass attenuation and interlobular septal thickening   suggestive of mild edema. There is soft tissue in the right infrahilar area somewhat oval in shape measuring 1.8 x 6.1 cm best seen on axial image #23 of series 2. This is favored to represent reactive lymph node prominence. There are no suspicious   pulmonary nodules.    Mediastinum/[place]: No mediastinal or hilar lymphadenopathy.    Lymph nodes: No axillary or supraclavicular adenopathy.    Cardiovascular: The heart is enlarged. There are changes of aortic valve replacement, midline sternotomy and there is calcific atherosclerosis of the coronary arteries..       Upper Abdomen: The upper abdominal contents are unremarkable.          Bones and Soft Tissue: No suspicious osseous lesion.        Impression:      Impression:  1.No evidence of pulmonary embolism.  2.Small bilateral pleural effusions greater on the right than the left.  3.Mild pulmonary  edema.  4.Cardiomegaly.        Electronically Signed: Jamal Johnson MD    7/25/2024 1:20 AM EDT    Workstation ID: [id number]    XR Chest 1 View [[id number]] Collected: 07/24/24 2234     Updated: 07/24/24 2240    Narrative:      XR CHEST 1 VW    Date of Exam: 7/24/2024 10:13 PM EDT    Indication: lethargy    Comparison: 7/17/2023 and prior    Findings:  Lung volumes are low. Study limited by overlying support monitoring apparatus.    Patient is status post median sternotomy. Heart size is enlarged and the pulmonary vascularity is congested and indistinct. Diffuse increased groundglass and interstitial opacities noted with a perihilar and bibasilar predominance. These findings are   accentuated by low lung volumes. Left subclavian approach pacemaker is in place. Osseous structures demonstrate no acute abnormality      Impression:      Impression:  Cardiomegaly with pulmonary vascular congestion and increased perihilar and interstitial opacities suggesting congestive failure. Findings are accentuated by low lung volumes. 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

## 2024-07-27 NOTE — DISCHARGE SUMMARY
"Anna Gilbert  1945  [id number]    Hospitalists Discharge Summary    Date of Admission: 7/24/2024  Date of Discharge:  7/27/2024    History of Present Illness from HPI on admit: Ms. Gilbert is unable to give me any reliable history, and no family is at the bedside.  The entire HPI is taken from the ER physician's note.  It reads as follows:     \"Patient was just discharged this afternoon after an elective right knee replacement. Son states she was very lethargic at home and had generalized weakness and trouble ambulating at home. He states she almost fell and had to grab her in order to prevent her from falling causing a small skin tear on her right wrist. She states she did not want to come back to the hospital and otherwise feels fine denies any new, fever, UTI symptoms nausea vomiting headaches numbness weakness or slurred speech. Son states she was acting funny at home and was concerned because she had inability to mobilize by herself at home. Patient denies any new cough but son reports that she has been having increased cough recently and shortness of breath. Patient is not usually on oxygen at home and required 2 L nasal cannula for oxygen sat on room air of 90%\"     Primary Discharge diagnoses/Hospital course: Generalized weakness secondary to metabolic encephalopathy-due to recent anesthesia, use of opioid pain medication-this has improved with time as well as holding narcotics, she has responded well with good pain control to tramadol and Tylenol, will continue on discharge.     Secondary Discharge Diagnoses/Hospital course: UTI ruled out.  Possible pneumonia-will complete course of antibiotics with Omnicef and doxycycline.  Acute constipation-as seen on KUB-was given laxatives, will continue daily, may also utilize mineral oil enema.  Chronic diastolic congestive heart failure-stable on home torsemide.  Permanent A-fib/history of VTE-subtherapeutic INR, bridging Coumadin with Lovenox will continue as " INR slowly improves.  Essential hypertension.  Hypothyroidism.  CAD.  Recent right total knee arthroplasty-stable, pain controlled with tramadol and Tylenol as above.     Hospital Course Summary: As above.     On 7/27/2024 patient's condition had improved. They were deemed stable for discharge. They were advised to take all medications as prescribed, follow up with PCP within 1 week. If there are any issues patient should contact PCP and/or return to the ED for follow up. Patient was agreeable to the plan and subsequently discharged at this time.     PCP  Patient Care Team:  Max Carreno MD as PCP - General (Emergency Medicine)  Alicia Garcia RPH as Pharmacist  Simon Ivy PharmD as Pharmacist (Pharmacy)  Kane Abdul MD as Consulting Physician (Internal Medicine)    Consults:   Consults       Date and Time Order Name Status Description    7/26/2024  2:04 PM Inpatient Neurology Consult General              Operations and Procedures Performed:       XR Chest 1 View    Result Date: 7/27/2024  Narrative: XR CHEST 1 VW Date of Exam: 7/27/2024 8:25 AM EDT Indication: Hypoxia, pneumonia Comparison: 7/24/2024. Findings: The heart is enlarged. The patient's had a previous median sternotomy. There is a left-sided transvenous pacemaker in place. The pulmonary vascular markings are mildly increased indicating vascular congestion. There is no overt pulmonary edema. There is no pneumothorax or pleural effusion. There are chronic age-related changes involving the bony thorax and thoracic aorta.     Impression: Impression: Cardiomegaly with mild pulmonary vascular congestion. There is no overt pulmonary edema. Electronically Signed: Gerard Longoria MD  7/27/2024 8:49 AM EDT  Workstation ID: [id number]    XR Abdomen KUB    Result Date: 7/26/2024  Narrative: XR ABDOMEN KUB Date of Exam: 7/26/2024 9:33 PM EDT Indication: Abdominal pain Comparison: None available Findings: There is a left chest wall ICD with lead in expected  position. There is cardiomegaly with central vascular congestion and likely early interstitial edema pattern. There is no focal airspace consolidation or pleural effusion. There is no evidence of pneumothorax. There are degenerative changes of the thoracic spine. There is a nonspecific nonobstructive bowel gas pattern. There is a moderate colonic stool burden most notable within the cecum and ascending colon. There is a partially calcified mass within the pelvis likely representing a uterine fibroid.     Impression: Impression: 1. Cardiomegaly with central vascular congestion and interstitial edema pattern. 2. Moderate colonic stool burden most notable within the cecum and ascending colon. 3. Nonobstructive small bowel gas pattern. Electronically Signed: Dav Huttonelor  7/26/2024 10:10 PM EDT  Workstation ID: [id number]    CT Head Without Contrast    Result Date: 7/26/2024  Narrative: CT HEAD WO CONTRAST Date of Exam: 7/26/2024 3:03 PM EDT Indication: confusion. Comparison: 7/25/2024 Technique: Axial CT images were obtained of the head without contrast administration.  Coronal reconstructions were performed.  Automated exposure control and iterative reconstruction methods were used. Findings: There is mild generalized parenchymal volume loss. There is no evidence of acute infarct or hemorrhage. No abnormal extra-axial fluid collections are seen. No mass effect or hydrocephalus. Globes and orbits are within normal limits. Visualized paranasal sinuses and mastoid air cells are clear. No acute skull fracture.     Impression: Impression: 1. No acute intracranial abnormality. Electronically Signed: Yuriy Blackwell MD  7/26/2024 3:31 PM EDT  Workstation ID: [id number]    US Carotid Bilateral    Result Date: 7/26/2024  Narrative: US CAROTID BILATERAL Date of Exam: 7/26/2024 2:36 PM EDT Indication: confusion. Comparison: No comparisons available. Technique: Grayscale, color-flow, and spectral imaging was obtained of the  bilateral carotid and vertebral arteries. Findings: There is a large amount of echogenic plaque of the left carotid bifurcation. Peak systolic velocity within the left internal carotid artery is 100 cm/s with peak end-diastolic velocity of 42 cm/s. There is mild echogenic plaque within the proximal right internal carotid artery. Peak systolic velocity within the right internal carotid artery is 72 cm/s with the peak end-diastolic velocity of 30 cm/s. Vertebral arteries are patent with antegrade flow bilaterally. Doppler waveforms demonstrate an irregularly irregular heart rate which may be related to atrial fibrillation. Clinical correlation recommended.    Impression: Impression: 1. No hemodynamically significant stenosis of the carotid arteries. 2. Patent vertebral arteries with antegrade flow bilaterally. 3. Irregular heart beat which may be secondary to atrial fibrillation. Clinical correlation recommended. Electronically Signed: Yuriy Blackwell MD  7/26/2024 3:25 PM EDT  Workstation ID: [id number]    CT Head Without Contrast    Result Date: 7/25/2024  Narrative: CT HEAD WO CONTRAST Date of Exam: 7/25/2024 4:37 AM EDT Indication: confusion after fall.  Subtherapeutic INR.. Comparison: CT head January 21, 2020 Technique: Axial CT images were obtained of the head without contrast administration.  Coronal reconstructions were performed.  Automated exposure control and iterative reconstruction methods were used. Findings: There is no evidence of hemorrhage. There is no mass effect or midline shift. There is no extracerebral collection. Ventricles are normal in size and configuration for patient's stated age.  Posterior fossa is within normal limits. Calvarium and skull base appear intact.   Visualized sinuses show no air fluid levels. Visualized orbits are unremarkable.     Impression: Impression: No acute intracranial abnormality. Electronically Signed: Jamal Johnson MD  7/25/2024 5:06 AM EDT  Workstation ID:  [id number]    CT Chest With Contrast Diagnostic    Result Date: 7/25/2024  Narrative: CT CHEST W CONTRAST DIAGNOSTIC Date of Exam: 7/25/2024 12:56 AM EDT Indication: rule out PE. Comparison: None available. Technique: Axial CT images were obtained of the chest after the uneventful intravenous administration of iodinated contrast.  Sagittal and coronal reconstructions were performed.  Automated exposure control and iterative reconstruction methods were used. Findings: Pulmonary arteries: Adequate opacification of the pulmonary arteries. No evidence of acute pulmonary embolism. Lungs and Pleura: There are small bilateral pleural effusions greater on the right than the left. There is mild bilateral basilar atelectasis and lingular atelectasis. There are few areas of groundglass attenuation and interlobular septal thickening suggestive of mild edema. There is soft tissue in the right infrahilar area somewhat oval in shape measuring 1.8 x 6.1 cm best seen on axial image #23 of series 2. This is favored to represent reactive lymph node prominence. There are no suspicious pulmonary nodules. Mediastinum/[place]: No mediastinal or hilar lymphadenopathy. Lymph nodes: No axillary or supraclavicular adenopathy. Cardiovascular: The heart is enlarged. There are changes of aortic valve replacement, midline sternotomy and there is calcific atherosclerosis of the coronary arteries..   Upper Abdomen: The upper abdominal contents are unremarkable.      Bones and Soft Tissue: No suspicious osseous lesion.     Impression: Impression: 1.No evidence of pulmonary embolism. 2.Small bilateral pleural effusions greater on the right than the left. 3.Mild pulmonary edema. 4.Cardiomegaly. Electronically Signed: Jamal Johnson MD  7/25/2024 1:20 AM EDT  Workstation ID: [id number]    XR Chest 1 View    Result Date: 7/24/2024  Narrative: XR CHEST 1 VW Date of Exam: 7/24/2024 10:13 PM EDT Indication: lethargy Comparison: 7/17/2023 and prior Findings: Lung  volumes are low. Study limited by overlying support monitoring apparatus. Patient is status post median sternotomy. Heart size is enlarged and the pulmonary vascularity is congested and indistinct. Diffuse increased groundglass and interstitial opacities noted with a perihilar and bibasilar predominance. These findings are accentuated by low lung volumes. Left subclavian approach pacemaker is in place. Osseous structures demonstrate no acute abnormality     Impression: Impression: Cardiomegaly with pulmonary vascular congestion and increased perihilar and interstitial opacities suggesting congestive failure. Findings are accentuated by low lung volumes. Superimposed pneumonia is not excluded Electronically Signed: Mariusz Fields MD  7/24/2024 10:37 PM EDT  Workstation ID: [id number]    XR Knee 1 or 2 View Right    Result Date: 7/23/2024  Narrative: XR KNEE 1 OR 2 VW RIGHT Date of Exam: 7/23/2024 6:26 PM EDT Indication: Postop total knee. Comparison: None available Findings: There is a new right total knee prosthesis. The hardware appears in expected position. There is no evidence of periprosthetic fracture or loosening. There is gas within the joint space compatible with recent surgery. There is peripheral vascular atherosclerotic calcification.     Impression: Impression: 1. Expected immediate postoperative appearance status post right total knee arthroplasty. Electronically Signed: Dav Ruffin  7/23/2024 6:40 PM EDT  Workstation ID: [id number]    Peripheral Block    Result Date: 7/23/2024  Narrative: Mitesh Amaro CRNA     7/23/2024  3:47 PM Peripheral Block Pre-sedation assessment completed: 7/23/2024 2:34 PM Patient reassessed immediately prior to procedure Patient location during procedure: pre-op Start time: 7/23/2024 3:03 PM Stop time: 7/23/2024 3:05 PM Reason for block: at surgeon's request and post-op pain management Performed by CRNA/CAA: Mitesh Amaro CRNA Preanesthetic Checklist Completed: patient  identified, IV checked, site marked, risks and benefits discussed, surgical consent, monitors and equipment checked, pre-op evaluation and timeout performed Prep: Pt Position: supine Sterile barriers:cap, gloves, mask and washed/disinfected hands Prep: ChloraPrep Patient monitoring: blood pressure monitoring, continuous pulse oximetry and EKG Procedure Sedation: yes Performed under: local infiltration Guidance:ultrasound guided ULTRASOUND INTERPRETATION.  Using ultrasound guidance a 21 G gauge needle was placed in close proximity to the nerve, at which point, under ultrasound guidance anesthetic was injected in the area of the nerve and spread of the anesthesia was seen on ultrasound in close proximity thereto.  There were no abnormalities seen on ultrasound; a digital image was taken; and the patient tolerated the procedure with no complications. Images:still images obtained, printed/placed on chart Laterality:right Block Type:EL Injection Technique:single-shot Needle Type:echogenic Needle Gauge:21 G Resistance on Injection: none Medications Used: bupivacaine PF (MARCAINE) injection 0.25% - Injection  20 mL - 7/23/2024 3:05:00 PM Post Assessment Injection Assessment: negative aspiration for heme, no paresthesia on injection and incremental injection Patient Tolerance:comfortable throughout block Complications:no Performed by: Mitesh Amaro CRNA     Peripheral Block    Result Date: 7/23/2024  Narrative: Mitesh Amaro CRNA     7/23/2024  3:47 PM Peripheral Block Pre-sedation assessment completed: 7/23/2024 2:34 PM Patient reassessed immediately prior to procedure Patient location during procedure: pre-op Start time: 7/23/2024 3:00 PM Stop time: 7/23/2024 3:02 PM Reason for block: at surgeon's request and post-op pain management Performed by CRNA/CAA: Mitesh Amaro CRNA Preanesthetic Checklist Completed: patient identified, IV checked, site marked, risks and benefits discussed, surgical consent, monitors and  equipment checked, pre-op evaluation and timeout performed Prep: Pt Position: supine Sterile barriers:cap, gloves, mask and washed/disinfected hands Prep: ChloraPrep Patient monitoring: blood pressure monitoring, continuous pulse oximetry and EKG Procedure Sedation: yes Performed under: local infiltration Guidance:ultrasound guided ULTRASOUND INTERPRETATION.  Using ultrasound guidance a 21 G gauge needle was placed in close proximity to the nerve, at which point, under ultrasound guidance anesthetic was injected in the area of the nerve and spread of the anesthesia was seen on ultrasound in close proximity thereto.  There were no abnormalities seen on ultrasound; a digital image was taken; and the patient tolerated the procedure with no complications. Images:still images obtained, printed/placed on chart Laterality:right Block Type:iPack Injection Technique:single-shot Needle Type:echogenic Needle Gauge:21 G Resistance on Injection: none Medications Used: bupivacaine PF (MARCAINE) injection 0.25% - Injection  15 mL - 7/23/2024 3:02:00 PM Medications Comment:15 mcg demedetomidine Post Assessment Injection Assessment: negative aspiration for heme, no paresthesia on injection and incremental injection Patient Tolerance:comfortable throughout block Complications:no Performed by: Mitesh Amaro CRNA     Peripheral Block    Result Date: 7/23/2024  Narrative: Mitesh Amaro CRNA     7/23/2024  3:46 PM Peripheral Block Pre-sedation assessment completed: 7/23/2024 2:34 PM Patient reassessed immediately prior to procedure Patient location during procedure: pre-op Start time: 7/23/2024 2:47 PM Stop time: 7/23/2024 2:55 PM Reason for block: at surgeon's request and post-op pain management Performed by CRNA/CAA: Mitesh Amaro CRNASRNA: Kee Harrison SRNA Preanesthetic Checklist Completed: patient identified, IV checked, site marked, risks and benefits discussed, surgical consent, monitors and equipment checked, pre-op  evaluation and timeout performed Prep: Pt Position: supine Sterile barriers:cap, gloves, sterile barriers, partial drape and mask Prep: ChloraPrep Patient monitoring: blood pressure monitoring, continuous pulse oximetry and EKG Procedure Sedation: yes Performed under: local infiltration Guidance:ultrasound guided ULTRASOUND INTERPRETATION.  Using ultrasound guidance a 21 G gauge needle was placed in close proximity to the nerve, at which point, under ultrasound guidance anesthetic was injected in the area of the nerve and spread of the anesthesia was seen on ultrasound in close proximity thereto.  There were no abnormalities seen on ultrasound; a digital image was taken; and the patient tolerated the procedure with no complications. Images:still images obtained, printed/placed on chart Laterality:right Block Type:adductor canal block Injection Technique:catheter Needle Type:echogenic Needle Gauge:21 G Resistance on Injection: none Catheter Size:20 G Medications Used: bupivacaine PF (MARCAINE) injection 0.25% - Injection  10 mL - 7/23/2024 2:55:00 PM Medications Comment:25 mcg dexmedetomidine Post Assessment Injection Assessment: negative aspiration for heme, no paresthesia on injection and incremental injection Patient Tolerance:comfortable throughout block Complications:no Performed by: Mitesh Amaro, CRNA     US Sonosite Portable    Result Date: 7/23/2024  Narrative: This procedure was auto-finalized with no dictation required.     Allergies:  is allergic to amiodarone, vancomycin, adhesive tape, gabapentin, and statins.    Jorge  Reviewed    Discharge Medications:     Discharge Medications        New Medications        Instructions Start Date   acetaminophen 325 MG tablet  Commonly known as: TYLENOL   650 mg, Oral, Every 6 Hours PRN      cefdinir 300 MG capsule  Commonly known as: OMNICEF   300 mg, Oral, 2 Times Daily      doxycycline 100 MG capsule  Commonly known as: VIBRAMYCIN   100 mg, Oral, 2 Times Daily     "  Enoxaparin Sodium 80 MG/0.8ML solution prefilled syringe syringe  Commonly known as: LOVENOX   1 mg/kg (70 mg), Subcutaneous, Every 12 Hours, Utilize until INR with warfarin is >/= to 2.      traMADol 50 MG tablet  Commonly known as: ULTRAM   50 mg, Oral, Every 6 Hours PRN             Changes to Medications        Instructions Start Date   pantoprazole 40 MG EC tablet  Commonly known as: PROTONIX  What changed: additional instructions   40 mg, Oral, Every Morning             Continue These Medications        Instructions Start Date   albuterol sulfate  (90 Base) MCG/ACT inhaler  Commonly known as: PROVENTIL HFA;VENTOLIN HFA;PROAIR HFA   2 puffs, Inhalation, As Needed      cyclobenzaprine 5 MG tablet  Commonly known as: FLEXERIL   5 mg, Oral, 3 Times Daily PRN      diclofenac 0.1 % ophthalmic solution  Commonly known as: VOLTAREN   2 drops, Right Eye, 4 Times Daily      docusate sodium 100 MG capsule  Commonly known as: Colace   100 mg, Oral, 2 Times Daily PRN      levothyroxine 150 MCG tablet  Commonly known as: SYNTHROID, LEVOTHROID   1 tablet, Oral, Daily      metoprolol succinate XL 50 MG 24 hr tablet  Commonly known as: TOPROL-XL   TAKE ONE TABLET BY MOUTH DAILY      ofloxacin 0.3 % ophthalmic solution  Commonly known as: OCUFLOX   2 drops, Right Eye, 4 Times Daily      ondansetron ODT 4 MG disintegrating tablet  Commonly known as: ZOFRAN-ODT   4 mg, Translingual, Every 8 Hours PRN      torsemide 20 MG tablet  Commonly known as: DEMADEX   40 mg, Oral, Daily      warfarin 5 MG tablet  Commonly known as: COUMADIN   5 mg, Oral, Nightly, TAKE AS DIRECTED             Stop These Medications      APPLE CIDER VINEGAR PO     b complex vitamins capsule     BIOTIN PO     BUPIVACAINE 0.125% IN 0.9% SODIUM CHLORIDE 400 ML ELASTOMERIC PUMP \"PAIN BALL\"     CALCIUM-MAGNESIUM-ZINC PO     Cholecalciferol 50 MCG (2000 UT) capsule     Klor-Con M20 20 MEQ CR tablet  Generic drug: potassium chloride   " "  oxyCODONE-acetaminophen 5-325 MG per tablet  Commonly known as: PERCOCET              Last Lab Results:   Lab Results (most recent)       Procedure Component Value Units Date/Time    Urine Culture - Urine, Urine, Clean Catch [[id number]]  (Normal) Collected: 07/25/24 0145    Specimen: Urine, Clean Catch Updated: 07/27/24 1309     Urine Culture No growth    Procalcitonin [[id number]]  (Abnormal) Collected: 07/27/24 0342    Specimen: Blood Updated: 07/27/24 0432     Procalcitonin 0.79 ng/mL     Narrative:      As a Marker for Sepsis (Non-Neonates):    1. <0.5 ng/mL represents a low risk of severe sepsis and/or septic shock.  2. >2 ng/mL represents a high risk of severe sepsis and/or septic shock.    As a Marker for Lower Respiratory Tract Infections that require antibiotic therapy:    PCT on Admission    Antibiotic Therapy       6-12 Hrs later    >0.5                Strongly Recommended  >0.25 - <0.5        Recommended   0.1 - 0.25          Discouraged              Remeasure/reassess PCT  <0.1                Strongly Discouraged     Remeasure/reassess PCT    As 28 day mortality risk marker: \"Change in Procalcitonin Result\" (>80% or <=80%) if Day 0 (or Day 1) and Day 4 values are available. Refer to http://www.[employer]-pct-calculator.com    Change in PCT <=80%  A decrease of PCT levels below or equal to 80% defines a positive change in PCT test result representing a higher risk for 28-day all-cause mortality of patients diagnosed with severe sepsis for septic shock.    Change in PCT >80%  A decrease of PCT levels of more than 80% defines a negative change in PCT result representing a lower risk for 28-day all-cause mortality of patients diagnosed with severe sepsis or septic shock.       Protime-INR [[id number]]  (Abnormal) Collected: 07/27/24 0342    Specimen: Blood Updated: 07/27/24 0423     Protime 19.8 Seconds      INR 1.63    Narrative:      Therapeutic Ranges for INR: 2.0-3.0 (PT 20-30)                              " 2.5-3.5 (PT 25-34)    Blood Culture - Blood, Arm, Left [[id number]]  (Normal) Collected: 07/25/24 0002    Specimen: Blood from Arm, Left Updated: 07/27/24 0015     Blood Culture No growth at 2 days    Blood Culture - Blood, Arm, Right [[id number]]  (Normal) Collected: 07/25/24 0009    Specimen: Blood from Arm, Right Updated: 07/27/24 0015     Blood Culture No growth at 2 days    CBC & Differential [[id number]]  (Abnormal) Collected: 07/26/24 0832    Specimen: Blood Updated: 07/26/24 0934    Narrative:      The following orders were created for panel order CBC & Differential.  Procedure                               Abnormality         Status                     ---------                               -----------         ------                     CBC Auto Differential[[id number]]        Abnormal            Final result               Scan Slide[[id number]]                                       Final result                 Please view results for these tests on the individual orders.    Scan Slide [[id number]] Collected: 07/26/24 0832    Specimen: Blood Updated: 07/26/24 0934     Anisocytosis Slight/1+     Polychromasia Slight/1+     WBC Morphology Normal     Large Platelets Slight/1+    Procalcitonin [[id number]]  (Abnormal) Collected: 07/26/24 0832    Specimen: Blood Updated: 07/26/24 0909     Procalcitonin 0.48 ng/mL     Narrative:      As a Marker for Sepsis (Non-Neonates):    1. <0.5 ng/mL represents a low risk of severe sepsis and/or septic shock.  2. >2 ng/mL represents a high risk of severe sepsis and/or septic shock.    As a Marker for Lower Respiratory Tract Infections that require antibiotic therapy:    PCT on Admission    Antibiotic Therapy       6-12 Hrs later    >0.5                Strongly Recommended  >0.25 - <0.5        Recommended   0.1 - 0.25          Discouraged              Remeasure/reassess PCT  <0.1                Strongly Discouraged     Remeasure/reassess PCT    As 28 day mortality risk marker:  "\"Change in Procalcitonin Result\" (>80% or <=80%) if Day 0 (or Day 1) and Day 4 values are available. Refer to http://www.[facility]-pct-calculator.com    Change in PCT <=80%  A decrease of PCT levels below or equal to 80% defines a positive change in PCT test result representing a higher risk for 28-day all-cause mortality of patients diagnosed with severe sepsis for septic shock.    Change in PCT >80%  A decrease of PCT levels of more than 80% defines a negative change in PCT result representing a lower risk for 28-day all-cause mortality of patients diagnosed with severe sepsis or septic shock.       Basic Metabolic Panel [[id number]]  (Abnormal) Collected: 07/26/24 0832    Specimen: Blood Updated: 07/26/24 0903     Glucose 93 mg/dL      BUN 35 mg/dL      Creatinine 1.18 mg/dL      Sodium 131 mmol/L      Potassium 3.9 mmol/L      Chloride 96 mmol/L      CO2 24.0 mmol/L      Calcium 9.0 mg/dL      BUN/Creatinine Ratio 29.7     Anion Gap 11.0 mmol/L      eGFR 46.8 mL/min/1.73     Narrative:      GFR Normal >60  Chronic Kidney Disease <60  Kidney Failure <15    The GFR formula is only valid for adults with stable renal function between ages 18 and 70.    Protime-INR [[id number]]  (Abnormal) Collected: 07/26/24 0832    Specimen: Blood Updated: 07/26/24 0851     Protime 16.7 Seconds      INR 1.30    Narrative:      Therapeutic Ranges for INR: 2.0-3.0 (PT 20-30)                              2.5-3.5 (PT 25-34)    CBC Auto Differential [[id number]]  (Abnormal) Collected: 07/26/24 0832    Specimen: Blood Updated: 07/26/24 0850     WBC 13.60 10*3/mm3      RBC 4.26 10*6/mm3      Hemoglobin 12.1 g/dL      Hematocrit 37.3 %      MCV 87.6 fL      MCH 28.4 pg      MCHC 32.4 g/dL      RDW 14.9 %      RDW-SD 47.8 fl      MPV 11.3 fL      Platelets 162 10*3/mm3      Neutrophil % 66.4 %      Lymphocyte % 10.1 %      Monocyte % 20.1 %      Eosinophil % 1.5 %      Basophil % 0.8 %      Immature Grans % 1.1 %      Neutrophils, Absolute 9.02 " 10*3/mm3      Lymphocytes, Absolute 1.38 10*3/mm3      Monocytes, Absolute 2.74 10*3/mm3      Eosinophils, Absolute 0.20 10*3/mm3      Basophils, Absolute 0.11 10*3/mm3      Immature Grans, Absolute 0.15 10*3/mm3      nRBC 0.0 /100 WBC     Respiratory Panel PCR w/COVID-19(SARS-CoV-2) GORDY/GUSTAVO/DAVID/PAD/COR/EVERT In-House, NP Swab in UTM/VTM, 2 HR TAT - Swab, Nasopharynx [[id number]]  (Normal) Collected: 07/25/24 1135    Specimen: Swab from Nasopharynx Updated: 07/25/24 1717     ADENOVIRUS, PCR Not Detected     Coronavirus 229E Not Detected     Coronavirus HKU1 Not Detected     Coronavirus NL63 Not Detected     Coronavirus OC43 Not Detected     COVID19 Not Detected     Human Metapneumovirus Not Detected     Human Rhinovirus/Enterovirus Not Detected     Influenza A PCR Not Detected     Influenza B PCR Not Detected     Parainfluenza Virus 1 Not Detected     Parainfluenza Virus 2 Not Detected     Parainfluenza Virus 3 Not Detected     Parainfluenza Virus 4 Not Detected     RSV, PCR Not Detected     Bordetella pertussis pcr Not Detected     Bordetella parapertussis PCR Not Detected     Chlamydophila pneumoniae PCR Not Detected     Mycoplasma pneumo by PCR Not Detected    Narrative:      In the setting of a positive respiratory panel with a viral infection PLUS a negative procalcitonin without other underlying concern for bacterial infection, consider observing off antibiotics or discontinuation of antibiotics and continue supportive care. If the respiratory panel is positive for atypical bacterial infection (Bordetella pertussis, Chlamydophila pneumoniae, or Mycoplasma pneumoniae), consider antibiotic de-escalation to target atypical bacterial infection.    High Sensitivity Troponin T 2Hr [[id number]]  (Abnormal) Collected: 07/25/24 0623    Specimen: Blood Updated: 07/25/24 0713     HS Troponin T 34 ng/L      Troponin T Delta 1 ng/L     Narrative:      High Sensitive Troponin T Reference Range:  <14.0 ng/L- Negative Female for  AMI  <22.0 ng/L- Negative Male for AMI  >=14 - Abnormal Female indicating possible myocardial injury.  >=22 - Abnormal Male indicating possible myocardial injury.   Clinicians would have to utilize clinical acumen, EKG, Troponin, and serial changes to determine if it is an Acute Myocardial Infarction or myocardial injury due to an underlying chronic condition.         Renal Function Panel [[id number]]  (Abnormal) Collected: 07/25/24 0437    Specimen: Blood Updated: 07/25/24 0513     Glucose 121 mg/dL      BUN 35 mg/dL      Creatinine 1.53 mg/dL      Sodium 133 mmol/L      Potassium 4.8 mmol/L      Comment: Specimen hemolyzed.  Result may be falsely elevated.        Chloride 100 mmol/L      CO2 20.3 mmol/L      Calcium 8.8 mg/dL      Albumin 3.6 g/dL      Phosphorus 3.3 mg/dL      Anion Gap 12.7 mmol/L      BUN/Creatinine Ratio 22.9     eGFR 34.3 mL/min/1.73     Narrative:      GFR Normal >60  Chronic Kidney Disease <60  Kidney Failure <15    The GFR formula is only valid for adults with stable renal function between ages 18 and 70.    High Sensitivity Troponin T [[id number]]  (Abnormal) Collected: 07/25/24 0437    Specimen: Blood Updated: 07/25/24 0507     HS Troponin T 33 ng/L     Narrative:      High Sensitive Troponin T Reference Range:  <14.0 ng/L- Negative Female for AMI  <22.0 ng/L- Negative Male for AMI  >=14 - Abnormal Female indicating possible myocardial injury.  >=22 - Abnormal Male indicating possible myocardial injury.   Clinicians would have to utilize clinical acumen, EKG, Troponin, and serial changes to determine if it is an Acute Myocardial Infarction or myocardial injury due to an underlying chronic condition.         CBC & Differential [[id number]]  (Abnormal) Collected: 07/25/24 0437    Specimen: Blood Updated: 07/25/24 0444    Narrative:      The following orders were created for panel order CBC & Differential.  Procedure                               Abnormality         Status                      ---------                               -----------         ------                     CBC Auto Differential[[id number]]        Abnormal            Final result                 Please view results for these tests on the individual orders.    CBC Auto Differential [[id number]]  (Abnormal) Collected: 07/25/24 0437    Specimen: Blood Updated: 07/25/24 0444     WBC 17.38 10*3/mm3      RBC 4.44 10*6/mm3      Hemoglobin 12.9 g/dL      Hematocrit 40.3 %      MCV 90.8 fL      MCH 29.1 pg      MCHC 32.0 g/dL      RDW 14.8 %      RDW-SD 49.3 fl      MPV 11.5 fL      Platelets 162 10*3/mm3      Neutrophil % 72.2 %      Lymphocyte % 6.7 %      Monocyte % 19.4 %      Eosinophil % 0.2 %      Basophil % 0.6 %      Immature Grans % 0.9 %      Neutrophils, Absolute 12.57 10*3/mm3      Lymphocytes, Absolute 1.16 10*3/mm3      Monocytes, Absolute 3.37 10*3/mm3      Eosinophils, Absolute 0.03 10*3/mm3      Basophils, Absolute 0.10 10*3/mm3      Immature Grans, Absolute 0.15 10*3/mm3      nRBC 0.0 /100 WBC     TSH [[id number]]  (Normal) Collected: 07/24/24 2208    Specimen: Blood Updated: 07/25/24 0442     TSH 1.730 uIU/mL     Urinalysis, Microscopic Only - Urine, Clean Catch [[id number]]  (Abnormal) Collected: 07/25/24 0145    Specimen: Urine, Clean Catch Updated: 07/25/24 0244     RBC, UA None Seen /HPF      WBC, UA 3-5 /HPF      Bacteria, UA Trace /HPF      Squamous Epithelial Cells, UA 0-2 /HPF      Transitional Epithelial Cells, UA 0-2 /HPF      Hyaline Casts, UA None Seen /LPF      Fine Granular Casts, UA 0-2 /LPF      Methodology Manual Light Microscopy    Urinalysis With Microscopic If Indicated (No Culture) - Urine, Clean Catch [[id number]]  (Abnormal) Collected: 07/25/24 0145    Specimen: Urine, Clean Catch Updated: 07/25/24 0205     Color, UA Yellow     Appearance, UA Clear     pH, UA <=5.0     Specific Gravity, UA 1.010     Glucose, UA Negative     Ketones, UA Trace     Bilirubin, UA Negative     Blood, UA Negative     Protein,  UA Negative     Leuk Esterase, UA Small (1+)     Nitrite, UA Negative     Urobilinogen, UA 0.2 E.U./dL    Lactic Acid, Plasma [[id number]]  (Normal) Collected: 07/25/24 0002    Specimen: Blood Updated: 07/25/24 0032     Lactate 2.0 mmol/L     BNP [[id number]]  (Abnormal) Collected: 07/24/24 2208    Specimen: Blood Updated: 07/25/24 0012     proBNP 3,175.0 pg/mL     Narrative:      This assay is used as an aid in the diagnosis of individuals suspected of having heart failure. It can be used as an aid in the diagnosis of acute decompensated heart failure (ADHF) in patients presenting with signs and symptoms of ADHF to the emergency department (ED). In addition, NT-proBNP of <300 pg/mL indicates ADHF is not likely.    Age Range Result Interpretation  NT-proBNP Concentration (pg/mL:      <50             Positive            >450                   Gray                 300-450                    Negative             <300    50-75           Positive            >900                  Gray                300-900                  Negative            <300      >75             Positive            >1800                  Gray                300-1800                  Negative            <300    Comprehensive Metabolic Panel [[id number]]  (Abnormal) Collected: 07/24/24 2208    Specimen: Blood Updated: 07/24/24 2243     Glucose 124 mg/dL      BUN 35 mg/dL      Creatinine 1.66 mg/dL      Sodium 132 mmol/L      Potassium 4.2 mmol/L      Chloride 95 mmol/L      CO2 23.8 mmol/L      Calcium 9.0 mg/dL      Total Protein 7.5 g/dL      Albumin 4.0 g/dL      ALT (SGPT) <5 U/L      AST (SGOT) 24 U/L      Alkaline Phosphatase 65 U/L      Total Bilirubin 0.6 mg/dL      Globulin 3.5 gm/dL      A/G Ratio 1.1 g/dL      BUN/Creatinine Ratio 21.1     Anion Gap 13.2 mmol/L      eGFR 31.1 mL/min/1.73     Narrative:      GFR Normal >60  Chronic Kidney Disease <60  Kidney Failure <15    The GFR formula is only valid for adults with stable renal function  between ages 18 and 70.    Single High Sensitivity Troponin T [[id number]]  (Abnormal) Collected: 07/24/24 2208    Specimen: Blood Updated: 07/24/24 2236     HS Troponin T 33 ng/L     Narrative:      High Sensitive Troponin T Reference Range:  <14.0 ng/L- Negative Female for AMI  <22.0 ng/L- Negative Male for AMI  >=14 - Abnormal Female indicating possible myocardial injury.  >=22 - Abnormal Male indicating possible myocardial injury.   Clinicians would have to utilize clinical acumen, EKG, Troponin, and serial changes to determine if it is an Acute Myocardial Infarction or myocardial injury due to an underlying chronic condition.               Imaging Results (Most Recent)       Procedure Component Value Units Date/Time    XR Chest 1 View [[id number]] Collected: 07/27/24 0847     Updated: 07/27/24 0852    Narrative:      XR CHEST 1 VW    Date of Exam: 7/27/2024 8:25 AM EDT    Indication: Hypoxia, pneumonia    Comparison: 7/24/2024.    Findings:  The heart is enlarged. The patient's had a previous median sternotomy. There is a left-sided transvenous pacemaker in place. The pulmonary vascular markings are mildly increased indicating vascular congestion. There is no overt pulmonary edema. There is   no pneumothorax or pleural effusion. There are chronic age-related changes involving the bony thorax and thoracic aorta.      Impression:      Impression:  Cardiomegaly with mild pulmonary vascular congestion. There is no overt pulmonary edema.      Electronically Signed: Gerard Longoria MD    7/27/2024 8:49 AM EDT    Workstation ID: [id number]    XR Abdomen KUB [[id number]] Collected: 07/26/24 2208     Updated: 07/26/24 2213    Narrative:      XR ABDOMEN KUB    Date of Exam: 7/26/2024 9:33 PM EDT    Indication: Abdominal pain    Comparison: None available    Findings:  There is a left chest wall ICD with lead in expected position. There is cardiomegaly with central vascular congestion and likely early interstitial edema pattern.  There is no focal airspace consolidation or pleural effusion. There is no evidence of   pneumothorax. There are degenerative changes of the thoracic spine. There is a nonspecific nonobstructive bowel gas pattern. There is a moderate colonic stool burden most notable within the cecum and ascending colon. There is a partially calcified mass   within the pelvis likely representing a uterine fibroid.      Impression:      Impression:  1. Cardiomegaly with central vascular congestion and interstitial edema pattern.  2. Moderate colonic stool burden most notable within the cecum and ascending colon.  3. Nonobstructive small bowel gas pattern.      Electronically Signed: Dav Ruffin    7/26/2024 10:10 PM EDT    Workstation ID: [id number]    CT Head Without Contrast [[id number]] Collected: 07/26/24 1525     Updated: 07/26/24 1534    Narrative:      CT HEAD WO CONTRAST    Date of Exam: 7/26/2024 3:03 PM EDT    Indication: confusion.    Comparison: 7/25/2024    Technique: Axial CT images were obtained of the head without contrast administration.  Coronal reconstructions were performed.  Automated exposure control and iterative reconstruction methods were used.      Findings:  There is mild generalized parenchymal volume loss. There is no evidence of acute infarct or hemorrhage. No abnormal extra-axial fluid collections are seen. No mass effect or hydrocephalus.    Globes and orbits are within normal limits. Visualized paranasal sinuses and mastoid air cells are clear. No acute skull fracture.      Impression:      Impression:    1. No acute intracranial abnormality.      Electronically Signed: Yuriy Blackwell MD    7/26/2024 3:31 PM EDT    Workstation ID: [id number]    US Carotid Bilateral [[id number]] Collected: 07/26/24 1522     Updated: 07/26/24 1528    Narrative:      US CAROTID BILATERAL    Date of Exam: 7/26/2024 2:36 PM EDT    Indication: confusion.    Comparison: No comparisons available.    Technique: Grayscale,  color-flow, and spectral imaging was obtained of the bilateral carotid and vertebral arteries.      Findings:  There is a large amount of echogenic plaque of the left carotid bifurcation. Peak systolic velocity within the left internal carotid artery is 100 cm/s with peak end-diastolic velocity of 42 cm/s.    There is mild echogenic plaque within the proximal right internal carotid artery. Peak systolic velocity within the right internal carotid artery is 72 cm/s with the peak end-diastolic velocity of 30 cm/s.    Vertebral arteries are patent with antegrade flow bilaterally.    Doppler waveforms demonstrate an irregularly irregular heart rate which may be related to atrial fibrillation. Clinical correlation recommended.    Impression:      Impression:    1. No hemodynamically significant stenosis of the carotid arteries.  2. Patent vertebral arteries with antegrade flow bilaterally.  3. Irregular heart beat which may be secondary to atrial fibrillation. Clinical correlation recommended.        Electronically Signed: Yuriy Blackwell MD    7/26/2024 3:25 PM EDT    Workstation ID: [id number]    CT Head Without Contrast [[id number]] Collected: 07/25/24 0500     Updated: 07/25/24 0509    Narrative:      CT HEAD WO CONTRAST    Date of Exam: 7/25/2024 4:37 AM EDT    Indication: confusion after fall.  Subtherapeutic INR..    Comparison: CT head January 21, 2020    Technique: Axial CT images were obtained of the head without contrast administration.  Coronal reconstructions were performed.  Automated exposure control and iterative reconstruction methods were used.      Findings:  There is no evidence of hemorrhage. There is no mass effect or midline shift.    There is no extracerebral collection.    Ventricles are normal in size and configuration for patient's stated age.      Posterior fossa is within normal limits.    Calvarium and skull base appear intact.   Visualized sinuses show no air fluid levels. Visualized orbits are  unremarkable.      Impression:      Impression:  No acute intracranial abnormality.        Electronically Signed: Jamal Johnson MD    7/25/2024 5:06 AM EDT    Workstation ID: [id number]    CT Chest With Contrast Diagnostic [[id number]] Collected: 07/25/24 0116     Updated: 07/25/24 0123    Narrative:      CT CHEST W CONTRAST DIAGNOSTIC    Date of Exam: 7/25/2024 12:56 AM EDT    Indication: rule out PE.    Comparison: None available.    Technique: Axial CT images were obtained of the chest after the uneventful intravenous administration of iodinated contrast.  Sagittal and coronal reconstructions were performed.  Automated exposure control and iterative reconstruction methods were used.      Findings:    Pulmonary arteries: Adequate opacification of the pulmonary arteries. No evidence of acute pulmonary embolism.    Lungs and Pleura: There are small bilateral pleural effusions greater on the right than the left. There is mild bilateral basilar atelectasis and lingular atelectasis. There are few areas of groundglass attenuation and interlobular septal thickening   suggestive of mild edema. There is soft tissue in the right infrahilar area somewhat oval in shape measuring 1.8 x 6.1 cm best seen on axial image #23 of series 2. This is favored to represent reactive lymph node prominence. There are no suspicious   pulmonary nodules.    Mediastinum/[place]: No mediastinal or hilar lymphadenopathy.    Lymph nodes: No axillary or supraclavicular adenopathy.    Cardiovascular: The heart is enlarged. There are changes of aortic valve replacement, midline sternotomy and there is calcific atherosclerosis of the coronary arteries..       Upper Abdomen: The upper abdominal contents are unremarkable.          Bones and Soft Tissue: No suspicious osseous lesion.        Impression:      Impression:  1.No evidence of pulmonary embolism.  2.Small bilateral pleural effusions greater on the right than the left.  3.Mild pulmonary  edema.  4.Cardiomegaly.        Electronically Signed: Jamal Johnson MD    7/25/2024 1:20 AM EDT    Workstation ID: [id number]    XR Chest 1 View [[id number]] Collected: 07/24/24 2234     Updated: 07/24/24 2240    Narrative:      XR CHEST 1 VW    Date of Exam: 7/24/2024 10:13 PM EDT    Indication: lethargy    Comparison: 7/17/2023 and prior    Findings:  Lung volumes are low. Study limited by overlying support monitoring apparatus.    Patient is status post median sternotomy. Heart size is enlarged and the pulmonary vascularity is congested and indistinct. Diffuse increased groundglass and interstitial opacities noted with a perihilar and bibasilar predominance. These findings are   accentuated by low lung volumes. Left subclavian approach pacemaker is in place. Osseous structures demonstrate no acute abnormality      Impression:      Impression:  Cardiomegaly with pulmonary vascular congestion and increased perihilar and interstitial opacities suggesting congestive failure. Findings are accentuated by low lung volumes. Superimposed pneumonia is not excluded      Electronically Signed: Mariusz Fields MD    7/24/2024 10:37 PM EDT    Workstation ID: [id number]            PROCEDURES      Condition on Discharge:  Stable, Improved.     Physical Exam at Discharge  Vital Signs  Temp:  [97.4 °F (36.3 °C)-99.8 °F (37.7 °C)] 97.4 °F (36.3 °C)  Heart Rate:  [] 57  Resp:  [16-18] 18  BP: ()/(68-99) 111/68    Physical Exam  Please see today's progress note    Discharge Disposition  To [facility] for short-term rehab    Discharge Diet:      Dietary Orders (From admission, onward)       Start     Ordered    07/25/24 0908  Diet: Regular/House; Fluid Consistency: Thin (IDDSI 0)  Diet Effective Now        References:    Diet Order [place]   Question Answer Comment   Diets: Regular/House    Fluid Consistency: Thin (IDDSI 0)        07/25/24 0907                    Activity at Discharge:  As tolerated    Pre-discharge  education  None      Follow-up Appointments  Future Appointments   Date Time Provider Department [place]   8/7/2024  1:30 PM Juliette Arechiga, APRN [facility] OS LAGRN L[facility]   8/9/2024 10:30 AM [facility] US 1 BH [facility] US [facility]   9/4/2024 12:45 PM Adeel Mina III, MD [facility] CD LCGLA [facility]   3/3/2025  1:00 PM [facility] [place] DEVICE CHECK [facility] CD LCG40 None   3/3/2025  1:40 PM Mick Perez MD [facility] CD LCG40 None     Additional Instructions for the Follow-ups that You Need to Schedule       Discharge Follow-up with PCP   As directed       Currently Documented PCP:    Max Carreno MD    PCP Phone Number:    [phone]     Follow Up Details: within 1 week                Test Results Pending at Discharge  Pending Labs       Order Current Status    Blood Culture - Blood, Arm, Left Preliminary result    Blood Culture - Blood, Arm, Right Preliminary result    Urine Culture - Urine, Urine, Clean Catch Preliminary result            Discharge over 30 min (if over 30 minutes give explanation as to why it took greater than 30 minutes)  Secondary to:   Coordination of care/follow up  Medication reconciliation  D/W patient      At [facility], we believe that sharing information builds trust and better relationships. You are receiving this note because you recently visited [facility]. It is possible you will see health information before a provider has talked with you about it. This kind of information can be easy to misunderstand. To help you fully understand what it means for your health, we urge you to discuss this note with your provider.    Henrry Diaz DO  07/27/24  13:58 EDT

## 2024-07-27 NOTE — CASE MANAGEMENT/SOCIAL WORK
Continued Stay Note  KATERINA Celeste     Patient Name: Anna Gilbert  MRN: [id number]  Today's Date: 7/27/2024    Admit Date: 7/24/2024    Plan: DC to [facility]   Discharge Plan       Row Name 07/27/24 1106       Plan    Plan DC to [facility]    Plan Comments Met with pt, introduced self, role and discussed dc plans.  I let pt know the insurance approved her to go to [facility].  I called Loni at [phone] and she has a bed available for pt today.  I will need to call loni back after Dr. Marques sees pt.  I updated Dr. Diaz in [facility] re:pt can dc to facility today.  Will follow. Lisa Levy  14:02 EDT  MD plans to send pt to [facility] today.  I called Loni the liaison, [phone], she still has not heard from admissions regarding approval.  I told her I had the approval # if she needed it.  Authorization Number:: [id number]. She has a bed available for pt and will call me back.  I updated pts nurse.  # to call report is [phone].  [facility] added. Will follow. ThanksLisa    Final Discharge Disposition Code 03 - skilled nursing facility (SNF)    Final Note dc to SNF                   Discharge Codes    No documentation.                 Expected Discharge Date and Time       Expected Discharge Date Expected Discharge Time    Jul 27, 2024               Lisa Fitzgerald

## 2024-07-28 LAB — BACTERIA SPEC AEROBE CULT: NO GROWTH

## 2024-07-29 LAB — BACTERIA ISLT: NORMAL

## 2024-07-30 LAB
BACTERIA SPEC AEROBE CULT: NORMAL
BACTERIA SPEC AEROBE CULT: NORMAL

## 2024-08-07 ENCOUNTER — OFFICE VISIT (OUTPATIENT)
Dept: ORTHOPEDIC SURGERY | Facility: CLINIC | Age: 79
End: 2024-08-07
Payer: MEDICARE

## 2024-08-07 VITALS — HEIGHT: 62 IN | BODY MASS INDEX: 28.25 KG/M2 | WEIGHT: 153.5 LBS

## 2024-08-07 DIAGNOSIS — Z96.651 STATUS POST TOTAL RIGHT KNEE REPLACEMENT: Primary | ICD-10-CM

## 2024-08-07 PROCEDURE — 99024 POSTOP FOLLOW-UP VISIT: CPT | Performed by: NURSE PRACTITIONER

## 2024-08-07 RX ORDER — POTASSIUM CHLORIDE 1500 MG/1
TABLET, EXTENDED RELEASE ORAL
COMMUNITY
Start: 2024-07-31

## 2024-08-07 RX ORDER — HYDROCODONE BITARTRATE AND ACETAMINOPHEN 5; 325 MG/1; MG/1
1 TABLET ORAL EVERY 6 HOURS PRN
Qty: 25 TABLET | Refills: 0 | Status: SHIPPED | OUTPATIENT
Start: 2024-08-07

## 2024-08-07 RX ORDER — DOXYCYCLINE HYCLATE 100 MG/1
100 CAPSULE ORAL 2 TIMES DAILY
Qty: 20 CAPSULE | Refills: 0 | Status: SHIPPED | OUTPATIENT
Start: 2024-08-07

## 2024-08-07 NOTE — PROGRESS NOTES
CC: s/p right total knee arthroplasty, DOS 7/23/2024  Interval History: Anna Gilbert returns for 2 week postoperative visit.  She is doing well. Pain is controlled with pain medication and is  improving. She denies any wound problem, fevers, or chills. Patient is continuing to work with physical therapy inpatient short-term rehab will be discharged tomorrow, plan is for home health.  PT. Ambulating without assistive device swelling clinic. Continuing DVT prophylaxis with use of warfarin home dose.     Physical Examination:   Right knee was examined   Incision clean and dry   ROM 3-93,  4/5 strength   Stable to varus and valgus stress   Flex/extend ankle and toes   Positive sensation right foot   No calf pain, negative Homans sign bilaterally   [place] remain present distal tibia, drainage noted, mild erythema surrounding staples    Assessment/Plan:  Anna Gilbert is recovering from surgery as expected.  We will continue home health therapy for range of motion, strengthening, and gait normalization once released from facility tomorrow.  Discussed with patient and family if there is no home health set up we need to order ASAP as she needs continued therapy.  I will see her back in clinic in approximately 9 days to reevaluate the staples.  We will start doxycycline 1 tablet twice daily with food.  If she has issues with the antibiotic instructed to call we can change the antibiotic but I do prefer doxycycline.  She can resume her warfarin.  Upon arrival we will try a removal of the staples.  The staples were covered with antibiotic Xeroform gauze followed by 4 x 4 wrapped with Kerlix and Ace bandage.  She is to follow up in clinic in 4 weeks with xrays. Patient had all question answered today.  Discussed with patient to avoid immersing incision for 4 weeks total after surgery.     Medications:  New Medications Ordered This Visit   Medications    doxycycline (VIBRAMYCIN) 100 MG capsule     Sig: Take 1 capsule by  mouth 2 (Two) Times a Day.     Dispense:  20 capsule     Refill:  0    HYDROcodone-acetaminophen (NORCO) 5-325 MG per tablet     Sig: Take 1 tablet by mouth Every 6 (Six) Hours As Needed for Moderate Pain or Severe Pain.     Dispense:  25 tablet     Refill:  0       LUIS ARMANDO Pena

## 2024-08-08 ENCOUNTER — HOME HEALTH ADMISSION (OUTPATIENT)
Dept: HOME HEALTH SERVICES | Facility: HOME HEALTHCARE | Age: 79
End: 2024-08-08
Payer: MEDICARE

## 2024-08-13 ENCOUNTER — TELEPHONE (OUTPATIENT)
Dept: ORTHOPEDIC SURGERY | Facility: CLINIC | Age: 79
End: 2024-08-13

## 2024-08-13 NOTE — TELEPHONE ENCOUNTER
Caller: JIMMY    Relationship: [facility]    Best call back number: [phone]    What is the medical concern/diagnosis: SKIN TEAR TO RIGHT WRIST AND RIGHT LOWER LEG    What specialty or service is being requested: HOME HEALTH PT    Any additional details: JIMMY IS WANTING TO REQUEST AN ORDER FOR HOME HEALTH FOR A NURSE TO BE SENT TWICE A WEEK FOR 6 WEEKS.. PLEASE ADVISE..

## 2024-08-15 NOTE — PROGRESS NOTES
CC: s/p right total knee arthroplasty, DOS 7/23/2024   Wound along the distal tibia, right    Interval history: Anna Gilbert Presents to clinic today with family for evaluation of her right lower extremity.  She is continued with dressing changes at the medial aspect of the distal tibia.  She is currently on warfarin is experiencing bilateral lower extremity swelling again has participated in the lymphedema clinic in the past.  As far as her knee she continues to do quite well denies any issues with her knee more concerns regarding the pain that she is experiencing with every motion at the right lower extremity.  Denies any other concerns present.    Exam:  Right lower extremity examined  Incision from total knee arthroplasty clean dry and intact well-healed  Staples remain intact, small amount of blood along the laceration  No significant erythema, no significant swelling along the border of the abrasion  1+ dorsalis pedis pulse  Flex/extend all digits right foot    Assessment: Status post right total knee arthroplasty, right  Wound along the distal tibia    Plan:  1.  Discussed plan of care with patient.  Will proceed with referral to wound care for follow-up dressing, staples removed today she was in an increased amount of pain with a staple removal however staples were removed, minimal bloody drainage remains present along the laceration.  Xeroform gauze covered by 4 x 4 wrapped with Kerlix and Ace bandage applied.  We will also proceed with referral to wound care, also proceed with referral to physical therapy department for lymphedema treatment.  I have her see Dr. Azevedo 3 weeks x-ray images right knee at follow-up all questions answered.

## 2024-08-16 ENCOUNTER — OFFICE VISIT (OUTPATIENT)
Dept: ORTHOPEDIC SURGERY | Facility: CLINIC | Age: 79
End: 2024-08-16
Payer: MEDICARE

## 2024-08-16 VITALS — HEIGHT: 62 IN | BODY MASS INDEX: 28.16 KG/M2 | WEIGHT: 153 LBS

## 2024-08-16 DIAGNOSIS — S80.811A ABRASION, RIGHT LOWER LEG, INITIAL ENCOUNTER: Primary | ICD-10-CM

## 2024-08-16 DIAGNOSIS — R59.1 LYMPHADENOPATHY: ICD-10-CM

## 2024-08-16 PROCEDURE — 1160F RVW MEDS BY RX/DR IN RCRD: CPT | Performed by: NURSE PRACTITIONER

## 2024-08-16 PROCEDURE — 1159F MED LIST DOCD IN RCRD: CPT | Performed by: NURSE PRACTITIONER

## 2024-08-16 PROCEDURE — 99024 POSTOP FOLLOW-UP VISIT: CPT | Performed by: NURSE PRACTITIONER

## 2024-08-20 DIAGNOSIS — R59.1 LYMPHADENOPATHY: Primary | ICD-10-CM

## 2024-08-26 ENCOUNTER — LAB REQUISITION (OUTPATIENT)
Dept: LAB | Facility: HOSPITAL | Age: 79
End: 2024-08-26
Payer: MEDICARE

## 2024-08-26 ENCOUNTER — APPOINTMENT (OUTPATIENT)
Dept: WOUND CARE | Facility: HOSPITAL | Age: 79
End: 2024-08-26
Payer: MEDICARE

## 2024-08-26 DIAGNOSIS — S81.811A LACERATION WITHOUT FOREIGN BODY, RIGHT LOWER LEG, INITIAL ENCOUNTER: ICD-10-CM

## 2024-08-26 PROCEDURE — 87186 SC STD MICRODIL/AGAR DIL: CPT | Performed by: NURSE PRACTITIONER

## 2024-08-26 PROCEDURE — 87205 SMEAR GRAM STAIN: CPT | Performed by: NURSE PRACTITIONER

## 2024-08-26 PROCEDURE — 87077 CULTURE AEROBIC IDENTIFY: CPT | Performed by: NURSE PRACTITIONER

## 2024-08-26 PROCEDURE — 87070 CULTURE OTHR SPECIMN AEROBIC: CPT | Performed by: NURSE PRACTITIONER

## 2024-08-26 PROCEDURE — G0463 HOSPITAL OUTPT CLINIC VISIT: HCPCS

## 2024-08-29 LAB
BACTERIA SPEC AEROBE CULT: ABNORMAL
GRAM STN SPEC: ABNORMAL
GRAM STN SPEC: ABNORMAL

## 2024-09-11 ENCOUNTER — OFFICE VISIT (OUTPATIENT)
Dept: ORTHOPEDIC SURGERY | Facility: CLINIC | Age: 79
End: 2024-09-11
Payer: MEDICARE

## 2024-09-11 VITALS — HEIGHT: 62 IN | WEIGHT: 153 LBS | BODY MASS INDEX: 28.16 KG/M2

## 2024-09-11 DIAGNOSIS — Z96.651 STATUS POST TOTAL RIGHT KNEE REPLACEMENT: Primary | ICD-10-CM

## 2024-09-11 PROCEDURE — 1160F RVW MEDS BY RX/DR IN RCRD: CPT | Performed by: ORTHOPAEDIC SURGERY

## 2024-09-11 PROCEDURE — 1159F MED LIST DOCD IN RCRD: CPT | Performed by: ORTHOPAEDIC SURGERY

## 2024-09-11 PROCEDURE — 99024 POSTOP FOLLOW-UP VISIT: CPT | Performed by: ORTHOPAEDIC SURGERY

## 2024-09-11 RX ORDER — NITROFURANTOIN 25; 75 MG/1; MG/1
100 CAPSULE ORAL
COMMUNITY
Start: 2024-09-09 | End: 2024-09-16

## 2024-09-11 NOTE — PROGRESS NOTES
CC: s/p right total knee arthroplasty, DOS 7/23/2024  Interval History: Anna Gilbert returns for 6 week postoperative visit.  She is doing well other than pain she had from a skin tear over her right medial distal leg.  She is receiving wound care with home health at this time.  She is complaining of a fair amount of burning type pain from the site. Pain from the knee is minimal and is controlled with pain medication and is improving. She denies any wound problem, fevers, or chills. Patient is continuing to work with home health PT. Ambulating with cane.     Physical Examination: Right knee was examined   Incision well healed   ROM 0-120,  4/5 strength   Stable to varus and valgus stress   Flex/extend ankle and toes   Positive sensation right foot   No calf pain, negative Homans sign bilaterally   Right medial leg wound dressed.  Recent pictures reviewed on patient's phone showing good progression of healing    Radiographic review: Radiographs of the right knee 3 views, AP, lateral and patella axial views, compared to immediate postop films, indication s/p TKA,  shows that the implant is in good position. There is no evidence of implant loosening or osteolysis, no dislocation or periprosthetic fractures. Overall alignment acceptable at this time.     Assessment/Plan:  Anna Gilbert is recovering from surgery-her knee is doing very well this point in time, she is continue to have difficulties from her right medial leg wound from skin dehiscence with dressing application.  I offered her referral to pain management to try to help control neuropathic pain that she cannot take Neurontin and she does not want to take narcotics.  She has declined that at this time  We will continue home health therapy for range of motion, strengthening, wound care and gait normalization.   She is to follow up in clinic in 6 weeks with no xrays. Patient had all question answered today. Discussed need for prophylactic antibiotics with  dental/endoscopy procedures.     Medications:  No orders of the defined types were placed in this encounter.      Danny Azevedo MD

## 2024-09-13 ENCOUNTER — TELEPHONE (OUTPATIENT)
Dept: ORTHOPEDIC SURGERY | Facility: CLINIC | Age: 79
End: 2024-09-13
Payer: MEDICARE

## 2024-09-13 NOTE — TELEPHONE ENCOUNTER
Status post total right knee replacement 7/23/2024  Last seen 9/11/2024    Anup PT with [facility] is asking for 2 visits next week, one for the PTA and one for him following that to discharge her from PT. He said ROM is at 120 and she is getting out and driving now so it's not really a homebound situation. Patient will still be seen by skilled nursing for wound care.

## 2024-09-16 ENCOUNTER — HOSPITAL ENCOUNTER (OUTPATIENT)
Dept: NUCLEAR MEDICINE | Facility: HOSPITAL | Age: 79
Discharge: HOME | End: 2024-09-16
Payer: MEDICARE

## 2024-09-16 ENCOUNTER — HOSPITAL ENCOUNTER (OUTPATIENT)
Dept: ULTRASOUND IMAGING | Facility: HOSPITAL | Age: 79
Discharge: HOME | End: 2024-09-16
Admitting: INTERNAL MEDICINE
Payer: MEDICARE

## 2024-09-16 DIAGNOSIS — R11.0 NAUSEA: ICD-10-CM

## 2024-09-16 PROCEDURE — 76705 ECHO EXAM OF ABDOMEN: CPT

## 2024-09-17 ENCOUNTER — APPOINTMENT (OUTPATIENT)
Dept: WOUND CARE | Facility: HOSPITAL | Age: 79
End: 2024-09-17
Payer: MEDICARE

## 2024-10-01 ENCOUNTER — APPOINTMENT (OUTPATIENT)
Dept: WOUND CARE | Facility: HOSPITAL | Age: 79
End: 2024-10-01
Payer: MEDICARE

## 2024-10-01 ENCOUNTER — TELEPHONE (OUTPATIENT)
Dept: CARDIOLOGY | Facility: CLINIC | Age: 79
End: 2024-10-01
Payer: MEDICARE

## 2024-10-01 NOTE — TELEPHONE ENCOUNTER
Pt was last seen by Dr. Mina on 5/29/24    I have placed form in your in box for review and signature

## 2024-10-04 ENCOUNTER — TELEPHONE (OUTPATIENT)
Dept: CARDIOLOGY | Facility: CLINIC | Age: 79
End: 2024-10-04
Payer: MEDICARE

## 2024-10-04 NOTE — TELEPHONE ENCOUNTER
Patient called and left a  stating she is having gallbladder surgery Tuesday and she doesn't think her water pill is working anymore. She has noticed that she has gained 10 lbs in the past week.    Would like a call back

## 2024-10-04 NOTE — TELEPHONE ENCOUNTER
JOHANA pt/ LOV 5/29/24 hx of AVR, MV repair, ICD, HF device monitoring, afib, lymphedema.      Patient is calling to report 10 lb weight gain in 1 week.  She has lost 40 lbs since February due to poor appetite and vomiting from GI issues.  She reports recently that has been better and she has been able to eat for the past week.  She has lymphedema and reports that is at baseline.  She reports increased SOA while lying flat this week.  She has not checked her BP/HR recently.  She states she eats a healthy diet that is low in sodium.  She takes 40 mg of torsemide daily.  This past week, she has taken 4 extra doses or torsemide, but still gained 10 lbs.  She feels like the 10 lbs weight gain is due to being able to eat more now.    She had some issues in July and APRN advised that she follow up in the office but she cancelled her appts.  She is having her gallbladder removed on Tuesday.     I advised that I think she needs to come in for an appt since she has taken 4 extra doses of torsemide and not lost any weight, and having orthopnea.  I scheduled her to see NM at  Monday.  I am also going to send HF clinic a message.  Appears she has a device that monitors fluid status, maybe we can check that?    Lyndsey Knutson RN  [facility] Triage  10/04/24 14:40 EDT

## 2024-10-07 ENCOUNTER — OFFICE VISIT (OUTPATIENT)
Dept: CARDIOLOGY | Facility: CLINIC | Age: 79
End: 2024-10-07
Payer: MEDICARE

## 2024-10-07 VITALS
DIASTOLIC BLOOD PRESSURE: 74 MMHG | OXYGEN SATURATION: 99 % | HEART RATE: 46 BPM | BODY MASS INDEX: 46.41 KG/M2 | SYSTOLIC BLOOD PRESSURE: 136 MMHG | WEIGHT: 252.2 LBS | HEIGHT: 62 IN

## 2024-10-07 DIAGNOSIS — G47.33 OSA (OBSTRUCTIVE SLEEP APNEA): ICD-10-CM

## 2024-10-07 DIAGNOSIS — I47.29 NSVT (NONSUSTAINED VENTRICULAR TACHYCARDIA): ICD-10-CM

## 2024-10-07 DIAGNOSIS — Z95.810 ICD (IMPLANTABLE CARDIOVERTER-DEFIBRILLATOR) IN PLACE: ICD-10-CM

## 2024-10-07 DIAGNOSIS — I48.21 PERMANENT ATRIAL FIBRILLATION: ICD-10-CM

## 2024-10-07 DIAGNOSIS — Z98.890 H/O MITRAL VALVE REPAIR: ICD-10-CM

## 2024-10-07 DIAGNOSIS — E78.2 MIXED HYPERLIPIDEMIA: ICD-10-CM

## 2024-10-07 DIAGNOSIS — Z01.818 PREOPERATIVE CLEARANCE: Primary | ICD-10-CM

## 2024-10-07 PROCEDURE — 1160F RVW MEDS BY RX/DR IN RCRD: CPT | Performed by: PHYSICIAN ASSISTANT

## 2024-10-07 PROCEDURE — 99214 OFFICE O/P EST MOD 30 MIN: CPT | Performed by: PHYSICIAN ASSISTANT

## 2024-10-07 PROCEDURE — 1159F MED LIST DOCD IN RCRD: CPT | Performed by: PHYSICIAN ASSISTANT

## 2024-10-07 PROCEDURE — 93000 ELECTROCARDIOGRAM COMPLETE: CPT | Performed by: PHYSICIAN ASSISTANT

## 2024-10-07 NOTE — PROGRESS NOTES
CARDIOLOGY        Patient Name: Anna Gilbert  :1945  Age: 79 y.o.  Primary Cardiologist: Adeel Mina MD  Encounter Provider:  Demond Ng PA-C    Date of Service: 10/07/24          CHIEF COMPLAINT / REASON FOR OFFICE VISIT     Cardiac clearance      HISTORY OF PRESENT ILLNESS       HPI  Anna Gilbert is a 79 y.o. female who presents today for cardiac clearance.     Pt has a  history significant for chronic atrial fibrillation, non-sustained V. tach, bioprosthetic atrial valve and mitral valve repair, and AICD placement presents for follow-up and surgical clearance.    Patient has been doing well since her last office visit.  She is scheduled tomorrow to have her gallbladder removed.  Patient has had issues since February of vomiting and not eating patient lost 7 pound weight due to this.  Patient has not had any chest pain or chest pressure.  No palpitations, worsening shortness of breath, worsening edema to her legs, or worsening orthopnea.  Patient has been holding her warfarin in preparation for her surgery tomorrow.  Patient has noticed that she has gained 10 pounds recently and has been increasing her torsemide to 40 mg in the morning and 20 at night.  Patient does drink V8 juice 2-3 times a week      Brief summary of patient's pertinent cardiac history  She does have a history of GERD, but she is did not feel like GERD and when she took antiacids it did not affect it at all. She is also been having worsening shortness of breath with activity. No recent illnesses, being compliant with her medical therapy, she was switched to NOAC by the medication management clinic earlier this year, was felt to have improved risk-benefit profile on the NOAC compared to warfarin.     2020 when she was hospitalized for ventricular tachycardia.  She was seen by Dr. Perez.  Her AICD was replaced at that time.  She also had a cardiac catheterization at that time:  Results for orders placed  "during the hospital encounter of 11/29/21     Cardiac Catheterization/Vascular Study     Narrative  Interventionalist: Nigel Egan M.D., F.A.C.C.     Procedure findings:  Left main: Large caliber vessel that bifurcates to an LAD and circumflex.  The left main coronary artery is normal.  LAD: Medium caliber vessel that gives rise to a small caliber D1 and medium caliber D2 branch.  The LAD has mild calcification of the proximal segment but no stenosis.  The mid LAD has a tubular 50% mid vessel stenosis.  Small caliber vessel from the mid to distal LAD.  LCX: Large caliber vessel gives rise to a medium caliber OM1 and small caliber OM 2 branch.  Normal.  RCA: Large caliber, dominant vessel gives rise to a medium caliber PDA and large caliber, long RPL branch.  The RCA has luminal irregularities in the distal segment.  RPL has a discrete 20% to 30% mid vessel stenosis     Hemodynamics:  LV:145/17  AO: 145/60     Interventional report  6 [place] XB 3.0 guide catheter was used to engage the left main.  The Aeris pressure wire was advanced the left main and equalization was performed.  Following this the pressure wire was advanced past the mid LAD stenosis and seated.  RFR was then performed.  Minimum RFR measurement 0.94.  Not hemodynamically significant.     Conclusions:  1. Left main: Normal  2. LAD: Mid LAD with tubular 50% stenosis.  Small caliber from the mid to distal segment.  3. LCX:Normal  4. RCA: Dominant.  Discrete 20 to 30% mid vessel RPL stenosis.  5.  RFR of the LAD 0.94. Not hemodynamically significant      The following portions of the patient's history were reviewed and updated as appropriate: allergies, current medications, past family history, past medical history, past social history, past surgical history and problem list.      VITAL SIGNS     Visit Vitals  /74   Pulse (!) 46   Ht 157.5 cm (62\")   Wt 114 kg (252 lb 3.2 oz)   SpO2 99%   BMI 46.13 kg/m²       @[email or web address]  Wt " Readings from Last 3 Encounters:   10/07/24 114 kg (252 lb 3.2 oz)   09/11/24 69.4 kg (153 lb)   08/16/24 69.4 kg (153 lb)     Body mass index is 46.13 kg/m².        PHYSICAL EXAMINATION     Physical Exam      REVIEWED DATA       ECG 12 Lead    Date/Time: 10/7/2024 1:55 PM  Performed by: Demond Ng PA-C    Authorized by: Demond Ng PA-C  Comparison: compared with previous ECG   Similar to previous ECG  Rhythm: atrial fibrillation  Ectopy: infrequent PVCs  BPM: 54    Clinical impression: abnormal EKG  Comments: Similar to previous EKG          Cardiac Procedures:    Device interrogation on 8/13/2024  Device Specialist Notes: OptiVol Fluid Index remains high. Thoracic impedance remains below baseline but has slightly improved. Patient activity has declined since Right Total Knee Replacement on 07/23/2024. Night Heart Rate is ~80 bpm. Heart Rate Variability trends are normal to patient. Patient diagnosed with Lymphedema attributed to COVID. Cellulitis Dx end of April. (Please see Dr. Adeel Mina note on 05/29/2024) We will continue to monitor monthly.     Duplex bilaterally on 7/26/2024  1. No hemodynamically significant stenosis of the carotid arteries.  2. Patent vertebral arteries with antegrade flow bilaterally.  3. Irregular heart beat which may be secondary to atrial fibrillation. Clinical correlation recommended.     Transthoracic echo on 12/13/2023  Interpretation Summary      Left ventricular ejection fraction appears to be 51 - 55%.    Left ventricular wall thickness is consistent with moderate concentric hypertrophy.    Left ventricular diastolic function was indeterminate.    The left atrial cavity is severely dilated.    Left atrial volume is severely increased.    The right atrial cavity is severely  dilated.    Aortic valve area is 1.9 cm2.    Aortic valve maximum pressure gradient is 29 mmHg. Aortic valve mean pressure gradient is 16 mmHg.    There is a bioprosthetic aortic valve  present.    There is a mitral valve ring present.    Moderate tricuspid valve regurgitation is present.    Calculated right ventricular systolic pressure from tricuspid regurgitation is 39 mmHg.     Stress test on 12/12/2023  Interpretation Summary       Findings consistent with a normal ECG stress test. There were few ventricular singlets and a couplets noted during stress testing.  No ventricular runs present.    Left ventricular ejection fraction is normal (Calculated EF = 54%).    Myocardial perfusion imaging indicates a small-sized, mildly severe area of ischemia located in the apical anteroseptal wall.  There is Breast attenuation artifact  present which could make it falsely positive.    Impressions are consistent with an intermediate risk study.    There is no prior study available for comparison.         Lab Results   Component Value Date     (L) 07/26/2024     (L) 07/25/2024    K 3.9 07/26/2024    K 4.8 07/25/2024    CL 96 (L) 07/26/2024     07/25/2024    CO2 24.0 07/26/2024    CO2 20.3 (L) 07/25/2024    BUN 35 (H) 07/26/2024    BUN 35 (H) 07/25/2024    CREATININE 1.18 (H) 07/26/2024    CREATININE 1.53 (H) 07/25/2024    EGFRIFNONA 56 (L) 12/03/2021    EGFRIFNONA 80 12/01/2021    EGFRIFAFRI >60 03/14/2023    EGFRIFAFRI >60 09/07/2022    GLUCOSE 93 07/26/2024    GLUCOSE 121 (H) 07/25/2024    CALCIUM 9.0 07/26/2024    CALCIUM 8.8 07/25/2024    ALBUMIN 3.6 07/25/2024    ALBUMIN 4.0 07/24/2024    BILITOT 0.6 07/24/2024    BILITOT 0.5 03/14/2023    AST 24 07/24/2024    AST 32 03/14/2023    ALT <5 07/24/2024    ALT 8 (L) 03/14/2023     Lab Results   Component Value Date    WBC 13.60 (H) 07/26/2024    WBC 17.38 (H) 07/25/2024    HGB 12.1 07/26/2024    HGB 12.9 07/25/2024    HCT 37.3 07/26/2024    HCT 40.3 07/25/2024    MCV 87.6 07/26/2024    MCV 90.8 07/25/2024     07/26/2024     07/25/2024     Lab Results   Component Value Date    PROBNP 3,175.0 (H) 07/24/2024    PROBNP 1,490.0  04/28/2022     Lab Results   Component Value Date    TROPONINT 34 (H) 07/25/2024     Lab Results   Component Value Date    TSH 1.730 07/24/2024    TSH 0.609 11/29/2021             ASSESSMENT & PLAN     Diagnoses and all orders for this visit:    1. Preoperative clearance (Primary)   Preop surgical clearance letter already faxed in by Dr. Mina.  Patient scheduled for laparoscopic cholecystectomy on 10/8/2024.  She is to hold her warfarin for 4 days prior to surgery.  She will need to resume it as deemed reasonable by her surgeon  postprocedure.    2. Atrial Fibrillation and Atrial Flutter  Assessment   The patient has permanent atrial fibrillation   This is valvular in etiology   The patient's CHADS2-VASc score is 4   A [id number] score of 2 or more is considered a high risk for a thromboembolic event   Warfarin prescribed     Plan   Continue in atrial fibrillation with rate control   Continue warfarin for antithrombotic therapy, bleeding issues discussed   Continue beta blocker for rate control   Rate controlled, continue current medical therapy    Continues to follow with Dr. Perez in the device clinic,     3.  Bioprosthetic aortic valve replacement with prior mitral valve repair-echocardiogram December 2023 reviewed above  4.  VT, status post ICD shock-  5.  Mediastinal adenopathy-status post biopsy,   6.  Chronic diastolic congestive heart failure, has been taking her torsemide 20 mg twice daily but is having some volume overload, I will switch that to 40 mg once daily.  7.  Chronic anticoagulation- was switched back to warfarin from the Eliquis because of nausea, falls in the medication management clinic.  8.  Hypertension, continue current medical therapy.  9.   Long COVID  10.  Lymphedema, following with vascular surgery     Patient to continue her dose of torsemide.  She is to cut back on her V8 juice to help her salt intake.  She will continue holding her warfarin.  She is to resume it as soon  as deemed appropriate by her surgeon.  She wants to follow-up in 6 months with Dr. Mina.      Return for Dr. Mina.    Future Appointments         Provider Department Center    10/15/2024 10:15 AM ROOM [id number] [facility] WOUND CARE [facility] OUTPATIENT WOUND CARE [facility]    10/23/2024 11:10 AM Danny Azevedo MD [facility] ORTHOPEDICS [facility]    3/3/2025 1:00 PM MGEAN RIZOG Ramsay DEVICE CHECK [facility] CARDIOLOGY     3/3/2025 1:40 PM Mick Perez MD [facility] CARDIOLOGY                 MEDICATIONS         Discharge Medications            Accurate as of October 7, 2024  1:21 PM. If you have any questions, ask your nurse or doctor.                Changes to Medications        Instructions Start Date   torsemide 20 MG tablet  Commonly known as: DEMADEX  What changed: when to take this   40 mg, Oral, Daily             Continue These Medications        Instructions Start Date   acetaminophen 325 MG tablet  Commonly known as: TYLENOL   650 mg, Oral, Every 6 Hours PRN      albuterol sulfate  (90 Base) MCG/ACT inhaler  Commonly known as: PROVENTIL HFA;VENTOLIN HFA;PROAIR HFA   2 puffs, Inhalation, As Needed      cyclobenzaprine 5 MG tablet  Commonly known as: FLEXERIL   5 mg, Oral, 3 Times Daily PRN      docusate sodium 100 MG capsule  Commonly known as: Colace   100 mg, Oral, 2 Times Daily PRN      doxycycline 100 MG capsule  Commonly known as: VIBRAMYCIN   100 mg, Oral, 2 Times Daily      HYDROcodone-acetaminophen 5-325 MG per tablet  Commonly known as: NORCO   1 tablet, Oral, Every 6 Hours PRN      Klor-Con M20 20 MEQ CR tablet  Generic drug: potassium chloride       levothyroxine 150 MCG tablet  Commonly known as: SYNTHROID, LEVOTHROID   1 tablet, Oral, Daily      metoprolol succinate XL 50 MG 24 hr tablet  Commonly known as: TOPROL-XL   TAKE ONE TABLET BY MOUTH DAILY      mineral oil enema   1 enema, Rectal, Daily PRN      ondansetron ODT 4 MG  disintegrating tablet  Commonly known as: ZOFRAN-ODT   4 mg, Translingual, Every 8 Hours PRN      pantoprazole 40 MG EC tablet  Commonly known as: PROTONIX   40 mg, Oral, Every Morning      polyethylene glycol 17 g packet  Commonly known as: MIRALAX   17 g, Oral, Daily      warfarin 5 MG tablet  Commonly known as: COUMADIN   5 mg, Oral, Nightly, TAKE AS DIRECTED                   **Dragon Disclaimer:   Much of this encounter note is an electronic transcription/translation of spoken language to printed text. The electronic translation of spoken language may permit erroneous, or at times, nonsensical words or phrases to be inadvertently transcribed. Although I have reviewed the note for such errors, some may still exist.

## 2024-10-14 ENCOUNTER — LAB (OUTPATIENT)
Dept: LAB | Facility: HOSPITAL | Age: 79
End: 2024-10-14
Payer: MEDICARE

## 2024-10-14 ENCOUNTER — OFFICE VISIT (OUTPATIENT)
Dept: ORTHOPEDIC SURGERY | Facility: CLINIC | Age: 79
End: 2024-10-14
Payer: MEDICARE

## 2024-10-14 VITALS — BODY MASS INDEX: 27.68 KG/M2 | WEIGHT: 150.4 LBS | HEIGHT: 62 IN

## 2024-10-14 DIAGNOSIS — M17.12 PRIMARY OSTEOARTHRITIS OF LEFT KNEE: ICD-10-CM

## 2024-10-14 DIAGNOSIS — R59.1 LYMPHADENOPATHY: ICD-10-CM

## 2024-10-14 DIAGNOSIS — M17.12 PRIMARY OSTEOARTHRITIS OF LEFT KNEE: Primary | ICD-10-CM

## 2024-10-14 LAB
CRP SERPL-MCNC: 3.19 MG/DL (ref 0–0.5)
ERYTHROCYTE [SEDIMENTATION RATE] IN BLOOD: 37 MM/HR (ref 0–30)

## 2024-10-14 PROCEDURE — 85652 RBC SED RATE AUTOMATED: CPT

## 2024-10-14 PROCEDURE — 36415 COLL VENOUS BLD VENIPUNCTURE: CPT

## 2024-10-14 PROCEDURE — 73562 X-RAY EXAM OF KNEE 3: CPT | Performed by: ORTHOPAEDIC SURGERY

## 2024-10-14 PROCEDURE — 86140 C-REACTIVE PROTEIN: CPT

## 2024-10-14 PROCEDURE — 99214 OFFICE O/P EST MOD 30 MIN: CPT | Performed by: ORTHOPAEDIC SURGERY

## 2024-10-14 NOTE — PROGRESS NOTES
Subjective:     Patient ID: Anna Gilbert is a 79 y.o. female.    Chief Complaint:  Left knee pain, new issue    History of Present Illness  History of Present Illness  The patient presents to the clinic today for evaluation of her left knee.    She has been experiencing issues with her left knee, which have recently worsened. The majority of her pain is located in the medial and anterior aspect of her knee, and it intensifies with prolonged walking, weightbearing, deep flexion activities, or rising from a seated position. She describes her pain as moderate to severe in intensity and achy in nature, without significant radiation from the knee itself. She reports no sensations of numbness or tingling. Due to the significant limitation caused by her pain and the failure of conservative treatments such as home exercise, physical therapy, intra-articular injections, and anti-inflammatory medications, she is considering a left total knee arthroplasty.    She has undergone a recent cholecystectomy and reports low-grade fevers, which are currently being investigated for potential residual infection. Her right leg wound has healed.    .     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    Drug use: No    Sexual activity: Defer      Past Medical History:   Diagnosis Date    Bradycardia     Chronic atrial fibrillation     Coronary artery disease     Essential (primary) hypertension     H/O mitral valve repair     Health care maintenance     Heart failure, unspecified     Hyperthyroidism     Hypothyroid     Localized edema     Lymphedema 06/02/2012    Mixed hyperlipidemia     NSVT (nonsustained ventricular tachycardia) 09/23/2020    SHEILA  "(obstructive sleep apnea) 07/20/2016    Pacemaker     PAF (paroxysmal atrial fibrillation)     Permanent atrial fibrillation     Sick sinus syndrome     Venous insufficiency (chronic) (peripheral)     Ventricular tachycardia     with ICD shock     Past Surgical History:   Procedure Laterality Date    AORTIC VALVE REPAIR/REPLACEMENT  2010    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    CARDIAC CATHETERIZATION  01/01/2011    CARDIAC CATHETERIZATION N/A 11/30/2021    Procedure: Left Heart Cath;  Surgeon: Nigel Egan MD;  Location: [facility] CATH INVASIVE LOCATION;  Service: Cardiovascular;  Laterality: N/A;    CARDIAC CATHETERIZATION N/A 11/30/2021    Procedure: Coronary angiography;  Surgeon: Nigel Egan MD;  Location: [facility] CATH INVASIVE LOCATION;  Service: Cardiovascular;  Laterality: N/A;    CARDIAC CATHETERIZATION N/A 11/30/2021    Procedure: Resting Full Cycle Ratio;  Surgeon: Nigel Egan MD;  Location: [facility] CATH INVASIVE LOCATION;  Service: Cardiovascular;  Laterality: N/A;    CARDIAC DEFIBRILLATOR PLACEMENT  2010    CARDIAC ELECTROPHYSIOLOGY PROCEDURE N/A 12/1/2021    Procedure: ICD DC generator change---Medtronic;  Surgeon: Mick Perez MD;  Location: [facility] INVASIVE LOCATION;  Service: Cardiology;  Laterality: N/A;    MITRAL VALVE REPAIR/REPLACEMENT  2010    TOTAL KNEE ARTHROPLASTY Right 7/23/2024    Procedure: TOTAL KNEE ARTHROPLASTY AND ALL ASSOCIATED PROCEDURES;  Surgeon: Danny Azevedo MD;  Location: [facility];  Service: Orthopedics;  Laterality: Right;       Family History   Problem Relation Age of Onset    Lung cancer Mother     Coronary artery disease Father     Heart attack Father     Stroke Other          Review of Systems        Objective:  Vitals:    10/14/24 1443   Weight: 68.2 kg (150 lb 6.4 oz)   Height: 157.5 cm (62\")         10/14/24  1443   Weight: 68.2 kg (150 lb 6.4 oz)     Body mass index is 27.51 kg/m².  Physical Exam    Vital signs reviewed.   General: No " acute distress, alert and oriented  Eyes: conjunctiva clear; pupils equally round and reactive  ENT: external ears and nose atraumatic; oropharynx clear  CV: no peripheral edema  Resp: normal respiratory effort  Skin: no rashes or wounds; normal turgor  Psych: mood and affect appropriate; recent and remote memory intact          Physical Exam  Left knee exhibits overall varus alignment. Active range of motion is 3 to 120 degrees, with 4 out of 5 strength on flexion and extension. Moderate effusion is present, with maximal tenderness to palpation at the medial joint line as well as medial and lateral patellar facet. Positive active patellar compression test is noted. Positive Romina's sign with pain along the medial joint line, no click. Stability to varus and valgus stress at 3 and 30 degrees is observed. No hip pain on logroll or Stinchfield exam. Positive sensation to light touch in all distributions, left foot, symmetric to the right, brisk cap refill to all digits, 1+ dorsalis pedis pulse.         Imaging:  Left Knee X-Ray  Indication: Pain    AP, Lateral, and [place] views    Findings:  Advanced tricompartmental osteoarthritis with bone-on-bone articulation medial compartment and overall varus alignment, mild reactive osteophyte formation most noted in the lateral compartment.  Overall varus alignment.  Moderate bony demineralization appreciated.  No comparison images available.     Assessment:        1. Primary osteoarthritis of left knee    2. Lymphadenopathy           Plan:          Assessment & Plan  1. Left knee pain.  She reports moderate to severe pain in the medial and anterior aspect of her left knee, exacerbated by prolonged walking, weightbearing, deep flexion activities, and getting up from a seated position. The pain is achy in nature without significant radiation. Physical examination reveals varus alignment, active range of motion from 3 to 120 degrees, 4 out of 5 strength on flexion and  extension, moderate effusion, maximal tenderness to palpation at the medial joint line, and positive Romina's sign with pain along the medial joint line. Imaging findings and failure of conservative treatments including home exercise, physical therapy, intra-articular injections, and anti-inflammatory medications indicate advanced end-stage arthritis. She has decided to proceed with left total knee arthroplasty.     I reviewed anatomy and a model of a total knee arthroplasty, as well as typical postoperative recovery of 6-12 months before maximal recovery, and possible need for rehabilitation stay after hospitalization. We also discussed risks, benefits, and alternatives of procedure with risks including but not limited to neurovascular damage, bleeding, infection, malalignment, chronic pian, failure of implants, osteolysis, loosening of implants, loss of motion, weakness, stiffness, instability, DVT, pulmonary embolus, death, stroke, complex regional pain syndrome, myocardial infarction, and need for additional procedures. Patient understood all these and had all questions answered before consenting to the procedure. No guarantees were given in regards to results from the surgery. We will have patient medically optimized by their primary care physician and plan on proceeding with surgery at next available date.     The use of the I assist device for her surgery and observation status postoperatively is planned.    She is on Coumadin for chronic atrial fibrillation. She reports no history of DVT, pulmonary embolus, or diabetes.    2. Low-grade fever.  She reports experiencing low-grade fevers and is undergoing further work-up for any residual infection post-cholecystectomy. A CRP and sed rate have been ordered to evaluate for any underlying low-grade infection.        Anna Gilbert was in agreement with plan and had all questions answered.     Orders:  Orders Placed This Encounter   Procedures    XR Knee 3+ View  With [place] Left    C-reactive Protein    Sedimentation Rate       Medications:  No orders of the defined types were placed in this encounter.      Followup:  No follow-ups on file.    Diagnoses and all orders for this visit:    1. Primary osteoarthritis of left knee (Primary)  -     XR Knee 3+ View With [place] Left  -     C-reactive Protein; Future  -     Sedimentation Rate; Future    2. Lymphadenopathy  -     C-reactive Protein; Future  -     Sedimentation Rate; Future                  Dictated utilizing Dragon dictation     Patient or patient representative verbalized consent for the use of Ambient Listening during the visit with  Danny Azevedo MD for chart documentation. 10/21/2024  15:03 EDT

## 2024-10-15 ENCOUNTER — TELEPHONE (OUTPATIENT)
Dept: ORTHOPEDIC SURGERY | Facility: CLINIC | Age: 79
End: 2024-10-15
Payer: MEDICARE

## 2024-10-15 ENCOUNTER — APPOINTMENT (OUTPATIENT)
Dept: WOUND CARE | Facility: HOSPITAL | Age: 79
End: 2024-10-15
Payer: MEDICARE

## 2024-10-15 NOTE — TELEPHONE ENCOUNTER
Patient is aware of results and to contact PCP.      Attempted to call patient, left voicemail to call office.      ----- Message from Danny Azevedo sent at 10/15/2024  6:04 AM EDT -----  Please call the patient regarding her lab results-her CRP and sed rate are elevated which is consistent with inflammation.  This could be due to underlying low-grade infection.  Recommend discussing with her primary care provider, may need referral to infectious disease.

## 2024-10-16 ENCOUNTER — TELEPHONE (OUTPATIENT)
Dept: ORTHOPEDIC SURGERY | Facility: CLINIC | Age: 79
End: 2024-10-16

## 2024-10-16 NOTE — TELEPHONE ENCOUNTER
Provider: ASHA    Caller: PATIENT    Phone Number: [phone]    Reason for Call: PATIENT IS ASKING IF SHE NEEDS TO KEEP HER APPT ON 10/23/24, SHE STATES DR. BARRY LOOKED AT HER RIGHT KNEE WHEN SHE WAS SEEN ON 10/14/24.

## 2024-10-21 RX ORDER — MELOXICAM 7.5 MG/1
15 TABLET ORAL ONCE
OUTPATIENT
Start: 2024-10-21 | End: 2024-10-21

## 2024-10-21 RX ORDER — PREGABALIN 150 MG/1
150 CAPSULE ORAL ONCE
OUTPATIENT
Start: 2024-10-21 | End: 2024-10-21

## 2024-10-21 RX ORDER — ACETAMINOPHEN 325 MG/1
1000 TABLET ORAL ONCE
OUTPATIENT
Start: 2024-10-21 | End: 2024-10-21

## 2024-10-24 ENCOUNTER — TELEPHONE (OUTPATIENT)
Dept: ORTHOPEDIC SURGERY | Facility: CLINIC | Age: 79
End: 2024-10-24
Payer: MEDICARE

## 2024-10-29 ENCOUNTER — APPOINTMENT (OUTPATIENT)
Dept: WOUND CARE | Facility: HOSPITAL | Age: 79
End: 2024-10-29
Payer: MEDICARE

## 2024-10-29 PROCEDURE — G0463 HOSPITAL OUTPT CLINIC VISIT: HCPCS

## 2024-10-31 DIAGNOSIS — E78.2 MIXED HYPERLIPIDEMIA: ICD-10-CM

## 2024-10-31 DIAGNOSIS — Z01.818 PREOPERATIVE CLEARANCE: ICD-10-CM

## 2024-10-31 DIAGNOSIS — I48.21 PERMANENT ATRIAL FIBRILLATION: ICD-10-CM

## 2024-10-31 DIAGNOSIS — Z98.890 H/O MITRAL VALVE REPAIR: Primary | ICD-10-CM

## 2024-10-31 DIAGNOSIS — G47.33 OSA (OBSTRUCTIVE SLEEP APNEA): ICD-10-CM

## 2024-10-31 RX ORDER — WARFARIN SODIUM 5 MG/1
5 TABLET ORAL
Qty: 180 TABLET | Refills: 3 | Status: SHIPPED | OUTPATIENT
Start: 2024-10-31

## 2024-11-04 ENCOUNTER — TELEPHONE (OUTPATIENT)
Dept: PHARMACY | Facility: HOSPITAL | Age: 79
End: 2024-11-04
Payer: MEDICARE

## 2024-11-04 ENCOUNTER — ANTICOAGULATION VISIT (OUTPATIENT)
Dept: PHARMACY | Facility: HOSPITAL | Age: 79
End: 2024-11-04
Payer: MEDICARE

## 2024-11-04 DIAGNOSIS — E78.2 MIXED HYPERLIPIDEMIA: Primary | ICD-10-CM

## 2024-11-04 DIAGNOSIS — Z01.818 PREOPERATIVE CLEARANCE: ICD-10-CM

## 2024-11-04 DIAGNOSIS — G47.33 OSA (OBSTRUCTIVE SLEEP APNEA): Chronic | ICD-10-CM

## 2024-11-04 DIAGNOSIS — Z98.890 H/O MITRAL VALVE REPAIR: Chronic | ICD-10-CM

## 2024-11-04 DIAGNOSIS — I48.21 PERMANENT ATRIAL FIBRILLATION: ICD-10-CM

## 2024-11-04 NOTE — PROGRESS NOTES
This visit is for documentation purposes only: Patient is managed by PCP (Max Carreno in [facility]). No longer following in the [facility] Management [facility]. It has been a pleasure being part of her care.

## 2024-11-14 PROCEDURE — 93297 REM INTERROG DEV EVAL ICPMS: CPT | Performed by: INTERNAL MEDICINE

## 2024-12-09 ENCOUNTER — TELEPHONE (OUTPATIENT)
Dept: CARDIOLOGY | Facility: CLINIC | Age: 79
End: 2024-12-09
Payer: MEDICARE

## 2024-12-09 RX ORDER — TORSEMIDE 20 MG/1
40 TABLET ORAL 2 TIMES DAILY
Qty: 360 TABLET | Refills: 3 | Status: SHIPPED | OUTPATIENT
Start: 2024-12-09 | End: 2024-12-10 | Stop reason: SDUPTHER

## 2024-12-09 NOTE — TELEPHONE ENCOUNTER
Dr. Mina/Demond,    Pt is out of her Torsemide. She is starting to get swelling. She asked if we can refill? She has no refills left.    Thank you,    Sophie Mayfield, RN  Triage [facility]  12/09/24 16:24 EST

## 2024-12-10 RX ORDER — TORSEMIDE 20 MG/1
40 TABLET ORAL 2 TIMES DAILY
Qty: 360 TABLET | Refills: 3 | Status: SHIPPED | OUTPATIENT
Start: 2024-12-10

## 2024-12-10 NOTE — TELEPHONE ENCOUNTER
PATIENT WENT TO THE PHARMACY AND THEY DO NOT HAVE IT.    THE PRESCRIPTION DOES NOT SAY PHARMACY RECEIPT CONFIRMED. PLEASE RE-SEND.

## 2024-12-10 NOTE — TELEPHONE ENCOUNTER
Notified pt. She verbalized understanding.    Thank you,    Sophie Mayfield, RN  Triage [facility] [place]  12/10/24 08:17 EST

## 2025-01-02 ENCOUNTER — TELEPHONE (OUTPATIENT)
Dept: ORTHOPEDIC SURGERY | Facility: CLINIC | Age: 80
End: 2025-01-02
Payer: MEDICARE

## 2025-01-02 DIAGNOSIS — R59.1 LYMPHADENOPATHY: Primary | ICD-10-CM

## 2025-01-02 NOTE — TELEPHONE ENCOUNTER
Provider: ASHA    Caller: Anna Gilbert    Relationship to Patient: Self    Phone Number: [phone]    Reason for Call: PT CALLING TO HAVE REFERRAL FOR LYMPHEDEMA CLINIC AT [facility] PUT IN. PT STATES SHE TRIED TO SCHEDULE APPT BUT NEEDS REFERRAL. PLEASE ADVISE.

## 2025-02-15 PROCEDURE — 93297 REM INTERROG DEV EVAL ICPMS: CPT | Performed by: INTERNAL MEDICINE

## 2025-02-27 ENCOUNTER — TELEPHONE (OUTPATIENT)
Dept: CARDIOLOGY | Age: 80
End: 2025-02-27

## 2025-02-27 NOTE — TELEPHONE ENCOUNTER
Caller: Anna Gilbert    Relationship: Self    Best call back number: [phone]      PATIENT IS NEEDING HER EKATERINA FOR MANDROLA MOVED TO A WEDNESDAY

## 2025-03-26 ENCOUNTER — HOSPITAL ENCOUNTER (OUTPATIENT)
Dept: OCCUPATIONAL THERAPY | Facility: HOSPITAL | Age: 80
Setting detail: THERAPIES SERIES
Discharge: HOME | End: 2025-03-26
Payer: MEDICARE

## 2025-03-26 DIAGNOSIS — I89.0 LYMPHEDEMA: Primary | ICD-10-CM

## 2025-03-26 PROCEDURE — 97165 OT EVAL LOW COMPLEX 30 MIN: CPT

## 2025-04-02 ENCOUNTER — OFFICE VISIT (OUTPATIENT)
Dept: CARDIOLOGY | Facility: CLINIC | Age: 80
End: 2025-04-02
Payer: MEDICARE

## 2025-04-02 VITALS
SYSTOLIC BLOOD PRESSURE: 122 MMHG | BODY MASS INDEX: 26.5 KG/M2 | DIASTOLIC BLOOD PRESSURE: 66 MMHG | WEIGHT: 144 LBS | HEART RATE: 63 BPM | HEIGHT: 62 IN | OXYGEN SATURATION: 98 %

## 2025-04-02 DIAGNOSIS — G47.33 OSA (OBSTRUCTIVE SLEEP APNEA): Chronic | ICD-10-CM

## 2025-04-02 DIAGNOSIS — I47.20 VENTRICULAR TACHYCARDIA: ICD-10-CM

## 2025-04-02 DIAGNOSIS — Z95.810 ICD (IMPLANTABLE CARDIOVERTER-DEFIBRILLATOR) IN PLACE: ICD-10-CM

## 2025-04-02 DIAGNOSIS — I48.21 PERMANENT ATRIAL FIBRILLATION: ICD-10-CM

## 2025-04-02 DIAGNOSIS — E78.2 MIXED HYPERLIPIDEMIA: ICD-10-CM

## 2025-04-02 DIAGNOSIS — I47.29 NSVT (NONSUSTAINED VENTRICULAR TACHYCARDIA): Chronic | ICD-10-CM

## 2025-04-02 DIAGNOSIS — Z95.2 AORTIC VALVE REPLACED: Primary | Chronic | ICD-10-CM

## 2025-04-02 DIAGNOSIS — Z98.890 H/O MITRAL VALVE REPAIR: Chronic | ICD-10-CM

## 2025-04-02 PROBLEM — I48.0 PAROXYSMAL ATRIAL FIBRILLATION: Status: RESOLVED | Noted: 2017-01-18 | Resolved: 2025-04-02

## 2025-04-02 NOTE — PROGRESS NOTES
Subjective:     Encounter Date:  04/02/25        Patient ID: Anna Gilbert is a 79 y.o. female.    Chief Complaint: CAF  History of Present Illness    Dear Dr. Isaac,    I had the pleasure of having a visit with this patient today. She has a history of chronic atrial fibrillation, nonsustained ventricular tachycardia, as well as bioprosthetic aortic valve and prior mitral valve repair. She also has an AICD in place.  She follows with EP for her chronic atrial fibrillation and nonsustained ventricular tachycardia.    She denies any chest pain, pressure, tightness, squeezing, or heartburn.  She has not experienced any feeling of palpitations, tachycardia or heart racing and no presyncope or syncope.  There has not been any problems with dizziness or lightheadedness.  There has not been any orthopnea or PND, and no problems with lower extremity edema.  She denies any shortness of breath at rest or with activity and has not had any wheezing.  She has continued to perform daily activities of living without any specific problem or change in the level of activity.    November 2020 when she was hospitalized for ventricular tachycardia.  She was seen by Dr. Perez.  Her AICD was replaced at that time.  She also had a cardiac catheterization at that time:  Results for orders placed during the hospital encounter of 11/29/21    Cardiac Catheterization/Vascular Study    Narrative  Interventionalist: Nigel Egan M.D., F.A.C.C.    Procedure findings:  Left main: Large caliber vessel that bifurcates to an LAD and circumflex.  The left main coronary artery is normal.  LAD: Medium caliber vessel that gives rise to a small caliber D1 and medium caliber D2 branch.  The LAD has mild calcification of the proximal segment but no stenosis.  The mid LAD has a tubular 50% mid vessel stenosis.  Small caliber vessel from the mid to distal LAD.  LCX: Large caliber vessel gives rise to a medium caliber OM1 and small caliber OM 2  branch.  Normal.  RCA: Large caliber, dominant vessel gives rise to a medium caliber PDA and large caliber, long RPL branch.  The RCA has luminal irregularities in the distal segment.  RPL has a discrete 20% to 30% mid vessel stenosis    Hemodynamics:  LV:145/17  AO: 145/60    Interventional report  6 Maori XB 3.0 guide catheter was used to engage the left main.  The Aeris pressure wire was advanced the left main and equalization was performed.  Following this the pressure wire was advanced past the mid LAD stenosis and seated.  RFR was then performed.  Minimum RFR measurement 0.94.  Not hemodynamically significant.    Conclusions:  1. Left main: Normal  2. LAD: Mid LAD with tubular 50% stenosis.  Small caliber from the mid to distal segment.  3. LCX:Normal  4. RCA: Dominant.  Discrete 20 to 30% mid vessel RPL stenosis.  5.  RFR of the LAD 0.94. Not hemodynamically significant    The following portions of the patient's history were reviewed and updated as appropriate: allergies, current medications, past family history, past medical history, past social history, past surgical history and problem list.    Past Medical History:   Diagnosis Date    Bradycardia     Chronic atrial fibrillation     Coronary artery disease     Essential (primary) hypertension     H/O mitral valve repair     Health care maintenance     Heart failure, unspecified     Hyperthyroidism     Hypothyroid     Localized edema     Lymphedema 06/02/2012    Mixed hyperlipidemia     NSVT (nonsustained ventricular tachycardia) 09/23/2020    SHEILA (obstructive sleep apnea) 07/20/2016    Pacemaker     PAF (paroxysmal atrial fibrillation)     Permanent atrial fibrillation     Sick sinus syndrome     Venous insufficiency (chronic) (peripheral)     Ventricular tachycardia     with ICD shock       Past Surgical History:   Procedure Laterality Date    AORTIC VALVE REPAIR/REPLACEMENT  2010    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    CARDIAC CATHETERIZATION  01/01/2011     "CARDIAC CATHETERIZATION N/A 11/30/2021    Procedure: Left Heart Cath;  Surgeon: Nigel Egan MD;  Location:  [facility] CATH INVASIVE LOCATION;  Service: Cardiovascular;  Laterality: N/A;    CARDIAC CATHETERIZATION N/A 11/30/2021    Procedure: Coronary angiography;  Surgeon: Nigel Egan MD;  [facility] CATH [facility]VASIVE LOC[facility];  Service: Cardiovascular;  Laterality: N/A;    CARDIAC CATHETERIZATION N/A 11/30/2021    Procedure: Resting Full Cycle Ratio;  Surgeon: Nigel Egan MD;  Location:  [facility] CATH INVASIVE LOCATION;  Service: Cardiovascular;  Laterality: N/A;    CARDIAC DEFIBRILLATOR PLACEMENT  2010    CARDIAC ELECTROPHYSIOLOGY PROCEDURE N/A 12/1/2021    Procedure: ICD DC generator change---Medtronic;  Surgeon: Mick Perez MD;  Location:  [facility] CATH INVASIVE LOCATION;  Service: Cardiology;  Laterality: N/A;    MITRAL VALVE REPAIR/REPLACEMENT  2010    TOTAL KNEE ARTHROPLASTY Right 7/23/2024    Procedure: TOTAL KNEE ARTHROPLASTY AND ALL ASSOCIATED PROCEDURES;  Surgeon: Danny Azevedo MD;  Location: [facility];  Service: Orthopedics;  Laterality: Right;           Procedures       Objective:     Vitals:    04/02/25 1428   BP: 122/66   BP Location: Left arm   Patient Position: Sitting   Cuff Size: Adult   Pulse: 63   SpO2: 98%   Weight: 65.3 kg (144 lb)   Height: 157.5 cm (62.01\")     General Appearance:    Alert, cooperative, in no acute distress   Head:    Normocephalic, without obvious abnormality   Eyes:            Lids and lashes normal, conjunctivae and sclerae normal, no icterus, no pallor, corneas clear   Ears:    Ears appear intact with no abnormalities noted   Throat:   No oral lesions, oral mucosa moist   Neck:   No adenopathy, supple, trachea midline, no thyromegaly, no carotid bruit, no JVD   Back:     No kyphosis present, no erythema or scars, no tenderness to palpation    Lungs:     Clear to auscultation,respirations regular, even and unlabored    Heart:    " irregular rhythm and normal rate, normal S1 and S2, no murmur, no gallop, no rub, no click   Chest Wall:    No abnormalities observed   Abdomen:     Normal bowel sounds, no masses, no organomegaly, soft        non-tender, non-distended, no guarding   Extremities:   Moves all extremities well, + edema, no cyanosis, no redness   Pulses:  Bilateral carotids brisk   Skin:  Psychiatric:   No bleeding or rash    Alert and oriented, normal mood and affect         Lab Review:             Results for orders placed during the hospital encounter of 12/12/23    Adult Transthoracic Echo Complete W/ Cont if Necessary Per Protocol    Interpretation Summary    Left ventricular ejection fraction appears to be 51 - 55%.    Left ventricular wall thickness is consistent with moderate concentric hypertrophy.    Left ventricular diastolic function was indeterminate.    The left atrial cavity is severely dilated.    Left atrial volume is severely increased.    The right atrial cavity is severely  dilated.    Aortic valve area is 1.9 cm2.    Aortic valve maximum pressure gradient is 29 mmHg. Aortic valve mean pressure gradient is 16 mmHg.    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    There is a mitral valve ring present.    Moderate tricuspid valve regurgitation is present.    Calculated right ventricular systolic pressure from tricuspid regurgitation is 39 mmHg.    CHADS-VASc Risk Assessment              3 Total Score    2 Age >/= 75    1 Sex: Female        Criteria that do not apply:    CHF    Hypertension    DM    PRIOR STROKE/TIA/THROMBO    Vascular Disease    Age 65-74                  Assessment:          Diagnosis Plan   1. Aortic valve replaced  Adult Transthoracic Echo Complete W/ Cont if Necessary Per Protocol      2. H/O mitral valve repair  Adult Transthoracic Echo Complete W/ Cont if Necessary Per Protocol      3. ICD (implantable cardioverter-defibrillator) in place  Adult Transthoracic Echo Complete W/ Cont if  Necessary Per Protocol      4. Mixed hyperlipidemia  Adult Transthoracic Echo Complete W/ Cont if Necessary Per Protocol      5. NSVT (nonsustained ventricular tachycardia)  Adult Transthoracic Echo Complete W/ Cont if Necessary Per Protocol      6. Permanent atrial fibrillation  Adult Transthoracic Echo Complete W/ Cont if Necessary Per Protocol      7. Ventricular tachycardia  Adult Transthoracic Echo Complete W/ Cont if Necessary Per Protocol      8. SHEILA (obstructive sleep apnea)  Adult Transthoracic Echo Complete W/ Cont if Necessary Per Protocol                   Plan:       1.   Atrial Fibrillation and Atrial Flutter  Assessment   The patient has permanent atrial fibrillation   This is valvular in etiology   The patient's CHADS2-VASc score is 4   A [id number] score of 2 or more is considered a high risk for a thromboembolic event   Warfarin prescribed    Plan   Continue in atrial fibrillation with rate control   Continue warfarin for antithrombotic therapy, bleeding issues discussed   Continue beta blocker for rate control   Rate controlled, continue current medical therapy  Continues to follow with Dr. Perez in the device clinic, to be seen next month  2.  Bioprosthetic aortic valve replacement with prior mitral valve repair-echocardiogram December 2023 reviewed above  3.  VT, status post ICD shock-  4.  Mediastinal adenopathy-status post biopsy,   5.  Chronic diastolic congestive heart failure euvolemic, continue current medical therapy.  6.  Chronic anticoagulation- was switched back to warfarin from the Eliquis because of nausea, follows in the medication management clinic.  7.  Hypertension, continue current medical therapy.  8.  Long COVID  9.  Lymphedema, following with vascular surgery      Current Outpatient Medications:     acetaminophen (TYLENOL) 325 MG tablet, Take 2 tablets by mouth Every 6 (Six) Hours As Needed for Mild Pain, Moderate Pain, Headache or Fever., Disp: , Rfl:     albuterol  sulfate  (90 Base) MCG/ACT inhaler, Inhale 2 puffs As Needed., Disp: , Rfl:     cyclobenzaprine (FLEXERIL) 5 MG tablet, Take 1 tablet by mouth 3 (Three) Times a Day As Needed., Disp: , Rfl:     docusate sodium (Colace) 100 MG capsule, Take 1 capsule by mouth 2 (Two) Times a Day As Needed for Constipation., Disp: 60 capsule, Rfl: 0    HYDROcodone-acetaminophen (NORCO) 5-325 MG per tablet, Take 1 tablet by mouth Every 6 (Six) Hours As Needed for Moderate Pain or Severe Pain., Disp: 25 tablet, Rfl: 0    Klor-Con M20 20 MEQ CR tablet, , Disp: , Rfl:     levothyroxine (SYNTHROID, LEVOTHROID) 150 MCG tablet, Take 1 tablet by mouth Daily., Disp: , Rfl:     metoprolol succinate XL (TOPROL-XL) 50 MG 24 hr tablet, TAKE ONE TABLET BY MOUTH DAILY (Patient taking differently: Take 1 tablet by mouth Daily.), Disp: 90 tablet, Rfl: 3    pantoprazole (PROTONIX) 40 MG EC tablet, TAKE ONE TABLET BY MOUTH EVERY MORNING, Disp: 90 tablet, Rfl: 2    torsemide (DEMADEX) 20 MG tablet, Take 2 tablets by mouth 2 (Two) Times a Day., Disp: 360 tablet, Rfl: 3    warfarin (COUMADIN) 5 MG tablet, Take 1 tablet by mouth Daily., Disp: 180 tablet, Rfl: 3    warfarin (COUMADIN) 5 MG tablet, Take 1 tablet by mouth Every Night. TAKE AS DIRECTED, Disp: , Rfl:

## 2025-04-16 ENCOUNTER — CLINICAL SUPPORT NO REQUIREMENTS (OUTPATIENT)
Age: 80
End: 2025-04-16
Payer: MEDICARE

## 2025-04-16 ENCOUNTER — OFFICE VISIT (OUTPATIENT)
Age: 80
End: 2025-04-16
Payer: MEDICARE

## 2025-04-16 VITALS
HEART RATE: 67 BPM | DIASTOLIC BLOOD PRESSURE: 70 MMHG | WEIGHT: 160 LBS | SYSTOLIC BLOOD PRESSURE: 118 MMHG | HEIGHT: 62 IN | BODY MASS INDEX: 29.44 KG/M2

## 2025-04-16 DIAGNOSIS — Z95.2 AORTIC VALVE REPLACED: Chronic | ICD-10-CM

## 2025-04-16 DIAGNOSIS — I47.20 VENTRICULAR TACHYCARDIA: Primary | ICD-10-CM

## 2025-04-16 DIAGNOSIS — Z95.810 ICD (IMPLANTABLE CARDIOVERTER-DEFIBRILLATOR) IN PLACE: ICD-10-CM

## 2025-04-16 DIAGNOSIS — I48.21 PERMANENT ATRIAL FIBRILLATION: ICD-10-CM

## 2025-04-16 PROCEDURE — 99214 OFFICE O/P EST MOD 30 MIN: CPT | Performed by: INTERNAL MEDICINE

## 2025-04-16 PROCEDURE — 93000 ELECTROCARDIOGRAM COMPLETE: CPT | Performed by: INTERNAL MEDICINE

## 2025-04-16 NOTE — PROGRESS NOTES
Date of Office Visit: 2025  Encounter Provider: Mick Perez MD  Place of Service: [facility] CARDIOLOGY  Patient Name: Anna Gilbert  : 1945    Subjective:     Encounter Date:2025      Patient ID: Anna Gilbert is a 79 y.o. female who has a cc of  perm AF and NSVT and ICD and bioprosthetic AV replacement. She sees Dr Mina    She has lymphedema. Some SOA and Cox and her optivol is elevated.     She had bronchitis recently and is recovering.     There have been no hospital admission since the last visit.     There have been no bleeding events.       Past Medical History:   Diagnosis Date    Bradycardia     Chronic atrial fibrillation     Coronary artery disease     Essential (primary) hypertension     H/O mitral valve repair     Health care maintenance     Heart failure, unspecified     Hyperthyroidism     Hypothyroid     Localized edema     Lymphedema 2012    Mixed hyperlipidemia     NSVT (nonsustained ventricular tachycardia) 2020    SHEILA (obstructive sleep apnea) 2016    Pacemaker     PAF (paroxysmal atrial fibrillation)     Permanent atrial fibrillation     Sick sinus syndrome     Venous insufficiency (chronic) (peripheral)     Ventricular tachycardia     with ICD shock       Social History     Socioeconomic History    Marital status: Single   Tobacco Use    Smoking status: Former     Passive exposure: Past    Smokeless tobacco: Never    Tobacco comments:     caffeine use   Vaping Use    Vaping status: Never Used   Substance and Sexual Activity    Alcohol use: Not Currently     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   HENT:  Negative for ear pain and stridor.    Eyes:  Negative for discharge  "and visual halos.   Cardiovascular:  Negative for cyanosis.   Respiratory:  Negative for hemoptysis and sputum production.    Hematologic/Lymphatic: Negative for adenopathy.   Skin:  Negative for nail changes and unusual hair distribution.   Musculoskeletal:  Positive for arthritis and joint pain. Negative for gout and joint swelling.   Gastrointestinal:  Negative for bowel incontinence and flatus.   Genitourinary:  Negative for dysuria and flank pain.   Neurological:  Positive for weakness. Negative for seizures and tremors.   Psychiatric/Behavioral:  Negative for altered mental status. The patient is not nervous/anxious.             Objective:     Vitals:    04/16/25 1238   BP: 118/70   BP Location: Right arm   Patient Position: Sitting   Pulse: 67   Weight: 72.6 kg (160 lb)   Height: 157.5 cm (62.01\")         Eyes:      General:         Right eye: No discharge.         Left eye: No discharge.   HENT:      Head: Normocephalic and atraumatic.   Neck:      Thyroid: No thyromegaly.      Vascular: No JVD.   Pulmonary:      Effort: Pulmonary effort is normal.      Breath sounds: Normal breath sounds. No rales.   Cardiovascular:      Normal rate. Regular rhythm.      No gallop.    Edema:     Peripheral edema present.  Abdominal:      General: Bowel sounds are normal.      Palpations: Abdomen is soft.      Tenderness: There is no abdominal tenderness.   Musculoskeletal: Normal range of motion.         General: No deformity. Skin:     General: Skin is warm and dry.      Findings: No erythema.   Neurological:      Mental Status: Alert and oriented to person, place, and time.      Motor: Normal muscle tone.   Psychiatric:         Behavior: Behavior normal.         Thought Content: Thought content normal.           ECG 12 Lead    Date/Time: 4/16/2025 2:16 PM  Performed by: Mick Perez MD    Authorized by: Mick Perez MD  Comparison: compared with previous ECG   Similar to previous ECG  Rhythm: atrial " fibrillation  Ectopy: unifocal PVCs          Lab Review:       Assessment:          Diagnosis Plan   1. Ventricular tachycardia        2. ICD (implantable cardioverter-defibrillator) in place        3. Aortic valve replaced        4. Permanent atrial fibrillation               Plan:     VT -- she did have some NSVT yesterday but it was asymptomatic.     I reviewed the pacemaker/ICD tracings and the pacing and sensing parameters are normal.  AF burden is 90% +    HFpEF -- her optivol is elevated. I will have her take 60 mg daily x 3 days.    I will message dr le.

## 2025-04-16 NOTE — Clinical Note
Her optivol is up and she has had some murry. I increased her torsemide for a few days. I wonder if she would benefit from MRA?

## 2025-04-22 ENCOUNTER — TELEPHONE (OUTPATIENT)
Dept: CARDIOLOGY | Age: 80
End: 2025-04-22
Payer: MEDICARE

## 2025-04-22 NOTE — TELEPHONE ENCOUNTER
"Pt called in to triage.  Pt states that when she saw Dr. Perez last week, he had her increase her torsemide to 60mg daily for 3 days and then go back to her normal 40mg daily (she increased on Thursday, Friday and Saturday).  She states that he did this because her weight was up 12lb.  She states that weight came down about 3.5lb (currently 156lb), and that she continues to be \"puffy\" in legs and abdomen.  She states that the hasn't used her compression devices for her lymphedema since last week.  Pt denies any SOA or other related symptoms at this time, and states that the extra fluid is related to her lymphedema and not her heart.    Pt asking if she should increase her torsemide to 60mg daily for another 3 days to try to get more of the fluid off?     Thanks so much,  Larissa Pitt, RN  Triage RN  04/22/25 13:58 EDT    "

## 2025-04-22 NOTE — TELEPHONE ENCOUNTER
Reviewed recommendations with patient, verbalized understanding, will call with any further questions or complaints.    Larissa Pitt RN  Triage Nurse  04/22/25 14:10 EDT

## 2025-05-05 ENCOUNTER — APPOINTMENT (OUTPATIENT)
Dept: WOUND CARE | Facility: HOSPITAL | Age: 80
End: 2025-05-05
Payer: MEDICARE

## 2025-05-05 ENCOUNTER — LAB REQUISITION (OUTPATIENT)
Dept: LAB | Facility: HOSPITAL | Age: 80
End: 2025-05-05
Payer: MEDICARE

## 2025-05-05 ENCOUNTER — TRANSCRIBE ORDERS (OUTPATIENT)
Dept: ADMINISTRATIVE | Facility: HOSPITAL | Age: 80
End: 2025-05-05
Payer: MEDICARE

## 2025-05-05 ENCOUNTER — HOSPITAL ENCOUNTER (OUTPATIENT)
Dept: GENERAL RADIOLOGY | Facility: HOSPITAL | Age: 80
Discharge: HOME | End: 2025-05-05
Payer: MEDICARE

## 2025-05-05 DIAGNOSIS — S91.312A: ICD-10-CM

## 2025-05-05 DIAGNOSIS — S91.312A LACERATION WITHOUT FOREIGN BODY, LEFT FOOT, INITIAL ENCOUNTER: ICD-10-CM

## 2025-05-05 DIAGNOSIS — L03.116 CELLULITIS OF LEFT LOWER LIMB: ICD-10-CM

## 2025-05-05 DIAGNOSIS — S96.922A: Primary | ICD-10-CM

## 2025-05-05 DIAGNOSIS — S96.922A: ICD-10-CM

## 2025-05-05 DIAGNOSIS — S91.312A: Primary | ICD-10-CM

## 2025-05-05 PROCEDURE — G0463 HOSPITAL OUTPT CLINIC VISIT: HCPCS

## 2025-05-05 PROCEDURE — 87205 SMEAR GRAM STAIN: CPT | Performed by: NURSE PRACTITIONER

## 2025-05-05 PROCEDURE — 73630 X-RAY EXAM OF FOOT: CPT

## 2025-05-05 PROCEDURE — 87186 SC STD MICRODIL/AGAR DIL: CPT | Performed by: NURSE PRACTITIONER

## 2025-05-05 PROCEDURE — 97602 WOUND(S) CARE NON-SELECTIVE: CPT

## 2025-05-05 PROCEDURE — 87077 CULTURE AEROBIC IDENTIFY: CPT | Performed by: NURSE PRACTITIONER

## 2025-05-05 PROCEDURE — 87070 CULTURE OTHR SPECIMN AEROBIC: CPT | Performed by: NURSE PRACTITIONER

## 2025-05-08 LAB
BACTERIA SPEC AEROBE CULT: ABNORMAL
BACTERIA SPEC AEROBE CULT: ABNORMAL
GRAM STN SPEC: ABNORMAL
GRAM STN SPEC: ABNORMAL

## 2025-05-09 RX ORDER — METOPROLOL SUCCINATE 50 MG/1
50 TABLET, EXTENDED RELEASE ORAL DAILY
Qty: 90 TABLET | Refills: 3 | Status: SHIPPED | OUTPATIENT
Start: 2025-05-09

## 2025-05-12 ENCOUNTER — APPOINTMENT (OUTPATIENT)
Dept: WOUND CARE | Facility: HOSPITAL | Age: 80
End: 2025-05-12
Payer: MEDICARE

## 2025-05-12 PROCEDURE — G0463 HOSPITAL OUTPT CLINIC VISIT: HCPCS

## 2025-05-15 ENCOUNTER — HOSPITAL ENCOUNTER (OUTPATIENT)
Dept: OCCUPATIONAL THERAPY | Facility: HOSPITAL | Age: 80
Setting detail: THERAPIES SERIES
Discharge: HOME | End: 2025-05-15
Payer: MEDICARE

## 2025-05-15 DIAGNOSIS — I89.0 LYMPHEDEMA: Primary | ICD-10-CM

## 2025-05-15 PROCEDURE — 97535 SELF CARE MNGMENT TRAINING: CPT

## 2025-05-16 NOTE — THERAPY TREATMENT NOTE
Outpatient Occupational Therapy Lymphedema Treatment Note   Thanh     Patient Name: Anna Gilbert  : 1945  MRN: [id number]  Today's Date: 2025      Visit Date: 05/15/2025    Patient Active Problem List   Diagnosis    Hyperlipidemia    SHIELA (obstructive sleep apnea)    Aortic valve replaced    H/O mitral valve repair    Flu vaccine need    Primary osteoarthritis of left knee    Tendinopathy of right rotator cuff    Angina pectoris    NSVT (nonsustained ventricular tachycardia)    Primary osteoarthritis of right knee    Ventricular tachycardia    Nausea & vomiting    Acute constipation    ICD (implantable cardioverter-defibrillator) in place    Permanent atrial fibrillation    Lymphedema    Long COVID    S/P total knee arthroplasty    Lymphadenopathy    Abrasion, right lower leg, initial encounter    Mixed hyperlipidemia    Preoperative clearance        Past Medical History:   Diagnosis Date    Bradycardia     Chronic atrial fibrillation     Coronary artery disease     Essential (primary) hypertension     H/O mitral valve repair     Health care maintenance     Heart failure, unspecified     Hyperthyroidism     Hypothyroid     Localized edema     Lymphedema 2012    Mixed hyperlipidemia     NSVT (nonsustained ventricular tachycardia) 2020    SHEILA (obstructive sleep apnea) 2016    Pacemaker     PAF (paroxysmal atrial fibrillation)     Permanent atrial fibrillation     Sick sinus syndrome     Venous insufficiency (chronic) (peripheral)     Ventricular tachycardia     with ICD shock        Past Surgical History:   Procedure Laterality Date    AORTIC VALVE REPAIR/REPLACEMENT      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    CARDIAC CATHETERIZATION  2011    CARDIAC CATHETERIZATION N/A 2021    Procedure: Left Heart Cath;  Surgeon: Nigel Egan MD;  Location: [facility] INVASIVE LOCATION;  Service: Cardiovascular;  Laterality: N/A;    CARDIAC CATHETERIZATION N/A 2021     Procedure: Coronary angiography;  Surgeon: Nigel Egan MD;  Location:  [facility] CATH INVASIVE LOCATION;  Service: Cardiovascular;  Laterality: N/A;    CARDIAC CATHETERIZATION N/A 11/30/2021    Procedure: Resting Full Cycle Ratio;  Surgeon: Nigel Egan MD;  Location:  [facility] INVASIVE LOCATION;  Service: Cardiovascular;  Laterality: N/A;    CARDIAC DEFIBRILLATOR PLACEMENT  2010    CARDIAC ELECTROPHYSIOLOGY PROCEDURE N/A 12/01/2021    Procedure: ICD DC generator change---[employer];  Surgeon: Mick Perez MD;  Location:  [facility] INVASIVE LOCATION;  Service: Cardiology;  Laterality: N/A;    MITRAL VALVE REPAIR/REPLACEMENT  2010    TOTAL KNEE ARTHROPLASTY Right 07/23/2024    Procedure: TOTAL KNEE ARTHROPLASTY AND ALL ASSOCIATED PROCEDURES;  Surgeon: Danny Azevedo MD;  Location:  [facility] OR;  Service: Orthopedics;  Laterality: Right;         Visit Dx:      ICD-10-CM ICD-9-CM   1. Lymphedema  I89.0 457.1        Lymphedema       Row Name 05/15/25 0930             Subjective Pain    Able to rate subjective pain? yes  -JJ      Subjective Pain Comment pt with co pain in L foot from recent wound and chronic L knee pain, did not rate  -JJ         Subjective    Subjective Comments pt states edema in L Ue has increased since L foot wound 2 dropping phone on foot in late April  -JJ         Lymphedema Assessment    Lymphedema Classification RLE:;LLE:;secondary  -JJ         Lymphedema Edema Assessment    Ptting Edema Category By severity  -JJ      Pitting Edema Moderate  -JJ         Compression/Skin Care    Compression/Skin Care Comments pt wearing tubigrip on L LE with bandaging on L foot wound from wound care center. pt is wearing knee high zip up compression garment that son purchased for her online while waiting for velcro compression garments to be shipped. pt fitted for L Le compression garments from foot to thigh and R lower leg. pt did not receive upper leg and knee piece in mail, company  states is on backorder. pt to wear tubigrip under  L UE garments for now until compressive undersocks are delivered to patient and foot wound is healed  -TONYA                User Key  (r) = Recorded By, (t) = Taken By, (c) = Cosigned By      Initials Name Provider Type    Meena Mendosa, OTLIDIA Occupational Therapist                                          Therapy Education  Given: Symptoms/condition management, Edema management, Bandaging/dressing change  Program: Reinforced  How Provided: Verbal, Demonstration  Provided to: Patient  Level of Understanding: Verbalized                Time Calculation:   OT Start Time: 0930  OT Stop Time: 1030  OT Time Calculation (min): 60 min     Therapy Charges for Today       Code Description Service Date Service Provider Modifiers Qty    [id number] HC OT SELF CARE/MGMT/TRAIN EA 15 MIN 5/15/2025 Meena Hay OTR GO 2                        JANI Amaya  5/16/2025

## 2025-05-19 ENCOUNTER — APPOINTMENT (OUTPATIENT)
Dept: WOUND CARE | Facility: HOSPITAL | Age: 80
End: 2025-05-19
Payer: MEDICARE

## 2025-05-23 ENCOUNTER — APPOINTMENT (OUTPATIENT)
Dept: WOUND CARE | Facility: HOSPITAL | Age: 80
End: 2025-05-23
Payer: MEDICARE

## 2025-05-30 ENCOUNTER — APPOINTMENT (OUTPATIENT)
Dept: WOUND CARE | Facility: HOSPITAL | Age: 80
End: 2025-05-30
Payer: MEDICARE

## 2025-06-02 ENCOUNTER — TRANSCRIBE ORDERS (OUTPATIENT)
Dept: ADMINISTRATIVE | Facility: HOSPITAL | Age: 80
End: 2025-06-02
Payer: MEDICARE

## 2025-06-02 ENCOUNTER — APPOINTMENT (OUTPATIENT)
Dept: WOUND CARE | Facility: HOSPITAL | Age: 80
End: 2025-06-02
Payer: MEDICARE

## 2025-06-02 DIAGNOSIS — R50.9 FEVER IN ADULT: Primary | ICD-10-CM

## 2025-06-02 DIAGNOSIS — S91.302A OPEN WOUND OF LEFT FOOT, INITIAL ENCOUNTER: ICD-10-CM

## 2025-06-09 ENCOUNTER — APPOINTMENT (OUTPATIENT)
Dept: WOUND CARE | Facility: HOSPITAL | Age: 80
End: 2025-06-09
Payer: MEDICARE

## 2025-06-13 ENCOUNTER — HOSPITAL ENCOUNTER (OUTPATIENT)
Dept: NUCLEAR MEDICINE | Facility: HOSPITAL | Age: 80
Discharge: HOME | End: 2025-06-13
Payer: MEDICARE

## 2025-06-13 DIAGNOSIS — S91.302A OPEN WOUND OF LEFT FOOT, INITIAL ENCOUNTER: ICD-10-CM

## 2025-06-13 DIAGNOSIS — R50.9 FEVER IN ADULT: ICD-10-CM

## 2025-06-13 PROCEDURE — A9503 TC99M MEDRONATE: HCPCS | Performed by: INTERNAL MEDICINE

## 2025-06-13 PROCEDURE — 34310000005 TECHNETIUM MEDRONATE KIT: Performed by: INTERNAL MEDICINE

## 2025-06-13 PROCEDURE — 78315 BONE IMAGING 3 PHASE: CPT

## 2025-06-13 RX ORDER — TC 99M MEDRONATE 20 MG/10ML
22.9 INJECTION, POWDER, LYOPHILIZED, FOR SOLUTION INTRAVENOUS
Status: COMPLETED | OUTPATIENT
Start: 2025-06-13 | End: 2025-06-13

## 2025-06-13 RX ADMIN — TC 99M MEDRONATE 22.9 MILLICURIE: 20 INJECTION, POWDER, LYOPHILIZED, FOR SOLUTION INTRAVENOUS at 12:20

## 2025-06-16 ENCOUNTER — APPOINTMENT (OUTPATIENT)
Dept: WOUND CARE | Facility: HOSPITAL | Age: 80
End: 2025-06-16
Payer: MEDICARE

## 2025-06-23 ENCOUNTER — APPOINTMENT (OUTPATIENT)
Dept: WOUND CARE | Facility: HOSPITAL | Age: 80
End: 2025-06-23
Payer: MEDICARE

## 2025-06-30 ENCOUNTER — APPOINTMENT (OUTPATIENT)
Dept: WOUND CARE | Facility: HOSPITAL | Age: 80
End: 2025-06-30
Payer: MEDICARE

## 2025-07-07 ENCOUNTER — APPOINTMENT (OUTPATIENT)
Dept: WOUND CARE | Facility: HOSPITAL | Age: 80
End: 2025-07-07
Payer: MEDICARE

## 2025-07-07 PROCEDURE — G0463 HOSPITAL OUTPT CLINIC VISIT: HCPCS

## 2025-07-23 LAB
MC_CV_MDC_IDC_RATE_1: 182
MC_CV_MDC_IDC_RATE_1: 214
MC_CV_MDC_IDC_RV_HV_IMPEDANCE: 49
MC_CV_MDC_IDC_THERAPIES: NORMAL
MC_CV_MDC_IDC_THERAPIES: NORMAL
MC_CV_MDC_IDC_ZONE_ID: 3
MC_CV_MDC_IDC_ZONE_ID: 4
MC_CV_MDC_IDC_ZONE_ID: 5
MC_CV_MDC_IDC_ZONE_ID: 6
MDC_IDC_MSMT_BATTERY_REMAINING_LONGEVITY: 104 MO
MDC_IDC_MSMT_BATTERY_RRT_TRIGGER: 2.73
MDC_IDC_MSMT_BATTERY_STATUS: NORMAL
MDC_IDC_MSMT_BATTERY_VOLTAGE: 3.01
MDC_IDC_MSMT_CAP_CHARGE_TIME: 3.71
MDC_IDC_MSMT_LEADCHNL_RV_DTM: NORMAL
MDC_IDC_MSMT_LEADCHNL_RV_IMPEDANCE_VALUE: 361
MDC_IDC_MSMT_LEADCHNL_RV_PACING_THRESHOLD_AMPLITUDE: 0.88
MDC_IDC_MSMT_LEADCHNL_RV_PACING_THRESHOLD_POLARITY: NORMAL
MDC_IDC_MSMT_LEADCHNL_RV_PACING_THRESHOLD_PULSEWIDTH: 0.4
MDC_IDC_MSMT_LEADCHNL_RV_SENSING_INTR_AMPL: 5.5
MDC_IDC_PG_IMPLANT_DTM: NORMAL
MDC_IDC_PG_MFG: NORMAL
MDC_IDC_PG_MODEL: NORMAL
MDC_IDC_PG_SERIAL: NORMAL
MDC_IDC_PG_TYPE: NORMAL
MDC_IDC_SESS_DTM: NORMAL
MDC_IDC_SESS_TYPE: NORMAL
MDC_IDC_SET_BRADY_LOWRATE: 40
MDC_IDC_SET_BRADY_MODE: NORMAL
MDC_IDC_SET_LEADCHNL_RV_PACING_AMPLITUDE: 2
MDC_IDC_SET_LEADCHNL_RV_PACING_POLARITY: NORMAL
MDC_IDC_SET_LEADCHNL_RV_PACING_PULSEWIDTH: 0.4
MDC_IDC_SET_LEADCHNL_RV_SENSING_POLARITY: NORMAL
MDC_IDC_SET_LEADCHNL_RV_SENSING_SENSITIVITY: 0.3
MDC_IDC_SET_ZONE_STATUS: NORMAL
MDC_IDC_SET_ZONE_TYPE: NORMAL
MDC_IDC_STAT_AT_BURDEN_PERCENT: 98.7
MDC_IDC_STAT_BRADY_RV_PERCENT_PACED: 0.68
MDC_IDC_STAT_TACHYTHERAPY_ATP_DELIVERED_RECENT: 0
MDC_IDC_STAT_TACHYTHERAPY_SHOCKS_ABORTED_RECENT: 0
MDC_IDC_STAT_TACHYTHERAPY_SHOCKS_DELIVERED_RECENT: 0

## 2025-08-07 LAB
MC_CV_MDC_IDC_RATE_1: 182
MC_CV_MDC_IDC_RATE_1: 214
MC_CV_MDC_IDC_RV_HV_IMPEDANCE: 48
MC_CV_MDC_IDC_THERAPIES: NORMAL
MC_CV_MDC_IDC_THERAPIES: NORMAL
MC_CV_MDC_IDC_ZONE_ID: 3
MC_CV_MDC_IDC_ZONE_ID: 4
MC_CV_MDC_IDC_ZONE_ID: 5
MC_CV_MDC_IDC_ZONE_ID: 6
MDC_IDC_MSMT_BATTERY_REMAINING_LONGEVITY: 103 MO
MDC_IDC_MSMT_BATTERY_RRT_TRIGGER: 2.73
MDC_IDC_MSMT_BATTERY_STATUS: NORMAL
MDC_IDC_MSMT_BATTERY_VOLTAGE: 3
MDC_IDC_MSMT_CAP_CHARGE_TIME: 3.71
MDC_IDC_MSMT_LEADCHNL_RV_DTM: NORMAL
MDC_IDC_MSMT_LEADCHNL_RV_IMPEDANCE_VALUE: 361
MDC_IDC_MSMT_LEADCHNL_RV_PACING_THRESHOLD_AMPLITUDE: 0.88
MDC_IDC_MSMT_LEADCHNL_RV_PACING_THRESHOLD_POLARITY: NORMAL
MDC_IDC_MSMT_LEADCHNL_RV_PACING_THRESHOLD_PULSEWIDTH: 0.4
MDC_IDC_MSMT_LEADCHNL_RV_SENSING_INTR_AMPL: 5.38
MDC_IDC_PG_IMPLANT_DTM: NORMAL
MDC_IDC_PG_MFG: NORMAL
MDC_IDC_PG_MODEL: NORMAL
MDC_IDC_PG_SERIAL: NORMAL
MDC_IDC_PG_TYPE: NORMAL
MDC_IDC_SESS_DTM: NORMAL
MDC_IDC_SESS_TYPE: NORMAL
MDC_IDC_SET_BRADY_LOWRATE: 40
MDC_IDC_SET_BRADY_MODE: NORMAL
MDC_IDC_SET_LEADCHNL_RV_PACING_AMPLITUDE: 2.25
MDC_IDC_SET_LEADCHNL_RV_PACING_POLARITY: NORMAL
MDC_IDC_SET_LEADCHNL_RV_PACING_PULSEWIDTH: 0.4
MDC_IDC_SET_LEADCHNL_RV_SENSING_POLARITY: NORMAL
MDC_IDC_SET_LEADCHNL_RV_SENSING_SENSITIVITY: 0.3
MDC_IDC_SET_ZONE_STATUS: NORMAL
MDC_IDC_SET_ZONE_TYPE: NORMAL
MDC_IDC_STAT_AT_BURDEN_PERCENT: 98.8
MDC_IDC_STAT_BRADY_RV_PERCENT_PACED: 0.5
MDC_IDC_STAT_TACHYTHERAPY_ATP_DELIVERED_RECENT: 0
MDC_IDC_STAT_TACHYTHERAPY_SHOCKS_ABORTED_RECENT: 0
MDC_IDC_STAT_TACHYTHERAPY_SHOCKS_DELIVERED_RECENT: 0

## 2025-08-22 LAB
MC_CV_MDC_IDC_RATE_1: 182
MC_CV_MDC_IDC_RATE_1: 214
MC_CV_MDC_IDC_RV_HV_IMPEDANCE: 48
MC_CV_MDC_IDC_THERAPIES: NORMAL
MC_CV_MDC_IDC_THERAPIES: NORMAL
MC_CV_MDC_IDC_ZONE_ID: 3
MC_CV_MDC_IDC_ZONE_ID: 4
MC_CV_MDC_IDC_ZONE_ID: 5
MC_CV_MDC_IDC_ZONE_ID: 6
MDC_IDC_MSMT_BATTERY_REMAINING_LONGEVITY: 103 MO
MDC_IDC_MSMT_BATTERY_RRT_TRIGGER: 2.73
MDC_IDC_MSMT_BATTERY_STATUS: NORMAL
MDC_IDC_MSMT_BATTERY_VOLTAGE: 3
MDC_IDC_MSMT_CAP_CHARGE_TIME: 3.71
MDC_IDC_MSMT_LEADCHNL_RV_DTM: NORMAL
MDC_IDC_MSMT_LEADCHNL_RV_IMPEDANCE_VALUE: 361
MDC_IDC_MSMT_LEADCHNL_RV_PACING_THRESHOLD_AMPLITUDE: 0.88
MDC_IDC_MSMT_LEADCHNL_RV_PACING_THRESHOLD_POLARITY: NORMAL
MDC_IDC_MSMT_LEADCHNL_RV_PACING_THRESHOLD_PULSEWIDTH: 0.4
MDC_IDC_MSMT_LEADCHNL_RV_SENSING_INTR_AMPL: 5.38
MDC_IDC_PG_IMPLANT_DTM: NORMAL
MDC_IDC_PG_MFG: NORMAL
MDC_IDC_PG_MODEL: NORMAL
MDC_IDC_PG_SERIAL: NORMAL
MDC_IDC_PG_TYPE: NORMAL
MDC_IDC_SESS_DTM: NORMAL
MDC_IDC_SESS_TYPE: NORMAL
MDC_IDC_SET_BRADY_LOWRATE: 40
MDC_IDC_SET_BRADY_MODE: NORMAL
MDC_IDC_SET_LEADCHNL_RV_PACING_AMPLITUDE: 2.25
MDC_IDC_SET_LEADCHNL_RV_PACING_POLARITY: NORMAL
MDC_IDC_SET_LEADCHNL_RV_PACING_PULSEWIDTH: 0.4
MDC_IDC_SET_LEADCHNL_RV_SENSING_POLARITY: NORMAL
MDC_IDC_SET_LEADCHNL_RV_SENSING_SENSITIVITY: 0.3
MDC_IDC_SET_ZONE_STATUS: NORMAL
MDC_IDC_SET_ZONE_TYPE: NORMAL
MDC_IDC_STAT_AT_BURDEN_PERCENT: 98.8
MDC_IDC_STAT_BRADY_RV_PERCENT_PACED: 0.5
MDC_IDC_STAT_TACHYTHERAPY_ATP_DELIVERED_RECENT: 0
MDC_IDC_STAT_TACHYTHERAPY_SHOCKS_ABORTED_RECENT: 0
MDC_IDC_STAT_TACHYTHERAPY_SHOCKS_DELIVERED_RECENT: 0

## (undated) DEVICE — INTENDED USE FOR SURGICAL MARKING ON INTACT SKIN, ALSO PROVIDES A PERMANENT METHOD OF IDENTIFYING OBJECTS IN THE OPERATING ROOM: Brand: WRITESITE® REGULAR TIP SKIN MARKER

## (undated) DEVICE — GLV SURG SENSICARE PI PF LF 7 GRN STRL

## (undated) DEVICE — DECANTER BAG 9": Brand: MEDLINE INDUSTRIES, INC.

## (undated) DEVICE — TRANSFER SET 3": Brand: MEDLINE INDUSTRIES, INC.

## (undated) DEVICE — PAD,NON-ADHERENT,3X8,STERILE,LF,1/PK: Brand: MEDLINE

## (undated) DEVICE — GW PRESSUREWIRE AERIS W/ AGILE TP 175CM

## (undated) DEVICE — GW EMR FIX EXCHG J STD .035 3MM 260CM

## (undated) DEVICE — APPL CHLORAPREP HI/LITE 26ML ORNG

## (undated) DEVICE — PREP SOL POVIDONE/IODINE BT 4OZ

## (undated) DEVICE — CATH DIAG IMPULSE PIG 5F 100CM

## (undated) DEVICE — LOU PACE DEFIB: Brand: MEDLINE INDUSTRIES, INC.

## (undated) DEVICE — CATH DIAG IMPULSE FR5 5F 100CM

## (undated) DEVICE — SOL ISO/ALC RUB 70PCT 4OZ

## (undated) DEVICE — Device

## (undated) DEVICE — BND PRESS RADL COMFRT 14IN STRL

## (undated) DEVICE — IMMOB KN 3PNL DLX CANVS 19IN BLU

## (undated) DEVICE — PUMP PAIN AUTOFUSER AUTO SELCT NOBOLUS 1TO14ML/HR 550ML DISP

## (undated) DEVICE — KT CATH NERV BLCK ONQ QUIKBLCK 20GA 100MM

## (undated) DEVICE — KT MANIFLD CARDIAC

## (undated) DEVICE — SYS CLS SKIN PREMIERPRO EXOFINFUSION 22CM

## (undated) DEVICE — WEBRIL* CAST PADDING: Brand: DEROYAL

## (undated) DEVICE — SCRW HEX CORT HD 3.5X38MM
Type: IMPLANTABLE DEVICE | Site: KNEE | Status: NON-FUNCTIONAL
Removed: 2024-07-23

## (undated) DEVICE — PENCL E/S HNDSWCH ROCKR CB

## (undated) DEVICE — IMPLANTABLE DEVICE
Type: IMPLANTABLE DEVICE | Site: KNEE | Status: NON-FUNCTIONAL
Brand: PERSONA™
Removed: 2024-07-23

## (undated) DEVICE — PENCL SMOKE/EVAC MEGADYNE TELESCP 10FT

## (undated) DEVICE — PK KN TOTL 90

## (undated) DEVICE — KT MIX CMT PALAMIX/UNO VAC WO/CMT

## (undated) DEVICE — WRAP KN COMPR COLD/THERP

## (undated) DEVICE — SUT VIC 0 CT1 36IN J946H

## (undated) DEVICE — 6F .070 XB 3 100CM: Brand: VISTA BRITE TIP

## (undated) DEVICE — CATH DIAG IMPULSE FL3.5 5F 100CM

## (undated) DEVICE — SUT VIC 2/0 CT1 CR8 18IN J839D

## (undated) DEVICE — CAP GUIDE PSI/SIGNATURE/I-ASSIST

## (undated) DEVICE — DRP SURG U/DRP INVISISHIELD PA 48X52IN

## (undated) DEVICE — YANKAUER,BULB TIP,W/O VENT,RIGID,STERILE: Brand: MEDLINE

## (undated) DEVICE — GLIDESHEATH SLENDER STAINLESS STEEL KIT: Brand: GLIDESHEATH SLENDER

## (undated) DEVICE — PK CATH CARD 40

## (undated) DEVICE — ARGYLE FRAZIER SURGICAL SUCTION INSTRUMENT 10 FR/CH (3.3 MM): Brand: ARGYLE

## (undated) DEVICE — PLASMABLADE PS200-040 4.0: Brand: PLASMABLADE™

## (undated) DEVICE — DUAL CUT SAGITTAL BLADE

## (undated) DEVICE — SPNG GZ WOVN 4X4IN 12PLY 10/BX STRL

## (undated) DEVICE — 3M™ DURAPORE™ SURGICAL TAPE 2 INCHES X 10YARDS (5.0CM X 9.1M) 6ROLLS/CARTON 10CARTONS/CASE 1538-2: Brand: 3M™ DURAPORE™

## (undated) DEVICE — FOAM BUMP ROUND LARGE: Brand: MEDLINE INDUSTRIES, INC.

## (undated) DEVICE — DISPOSABLE TOURNIQUET CUFF 34"X4", 1-LINE, BLUE, STERILE, 1EA/PK, 10PK/CS: Brand: ASP MEDICAL

## (undated) DEVICE — SOL IRR H2O BTL 1000ML STRL

## (undated) DEVICE — GLV SURG SENSICARE PF POLYISPRN SZ8 LF

## (undated) DEVICE — MAT FLR ABSORBENT LG 4FT 10 2.5FT

## (undated) DEVICE — INTENDED FOR TISSUE SEPARATION, AND OTHER PROCEDURES THAT REQUIRE A SHARP SURGICAL BLADE TO PUNCTURE OR CUT.: Brand: BARD-PARKER ® STAINLESS STEEL BLADES

## (undated) DEVICE — GLV SURG SENSICARE PI LF PF 7.0